# Patient Record
Sex: FEMALE | Race: ASIAN | NOT HISPANIC OR LATINO | Employment: UNEMPLOYED | URBAN - METROPOLITAN AREA
[De-identification: names, ages, dates, MRNs, and addresses within clinical notes are randomized per-mention and may not be internally consistent; named-entity substitution may affect disease eponyms.]

---

## 2017-01-04 ENCOUNTER — APPOINTMENT (OUTPATIENT)
Dept: INTERPRETER SERVICES | Age: 67
End: 2017-01-04

## 2017-01-04 ENCOUNTER — OFFICE VISIT (OUTPATIENT)
Dept: FAMILY MEDICINE | Age: 67
End: 2017-01-04

## 2017-01-04 VITALS
HEIGHT: 59 IN | HEART RATE: 62 BPM | WEIGHT: 167.11 LBS | SYSTOLIC BLOOD PRESSURE: 162 MMHG | BODY MASS INDEX: 33.69 KG/M2 | DIASTOLIC BLOOD PRESSURE: 88 MMHG

## 2017-01-04 DIAGNOSIS — I10 BENIGN ESSENTIAL HYPERTENSION: ICD-10-CM

## 2017-01-04 DIAGNOSIS — Z23 NEED FOR VACCINATION: ICD-10-CM

## 2017-01-04 PROCEDURE — 1036F TOBACCO NON-USER: CPT | Performed by: FAMILY MEDICINE

## 2017-01-04 PROCEDURE — 99214 OFFICE O/P EST MOD 30 MIN: CPT | Performed by: FAMILY MEDICINE

## 2017-01-04 PROCEDURE — G8427 DOCREV CUR MEDS BY ELIG CLIN: HCPCS | Performed by: FAMILY MEDICINE

## 2017-01-04 PROCEDURE — 3017F COLORECTAL CA SCREEN DOC REV: CPT | Performed by: FAMILY MEDICINE

## 2017-01-04 PROCEDURE — G8732 NO DOC OF PAIN: HCPCS | Performed by: FAMILY MEDICINE

## 2017-01-04 PROCEDURE — 3014F SCREEN MAMMO DOC REV: CPT | Performed by: FAMILY MEDICINE

## 2017-01-04 PROCEDURE — 3045F MOST RECENT HEMOGLOBIN A1C 7.0%-9.0%: CPT | Performed by: FAMILY MEDICINE

## 2017-01-04 RX ORDER — SIMVASTATIN 10 MG
10 TABLET ORAL EVERY EVENING
Qty: 90 TABLET | Refills: 1 | Status: SHIPPED | OUTPATIENT
Start: 2017-01-04 | End: 2018-05-08 | Stop reason: SDUPTHER

## 2017-01-04 RX ORDER — PEN NEEDLE, DIABETIC 30 GX5/16"
1 NEEDLE, DISPOSABLE MISCELLANEOUS NIGHTLY
Qty: 100 EACH | Refills: 2 | Status: SHIPPED | OUTPATIENT
Start: 2017-01-04 | End: 2018-05-08 | Stop reason: SDUPTHER

## 2017-01-05 ENCOUNTER — TELEPHONE (OUTPATIENT)
Dept: FAMILY MEDICINE | Age: 67
End: 2017-01-05

## 2017-01-13 ENCOUNTER — OFFICE VISIT (OUTPATIENT)
Dept: EDUCATION SERVICES | Age: 67
End: 2017-01-13
Attending: FAMILY MEDICINE

## 2017-01-13 ENCOUNTER — APPOINTMENT (OUTPATIENT)
Dept: INTERPRETER SERVICES | Age: 67
End: 2017-01-13

## 2017-01-13 VITALS — BODY MASS INDEX: 33.57 KG/M2 | WEIGHT: 166.23 LBS

## 2017-01-13 PROCEDURE — G0108 DIAB MANAGE TRN  PER INDIV: HCPCS | Performed by: REGISTERED NURSE

## 2017-01-13 PROCEDURE — G8428 CUR MEDS NOT DOCUMENT: HCPCS | Performed by: REGISTERED NURSE

## 2017-01-17 ENCOUNTER — OFFICE VISIT (OUTPATIENT)
Dept: EDUCATION SERVICES | Age: 67
End: 2017-01-17

## 2017-01-17 VITALS — BODY MASS INDEX: 33.31 KG/M2 | WEIGHT: 164.9 LBS

## 2017-01-17 DIAGNOSIS — Z71.3 DIETARY COUNSELING AND SURVEILLANCE: ICD-10-CM

## 2017-01-17 PROCEDURE — G8427 DOCREV CUR MEDS BY ELIG CLIN: HCPCS | Performed by: DIETITIAN, REGISTERED

## 2017-01-17 PROCEDURE — 3045F MOST RECENT HEMOGLOBIN A1C 7.0%-9.0%: CPT | Performed by: DIETITIAN, REGISTERED

## 2017-01-17 PROCEDURE — 97802 MEDICAL NUTRITION INDIV IN: CPT | Performed by: DIETITIAN, REGISTERED

## 2017-01-19 ENCOUNTER — TELEPHONE (OUTPATIENT)
Dept: FAMILY MEDICINE | Age: 67
End: 2017-01-19

## 2017-01-19 ENCOUNTER — OFFICE VISIT (OUTPATIENT)
Dept: FAMILY MEDICINE | Age: 67
End: 2017-01-19

## 2017-01-19 ENCOUNTER — TELEPHONE (OUTPATIENT)
Dept: EDUCATION SERVICES | Age: 67
End: 2017-01-19

## 2017-01-19 VITALS
WEIGHT: 165.7 LBS | SYSTOLIC BLOOD PRESSURE: 130 MMHG | TEMPERATURE: 97.9 F | BODY MASS INDEX: 33.4 KG/M2 | HEIGHT: 59 IN | HEART RATE: 64 BPM | DIASTOLIC BLOOD PRESSURE: 78 MMHG

## 2017-01-19 PROCEDURE — 3014F SCREEN MAMMO DOC REV: CPT | Performed by: FAMILY MEDICINE

## 2017-01-19 PROCEDURE — 90662 IIV NO PRSV INCREASED AG IM: CPT | Performed by: FAMILY MEDICINE

## 2017-01-19 PROCEDURE — 1036F TOBACCO NON-USER: CPT | Performed by: FAMILY MEDICINE

## 2017-01-19 PROCEDURE — 3045F MOST RECENT HEMOGLOBIN A1C 7.0%-9.0%: CPT | Performed by: FAMILY MEDICINE

## 2017-01-19 PROCEDURE — 3017F COLORECTAL CA SCREEN DOC REV: CPT | Performed by: FAMILY MEDICINE

## 2017-01-19 PROCEDURE — G0008 ADMIN INFLUENZA VIRUS VAC: HCPCS | Performed by: FAMILY MEDICINE

## 2017-01-19 PROCEDURE — G8427 DOCREV CUR MEDS BY ELIG CLIN: HCPCS | Performed by: FAMILY MEDICINE

## 2017-01-19 PROCEDURE — 99213 OFFICE O/P EST LOW 20 MIN: CPT | Performed by: FAMILY MEDICINE

## 2017-01-19 PROCEDURE — G8732 NO DOC OF PAIN: HCPCS | Performed by: FAMILY MEDICINE

## 2017-01-19 RX ORDER — LANCETS 28 GAUGE
EACH MISCELLANEOUS
Qty: 300 EACH | Refills: 3 | Status: SHIPPED | OUTPATIENT
Start: 2017-01-19 | End: 2017-06-05 | Stop reason: SDUPTHER

## 2017-01-19 RX ORDER — LANCETS 28 GAUGE
EACH MISCELLANEOUS
Qty: 100 EACH | Refills: 3 | Status: SHIPPED | OUTPATIENT
Start: 2017-01-19 | End: 2017-01-19 | Stop reason: SDUPTHER

## 2017-03-06 ENCOUNTER — COMMUNICATION - HEALTHEAST (OUTPATIENT)
Dept: FAMILY MEDICINE | Facility: CLINIC | Age: 67
End: 2017-03-06

## 2017-03-06 DIAGNOSIS — E11.9 DIABETES (H): ICD-10-CM

## 2017-03-13 ENCOUNTER — OFFICE VISIT - HEALTHEAST (OUTPATIENT)
Dept: FAMILY MEDICINE | Facility: CLINIC | Age: 67
End: 2017-03-13

## 2017-03-13 ENCOUNTER — COMMUNICATION - HEALTHEAST (OUTPATIENT)
Dept: FAMILY MEDICINE | Facility: CLINIC | Age: 67
End: 2017-03-13

## 2017-03-13 DIAGNOSIS — M65.312 TRIGGER FINGER OF LEFT THUMB: ICD-10-CM

## 2017-03-13 DIAGNOSIS — E11.43 TYPE 2 DIABETES MELLITUS WITH DIABETIC AUTONOMIC NEUROPATHY, WITHOUT LONG-TERM CURRENT USE OF INSULIN (H): ICD-10-CM

## 2017-03-13 DIAGNOSIS — G62.9 PERIPHERAL POLYNEUROPATHY: ICD-10-CM

## 2017-03-13 DIAGNOSIS — Z85.41 HISTORY OF CERVICAL CANCER: ICD-10-CM

## 2017-03-13 LAB — HBA1C MFR BLD: 7.7 % (ref 3.5–6)

## 2017-03-16 LAB
HPV INTERPRETATION - HISTORICAL: NORMAL
HPV INTERPRETER - HISTORICAL: NORMAL

## 2017-03-20 LAB

## 2017-04-12 ENCOUNTER — COMMUNICATION - HEALTHEAST (OUTPATIENT)
Dept: FAMILY MEDICINE | Facility: CLINIC | Age: 67
End: 2017-04-12

## 2017-05-09 ENCOUNTER — HOSPITAL ENCOUNTER (OUTPATIENT)
Dept: MAMMOGRAPHY | Facility: HOSPITAL | Age: 67
Discharge: HOME | End: 2017-05-09
Attending: FAMILY MEDICINE

## 2017-05-09 DIAGNOSIS — Z12.31 VISIT FOR SCREENING MAMMOGRAM: ICD-10-CM

## 2017-06-05 ENCOUNTER — TELEPHONE (OUTPATIENT)
Dept: FAMILY MEDICINE | Age: 67
End: 2017-06-05

## 2017-06-05 DIAGNOSIS — Z79.4 TYPE 2 DIABETES MELLITUS WITH DIABETIC NEPHROPATHY, WITH LONG-TERM CURRENT USE OF INSULIN (CMD): Primary | ICD-10-CM

## 2017-06-05 DIAGNOSIS — E11.21 TYPE 2 DIABETES MELLITUS WITH DIABETIC NEPHROPATHY, WITH LONG-TERM CURRENT USE OF INSULIN (CMD): Primary | ICD-10-CM

## 2017-06-05 RX ORDER — LANCETS 28 GAUGE
EACH MISCELLANEOUS
Qty: 300 EACH | Refills: 3 | Status: SHIPPED | OUTPATIENT
Start: 2017-06-05 | End: 2019-01-22 | Stop reason: SDUPTHER

## 2017-06-06 RX ORDER — BLOOD SUGAR DIAGNOSTIC
STRIP MISCELLANEOUS
Qty: 350 STRIP | Refills: 3 | Status: SHIPPED | OUTPATIENT
Start: 2017-06-06 | End: 2019-01-22 | Stop reason: SDUPTHER

## 2017-06-13 ENCOUNTER — OFFICE VISIT - HEALTHEAST (OUTPATIENT)
Dept: FAMILY MEDICINE | Facility: CLINIC | Age: 67
End: 2017-06-13

## 2017-06-13 DIAGNOSIS — E11.9 TYPE 2 DIABETES MELLITUS (H): ICD-10-CM

## 2017-06-13 DIAGNOSIS — E11.43 TYPE 2 DIABETES MELLITUS WITH DIABETIC AUTONOMIC NEUROPATHY, WITHOUT LONG-TERM CURRENT USE OF INSULIN (H): ICD-10-CM

## 2017-06-13 DIAGNOSIS — E11.9 DIABETES (H): ICD-10-CM

## 2017-06-13 LAB — HBA1C MFR BLD: 7.7 % (ref 3.5–6)

## 2017-06-14 LAB
CHOLEST SERPL-MCNC: 137 MG/DL
FASTING STATUS PATIENT QL REPORTED: YES
HDLC SERPL-MCNC: 57 MG/DL
LDLC SERPL CALC-MCNC: 62 MG/DL
TRIGL SERPL-MCNC: 88 MG/DL

## 2017-06-19 ENCOUNTER — COMMUNICATION - HEALTHEAST (OUTPATIENT)
Dept: FAMILY MEDICINE | Facility: CLINIC | Age: 67
End: 2017-06-19

## 2017-06-27 ENCOUNTER — LAB SERVICES (OUTPATIENT)
Dept: LAB | Age: 67
End: 2017-06-27

## 2017-06-27 ENCOUNTER — OFFICE VISIT (OUTPATIENT)
Dept: FAMILY MEDICINE | Age: 67
End: 2017-06-27

## 2017-06-27 VITALS
BODY MASS INDEX: 32.98 KG/M2 | SYSTOLIC BLOOD PRESSURE: 138 MMHG | TEMPERATURE: 96 F | DIASTOLIC BLOOD PRESSURE: 87 MMHG | HEIGHT: 59 IN | WEIGHT: 163.58 LBS | HEART RATE: 60 BPM

## 2017-06-27 DIAGNOSIS — N18.2 CKD (CHRONIC KIDNEY DISEASE) STAGE 2, GFR 60-89 ML/MIN: ICD-10-CM

## 2017-06-27 DIAGNOSIS — Z00.00 MEDICARE ANNUAL WELLNESS VISIT, SUBSEQUENT: Primary | ICD-10-CM

## 2017-06-27 DIAGNOSIS — I10 BENIGN ESSENTIAL HYPERTENSION: ICD-10-CM

## 2017-06-27 DIAGNOSIS — R20.0 LEFT ARM NUMBNESS: ICD-10-CM

## 2017-06-27 LAB
ANION GAP SERPL CALC-SCNC: 8 MMOL/L (ref 10–20)
BUN SERPL-MCNC: 14 MG/DL (ref 6–20)
BUN/CREAT SERPL: 16 (ref 7–25)
CALCIUM SERPL-MCNC: 10.2 MG/DL (ref 8.4–10.2)
CHLORIDE SERPL-SCNC: 104 MMOL/L (ref 98–107)
CO2 SERPL-SCNC: 33 MMOL/L (ref 21–32)
CREAT SERPL-MCNC: 0.87 MG/DL (ref 0.51–0.95)
FASTING STATUS PATIENT QL REPORTED: ABNORMAL HRS
GLUCOSE SERPL-MCNC: 114 MG/DL (ref 65–99)
POTASSIUM SERPL-SCNC: 4.9 MMOL/L (ref 3.4–5.1)
SODIUM SERPL-SCNC: 140 MMOL/L (ref 135–145)

## 2017-06-27 PROCEDURE — 3017F COLORECTAL CA SCREEN DOC REV: CPT | Performed by: FAMILY MEDICINE

## 2017-06-27 PROCEDURE — G8732 NO DOC OF PAIN: HCPCS | Performed by: FAMILY MEDICINE

## 2017-06-27 PROCEDURE — 3014F SCREEN MAMMO DOC REV: CPT | Performed by: FAMILY MEDICINE

## 2017-06-27 PROCEDURE — G0439 PPPS, SUBSEQ VISIT: HCPCS | Performed by: FAMILY MEDICINE

## 2017-06-27 PROCEDURE — 3045F MOST RECENT HEMOGLOBIN A1C 7.0%-9.0%: CPT | Performed by: FAMILY MEDICINE

## 2017-06-27 PROCEDURE — 99214 OFFICE O/P EST MOD 30 MIN: CPT | Performed by: FAMILY MEDICINE

## 2017-06-27 PROCEDURE — 80048 BASIC METABOLIC PNL TOTAL CA: CPT | Performed by: INTERNAL MEDICINE

## 2017-06-27 PROCEDURE — G8427 DOCREV CUR MEDS BY ELIG CLIN: HCPCS | Performed by: FAMILY MEDICINE

## 2017-06-27 PROCEDURE — 1036F TOBACCO NON-USER: CPT | Performed by: FAMILY MEDICINE

## 2017-06-27 PROCEDURE — 36415 COLL VENOUS BLD VENIPUNCTURE: CPT | Performed by: INTERNAL MEDICINE

## 2017-06-27 ASSESSMENT — PATIENT HEALTH QUESTIONNAIRE - PHQ9
2. FEELING DOWN, DEPRESSED OR HOPELESS: NO
1. LITTLE INTEREST OR PLEASURE IN DOING THINGS: NO

## 2017-06-28 ENCOUNTER — TELEPHONE (OUTPATIENT)
Dept: FAMILY MEDICINE | Age: 67
End: 2017-06-28

## 2017-06-28 DIAGNOSIS — E11.9 CONTROLLED TYPE 2 DIABETES MELLITUS WITHOUT COMPLICATION, WITHOUT LONG-TERM CURRENT USE OF INSULIN (CMD): Primary | ICD-10-CM

## 2017-06-28 LAB — HBA1C MFR BLD: 6.5 % (ref 4.5–5.6)

## 2017-07-26 ENCOUNTER — OFFICE VISIT (OUTPATIENT)
Dept: NEUROLOGY | Age: 67
End: 2017-07-26
Attending: FAMILY MEDICINE

## 2017-07-26 ENCOUNTER — NURSE ONLY (OUTPATIENT)
Dept: FAMILY MEDICINE | Age: 67
End: 2017-07-26

## 2017-07-26 ENCOUNTER — APPOINTMENT (OUTPATIENT)
Dept: INTERPRETER SERVICES | Age: 67
End: 2017-07-26

## 2017-07-26 DIAGNOSIS — R20.0 LEFT ARM NUMBNESS: Primary | ICD-10-CM

## 2017-07-26 PROCEDURE — 95886 MUSC TEST DONE W/N TEST COMP: CPT | Performed by: PSYCHIATRY & NEUROLOGY

## 2017-07-26 PROCEDURE — 95909 NRV CNDJ TST 5-6 STUDIES: CPT | Performed by: PSYCHIATRY & NEUROLOGY

## 2017-07-26 RX ORDER — LOSARTAN POTASSIUM 50 MG/1
50 TABLET ORAL 2 TIMES DAILY
Qty: 180 TABLET | Refills: 1 | Status: SHIPPED | OUTPATIENT
Start: 2017-07-26 | End: 2020-02-05 | Stop reason: SDUPTHER

## 2017-07-26 RX ORDER — AMLODIPINE BESYLATE 10 MG/1
10 TABLET ORAL DAILY
Qty: 90 TABLET | Refills: 1 | Status: SHIPPED | OUTPATIENT
Start: 2017-07-26 | End: 2018-05-08 | Stop reason: SDUPTHER

## 2017-07-31 ENCOUNTER — TELEPHONE (OUTPATIENT)
Dept: FAMILY MEDICINE | Age: 67
End: 2017-07-31

## 2017-09-19 ENCOUNTER — OFFICE VISIT - HEALTHEAST (OUTPATIENT)
Dept: FAMILY MEDICINE | Facility: CLINIC | Age: 67
End: 2017-09-19

## 2017-09-19 DIAGNOSIS — E11.9 DIABETES (H): ICD-10-CM

## 2017-09-19 LAB — HBA1C MFR BLD: 7.9 % (ref 3.5–6)

## 2017-10-03 ENCOUNTER — OFFICE VISIT - HEALTHEAST (OUTPATIENT)
Dept: FAMILY MEDICINE | Facility: CLINIC | Age: 67
End: 2017-10-03

## 2017-10-03 DIAGNOSIS — H61.21 RIGHT EAR IMPACTED CERUMEN: ICD-10-CM

## 2017-10-30 RX ORDER — INSULIN GLARGINE 100 [IU]/ML
INJECTION, SOLUTION SUBCUTANEOUS
Qty: 15 ML | Refills: 5 | Status: SHIPPED | OUTPATIENT
Start: 2017-10-30 | End: 2018-05-08 | Stop reason: SDUPTHER

## 2017-12-06 ENCOUNTER — COMMUNICATION - HEALTHEAST (OUTPATIENT)
Dept: FAMILY MEDICINE | Facility: CLINIC | Age: 67
End: 2017-12-06

## 2017-12-06 DIAGNOSIS — E11.9 TYPE 2 DIABETES MELLITUS (H): ICD-10-CM

## 2017-12-18 ENCOUNTER — OFFICE VISIT - HEALTHEAST (OUTPATIENT)
Dept: FAMILY MEDICINE | Facility: CLINIC | Age: 67
End: 2017-12-18

## 2017-12-18 DIAGNOSIS — E11.43 TYPE 2 DIABETES MELLITUS WITH DIABETIC AUTONOMIC NEUROPATHY, WITHOUT LONG-TERM CURRENT USE OF INSULIN (H): ICD-10-CM

## 2017-12-18 DIAGNOSIS — Z12.11 SCREENING FOR COLON CANCER: ICD-10-CM

## 2017-12-19 ENCOUNTER — AMBULATORY - HEALTHEAST (OUTPATIENT)
Dept: LAB | Facility: CLINIC | Age: 67
End: 2017-12-19

## 2017-12-19 DIAGNOSIS — E11.43 TYPE 2 DIABETES MELLITUS WITH DIABETIC AUTONOMIC NEUROPATHY, WITHOUT LONG-TERM CURRENT USE OF INSULIN (H): ICD-10-CM

## 2017-12-19 LAB — HBA1C MFR BLD: 7.8 % (ref 3.5–6)

## 2018-01-08 ENCOUNTER — RECORDS - HEALTHEAST (OUTPATIENT)
Dept: ADMINISTRATIVE | Facility: OTHER | Age: 68
End: 2018-01-08

## 2018-01-31 ENCOUNTER — TELEPHONE (OUTPATIENT)
Dept: FAMILY MEDICINE | Age: 68
End: 2018-01-31

## 2018-01-31 RX ORDER — METFORMIN HYDROCHLORIDE 500 MG/1
2000 TABLET, EXTENDED RELEASE ORAL
Qty: 360 TABLET | Refills: 0 | Status: SHIPPED | OUTPATIENT
Start: 2018-01-31 | End: 2019-08-21 | Stop reason: SDUPTHER

## 2018-03-19 ENCOUNTER — OFFICE VISIT - HEALTHEAST (OUTPATIENT)
Dept: FAMILY MEDICINE | Facility: CLINIC | Age: 68
End: 2018-03-19

## 2018-03-19 DIAGNOSIS — E11.43 TYPE 2 DIABETES MELLITUS WITH DIABETIC AUTONOMIC NEUROPATHY, WITHOUT LONG-TERM CURRENT USE OF INSULIN (H): ICD-10-CM

## 2018-03-19 LAB — HBA1C MFR BLD: 7.7 % (ref 3.5–6)

## 2018-03-20 ENCOUNTER — APPOINTMENT (OUTPATIENT)
Dept: INTERPRETER SERVICES | Age: 68
End: 2018-03-20

## 2018-03-20 ENCOUNTER — OFFICE VISIT (OUTPATIENT)
Dept: FAMILY MEDICINE | Age: 68
End: 2018-03-20

## 2018-03-20 VITALS
TEMPERATURE: 98.1 F | WEIGHT: 167.77 LBS | DIASTOLIC BLOOD PRESSURE: 74 MMHG | HEART RATE: 68 BPM | SYSTOLIC BLOOD PRESSURE: 154 MMHG | HEIGHT: 59 IN | BODY MASS INDEX: 33.82 KG/M2

## 2018-03-20 DIAGNOSIS — I10 BENIGN ESSENTIAL HYPERTENSION: Primary | ICD-10-CM

## 2018-03-20 DIAGNOSIS — N18.30 CONTROLLED TYPE 2 DIABETES MELLITUS WITH STAGE 3 CHRONIC KIDNEY DISEASE, WITH LONG-TERM CURRENT USE OF INSULIN (CMD): ICD-10-CM

## 2018-03-20 DIAGNOSIS — Z79.4 CONTROLLED TYPE 2 DIABETES MELLITUS WITH STAGE 3 CHRONIC KIDNEY DISEASE, WITH LONG-TERM CURRENT USE OF INSULIN (CMD): ICD-10-CM

## 2018-03-20 DIAGNOSIS — E11.22 CONTROLLED TYPE 2 DIABETES MELLITUS WITH STAGE 3 CHRONIC KIDNEY DISEASE, WITH LONG-TERM CURRENT USE OF INSULIN (CMD): ICD-10-CM

## 2018-03-20 PROCEDURE — 99214 OFFICE O/P EST MOD 30 MIN: CPT | Performed by: FAMILY MEDICINE

## 2018-03-23 ENCOUNTER — NURSE ONLY (OUTPATIENT)
Dept: FAMILY MEDICINE | Age: 68
End: 2018-03-23

## 2018-03-23 ENCOUNTER — LAB SERVICES (OUTPATIENT)
Dept: LAB | Age: 68
End: 2018-03-23

## 2018-03-23 ENCOUNTER — TELEPHONE (OUTPATIENT)
Dept: FAMILY MEDICINE | Age: 68
End: 2018-03-23

## 2018-03-23 VITALS — SYSTOLIC BLOOD PRESSURE: 130 MMHG | HEART RATE: 80 BPM | DIASTOLIC BLOOD PRESSURE: 78 MMHG

## 2018-03-23 DIAGNOSIS — E11.9 CONTROLLED TYPE 2 DIABETES MELLITUS WITHOUT COMPLICATION, WITHOUT LONG-TERM CURRENT USE OF INSULIN (CMD): ICD-10-CM

## 2018-03-23 LAB
ALBUMIN SERPL-MCNC: 4 G/DL (ref 3.6–5.1)
ALBUMIN/GLOB SERPL: 1 {RATIO} (ref 1–2.4)
ALP SERPL-CCNC: 103 UNITS/L (ref 45–117)
ALT SERPL-CCNC: 32 UNITS/L
ANION GAP SERPL CALC-SCNC: 14 MMOL/L (ref 10–20)
AST SERPL-CCNC: 24 UNITS/L
BILIRUB SERPL-MCNC: 0.6 MG/DL (ref 0.2–1)
BUN SERPL-MCNC: 14 MG/DL (ref 6–20)
BUN/CREAT SERPL: 17 (ref 7–25)
CALCIUM SERPL-MCNC: 10.3 MG/DL (ref 8.4–10.2)
CHLORIDE SERPL-SCNC: 103 MMOL/L (ref 98–107)
CHOLEST SERPL-MCNC: 195 MG/DL
CHOLEST/HDLC SERPL: 4.6 {RATIO}
CO2 SERPL-SCNC: 28 MMOL/L (ref 21–32)
CREAT SERPL-MCNC: 0.81 MG/DL (ref 0.51–0.95)
FASTING STATUS PATIENT QL REPORTED: 12 HRS
GLOBULIN SER-MCNC: 4.2 G/DL (ref 2–4)
GLUCOSE SERPL-MCNC: 137 MG/DL (ref 65–99)
HDLC SERPL-MCNC: 42 MG/DL
LDLC SERPL-MCNC: 111 MG/DL
LENGTH OF FAST TIME PATIENT: 12 HRS
NONHDLC SERPL-MCNC: 153 MG/DL
POTASSIUM SERPL-SCNC: 4.2 MMOL/L (ref 3.4–5.1)
PROT SERPL-MCNC: 8.2 G/DL (ref 6.4–8.2)
SODIUM SERPL-SCNC: 141 MMOL/L (ref 135–145)
TRIGL SERPL-MCNC: 211 MG/DL

## 2018-03-23 PROCEDURE — 36415 COLL VENOUS BLD VENIPUNCTURE: CPT | Performed by: INTERNAL MEDICINE

## 2018-03-23 PROCEDURE — 80053 COMPREHEN METABOLIC PANEL: CPT | Performed by: INTERNAL MEDICINE

## 2018-03-24 LAB
CREAT UR-MCNC: 232 MG/DL
HBA1C MFR BLD: 7.1 % (ref 4.5–5.6)
MICROALBUMIN UR-MCNC: 9.36 MG/DL
MICROALBUMIN/CREAT UR: 40.3 MCG/MG

## 2018-03-26 ENCOUNTER — TELEPHONE (OUTPATIENT)
Dept: FAMILY MEDICINE | Age: 68
End: 2018-03-26

## 2018-03-27 ENCOUNTER — APPOINTMENT (OUTPATIENT)
Dept: INTERPRETER SERVICES | Age: 68
End: 2018-03-27

## 2018-03-27 ENCOUNTER — OFFICE VISIT (OUTPATIENT)
Dept: OPHTHALMOLOGY | Age: 68
End: 2018-03-27
Attending: FAMILY MEDICINE

## 2018-03-27 DIAGNOSIS — E11.22 CONTROLLED TYPE 2 DIABETES MELLITUS WITH STAGE 3 CHRONIC KIDNEY DISEASE, WITH LONG-TERM CURRENT USE OF INSULIN (CMD): Primary | ICD-10-CM

## 2018-03-27 DIAGNOSIS — Z79.4 CONTROLLED TYPE 2 DIABETES MELLITUS WITH STAGE 3 CHRONIC KIDNEY DISEASE, WITH LONG-TERM CURRENT USE OF INSULIN (CMD): Primary | ICD-10-CM

## 2018-03-27 DIAGNOSIS — N18.30 CONTROLLED TYPE 2 DIABETES MELLITUS WITH STAGE 3 CHRONIC KIDNEY DISEASE, WITH LONG-TERM CURRENT USE OF INSULIN (CMD): Primary | ICD-10-CM

## 2018-03-27 DIAGNOSIS — H25.813 COMBINED FORMS OF AGE-RELATED CATARACT OF BOTH EYES: ICD-10-CM

## 2018-03-27 PROCEDURE — 92004 COMPRE OPH EXAM NEW PT 1/>: CPT | Performed by: OPTOMETRIST

## 2018-03-27 ASSESSMENT — REFRACTION_WEARINGRX
OD_SPHERE: +2.25
OD_CYLINDER: SPHERE
OS_SPHERE: +2.00
SPECS_TYPE: SINGLE VISION
OS_CYLINDER: SPHERE

## 2018-03-27 ASSESSMENT — VISUAL ACUITY
METHOD: SNELLEN - LINEAR
OD_PH_SC+: +1
OD_SC: 20/50
OS_SC: 20/40
CORRECTION_TYPE: GLASSES
OD_SC+: +1
OD_PH_SC: 20/25
OS_PH_SC: 20/30
OS_SC+: -2

## 2018-03-27 ASSESSMENT — TONOMETRY
OS_IOP_MMHG: 18
IOP_METHOD: APPLANATION
OD_IOP_MMHG: 18

## 2018-05-02 ENCOUNTER — OFFICE VISIT - HEALTHEAST (OUTPATIENT)
Dept: FAMILY MEDICINE | Facility: CLINIC | Age: 68
End: 2018-05-02

## 2018-05-02 DIAGNOSIS — E87.1 HYPONATREMIA: ICD-10-CM

## 2018-05-02 DIAGNOSIS — N17.9 ACUTE RENAL FAILURE, UNSPECIFIED ACUTE RENAL FAILURE TYPE (H): ICD-10-CM

## 2018-05-02 DIAGNOSIS — E11.43 TYPE 2 DIABETES MELLITUS WITH DIABETIC AUTONOMIC NEUROPATHY, WITHOUT LONG-TERM CURRENT USE OF INSULIN (H): ICD-10-CM

## 2018-05-02 DIAGNOSIS — D64.9 ANEMIA, UNSPECIFIED TYPE: ICD-10-CM

## 2018-05-02 DIAGNOSIS — E83.42 HYPOMAGNESEMIA: ICD-10-CM

## 2018-05-02 LAB
ANION GAP SERPL CALCULATED.3IONS-SCNC: 9 MMOL/L (ref 5–18)
BUN SERPL-MCNC: 8 MG/DL (ref 8–22)
CALCIUM SERPL-MCNC: 9.1 MG/DL (ref 8.5–10.5)
CHLORIDE BLD-SCNC: 102 MMOL/L (ref 98–107)
CO2 SERPL-SCNC: 26 MMOL/L (ref 22–31)
CREAT SERPL-MCNC: 0.72 MG/DL (ref 0.6–1.1)
ERYTHROCYTE [DISTWIDTH] IN BLOOD BY AUTOMATED COUNT: 12.6 % (ref 11–14.5)
GFR SERPL CREATININE-BSD FRML MDRD: >60 ML/MIN/1.73M2
GLUCOSE BLD-MCNC: 96 MG/DL (ref 70–125)
HCT VFR BLD AUTO: 31.9 % (ref 35–47)
HGB BLD-MCNC: 11 G/DL (ref 12–16)
MAGNESIUM SERPL-MCNC: 2 MG/DL (ref 1.8–2.6)
MCH RBC QN AUTO: 31.2 PG (ref 27–34)
MCHC RBC AUTO-ENTMCNC: 34.7 G/DL (ref 32–36)
MCV RBC AUTO: 90 FL (ref 80–100)
PLATELET # BLD AUTO: 354 THOU/UL (ref 140–440)
PMV BLD AUTO: 6.7 FL (ref 7–10)
POTASSIUM BLD-SCNC: 4.2 MMOL/L (ref 3.5–5)
RBC # BLD AUTO: 3.54 MILL/UL (ref 3.8–5.4)
SODIUM SERPL-SCNC: 137 MMOL/L (ref 136–145)
WBC: 4.7 THOU/UL (ref 4–11)

## 2018-05-02 ASSESSMENT — MIFFLIN-ST. JEOR: SCORE: 972.64

## 2018-05-03 ENCOUNTER — COMMUNICATION - HEALTHEAST (OUTPATIENT)
Dept: FAMILY MEDICINE | Facility: CLINIC | Age: 68
End: 2018-05-03

## 2018-05-04 ENCOUNTER — COMMUNICATION - HEALTHEAST (OUTPATIENT)
Dept: FAMILY MEDICINE | Facility: CLINIC | Age: 68
End: 2018-05-04

## 2018-05-04 DIAGNOSIS — E78.5 HYPERLIPIDEMIA: ICD-10-CM

## 2018-05-08 ENCOUNTER — APPOINTMENT (OUTPATIENT)
Dept: INTERPRETER SERVICES | Age: 68
End: 2018-05-08

## 2018-05-08 ENCOUNTER — OFFICE VISIT (OUTPATIENT)
Dept: FAMILY MEDICINE | Age: 68
End: 2018-05-08

## 2018-05-08 VITALS
DIASTOLIC BLOOD PRESSURE: 80 MMHG | HEIGHT: 59 IN | TEMPERATURE: 98.5 F | WEIGHT: 166.4 LBS | SYSTOLIC BLOOD PRESSURE: 136 MMHG | BODY MASS INDEX: 33.55 KG/M2 | HEART RATE: 68 BPM

## 2018-05-08 DIAGNOSIS — I10 BENIGN ESSENTIAL HYPERTENSION: ICD-10-CM

## 2018-05-08 DIAGNOSIS — E11.22 CONTROLLED TYPE 2 DIABETES MELLITUS WITH STAGE 3 CHRONIC KIDNEY DISEASE, WITH LONG-TERM CURRENT USE OF INSULIN (CMD): Primary | ICD-10-CM

## 2018-05-08 DIAGNOSIS — Z79.4 CONTROLLED TYPE 2 DIABETES MELLITUS WITH STAGE 3 CHRONIC KIDNEY DISEASE, WITH LONG-TERM CURRENT USE OF INSULIN (CMD): Primary | ICD-10-CM

## 2018-05-08 DIAGNOSIS — N18.2 CKD (CHRONIC KIDNEY DISEASE) STAGE 2, GFR 60-89 ML/MIN: ICD-10-CM

## 2018-05-08 DIAGNOSIS — N18.30 CONTROLLED TYPE 2 DIABETES MELLITUS WITH STAGE 3 CHRONIC KIDNEY DISEASE, WITH LONG-TERM CURRENT USE OF INSULIN (CMD): Primary | ICD-10-CM

## 2018-05-08 PROCEDURE — 99214 OFFICE O/P EST MOD 30 MIN: CPT | Performed by: FAMILY MEDICINE

## 2018-05-08 RX ORDER — AMLODIPINE BESYLATE 10 MG/1
10 TABLET ORAL DAILY
Qty: 90 TABLET | Refills: 0 | Status: SHIPPED | OUTPATIENT
Start: 2018-05-08 | End: 2019-08-14 | Stop reason: SDUPTHER

## 2018-05-08 RX ORDER — PEN NEEDLE, DIABETIC 30 GX5/16"
1 NEEDLE, DISPOSABLE MISCELLANEOUS NIGHTLY
Qty: 100 EACH | Refills: 0 | Status: SHIPPED | OUTPATIENT
Start: 2018-05-08 | End: 2018-11-28 | Stop reason: SDUPTHER

## 2018-05-08 RX ORDER — SIMVASTATIN 10 MG
10 TABLET ORAL EVERY EVENING
Qty: 90 TABLET | Refills: 0 | Status: SHIPPED | OUTPATIENT
Start: 2018-05-08 | End: 2018-11-28 | Stop reason: SDUPTHER

## 2018-05-10 ENCOUNTER — OFFICE VISIT - HEALTHEAST (OUTPATIENT)
Dept: PHARMACY | Facility: CLINIC | Age: 68
End: 2018-05-10

## 2018-05-10 DIAGNOSIS — G62.9 NEUROPATHY: ICD-10-CM

## 2018-05-10 DIAGNOSIS — E87.1 HYPONATREMIA: ICD-10-CM

## 2018-05-10 DIAGNOSIS — E11.43 TYPE 2 DIABETES MELLITUS WITH DIABETIC AUTONOMIC NEUROPATHY, WITHOUT LONG-TERM CURRENT USE OF INSULIN (H): ICD-10-CM

## 2018-05-10 DIAGNOSIS — E83.42 HYPOMAGNESEMIA: ICD-10-CM

## 2018-06-25 ENCOUNTER — COMMUNICATION - HEALTHEAST (OUTPATIENT)
Dept: FAMILY MEDICINE | Facility: CLINIC | Age: 68
End: 2018-06-25

## 2018-06-25 ENCOUNTER — OFFICE VISIT - HEALTHEAST (OUTPATIENT)
Dept: FAMILY MEDICINE | Facility: CLINIC | Age: 68
End: 2018-06-25

## 2018-06-25 DIAGNOSIS — E11.43 TYPE 2 DIABETES MELLITUS WITH DIABETIC AUTONOMIC NEUROPATHY, WITHOUT LONG-TERM CURRENT USE OF INSULIN (H): ICD-10-CM

## 2018-06-25 DIAGNOSIS — E87.1 HYPONATREMIA: ICD-10-CM

## 2018-06-25 LAB
CREAT UR-MCNC: 63.9 MG/DL
HBA1C MFR BLD: 7.8 % (ref 3.5–6)
MAGNESIUM SERPL-MCNC: 2 MG/DL (ref 1.8–2.6)
MICROALBUMIN UR-MCNC: 0.83 MG/DL (ref 0–1.99)
MICROALBUMIN/CREAT UR: 13 MG/G
SODIUM SERPL-SCNC: 139 MMOL/L (ref 136–145)

## 2018-06-26 ENCOUNTER — LAB SERVICES (OUTPATIENT)
Dept: LAB | Age: 68
End: 2018-06-26

## 2018-06-26 ENCOUNTER — OFFICE VISIT (OUTPATIENT)
Dept: FAMILY MEDICINE | Age: 68
End: 2018-06-26

## 2018-06-26 VITALS
TEMPERATURE: 96.7 F | BODY MASS INDEX: 33.12 KG/M2 | SYSTOLIC BLOOD PRESSURE: 134 MMHG | HEART RATE: 64 BPM | RESPIRATION RATE: 16 BRPM | WEIGHT: 164 LBS | DIASTOLIC BLOOD PRESSURE: 76 MMHG

## 2018-06-26 DIAGNOSIS — N18.30 CONTROLLED TYPE 2 DIABETES MELLITUS WITH STAGE 3 CHRONIC KIDNEY DISEASE, WITH LONG-TERM CURRENT USE OF INSULIN (CMD): ICD-10-CM

## 2018-06-26 DIAGNOSIS — Z79.4 CONTROLLED TYPE 2 DIABETES MELLITUS WITH STAGE 3 CHRONIC KIDNEY DISEASE, WITH LONG-TERM CURRENT USE OF INSULIN (CMD): Primary | ICD-10-CM

## 2018-06-26 DIAGNOSIS — I10 BENIGN ESSENTIAL HYPERTENSION: ICD-10-CM

## 2018-06-26 DIAGNOSIS — K21.9 GASTROESOPHAGEAL REFLUX DISEASE WITHOUT ESOPHAGITIS: ICD-10-CM

## 2018-06-26 DIAGNOSIS — E11.22 CONTROLLED TYPE 2 DIABETES MELLITUS WITH STAGE 3 CHRONIC KIDNEY DISEASE, WITH LONG-TERM CURRENT USE OF INSULIN (CMD): Primary | ICD-10-CM

## 2018-06-26 DIAGNOSIS — N18.30 CONTROLLED TYPE 2 DIABETES MELLITUS WITH STAGE 3 CHRONIC KIDNEY DISEASE, WITH LONG-TERM CURRENT USE OF INSULIN (CMD): Primary | ICD-10-CM

## 2018-06-26 DIAGNOSIS — E11.22 CONTROLLED TYPE 2 DIABETES MELLITUS WITH STAGE 3 CHRONIC KIDNEY DISEASE, WITH LONG-TERM CURRENT USE OF INSULIN (CMD): ICD-10-CM

## 2018-06-26 DIAGNOSIS — Z79.4 CONTROLLED TYPE 2 DIABETES MELLITUS WITH STAGE 3 CHRONIC KIDNEY DISEASE, WITH LONG-TERM CURRENT USE OF INSULIN (CMD): ICD-10-CM

## 2018-06-26 LAB
ALBUMIN SERPL-MCNC: 3.9 G/DL (ref 3.6–5.1)
ALBUMIN/GLOB SERPL: 0.9 {RATIO} (ref 1–2.4)
ALP SERPL-CCNC: 125 UNITS/L (ref 45–117)
ALT SERPL-CCNC: 30 UNITS/L
ANION GAP SERPL CALC-SCNC: 11 MMOL/L (ref 10–20)
AST SERPL-CCNC: 23 UNITS/L
BILIRUB SERPL-MCNC: 0.4 MG/DL (ref 0.2–1)
BUN SERPL-MCNC: 21 MG/DL (ref 6–20)
BUN/CREAT SERPL: 23 (ref 7–25)
CALCIUM SERPL-MCNC: 9.8 MG/DL (ref 8.4–10.2)
CHLORIDE SERPL-SCNC: 102 MMOL/L (ref 98–107)
CO2 SERPL-SCNC: 30 MMOL/L (ref 21–32)
CREAT SERPL-MCNC: 0.93 MG/DL (ref 0.51–0.95)
FASTING STATUS PATIENT QL REPORTED: 0 HRS
GLOBULIN SER-MCNC: 4.4 G/DL (ref 2–4)
GLUCOSE SERPL-MCNC: 122 MG/DL (ref 65–99)
POTASSIUM SERPL-SCNC: 5.3 MMOL/L (ref 3.4–5.1)
PROT SERPL-MCNC: 8.3 G/DL (ref 6.4–8.2)
SODIUM SERPL-SCNC: 138 MMOL/L (ref 135–145)

## 2018-06-26 PROCEDURE — 80053 COMPREHEN METABOLIC PANEL: CPT | Performed by: INTERNAL MEDICINE

## 2018-06-26 PROCEDURE — 36415 COLL VENOUS BLD VENIPUNCTURE: CPT | Performed by: INTERNAL MEDICINE

## 2018-06-26 PROCEDURE — 99214 OFFICE O/P EST MOD 30 MIN: CPT | Performed by: FAMILY MEDICINE

## 2018-06-27 ENCOUNTER — TELEPHONE (OUTPATIENT)
Dept: FAMILY MEDICINE | Age: 68
End: 2018-06-27

## 2018-06-27 LAB — HBA1C MFR BLD: 7.3 % (ref 4.5–5.6)

## 2018-08-10 ENCOUNTER — OFFICE VISIT (OUTPATIENT)
Dept: FAMILY MEDICINE | Age: 68
End: 2018-08-10

## 2018-08-10 VITALS
HEART RATE: 62 BPM | TEMPERATURE: 97.6 F | WEIGHT: 162.1 LBS | SYSTOLIC BLOOD PRESSURE: 140 MMHG | BODY MASS INDEX: 32.68 KG/M2 | HEIGHT: 59 IN | DIASTOLIC BLOOD PRESSURE: 78 MMHG

## 2018-08-10 DIAGNOSIS — Z12.31 ENCOUNTER FOR SCREENING MAMMOGRAM FOR MALIGNANT NEOPLASM OF BREAST: ICD-10-CM

## 2018-08-10 DIAGNOSIS — Z00.00 MEDICARE ANNUAL WELLNESS VISIT, SUBSEQUENT: Primary | ICD-10-CM

## 2018-08-10 DIAGNOSIS — I12.9 BENIGN HYPERTENSION WITH CKD (CHRONIC KIDNEY DISEASE) STAGE III (CMD): ICD-10-CM

## 2018-08-10 DIAGNOSIS — N18.30 BENIGN HYPERTENSION WITH CKD (CHRONIC KIDNEY DISEASE) STAGE III (CMD): ICD-10-CM

## 2018-08-10 DIAGNOSIS — Z23 NEED FOR SHINGLES VACCINE: ICD-10-CM

## 2018-08-10 PROCEDURE — G0439 PPPS, SUBSEQ VISIT: HCPCS | Performed by: PHYSICIAN ASSISTANT

## 2018-08-10 ASSESSMENT — PATIENT HEALTH QUESTIONNAIRE - PHQ9
SUM OF ALL RESPONSES TO PHQ9 QUESTIONS 1 AND 2: 0
CLINICAL INTERPRETATION OF PHQ2 SCORE: 0

## 2018-08-14 ENCOUNTER — OFFICE VISIT (OUTPATIENT)
Dept: EDUCATION SERVICES | Age: 68
End: 2018-08-14
Attending: PHYSICIAN ASSISTANT

## 2018-08-14 VITALS — WEIGHT: 163.14 LBS | BODY MASS INDEX: 33.23 KG/M2

## 2018-08-14 DIAGNOSIS — Z79.4 CONTROLLED TYPE 2 DIABETES MELLITUS WITH STAGE 3 CHRONIC KIDNEY DISEASE, WITH LONG-TERM CURRENT USE OF INSULIN (CMD): ICD-10-CM

## 2018-08-14 DIAGNOSIS — Z71.3 DIETARY COUNSELING AND SURVEILLANCE: ICD-10-CM

## 2018-08-14 DIAGNOSIS — E11.22 CONTROLLED TYPE 2 DIABETES MELLITUS WITH STAGE 3 CHRONIC KIDNEY DISEASE, WITH LONG-TERM CURRENT USE OF INSULIN (CMD): ICD-10-CM

## 2018-08-14 DIAGNOSIS — N18.30 CONTROLLED TYPE 2 DIABETES MELLITUS WITH STAGE 3 CHRONIC KIDNEY DISEASE, WITH LONG-TERM CURRENT USE OF INSULIN (CMD): ICD-10-CM

## 2018-08-14 PROCEDURE — G0108 DIAB MANAGE TRN  PER INDIV: HCPCS | Performed by: DIETITIAN, REGISTERED

## 2018-08-20 ENCOUNTER — TELEPHONE (OUTPATIENT)
Dept: EDUCATION SERVICES | Age: 68
End: 2018-08-20

## 2018-09-01 ENCOUNTER — COMMUNICATION - HEALTHEAST (OUTPATIENT)
Dept: FAMILY MEDICINE | Facility: CLINIC | Age: 68
End: 2018-09-01

## 2018-09-01 DIAGNOSIS — E78.5 HYPERLIPIDEMIA: ICD-10-CM

## 2018-09-01 DIAGNOSIS — E11.9 DIABETES (H): ICD-10-CM

## 2018-09-05 ENCOUNTER — COMMUNICATION - HEALTHEAST (OUTPATIENT)
Dept: FAMILY MEDICINE | Facility: CLINIC | Age: 68
End: 2018-09-05

## 2018-09-05 DIAGNOSIS — E11.9 TYPE 2 DIABETES MELLITUS (H): ICD-10-CM

## 2018-10-01 ENCOUNTER — OFFICE VISIT - HEALTHEAST (OUTPATIENT)
Dept: FAMILY MEDICINE | Facility: CLINIC | Age: 68
End: 2018-10-01

## 2018-10-01 ENCOUNTER — COMMUNICATION - HEALTHEAST (OUTPATIENT)
Dept: FAMILY MEDICINE | Facility: CLINIC | Age: 68
End: 2018-10-01

## 2018-10-01 DIAGNOSIS — E11.43 TYPE 2 DIABETES MELLITUS WITH DIABETIC AUTONOMIC NEUROPATHY, WITHOUT LONG-TERM CURRENT USE OF INSULIN (H): ICD-10-CM

## 2018-10-01 DIAGNOSIS — M79.2 NERVE PAIN: ICD-10-CM

## 2018-10-01 LAB
ALBUMIN SERPL-MCNC: 4.1 G/DL (ref 3.5–5)
ALP SERPL-CCNC: 62 U/L (ref 45–120)
ALT SERPL W P-5'-P-CCNC: 22 U/L (ref 0–45)
ANION GAP SERPL CALCULATED.3IONS-SCNC: 11 MMOL/L (ref 5–18)
AST SERPL W P-5'-P-CCNC: 24 U/L (ref 0–40)
BILIRUB SERPL-MCNC: 0.9 MG/DL (ref 0–1)
BUN SERPL-MCNC: 10 MG/DL (ref 8–28)
CALCIUM SERPL-MCNC: 9.8 MG/DL (ref 8.5–10.5)
CHLORIDE BLD-SCNC: 99 MMOL/L (ref 98–107)
CO2 SERPL-SCNC: 25 MMOL/L (ref 22–31)
CREAT SERPL-MCNC: 0.71 MG/DL (ref 0.6–1.1)
FERRITIN SERPL-MCNC: 63 NG/ML (ref 10–130)
GFR SERPL CREATININE-BSD FRML MDRD: >60 ML/MIN/1.73M2
GLUCOSE BLD-MCNC: 101 MG/DL (ref 70–125)
HBA1C MFR BLD: 8 % (ref 3.5–6)
MAGNESIUM SERPL-MCNC: 2 MG/DL (ref 1.8–2.6)
POTASSIUM BLD-SCNC: 4.3 MMOL/L (ref 3.5–5)
PROT SERPL-MCNC: 6.8 G/DL (ref 6–8)
SODIUM SERPL-SCNC: 135 MMOL/L (ref 136–145)
VIT B12 SERPL-MCNC: 183 PG/ML (ref 213–816)

## 2018-10-02 ENCOUNTER — COMMUNICATION - HEALTHEAST (OUTPATIENT)
Dept: FAMILY MEDICINE | Facility: CLINIC | Age: 68
End: 2018-10-02

## 2018-10-02 ENCOUNTER — AMBULATORY - HEALTHEAST (OUTPATIENT)
Dept: FAMILY MEDICINE | Facility: CLINIC | Age: 68
End: 2018-10-02

## 2018-10-02 DIAGNOSIS — E53.8 VITAMIN B12 DEFICIENCY (NON ANEMIC): ICD-10-CM

## 2018-10-11 ENCOUNTER — AMBULATORY - HEALTHEAST (OUTPATIENT)
Dept: NURSING | Facility: CLINIC | Age: 68
End: 2018-10-11

## 2018-10-18 ENCOUNTER — AMBULATORY - HEALTHEAST (OUTPATIENT)
Dept: NURSING | Facility: CLINIC | Age: 68
End: 2018-10-18

## 2018-10-25 ENCOUNTER — AMBULATORY - HEALTHEAST (OUTPATIENT)
Dept: NURSING | Facility: CLINIC | Age: 68
End: 2018-10-25

## 2018-11-01 ENCOUNTER — AMBULATORY - HEALTHEAST (OUTPATIENT)
Dept: NURSING | Facility: CLINIC | Age: 68
End: 2018-11-01

## 2018-11-01 ENCOUNTER — COMMUNICATION - HEALTHEAST (OUTPATIENT)
Dept: PHARMACY | Facility: CLINIC | Age: 68
End: 2018-11-01

## 2018-11-12 ENCOUNTER — LAB SERVICES (OUTPATIENT)
Dept: LAB | Age: 68
End: 2018-11-12

## 2018-11-12 LAB — HBA1C MFR BLD: 6.7 % (ref 4.5–5.6)

## 2018-11-12 PROCEDURE — 36415 COLL VENOUS BLD VENIPUNCTURE: CPT | Performed by: INTERNAL MEDICINE

## 2018-11-27 ENCOUNTER — COMMUNICATION - HEALTHEAST (OUTPATIENT)
Dept: FAMILY MEDICINE | Facility: CLINIC | Age: 68
End: 2018-11-27

## 2018-11-28 ENCOUNTER — OFFICE VISIT (OUTPATIENT)
Dept: FAMILY MEDICINE | Age: 68
End: 2018-11-28

## 2018-11-28 ENCOUNTER — APPOINTMENT (OUTPATIENT)
Dept: INTERPRETER SERVICES | Age: 68
End: 2018-11-28

## 2018-11-28 VITALS
TEMPERATURE: 98.7 F | BODY MASS INDEX: 32.3 KG/M2 | DIASTOLIC BLOOD PRESSURE: 80 MMHG | WEIGHT: 160.2 LBS | HEART RATE: 64 BPM | HEIGHT: 59 IN | SYSTOLIC BLOOD PRESSURE: 126 MMHG

## 2018-11-28 DIAGNOSIS — I10 BENIGN ESSENTIAL HYPERTENSION: ICD-10-CM

## 2018-11-28 DIAGNOSIS — N18.2 CKD (CHRONIC KIDNEY DISEASE) STAGE 2, GFR 60-89 ML/MIN: ICD-10-CM

## 2018-11-28 DIAGNOSIS — Z12.11 SPECIAL SCREENING FOR MALIGNANT NEOPLASMS, COLON: ICD-10-CM

## 2018-11-28 DIAGNOSIS — E11.22 CONTROLLED TYPE 2 DIABETES MELLITUS WITH STAGE 3 CHRONIC KIDNEY DISEASE, WITH LONG-TERM CURRENT USE OF INSULIN (CMD): ICD-10-CM

## 2018-11-28 DIAGNOSIS — N18.30 CONTROLLED TYPE 2 DIABETES MELLITUS WITH STAGE 3 CHRONIC KIDNEY DISEASE, WITH LONG-TERM CURRENT USE OF INSULIN (CMD): ICD-10-CM

## 2018-11-28 DIAGNOSIS — Z23 NEED FOR VACCINATION: Primary | ICD-10-CM

## 2018-11-28 DIAGNOSIS — I50.32 DIASTOLIC CHF, CHRONIC (CMD): ICD-10-CM

## 2018-11-28 DIAGNOSIS — Z79.4 CONTROLLED TYPE 2 DIABETES MELLITUS WITH STAGE 3 CHRONIC KIDNEY DISEASE, WITH LONG-TERM CURRENT USE OF INSULIN (CMD): ICD-10-CM

## 2018-11-28 PROCEDURE — G0008 ADMIN INFLUENZA VIRUS VAC: HCPCS | Performed by: FAMILY MEDICINE

## 2018-11-28 PROCEDURE — 90662 IIV NO PRSV INCREASED AG IM: CPT | Performed by: FAMILY MEDICINE

## 2018-11-28 PROCEDURE — 99214 OFFICE O/P EST MOD 30 MIN: CPT | Performed by: FAMILY MEDICINE

## 2018-11-28 RX ORDER — SIMVASTATIN 10 MG
10 TABLET ORAL EVERY EVENING
Qty: 90 TABLET | Refills: 0 | Status: SHIPPED | OUTPATIENT
Start: 2018-11-28 | End: 2019-01-22 | Stop reason: ALTCHOICE

## 2018-11-28 RX ORDER — PEN NEEDLE, DIABETIC 30 GX5/16"
1 NEEDLE, DISPOSABLE MISCELLANEOUS NIGHTLY
Qty: 100 EACH | Refills: 0 | Status: SHIPPED | OUTPATIENT
Start: 2018-11-28 | End: 2020-02-05 | Stop reason: SDUPTHER

## 2018-11-29 ENCOUNTER — RECORDS - HEALTHEAST (OUTPATIENT)
Dept: GENERAL RADIOLOGY | Facility: CLINIC | Age: 68
End: 2018-11-29

## 2018-11-29 ENCOUNTER — OFFICE VISIT - HEALTHEAST (OUTPATIENT)
Dept: FAMILY MEDICINE | Facility: CLINIC | Age: 68
End: 2018-11-29

## 2018-11-29 DIAGNOSIS — M79.672 PAIN IN LEFT FOOT: ICD-10-CM

## 2018-11-29 DIAGNOSIS — M79.672 LEFT FOOT PAIN: ICD-10-CM

## 2018-12-04 ENCOUNTER — IMAGING SERVICES (OUTPATIENT)
Dept: MAMMOGRAPHY | Age: 68
End: 2018-12-04

## 2018-12-04 DIAGNOSIS — Z12.31 ENCOUNTER FOR SCREENING MAMMOGRAM FOR MALIGNANT NEOPLASM OF BREAST: ICD-10-CM

## 2018-12-04 DIAGNOSIS — Z00.00 MEDICARE ANNUAL WELLNESS VISIT, SUBSEQUENT: ICD-10-CM

## 2018-12-04 PROCEDURE — 3342F MAMMO ASSESS BENGN DOCD: CPT | Performed by: RADIOLOGY

## 2018-12-04 PROCEDURE — 77063 BREAST TOMOSYNTHESIS BI: CPT | Performed by: RADIOLOGY

## 2018-12-04 PROCEDURE — 77067 SCR MAMMO BI INCL CAD: CPT | Performed by: RADIOLOGY

## 2019-01-03 ENCOUNTER — OFFICE VISIT - HEALTHEAST (OUTPATIENT)
Dept: FAMILY MEDICINE | Facility: CLINIC | Age: 69
End: 2019-01-03

## 2019-01-03 ENCOUNTER — COMMUNICATION - HEALTHEAST (OUTPATIENT)
Dept: FAMILY MEDICINE | Facility: CLINIC | Age: 69
End: 2019-01-03

## 2019-01-03 DIAGNOSIS — E11.43 TYPE 2 DIABETES MELLITUS WITH DIABETIC AUTONOMIC NEUROPATHY, WITHOUT LONG-TERM CURRENT USE OF INSULIN (H): ICD-10-CM

## 2019-01-03 DIAGNOSIS — E53.8 VITAMIN B12 DEFICIENCY (NON ANEMIC): ICD-10-CM

## 2019-01-03 LAB
CHOLEST SERPL-MCNC: 143 MG/DL
FASTING STATUS PATIENT QL REPORTED: NORMAL
HBA1C MFR BLD: 7.7 % (ref 3.5–6)
HDLC SERPL-MCNC: 61 MG/DL
LDLC SERPL CALC-MCNC: 52 MG/DL
TRIGL SERPL-MCNC: 148 MG/DL
VIT B12 SERPL-MCNC: 497 PG/ML (ref 213–816)

## 2019-01-14 DIAGNOSIS — Z79.899 MEDICATION MANAGEMENT: Primary | ICD-10-CM

## 2019-01-16 ENCOUNTER — CASE MANAGEMENT (OUTPATIENT)
Dept: OTHER | Age: 69
End: 2019-01-16

## 2019-01-22 DIAGNOSIS — Z79.4 TYPE 2 DIABETES MELLITUS WITH DIABETIC NEPHROPATHY, WITH LONG-TERM CURRENT USE OF INSULIN (CMD): ICD-10-CM

## 2019-01-22 DIAGNOSIS — E11.21 TYPE 2 DIABETES MELLITUS WITH DIABETIC NEPHROPATHY, WITH LONG-TERM CURRENT USE OF INSULIN (CMD): ICD-10-CM

## 2019-01-22 RX ORDER — SYRINGE AND NEEDLE,INSULIN,1ML 31 GX5/16"
1 SYRINGE, EMPTY DISPOSABLE MISCELLANEOUS SEE ADMIN INSTRUCTIONS
Qty: 1 KIT | Refills: 0 | Status: SHIPPED | OUTPATIENT
Start: 2019-01-22 | End: 2019-01-22 | Stop reason: ALTCHOICE

## 2019-01-22 RX ORDER — ATORVASTATIN CALCIUM 40 MG/1
TABLET, FILM COATED ORAL
Qty: 90 TABLET | Refills: 0 | Status: SHIPPED | OUTPATIENT
Start: 2019-01-22 | End: 2019-04-19 | Stop reason: SDUPTHER

## 2019-01-22 RX ORDER — BLOOD-GLUCOSE METER
KIT MISCELLANEOUS
Qty: 1 EACH | Refills: 0 | Status: SHIPPED | OUTPATIENT
Start: 2019-01-22

## 2019-01-22 RX ORDER — LANCETS 28 GAUGE
EACH MISCELLANEOUS
Qty: 300 EACH | Refills: 3 | Status: SHIPPED | OUTPATIENT
Start: 2019-01-22 | End: 2019-08-28 | Stop reason: SDUPTHER

## 2019-02-05 ENCOUNTER — AMBULATORY - HEALTHEAST (OUTPATIENT)
Dept: NURSING | Facility: CLINIC | Age: 69
End: 2019-02-05

## 2019-02-19 ENCOUNTER — CASE MANAGEMENT (OUTPATIENT)
Dept: OTHER | Age: 69
End: 2019-02-19

## 2019-03-05 ENCOUNTER — COMMUNICATION - HEALTHEAST (OUTPATIENT)
Dept: FAMILY MEDICINE | Facility: CLINIC | Age: 69
End: 2019-03-05

## 2019-03-05 ENCOUNTER — AMBULATORY - HEALTHEAST (OUTPATIENT)
Dept: NURSING | Facility: CLINIC | Age: 69
End: 2019-03-05

## 2019-03-05 DIAGNOSIS — E11.9 DIABETES (H): ICD-10-CM

## 2019-03-05 DIAGNOSIS — E11.9 TYPE 2 DIABETES MELLITUS (H): ICD-10-CM

## 2019-04-04 ENCOUNTER — OFFICE VISIT - HEALTHEAST (OUTPATIENT)
Dept: FAMILY MEDICINE | Facility: CLINIC | Age: 69
End: 2019-04-04

## 2019-04-04 ENCOUNTER — COMMUNICATION - HEALTHEAST (OUTPATIENT)
Dept: FAMILY MEDICINE | Facility: CLINIC | Age: 69
End: 2019-04-04

## 2019-04-04 DIAGNOSIS — Z12.31 VISIT FOR SCREENING MAMMOGRAM: ICD-10-CM

## 2019-04-04 DIAGNOSIS — E11.43 TYPE 2 DIABETES MELLITUS WITH DIABETIC AUTONOMIC NEUROPATHY, WITHOUT LONG-TERM CURRENT USE OF INSULIN (H): ICD-10-CM

## 2019-04-04 DIAGNOSIS — C53.9 MALIGNANT NEOPLASM OF CERVIX, UNSPECIFIED SITE (H): ICD-10-CM

## 2019-04-04 LAB — HBA1C MFR BLD: 7.7 % (ref 3.5–6)

## 2019-04-04 ASSESSMENT — MIFFLIN-ST. JEOR: SCORE: 984.89

## 2019-04-08 ENCOUNTER — COMMUNICATION - HEALTHEAST (OUTPATIENT)
Dept: SCHEDULING | Facility: CLINIC | Age: 69
End: 2019-04-08

## 2019-04-11 ENCOUNTER — COMMUNICATION - HEALTHEAST (OUTPATIENT)
Dept: CARE COORDINATION | Facility: CLINIC | Age: 69
End: 2019-04-11

## 2019-04-18 ENCOUNTER — OFFICE VISIT - HEALTHEAST (OUTPATIENT)
Dept: FAMILY MEDICINE | Facility: CLINIC | Age: 69
End: 2019-04-18

## 2019-04-18 ENCOUNTER — CASE MANAGEMENT (OUTPATIENT)
Dept: OTHER | Age: 69
End: 2019-04-18

## 2019-04-18 DIAGNOSIS — E11.49 OTHER DIABETIC NEUROLOGICAL COMPLICATION ASSOCIATED WITH TYPE 2 DIABETES MELLITUS (H): ICD-10-CM

## 2019-04-18 DIAGNOSIS — E87.1 HYPONATREMIA: ICD-10-CM

## 2019-04-18 DIAGNOSIS — M62.81 GENERALIZED MUSCLE WEAKNESS: ICD-10-CM

## 2019-04-18 LAB
ANION GAP SERPL CALCULATED.3IONS-SCNC: 9 MMOL/L (ref 5–18)
BUN SERPL-MCNC: 11 MG/DL (ref 8–28)
CALCIUM SERPL-MCNC: 9.8 MG/DL (ref 8.5–10.5)
CHLORIDE BLD-SCNC: 101 MMOL/L (ref 98–107)
CO2 SERPL-SCNC: 26 MMOL/L (ref 22–31)
CREAT SERPL-MCNC: 0.79 MG/DL (ref 0.6–1.1)
GFR SERPL CREATININE-BSD FRML MDRD: >60 ML/MIN/1.73M2
GLUCOSE BLD-MCNC: 193 MG/DL (ref 70–125)
POTASSIUM BLD-SCNC: 4.1 MMOL/L (ref 3.5–5)
SODIUM SERPL-SCNC: 136 MMOL/L (ref 136–145)

## 2019-04-19 ENCOUNTER — COMMUNICATION - HEALTHEAST (OUTPATIENT)
Dept: FAMILY MEDICINE | Facility: CLINIC | Age: 69
End: 2019-04-19

## 2019-04-19 RX ORDER — ATORVASTATIN CALCIUM 40 MG/1
TABLET, FILM COATED ORAL
Qty: 90 TABLET | Refills: 0 | Status: SHIPPED | OUTPATIENT
Start: 2019-04-19 | End: 2019-08-14 | Stop reason: SDUPTHER

## 2019-04-19 RX ORDER — INSULIN GLARGINE 100 [IU]/ML
INJECTION, SOLUTION SUBCUTANEOUS
Qty: 15 ML | Refills: 0 | Status: SHIPPED | OUTPATIENT
Start: 2019-04-19 | End: 2019-08-14 | Stop reason: SDUPTHER

## 2019-04-25 ENCOUNTER — COMMUNICATION - HEALTHEAST (OUTPATIENT)
Dept: FAMILY MEDICINE | Facility: CLINIC | Age: 69
End: 2019-04-25

## 2019-05-07 ENCOUNTER — AMBULATORY - HEALTHEAST (OUTPATIENT)
Dept: NURSING | Facility: CLINIC | Age: 69
End: 2019-05-07

## 2019-05-08 ENCOUNTER — COMMUNICATION - HEALTHEAST (OUTPATIENT)
Dept: FAMILY MEDICINE | Facility: CLINIC | Age: 69
End: 2019-05-08

## 2019-05-08 DIAGNOSIS — G62.9 PERIPHERAL POLYNEUROPATHY: ICD-10-CM

## 2019-06-11 ENCOUNTER — AMBULATORY - HEALTHEAST (OUTPATIENT)
Dept: NURSING | Facility: CLINIC | Age: 69
End: 2019-06-11

## 2019-06-11 ENCOUNTER — OFFICE VISIT - HEALTHEAST (OUTPATIENT)
Dept: FAMILY MEDICINE | Facility: CLINIC | Age: 69
End: 2019-06-11

## 2019-06-11 ENCOUNTER — COMMUNICATION - HEALTHEAST (OUTPATIENT)
Dept: TELEHEALTH | Facility: CLINIC | Age: 69
End: 2019-06-11

## 2019-06-11 DIAGNOSIS — T30.0 BURN, FIRST DEGREE: ICD-10-CM

## 2019-06-11 DIAGNOSIS — R03.0 ELEVATED BLOOD PRESSURE READING WITHOUT DIAGNOSIS OF HYPERTENSION: ICD-10-CM

## 2019-06-11 DIAGNOSIS — L25.9 CONTACT DERMATITIS, UNSPECIFIED CONTACT DERMATITIS TYPE, UNSPECIFIED TRIGGER: ICD-10-CM

## 2019-06-13 ENCOUNTER — OFFICE VISIT - HEALTHEAST (OUTPATIENT)
Dept: FAMILY MEDICINE | Facility: CLINIC | Age: 69
End: 2019-06-13

## 2019-06-13 DIAGNOSIS — R21 RASH: ICD-10-CM

## 2019-06-13 DIAGNOSIS — L25.9 CONTACT DERMATITIS, UNSPECIFIED CONTACT DERMATITIS TYPE, UNSPECIFIED TRIGGER: ICD-10-CM

## 2019-06-21 ASSESSMENT — MIFFLIN-ST. JEOR
SCORE: 982.62
SCORE: 975.37

## 2019-06-23 ENCOUNTER — ANESTHESIA - HEALTHEAST (OUTPATIENT)
Dept: SURGERY | Facility: HOSPITAL | Age: 69
End: 2019-06-23

## 2019-06-23 ENCOUNTER — SURGERY - HEALTHEAST (OUTPATIENT)
Dept: SURGERY | Facility: HOSPITAL | Age: 69
End: 2019-06-23

## 2019-06-25 ENCOUNTER — RECORDS - HEALTHEAST (OUTPATIENT)
Dept: ADMINISTRATIVE | Facility: OTHER | Age: 69
End: 2019-06-25

## 2019-06-25 ASSESSMENT — MIFFLIN-ST. JEOR: SCORE: 991.7

## 2019-06-26 ASSESSMENT — MIFFLIN-ST. JEOR: SCORE: 1001.68

## 2019-06-27 ASSESSMENT — MIFFLIN-ST. JEOR: SCORE: 1062.91

## 2019-06-28 ASSESSMENT — MIFFLIN-ST. JEOR: SCORE: 1053.84

## 2019-06-29 ASSESSMENT — MIFFLIN-ST. JEOR: SCORE: 1047.94

## 2019-06-30 ASSESSMENT — MIFFLIN-ST. JEOR: SCORE: 1051.57

## 2019-07-01 ENCOUNTER — COMMUNICATION - HEALTHEAST (OUTPATIENT)
Dept: FAMILY MEDICINE | Facility: CLINIC | Age: 69
End: 2019-07-01

## 2019-07-01 DIAGNOSIS — E11.43 TYPE 2 DIABETES MELLITUS WITH DIABETIC AUTONOMIC NEUROPATHY, WITHOUT LONG-TERM CURRENT USE OF INSULIN (H): ICD-10-CM

## 2019-07-02 ENCOUNTER — HOME CARE/HOSPICE - HEALTHEAST (OUTPATIENT)
Dept: HOME HEALTH SERVICES | Facility: HOME HEALTH | Age: 69
End: 2019-07-02

## 2019-07-02 ENCOUNTER — COMMUNICATION - HEALTHEAST (OUTPATIENT)
Dept: SCHEDULING | Facility: CLINIC | Age: 69
End: 2019-07-02

## 2019-07-03 ENCOUNTER — HOME CARE/HOSPICE - HEALTHEAST (OUTPATIENT)
Dept: HOME HEALTH SERVICES | Facility: HOME HEALTH | Age: 69
End: 2019-07-03

## 2019-07-03 ENCOUNTER — COMMUNICATION - HEALTHEAST (OUTPATIENT)
Dept: HOME HEALTH SERVICES | Facility: HOME HEALTH | Age: 69
End: 2019-07-03

## 2019-07-03 ENCOUNTER — COMMUNICATION - HEALTHEAST (OUTPATIENT)
Dept: CARE COORDINATION | Facility: CLINIC | Age: 69
End: 2019-07-03

## 2019-07-04 ENCOUNTER — HOME CARE/HOSPICE - HEALTHEAST (OUTPATIENT)
Dept: HOME HEALTH SERVICES | Facility: HOME HEALTH | Age: 69
End: 2019-07-04

## 2019-07-06 ENCOUNTER — HOME CARE/HOSPICE - HEALTHEAST (OUTPATIENT)
Dept: HOME HEALTH SERVICES | Facility: HOME HEALTH | Age: 69
End: 2019-07-06

## 2019-07-08 ENCOUNTER — COMMUNICATION - HEALTHEAST (OUTPATIENT)
Dept: HOME HEALTH SERVICES | Facility: HOME HEALTH | Age: 69
End: 2019-07-08

## 2019-07-08 ENCOUNTER — COMMUNICATION - HEALTHEAST (OUTPATIENT)
Dept: FAMILY MEDICINE | Facility: CLINIC | Age: 69
End: 2019-07-08

## 2019-07-09 ENCOUNTER — COMMUNICATION - HEALTHEAST (OUTPATIENT)
Dept: CARE COORDINATION | Facility: CLINIC | Age: 69
End: 2019-07-09

## 2019-07-11 ENCOUNTER — COMMUNICATION - HEALTHEAST (OUTPATIENT)
Dept: LAB | Facility: CLINIC | Age: 69
End: 2019-07-11

## 2019-07-11 ENCOUNTER — OFFICE VISIT - HEALTHEAST (OUTPATIENT)
Dept: FAMILY MEDICINE | Facility: CLINIC | Age: 69
End: 2019-07-11

## 2019-07-11 DIAGNOSIS — E53.8 VITAMIN B12 DEFICIENCY (NON ANEMIC): ICD-10-CM

## 2019-07-11 DIAGNOSIS — Z87.19 HX SBO: ICD-10-CM

## 2019-07-11 DIAGNOSIS — E11.43 TYPE 2 DIABETES MELLITUS WITH DIABETIC AUTONOMIC NEUROPATHY, WITHOUT LONG-TERM CURRENT USE OF INSULIN (H): ICD-10-CM

## 2019-07-11 LAB
CREAT UR-MCNC: 58.2 MG/DL
ERYTHROCYTE [DISTWIDTH] IN BLOOD BY AUTOMATED COUNT: 12.5 % (ref 11–14.5)
HCT VFR BLD AUTO: 29 % (ref 35–47)
HGB BLD-MCNC: 9.6 G/DL (ref 12–16)
MCH RBC QN AUTO: 30.1 PG (ref 27–34)
MCHC RBC AUTO-ENTMCNC: 33.2 G/DL (ref 32–36)
MCV RBC AUTO: 91 FL (ref 80–100)
MICROALBUMIN UR-MCNC: 0.51 MG/DL (ref 0–1.99)
MICROALBUMIN/CREAT UR: 8.8 MG/G
PLATELET # BLD AUTO: 586 THOU/UL (ref 140–440)
PMV BLD AUTO: 6.7 FL (ref 7–10)
RBC # BLD AUTO: 3.19 MILL/UL (ref 3.8–5.4)
WBC: 4.6 THOU/UL (ref 4–11)

## 2019-07-16 ENCOUNTER — OFFICE VISIT - HEALTHEAST (OUTPATIENT)
Dept: SURGERY | Facility: CLINIC | Age: 69
End: 2019-07-16

## 2019-07-18 ENCOUNTER — COMMUNICATION - HEALTHEAST (OUTPATIENT)
Dept: SCHEDULING | Facility: CLINIC | Age: 69
End: 2019-07-18

## 2019-08-06 ENCOUNTER — HOSPITAL ENCOUNTER (OUTPATIENT)
Dept: MAMMOGRAPHY | Facility: CLINIC | Age: 69
Discharge: HOME | End: 2019-08-06
Attending: FAMILY MEDICINE

## 2019-08-06 DIAGNOSIS — Z12.31 VISIT FOR SCREENING MAMMOGRAM: ICD-10-CM

## 2019-08-12 ENCOUNTER — COMMUNICATION - HEALTHEAST (OUTPATIENT)
Dept: FAMILY MEDICINE | Facility: CLINIC | Age: 69
End: 2019-08-12

## 2019-08-12 ENCOUNTER — AMBULATORY - HEALTHEAST (OUTPATIENT)
Dept: FAMILY MEDICINE | Facility: CLINIC | Age: 69
End: 2019-08-12

## 2019-08-12 DIAGNOSIS — E53.8 VITAMIN B12 DEFICIENCY (NON ANEMIC): ICD-10-CM

## 2019-08-13 ENCOUNTER — AMBULATORY - HEALTHEAST (OUTPATIENT)
Dept: NURSING | Facility: CLINIC | Age: 69
End: 2019-08-13

## 2019-08-14 ENCOUNTER — TELEPHONE (OUTPATIENT)
Dept: FAMILY MEDICINE | Age: 69
End: 2019-08-14

## 2019-08-14 RX ORDER — AMLODIPINE BESYLATE 10 MG/1
10 TABLET ORAL DAILY
Qty: 90 TABLET | Refills: 0 | Status: SHIPPED | OUTPATIENT
Start: 2019-08-14 | End: 2019-08-28 | Stop reason: SDUPTHER

## 2019-08-14 RX ORDER — ATORVASTATIN CALCIUM 40 MG/1
TABLET, FILM COATED ORAL
Qty: 90 TABLET | Refills: 0 | Status: SHIPPED | OUTPATIENT
Start: 2019-08-14 | End: 2019-08-21 | Stop reason: SDUPTHER

## 2019-08-19 ENCOUNTER — LAB SERVICES (OUTPATIENT)
Dept: LAB | Age: 69
End: 2019-08-19

## 2019-08-19 LAB
ALBUMIN SERPL-MCNC: 3.8 G/DL (ref 3.6–5.1)
ALBUMIN/GLOB SERPL: 1 {RATIO} (ref 1–2.4)
ALP SERPL-CCNC: 118 UNITS/L (ref 45–117)
ALT SERPL-CCNC: 29 UNITS/L
ANION GAP SERPL CALC-SCNC: 13 MMOL/L (ref 10–20)
AST SERPL-CCNC: 16 UNITS/L
BILIRUB SERPL-MCNC: 0.7 MG/DL (ref 0.2–1)
BUN SERPL-MCNC: 17 MG/DL (ref 6–20)
BUN/CREAT SERPL: 21 (ref 7–25)
CALCIUM SERPL-MCNC: 10.5 MG/DL (ref 8.4–10.2)
CHLORIDE SERPL-SCNC: 103 MMOL/L (ref 98–107)
CHOLEST SERPL-MCNC: 172 MG/DL
CHOLEST/HDLC SERPL: 3.2 {RATIO}
CO2 SERPL-SCNC: 30 MMOL/L (ref 21–32)
CREAT SERPL-MCNC: 0.82 MG/DL (ref 0.51–0.95)
FASTING STATUS PATIENT QL REPORTED: 19 HRS
GLOBULIN SER-MCNC: 3.8 G/DL (ref 2–4)
GLUCOSE SERPL-MCNC: 184 MG/DL (ref 65–99)
HBA1C MFR BLD: 9.7 % (ref 4.5–5.6)
HDLC SERPL-MCNC: 53 MG/DL
LDLC SERPL-MCNC: 77 MG/DL
LENGTH OF FAST TIME PATIENT: 19 HRS
NONHDLC SERPL-MCNC: 119 MG/DL
POTASSIUM SERPL-SCNC: 4.6 MMOL/L (ref 3.4–5.1)
PROT SERPL-MCNC: 7.6 G/DL (ref 6.4–8.2)
SODIUM SERPL-SCNC: 141 MMOL/L (ref 135–145)
TRIGL SERPL-MCNC: 211 MG/DL

## 2019-08-19 PROCEDURE — 36415 COLL VENOUS BLD VENIPUNCTURE: CPT | Performed by: INTERNAL MEDICINE

## 2019-08-19 PROCEDURE — 80053 COMPREHEN METABOLIC PANEL: CPT | Performed by: INTERNAL MEDICINE

## 2019-08-21 ENCOUNTER — OFFICE VISIT (OUTPATIENT)
Dept: FAMILY MEDICINE | Age: 69
End: 2019-08-21

## 2019-08-21 ENCOUNTER — APPOINTMENT (OUTPATIENT)
Dept: INTERPRETER SERVICES | Age: 69
End: 2019-08-21

## 2019-08-21 VITALS
SYSTOLIC BLOOD PRESSURE: 126 MMHG | HEIGHT: 59 IN | HEART RATE: 68 BPM | DIASTOLIC BLOOD PRESSURE: 80 MMHG | WEIGHT: 159.1 LBS | TEMPERATURE: 97.8 F | BODY MASS INDEX: 32.08 KG/M2

## 2019-08-21 DIAGNOSIS — I50.32 DIASTOLIC CHF, CHRONIC (CMD): ICD-10-CM

## 2019-08-21 DIAGNOSIS — I10 BENIGN ESSENTIAL HYPERTENSION: Primary | ICD-10-CM

## 2019-08-21 PROCEDURE — 99214 OFFICE O/P EST MOD 30 MIN: CPT | Performed by: FAMILY MEDICINE

## 2019-08-21 RX ORDER — METFORMIN HYDROCHLORIDE 500 MG/1
2000 TABLET, EXTENDED RELEASE ORAL
Qty: 360 TABLET | Refills: 0 | Status: SHIPPED | OUTPATIENT
Start: 2019-08-21 | End: 2019-08-28 | Stop reason: SDUPTHER

## 2019-08-21 RX ORDER — ATORVASTATIN CALCIUM 40 MG/1
TABLET, FILM COATED ORAL
Qty: 90 TABLET | Refills: 0 | Status: SHIPPED | OUTPATIENT
Start: 2019-08-21 | End: 2019-08-28 | Stop reason: SDUPTHER

## 2019-08-28 ENCOUNTER — OFFICE VISIT (OUTPATIENT)
Dept: FAMILY MEDICINE | Age: 69
End: 2019-08-28

## 2019-08-28 VITALS
HEART RATE: 66 BPM | WEIGHT: 159.6 LBS | HEIGHT: 59 IN | SYSTOLIC BLOOD PRESSURE: 114 MMHG | DIASTOLIC BLOOD PRESSURE: 62 MMHG | TEMPERATURE: 97.4 F | BODY MASS INDEX: 32.17 KG/M2

## 2019-08-28 DIAGNOSIS — N18.30 BENIGN HYPERTENSION WITH CKD (CHRONIC KIDNEY DISEASE) STAGE III (CMD): ICD-10-CM

## 2019-08-28 DIAGNOSIS — I10 BENIGN ESSENTIAL HYPERTENSION: ICD-10-CM

## 2019-08-28 DIAGNOSIS — Z78.0 POSTMENOPAUSAL STATUS (AGE-RELATED) (NATURAL): ICD-10-CM

## 2019-08-28 DIAGNOSIS — K21.9 GASTROESOPHAGEAL REFLUX DISEASE WITHOUT ESOPHAGITIS: ICD-10-CM

## 2019-08-28 DIAGNOSIS — I12.9 BENIGN HYPERTENSION WITH CKD (CHRONIC KIDNEY DISEASE) STAGE III (CMD): ICD-10-CM

## 2019-08-28 DIAGNOSIS — Z00.00 MEDICARE ANNUAL WELLNESS VISIT, SUBSEQUENT: Primary | ICD-10-CM

## 2019-08-28 DIAGNOSIS — E11.65 UNCONTROLLED TYPE 2 DIABETES MELLITUS WITH HYPERGLYCEMIA (CMD): ICD-10-CM

## 2019-08-28 DIAGNOSIS — I50.32 DIASTOLIC CHF, CHRONIC (CMD): ICD-10-CM

## 2019-08-28 DIAGNOSIS — Z00.00 WELLNESS EXAMINATION: ICD-10-CM

## 2019-08-28 PROCEDURE — 99397 PER PM REEVAL EST PAT 65+ YR: CPT | Performed by: FAMILY MEDICINE

## 2019-08-28 PROCEDURE — G0439 PPPS, SUBSEQ VISIT: HCPCS | Performed by: FAMILY MEDICINE

## 2019-08-28 RX ORDER — AMLODIPINE BESYLATE 10 MG/1
10 TABLET ORAL DAILY
Qty: 90 TABLET | Refills: 0 | Status: SHIPPED | OUTPATIENT
Start: 2019-08-28 | End: 2020-03-10 | Stop reason: SDUPTHER

## 2019-08-28 RX ORDER — ATORVASTATIN CALCIUM 40 MG/1
TABLET, FILM COATED ORAL
Qty: 90 TABLET | Refills: 0 | Status: SHIPPED | OUTPATIENT
Start: 2019-08-28 | End: 2020-01-30 | Stop reason: SDUPTHER

## 2019-08-28 RX ORDER — LANCETS 28 GAUGE
EACH MISCELLANEOUS
Qty: 300 EACH | Refills: 3 | Status: SHIPPED | OUTPATIENT
Start: 2019-08-28 | End: 2022-10-20 | Stop reason: SDUPTHER

## 2019-08-28 RX ORDER — METFORMIN HYDROCHLORIDE 500 MG/1
2000 TABLET, EXTENDED RELEASE ORAL
Qty: 360 TABLET | Refills: 0 | Status: SHIPPED | OUTPATIENT
Start: 2019-08-28 | End: 2020-08-04 | Stop reason: SDUPTHER

## 2019-08-28 ASSESSMENT — PATIENT HEALTH QUESTIONNAIRE - PHQ9
1. LITTLE INTEREST OR PLEASURE IN DOING THINGS: NOT AT ALL
2. FEELING DOWN, DEPRESSED OR HOPELESS: NOT AT ALL
SUM OF ALL RESPONSES TO PHQ9 QUESTIONS 1 AND 2: 0
SUM OF ALL RESPONSES TO PHQ9 QUESTIONS 1 AND 2: 0

## 2019-08-29 ENCOUNTER — OFFICE VISIT (OUTPATIENT)
Dept: EDUCATION SERVICES | Age: 69
End: 2019-08-29
Attending: FAMILY MEDICINE

## 2019-08-29 VITALS — BODY MASS INDEX: 31.93 KG/M2 | WEIGHT: 156.75 LBS

## 2019-08-29 PROCEDURE — G0108 DIAB MANAGE TRN  PER INDIV: HCPCS | Performed by: REGISTERED NURSE

## 2019-09-05 ENCOUNTER — COMMUNICATION - HEALTHEAST (OUTPATIENT)
Dept: FAMILY MEDICINE | Facility: CLINIC | Age: 69
End: 2019-09-05

## 2019-09-05 DIAGNOSIS — E78.5 HYPERLIPIDEMIA: ICD-10-CM

## 2019-09-12 ENCOUNTER — OFFICE VISIT - HEALTHEAST (OUTPATIENT)
Dept: FAMILY MEDICINE | Facility: CLINIC | Age: 69
End: 2019-09-12

## 2019-09-12 DIAGNOSIS — G62.9 PERIPHERAL POLYNEUROPATHY: ICD-10-CM

## 2019-09-12 DIAGNOSIS — E11.43 TYPE 2 DIABETES MELLITUS WITH DIABETIC AUTONOMIC NEUROPATHY, WITHOUT LONG-TERM CURRENT USE OF INSULIN (H): ICD-10-CM

## 2019-09-12 DIAGNOSIS — L30.9 DERMATITIS: ICD-10-CM

## 2019-09-12 RX ORDER — TRIAMCINOLONE ACETONIDE 1 MG/G
CREAM TOPICAL
Qty: 30 G | Refills: 1 | Status: SHIPPED | OUTPATIENT
Start: 2019-09-12 | End: 2021-10-11

## 2019-09-13 ENCOUNTER — IMAGING SERVICES (OUTPATIENT)
Dept: GENERAL RADIOLOGY | Age: 69
End: 2019-09-13
Attending: FAMILY MEDICINE

## 2019-09-13 DIAGNOSIS — Z00.00 MEDICARE ANNUAL WELLNESS VISIT, SUBSEQUENT: ICD-10-CM

## 2019-09-13 DIAGNOSIS — Z78.0 POSTMENOPAUSAL STATUS (AGE-RELATED) (NATURAL): ICD-10-CM

## 2019-09-13 PROCEDURE — 77080 DXA BONE DENSITY AXIAL: CPT | Performed by: RADIOLOGY

## 2019-09-15 PROBLEM — E11.65 UNCONTROLLED TYPE 2 DIABETES MELLITUS WITH HYPERGLYCEMIA  (CMD): Status: ACTIVE | Noted: 2019-09-15

## 2019-09-16 ENCOUNTER — TELEPHONE (OUTPATIENT)
Dept: FAMILY MEDICINE | Age: 69
End: 2019-09-16

## 2019-09-16 DIAGNOSIS — M81.0 OSTEOPOROSIS, UNSPECIFIED OSTEOPOROSIS TYPE, UNSPECIFIED PATHOLOGICAL FRACTURE PRESENCE: Primary | ICD-10-CM

## 2019-09-26 ENCOUNTER — COMMUNICATION - HEALTHEAST (OUTPATIENT)
Dept: FAMILY MEDICINE | Facility: CLINIC | Age: 69
End: 2019-09-26

## 2019-09-26 DIAGNOSIS — E11.43 TYPE 2 DIABETES MELLITUS WITH DIABETIC AUTONOMIC NEUROPATHY, WITHOUT LONG-TERM CURRENT USE OF INSULIN (H): ICD-10-CM

## 2019-10-10 ENCOUNTER — OFFICE VISIT (OUTPATIENT)
Dept: EDUCATION SERVICES | Age: 69
End: 2019-10-10
Attending: FAMILY MEDICINE

## 2019-10-10 VITALS — WEIGHT: 164.02 LBS | BODY MASS INDEX: 33.41 KG/M2

## 2019-10-10 DIAGNOSIS — Z71.3 DIETARY COUNSELING AND SURVEILLANCE: ICD-10-CM

## 2019-10-17 ENCOUNTER — OFFICE VISIT - HEALTHEAST (OUTPATIENT)
Dept: FAMILY MEDICINE | Facility: CLINIC | Age: 69
End: 2019-10-17

## 2019-10-17 ENCOUNTER — COMMUNICATION - HEALTHEAST (OUTPATIENT)
Dept: FAMILY MEDICINE | Facility: CLINIC | Age: 69
End: 2019-10-17

## 2019-10-17 DIAGNOSIS — E53.8 VITAMIN B12 DEFICIENCY (NON ANEMIC): ICD-10-CM

## 2019-10-17 DIAGNOSIS — Z00.00 ROUTINE GENERAL MEDICAL EXAMINATION AT A HEALTH CARE FACILITY: ICD-10-CM

## 2019-10-17 DIAGNOSIS — E11.43 TYPE 2 DIABETES MELLITUS WITH DIABETIC AUTONOMIC NEUROPATHY, WITHOUT LONG-TERM CURRENT USE OF INSULIN (H): ICD-10-CM

## 2019-10-17 DIAGNOSIS — G62.9 PERIPHERAL POLYNEUROPATHY: ICD-10-CM

## 2019-10-17 LAB
ANION GAP SERPL CALCULATED.3IONS-SCNC: 7 MMOL/L (ref 5–18)
BUN SERPL-MCNC: 8 MG/DL (ref 8–28)
CALCIUM SERPL-MCNC: 10 MG/DL (ref 8.5–10.5)
CHLORIDE BLD-SCNC: 100 MMOL/L (ref 98–107)
CHOLEST SERPL-MCNC: 129 MG/DL
CO2 SERPL-SCNC: 29 MMOL/L (ref 22–31)
CREAT SERPL-MCNC: 0.76 MG/DL (ref 0.6–1.1)
ERYTHROCYTE [DISTWIDTH] IN BLOOD BY AUTOMATED COUNT: 13.1 % (ref 11–14.5)
FASTING STATUS PATIENT QL REPORTED: YES
GFR SERPL CREATININE-BSD FRML MDRD: >60 ML/MIN/1.73M2
GLUCOSE BLD-MCNC: 122 MG/DL (ref 70–125)
HBA1C MFR BLD: 7.1 % (ref 3.5–6)
HCT VFR BLD AUTO: 36.7 % (ref 35–47)
HDLC SERPL-MCNC: 54 MG/DL
HGB BLD-MCNC: 12.2 G/DL (ref 12–16)
LDLC SERPL CALC-MCNC: 52 MG/DL
MCH RBC QN AUTO: 28.6 PG (ref 27–34)
MCHC RBC AUTO-ENTMCNC: 33.1 G/DL (ref 32–36)
MCV RBC AUTO: 87 FL (ref 80–100)
PLATELET # BLD AUTO: 255 THOU/UL (ref 140–440)
PMV BLD AUTO: 6.8 FL (ref 7–10)
POTASSIUM BLD-SCNC: 4.1 MMOL/L (ref 3.5–5)
RBC # BLD AUTO: 4.24 MILL/UL (ref 3.8–5.4)
SODIUM SERPL-SCNC: 136 MMOL/L (ref 136–145)
TRIGL SERPL-MCNC: 113 MG/DL
WBC: 5 THOU/UL (ref 4–11)

## 2019-11-19 ENCOUNTER — AMBULATORY - HEALTHEAST (OUTPATIENT)
Dept: NURSING | Facility: CLINIC | Age: 69
End: 2019-11-19

## 2019-12-04 PROBLEM — M81.8 OTHER OSTEOPOROSIS WITHOUT CURRENT PATHOLOGICAL FRACTURE: Status: ACTIVE | Noted: 2019-12-04

## 2019-12-20 ENCOUNTER — AMBULATORY - HEALTHEAST (OUTPATIENT)
Dept: NURSING | Facility: CLINIC | Age: 69
End: 2019-12-20

## 2019-12-31 ENCOUNTER — TELEPHONE (OUTPATIENT)
Dept: FAMILY MEDICINE | Age: 69
End: 2019-12-31

## 2020-01-02 ENCOUNTER — COMMUNICATION - HEALTHEAST (OUTPATIENT)
Dept: FAMILY MEDICINE | Facility: CLINIC | Age: 70
End: 2020-01-02

## 2020-01-02 DIAGNOSIS — L25.9 CONTACT DERMATITIS, UNSPECIFIED CONTACT DERMATITIS TYPE, UNSPECIFIED TRIGGER: ICD-10-CM

## 2020-01-04 RX ORDER — HYDROCORTISONE 2.5 %
CREAM (GRAM) TOPICAL
Qty: 30 G | Refills: 1 | Status: SHIPPED | OUTPATIENT
Start: 2020-01-04 | End: 2021-10-11

## 2020-01-17 ENCOUNTER — APPOINTMENT (OUTPATIENT)
Dept: INTERPRETER SERVICES | Age: 70
End: 2020-01-17

## 2020-01-17 ENCOUNTER — OFFICE VISIT (OUTPATIENT)
Dept: FAMILY MEDICINE | Age: 70
End: 2020-01-17

## 2020-01-17 ENCOUNTER — LAB SERVICES (OUTPATIENT)
Dept: LAB | Age: 70
End: 2020-01-17

## 2020-01-17 VITALS
WEIGHT: 163.2 LBS | DIASTOLIC BLOOD PRESSURE: 80 MMHG | HEIGHT: 59 IN | SYSTOLIC BLOOD PRESSURE: 135 MMHG | TEMPERATURE: 96.9 F | BODY MASS INDEX: 32.9 KG/M2 | HEART RATE: 60 BPM

## 2020-01-17 DIAGNOSIS — R01.1 CARDIAC MURMUR, PREVIOUSLY UNDIAGNOSED: ICD-10-CM

## 2020-01-17 DIAGNOSIS — I50.32 DIASTOLIC CHF, CHRONIC (CMD): ICD-10-CM

## 2020-01-17 DIAGNOSIS — Z23 NEED FOR VACCINATION: ICD-10-CM

## 2020-01-17 DIAGNOSIS — N18.30 BENIGN HYPERTENSION WITH CKD (CHRONIC KIDNEY DISEASE) STAGE III (CMD): ICD-10-CM

## 2020-01-17 DIAGNOSIS — I12.9 BENIGN HYPERTENSION WITH CKD (CHRONIC KIDNEY DISEASE) STAGE III (CMD): ICD-10-CM

## 2020-01-17 DIAGNOSIS — I10 BENIGN ESSENTIAL HYPERTENSION: ICD-10-CM

## 2020-01-17 LAB
ANION GAP SERPL CALC-SCNC: 10 MMOL/L (ref 10–20)
BUN SERPL-MCNC: 10 MG/DL (ref 6–20)
BUN/CREAT SERPL: 12 (ref 7–25)
CALCIUM SERPL-MCNC: 10.1 MG/DL (ref 8.4–10.2)
CHLORIDE SERPL-SCNC: 102 MMOL/L (ref 98–107)
CO2 SERPL-SCNC: 30 MMOL/L (ref 21–32)
CREAT SERPL-MCNC: 0.81 MG/DL (ref 0.51–0.95)
FASTING STATUS PATIENT QL REPORTED: 0 HRS
GLUCOSE SERPL-MCNC: 278 MG/DL (ref 65–99)
HBA1C MFR BLD: 10.3 % (ref 4.5–5.6)
POTASSIUM SERPL-SCNC: 4.4 MMOL/L (ref 3.4–5.1)
SODIUM SERPL-SCNC: 138 MMOL/L (ref 135–145)

## 2020-01-17 PROCEDURE — 99214 OFFICE O/P EST MOD 30 MIN: CPT | Performed by: FAMILY MEDICINE

## 2020-01-17 PROCEDURE — 90662 IIV NO PRSV INCREASED AG IM: CPT | Performed by: FAMILY MEDICINE

## 2020-01-17 PROCEDURE — G0008 ADMIN INFLUENZA VIRUS VAC: HCPCS | Performed by: FAMILY MEDICINE

## 2020-01-17 PROCEDURE — 80048 BASIC METABOLIC PNL TOTAL CA: CPT | Performed by: INTERNAL MEDICINE

## 2020-01-17 PROCEDURE — 36415 COLL VENOUS BLD VENIPUNCTURE: CPT | Performed by: INTERNAL MEDICINE

## 2020-01-18 LAB
CREAT UR-MCNC: 88.6 MG/DL
MICROALBUMIN UR-MCNC: 1.07 MG/DL
MICROALBUMIN/CREAT UR: 12.1 MG/G

## 2020-01-20 ENCOUNTER — ADVANCED DIRECTIVES (OUTPATIENT)
Dept: HEALTH INFORMATION MANAGEMENT | Age: 70
End: 2020-01-20

## 2020-01-20 ENCOUNTER — RECORDS - HEALTHEAST (OUTPATIENT)
Dept: ADMINISTRATIVE | Facility: OTHER | Age: 70
End: 2020-01-20

## 2020-01-20 ENCOUNTER — TELEPHONE (OUTPATIENT)
Dept: FAMILY MEDICINE | Age: 70
End: 2020-01-20

## 2020-01-22 ENCOUNTER — IMAGING SERVICES (OUTPATIENT)
Dept: ULTRASOUND IMAGING | Age: 70
End: 2020-01-22
Attending: FAMILY MEDICINE

## 2020-01-22 DIAGNOSIS — I50.32 DIASTOLIC CHF, CHRONIC (CMD): ICD-10-CM

## 2020-01-22 DIAGNOSIS — R01.1 CARDIAC MURMUR, PREVIOUSLY UNDIAGNOSED: ICD-10-CM

## 2020-01-22 PROCEDURE — 93356 MYOCRD STRAIN IMG SPCKL TRCK: CPT | Performed by: INTERNAL MEDICINE

## 2020-01-22 PROCEDURE — 93306 TTE W/DOPPLER COMPLETE: CPT | Performed by: INTERNAL MEDICINE

## 2020-01-23 ENCOUNTER — OFFICE VISIT (OUTPATIENT)
Dept: EDUCATION SERVICES | Age: 70
End: 2020-01-23
Attending: FAMILY MEDICINE

## 2020-01-23 ENCOUNTER — OFFICE VISIT - HEALTHEAST (OUTPATIENT)
Dept: FAMILY MEDICINE | Facility: CLINIC | Age: 70
End: 2020-01-23

## 2020-01-23 VITALS — WEIGHT: 161.82 LBS | BODY MASS INDEX: 32.68 KG/M2

## 2020-01-23 DIAGNOSIS — E11.43 TYPE 2 DIABETES MELLITUS WITH DIABETIC AUTONOMIC NEUROPATHY, WITHOUT LONG-TERM CURRENT USE OF INSULIN (H): ICD-10-CM

## 2020-01-23 DIAGNOSIS — E53.8 VITAMIN B12 DEFICIENCY (NON ANEMIC): ICD-10-CM

## 2020-01-23 DIAGNOSIS — R29.898 LEG WEAKNESS, BILATERAL: ICD-10-CM

## 2020-01-23 DIAGNOSIS — Z13.820 SCREENING FOR OSTEOPOROSIS: ICD-10-CM

## 2020-01-23 DIAGNOSIS — G62.9 PERIPHERAL POLYNEUROPATHY: ICD-10-CM

## 2020-01-23 LAB
AORTIC VALVE AREA: 2.2 CM2
ASCENDING AORTA (AAD): 4.2 CM
AV MEAN GRADIENT (AVMG): 5.6 MMHG
AV PEAK GRADIENT (AVPG): 13 MMHG
AV PEAK VELOCITY (AVPV): 1.8 M/S
DOP CALC LVOT PEAK VEL (LVOTPV): 1.1 M/S
E WAVE DECELARATION TIME (MDT): 299.6 MS
EST RIGHT VENT SYSTOLIC PRESSURE BY TRICUSPID REGURGITATION JET (RVSP): 17.1 MMHG
HBA1C MFR BLD: 7.5 % (ref 3.5–6)
INTERVENTRICULAR SEPTUM IN END DIASTOLE (IVSD): 1.2 CM
LEFT INTERNAL DIMENSION IN SYSTOLE (LVSD): 2.5 CM
LEFT VENTRICULAR INTERNAL DIMENSION IN DIASTOLE (LVDD): 4.3 CM
LEFT VENTRICULAR POSTERIOR WALL IN END DIASTOLE (LVPW): 1.3 CM
LV EF: 70 %
LV END SYSTOLIC LONGITUDINAL STRAIN GLOBAL (LVGS): -13.1 %
LVOT 2D (LVOTD): 2 CM
LVOT VTI (LVOTVTI): 23.8 CM
MV E TISSUE VEL LAT (MELV): 8 CM/S
MV E TISSUE VEL MED (MESV): 5 CM/S
MV E WAVE VEL/E TISSUE VEL MED(MSR): 15.1
MV PEAK A VELOCITY (MVPAV): 1.3 M/S
MV PEAK E VELOCITY (MVPEV): 0.8 M/S
PV PEAK VELOCITY (PVPV): 1.4 M/S
RIGHT VENTRICULAR AREA CHANGE (APICAL 4-CHAMBER VIEW) (RVFAC): 51.7 %
RV TISSUE DOPPLER FREE WALL HEART RATE (RVSTV): 0.1 M/S
SINUSES OF VALSALVA (SVD): 3.3 CM
TRICUSPID VALVE PEAK REGURGITATION VELOCITY (TRPV): 1.7 M/S
TV ESTIMATED RIGHT ARTERIAL PRESSURE (RAP): 5 MMHG
VIT B12 SERPL-MCNC: 416 PG/ML (ref 213–816)

## 2020-01-23 PROCEDURE — G0108 DIAB MANAGE TRN  PER INDIV: HCPCS | Performed by: FAMILY MEDICINE

## 2020-01-24 ENCOUNTER — TELEPHONE (OUTPATIENT)
Dept: FAMILY MEDICINE | Age: 70
End: 2020-01-24

## 2020-01-24 DIAGNOSIS — E11.65 UNCONTROLLED TYPE 2 DIABETES MELLITUS WITH HYPERGLYCEMIA (CMD): Primary | ICD-10-CM

## 2020-01-28 ENCOUNTER — OFFICE VISIT (OUTPATIENT)
Dept: EDUCATION SERVICES | Age: 70
End: 2020-01-28
Attending: FAMILY MEDICINE

## 2020-01-28 VITALS — BODY MASS INDEX: 32.05 KG/M2 | WEIGHT: 164.13 LBS

## 2020-01-28 DIAGNOSIS — Z71.3 DIETARY COUNSELING AND SURVEILLANCE: ICD-10-CM

## 2020-01-28 PROCEDURE — 97802 MEDICAL NUTRITION INDIV IN: CPT | Performed by: DIETITIAN, REGISTERED

## 2020-01-30 ENCOUNTER — TELEPHONE (OUTPATIENT)
Dept: FAMILY MEDICINE | Age: 70
End: 2020-01-30

## 2020-01-30 RX ORDER — ATORVASTATIN CALCIUM 40 MG/1
TABLET, FILM COATED ORAL
Qty: 90 TABLET | Refills: 0 | Status: SHIPPED | OUTPATIENT
Start: 2020-01-30 | End: 2020-11-09

## 2020-02-05 ENCOUNTER — APPOINTMENT (OUTPATIENT)
Dept: INTERPRETER SERVICES | Age: 70
End: 2020-02-05

## 2020-02-05 ENCOUNTER — OFFICE VISIT (OUTPATIENT)
Dept: FAMILY MEDICINE | Age: 70
End: 2020-02-05

## 2020-02-05 VITALS
DIASTOLIC BLOOD PRESSURE: 80 MMHG | HEART RATE: 70 BPM | HEIGHT: 60 IN | WEIGHT: 157 LBS | SYSTOLIC BLOOD PRESSURE: 124 MMHG | BODY MASS INDEX: 30.82 KG/M2 | TEMPERATURE: 98.6 F

## 2020-02-05 DIAGNOSIS — E66.9 OBESITY (BMI 30.0-34.9): ICD-10-CM

## 2020-02-05 DIAGNOSIS — I12.9 BENIGN HYPERTENSION WITH CKD (CHRONIC KIDNEY DISEASE) STAGE III (CMD): ICD-10-CM

## 2020-02-05 DIAGNOSIS — I10 BENIGN ESSENTIAL HYPERTENSION: ICD-10-CM

## 2020-02-05 DIAGNOSIS — N18.30 BENIGN HYPERTENSION WITH CKD (CHRONIC KIDNEY DISEASE) STAGE III (CMD): ICD-10-CM

## 2020-02-05 PROBLEM — E66.811 OBESITY (BMI 30.0-34.9): Status: ACTIVE | Noted: 2020-02-05

## 2020-02-05 PROCEDURE — 99214 OFFICE O/P EST MOD 30 MIN: CPT | Performed by: FAMILY MEDICINE

## 2020-02-05 RX ORDER — PEN NEEDLE, DIABETIC 30 GX5/16"
1 NEEDLE, DISPOSABLE MISCELLANEOUS NIGHTLY
Qty: 100 EACH | Refills: 3 | Status: SHIPPED | OUTPATIENT
Start: 2020-02-05 | End: 2020-08-27 | Stop reason: SDUPTHER

## 2020-02-05 RX ORDER — LOSARTAN POTASSIUM 50 MG/1
50 TABLET ORAL 2 TIMES DAILY
Qty: 180 TABLET | Refills: 1 | Status: SHIPPED | OUTPATIENT
Start: 2020-02-05 | End: 2020-11-09

## 2020-02-10 ENCOUNTER — LAB SERVICES (OUTPATIENT)
Dept: LAB | Age: 70
End: 2020-02-10

## 2020-02-10 ENCOUNTER — APPOINTMENT (OUTPATIENT)
Dept: INTERPRETER SERVICES | Age: 70
End: 2020-02-10

## 2020-02-10 ENCOUNTER — OFFICE VISIT (OUTPATIENT)
Dept: ENDOCRINOLOGY | Age: 70
End: 2020-02-10
Attending: FAMILY MEDICINE

## 2020-02-10 ENCOUNTER — RECORDS - HEALTHEAST (OUTPATIENT)
Dept: ADMINISTRATIVE | Facility: OTHER | Age: 70
End: 2020-02-10

## 2020-02-10 VITALS
BODY MASS INDEX: 31.85 KG/M2 | HEART RATE: 64 BPM | WEIGHT: 163.1 LBS | SYSTOLIC BLOOD PRESSURE: 124 MMHG | DIASTOLIC BLOOD PRESSURE: 68 MMHG

## 2020-02-10 DIAGNOSIS — E83.52 HYPERCALCEMIA: Primary | ICD-10-CM

## 2020-02-10 DIAGNOSIS — E55.9 VITAMIN D DEFICIENCY: ICD-10-CM

## 2020-02-10 DIAGNOSIS — M81.8 OTHER OSTEOPOROSIS WITHOUT CURRENT PATHOLOGICAL FRACTURE: ICD-10-CM

## 2020-02-10 LAB
CALCIUM SERPL-MCNC: 10.1 MG/DL (ref 8.4–10.2)
PHOSPHATE SERPL-MCNC: 2.7 MG/DL (ref 2.4–4.7)
RETINOPATHY: NEGATIVE
TSH SERPL-ACNC: 3.12 MCUNITS/ML (ref 0.35–5)

## 2020-02-10 PROCEDURE — 84155 ASSAY OF PROTEIN SERUM: CPT | Performed by: CLINICAL MEDICAL LABORATORY

## 2020-02-10 PROCEDURE — 82306 VITAMIN D 25 HYDROXY: CPT | Performed by: CLINICAL MEDICAL LABORATORY

## 2020-02-10 PROCEDURE — 99204 OFFICE O/P NEW MOD 45 MIN: CPT | Performed by: INTERNAL MEDICINE

## 2020-02-10 PROCEDURE — 82310 ASSAY OF CALCIUM: CPT | Performed by: INTERNAL MEDICINE

## 2020-02-10 PROCEDURE — 84443 ASSAY THYROID STIM HORMONE: CPT | Performed by: CLINICAL MEDICAL LABORATORY

## 2020-02-10 PROCEDURE — 36415 COLL VENOUS BLD VENIPUNCTURE: CPT | Performed by: INTERNAL MEDICINE

## 2020-02-10 PROCEDURE — 84165 PROTEIN E-PHORESIS SERUM: CPT | Performed by: CLINICAL MEDICAL LABORATORY

## 2020-02-10 PROCEDURE — 83970 ASSAY OF PARATHORMONE: CPT | Performed by: CLINICAL MEDICAL LABORATORY

## 2020-02-10 PROCEDURE — 84100 ASSAY OF PHOSPHORUS: CPT | Performed by: INTERNAL MEDICINE

## 2020-02-11 LAB
ALBUMIN SERPL ELPH-MCNC: 4.4 G/DL (ref 3.5–4.9)
ALPHA 1: 0.3 G/DL (ref 0.2–0.4)
ALPHA2 GLOB SERPL ELPH-MCNC: 0.7 G/DL (ref 0.5–0.9)
B-GLOBULIN SERPL ELPH-MCNC: 0.9 G/DL (ref 0.7–1.2)
GAMMA GLOB SERPL ELPH-MCNC: 1.5 G/DL (ref 0.7–1.7)
GLOBULIN SER-MCNC: 3.3 G/DL (ref 2.1–4.2)
PATH INTERP SPEC-IMP: NORMAL
PROT SERPL-MCNC: 7.7 G/DL (ref 6.4–8.2)

## 2020-02-12 ENCOUNTER — APPOINTMENT (OUTPATIENT)
Dept: LAB | Age: 70
End: 2020-02-12

## 2020-02-12 LAB — PTH-INTACT SERPL-MCNC: 70 PG/ML (ref 19–88)

## 2020-02-12 PROCEDURE — 82340 ASSAY OF CALCIUM IN URINE: CPT | Performed by: CLINICAL MEDICAL LABORATORY

## 2020-02-12 PROCEDURE — 82570 ASSAY OF URINE CREATININE: CPT | Performed by: CLINICAL MEDICAL LABORATORY

## 2020-02-13 ENCOUNTER — TELEPHONE (OUTPATIENT)
Dept: ENDOCRINOLOGY | Age: 70
End: 2020-02-13

## 2020-02-13 DIAGNOSIS — E55.9 VITAMIN D DEFICIENCY: Primary | ICD-10-CM

## 2020-02-13 LAB
CALCIUM 24H UR-MRATE: NORMAL MG/(24.H)
CALCIUM UR-MCNC: <5 MG/DL
COLLECT DURATION TIME UR: 24 HRS
COLLECT DURATION TIME UR: 24 HRS
CREAT 24H UR-MCNC: 34.5 MG/DL
CREAT 24H UR-MRATE: 0.79 G/24 HR (ref 0.67–1.59)
SPECIMEN VOL UR: 2300 ML
SPECIMEN VOL UR: 2300 ML

## 2020-02-14 ENCOUNTER — TELEPHONE (OUTPATIENT)
Dept: ENDOCRINOLOGY | Age: 70
End: 2020-02-14

## 2020-02-14 ENCOUNTER — COMMUNICATION - HEALTHEAST (OUTPATIENT)
Dept: SCHEDULING | Facility: CLINIC | Age: 70
End: 2020-02-14

## 2020-02-14 LAB — 25(OH)D3+25(OH)D2 SERPL-MCNC: 22.3 NG/ML (ref 30–100)

## 2020-02-18 ENCOUNTER — AMBULATORY - HEALTHEAST (OUTPATIENT)
Dept: FAMILY MEDICINE | Facility: CLINIC | Age: 70
End: 2020-02-18

## 2020-02-18 ENCOUNTER — RECORDS - HEALTHEAST (OUTPATIENT)
Dept: HEALTH INFORMATION MANAGEMENT | Facility: CLINIC | Age: 70
End: 2020-02-18

## 2020-02-18 DIAGNOSIS — M21.379 FOOT-DROP, UNSPECIFIED LATERALITY: ICD-10-CM

## 2020-02-18 DIAGNOSIS — M21.372 LEFT FOOT DROP: ICD-10-CM

## 2020-02-25 ENCOUNTER — AMBULATORY - HEALTHEAST (OUTPATIENT)
Dept: NURSING | Facility: CLINIC | Age: 70
End: 2020-02-25

## 2020-02-25 ENCOUNTER — RECORDS - HEALTHEAST (OUTPATIENT)
Dept: ADMINISTRATIVE | Facility: OTHER | Age: 70
End: 2020-02-25

## 2020-02-25 ENCOUNTER — COMMUNICATION - HEALTHEAST (OUTPATIENT)
Dept: OTHER | Facility: CLINIC | Age: 70
End: 2020-02-25

## 2020-02-25 ENCOUNTER — RECORDS - HEALTHEAST (OUTPATIENT)
Dept: BONE DENSITY | Facility: CLINIC | Age: 70
End: 2020-02-25

## 2020-02-25 DIAGNOSIS — Z78.0 ASYMPTOMATIC MENOPAUSAL STATE: ICD-10-CM

## 2020-02-25 DIAGNOSIS — Z13.820 ENCOUNTER FOR SCREENING FOR OSTEOPOROSIS: ICD-10-CM

## 2020-02-28 ENCOUNTER — COMMUNICATION - HEALTHEAST (OUTPATIENT)
Dept: FAMILY MEDICINE | Facility: CLINIC | Age: 70
End: 2020-02-28

## 2020-03-02 ENCOUNTER — COMMUNICATION - HEALTHEAST (OUTPATIENT)
Dept: FAMILY MEDICINE | Facility: CLINIC | Age: 70
End: 2020-03-02

## 2020-03-02 DIAGNOSIS — E11.9 DIABETES (H): ICD-10-CM

## 2020-03-03 ENCOUNTER — COMMUNICATION - HEALTHEAST (OUTPATIENT)
Dept: OTHER | Facility: CLINIC | Age: 70
End: 2020-03-03

## 2020-03-10 ENCOUNTER — HOSPITAL ENCOUNTER (OUTPATIENT)
Dept: MRI IMAGING | Facility: CLINIC | Age: 70
Discharge: HOME | End: 2020-03-10
Attending: FAMILY MEDICINE

## 2020-03-10 ENCOUNTER — AMBULATORY - HEALTHEAST (OUTPATIENT)
Dept: FAMILY MEDICINE | Facility: CLINIC | Age: 70
End: 2020-03-10

## 2020-03-10 DIAGNOSIS — E11.21 TYPE 2 DIABETES MELLITUS WITH DIABETIC NEPHROPATHY, WITH LONG-TERM CURRENT USE OF INSULIN (CMD): ICD-10-CM

## 2020-03-10 DIAGNOSIS — I10 BENIGN ESSENTIAL HYPERTENSION: ICD-10-CM

## 2020-03-10 DIAGNOSIS — M51.26 HERNIATION OF INTERVERTEBRAL DISC BETWEEN L4 AND L5: ICD-10-CM

## 2020-03-10 DIAGNOSIS — Z79.4 TYPE 2 DIABETES MELLITUS WITH DIABETIC NEPHROPATHY, WITH LONG-TERM CURRENT USE OF INSULIN (CMD): ICD-10-CM

## 2020-03-10 DIAGNOSIS — M21.379 FOOT-DROP, UNSPECIFIED LATERALITY: ICD-10-CM

## 2020-03-10 RX ORDER — AMLODIPINE BESYLATE 10 MG/1
10 TABLET ORAL DAILY
Qty: 90 TABLET | Refills: 0 | Status: SHIPPED | OUTPATIENT
Start: 2020-03-10 | End: 2020-08-27 | Stop reason: SDUPTHER

## 2020-03-11 ENCOUNTER — COMMUNICATION - HEALTHEAST (OUTPATIENT)
Dept: FAMILY MEDICINE | Facility: CLINIC | Age: 70
End: 2020-03-11

## 2020-03-17 ENCOUNTER — OFFICE VISIT - HEALTHEAST (OUTPATIENT)
Dept: FAMILY MEDICINE | Facility: CLINIC | Age: 70
End: 2020-03-17

## 2020-03-17 DIAGNOSIS — L03.011 CELLULITIS OF FINGER OF RIGHT HAND: ICD-10-CM

## 2020-03-23 RX ORDER — SITAGLIPTIN 100 MG/1
TABLET, FILM COATED ORAL
Qty: 90 TABLET | Refills: 0 | Status: SHIPPED | OUTPATIENT
Start: 2020-03-23 | End: 2020-08-04 | Stop reason: SDUPTHER

## 2020-04-13 ENCOUNTER — COMMUNICATION - HEALTHEAST (OUTPATIENT)
Dept: FAMILY MEDICINE | Facility: CLINIC | Age: 70
End: 2020-04-13

## 2020-04-13 DIAGNOSIS — G62.9 PERIPHERAL POLYNEUROPATHY: ICD-10-CM

## 2020-04-20 ENCOUNTER — OFFICE VISIT - HEALTHEAST (OUTPATIENT)
Dept: FAMILY MEDICINE | Facility: CLINIC | Age: 70
End: 2020-04-20

## 2020-04-20 DIAGNOSIS — M51.26 HERNIATION OF INTERVERTEBRAL DISC BETWEEN L4 AND L5: ICD-10-CM

## 2020-04-20 DIAGNOSIS — M25.562 ARTHRALGIA OF BOTH LOWER LEGS: ICD-10-CM

## 2020-04-20 DIAGNOSIS — E11.43 TYPE 2 DIABETES MELLITUS WITH DIABETIC AUTONOMIC NEUROPATHY, WITHOUT LONG-TERM CURRENT USE OF INSULIN (H): ICD-10-CM

## 2020-04-20 DIAGNOSIS — M81.0 AGE-RELATED OSTEOPOROSIS WITHOUT CURRENT PATHOLOGICAL FRACTURE: ICD-10-CM

## 2020-04-20 DIAGNOSIS — M25.561 ARTHRALGIA OF BOTH LOWER LEGS: ICD-10-CM

## 2020-04-20 RX ORDER — GLIPIZIDE 2.5 MG/1
2.5 TABLET, EXTENDED RELEASE ORAL DAILY PRN
Qty: 90 TABLET | Refills: 4 | Status: SHIPPED | OUTPATIENT
Start: 2020-04-20 | End: 2022-08-11

## 2020-04-21 ENCOUNTER — RECORDS - HEALTHEAST (OUTPATIENT)
Dept: ADMINISTRATIVE | Facility: OTHER | Age: 70
End: 2020-04-21

## 2020-07-05 ENCOUNTER — VIRTUAL VISIT (OUTPATIENT)
Dept: FAMILY MEDICINE | Facility: OTHER | Age: 70
End: 2020-07-05

## 2020-07-06 ENCOUNTER — AMBULATORY - HEALTHEAST (OUTPATIENT)
Dept: INTERNAL MEDICINE | Facility: CLINIC | Age: 70
End: 2020-07-06

## 2020-07-06 DIAGNOSIS — Z20.822 SUSPECTED 2019 NOVEL CORONAVIRUS INFECTION: ICD-10-CM

## 2020-07-06 NOTE — PROGRESS NOTES
"Date: 2020 20:59:07  Clinician: Carli Jones  Clinician NPI: [id number]  Patient: Hai Meade  Patient : 1950  Patient Address: [address] [place], [place] [address] [address]  Patient Phone: [phone]  Visit Protocol: URI  Patient Summary:  Hai is a 70 year old ( : 1950 ) female who initiated a Visit for COVID-19 (Coronavirus) evaluation and screening. When asked the question \"Please sign me up to receive news, health information and promotions from [employer].\", Hai responded \"No\".    Hai states her symptoms started suddenly 3-4 days ago. After her symptoms started, they improved and then got worse again.   Her symptoms consist of nausea, ageusia, malaise, a headache, and myalgia. Hai also feels feverish but was unable to measure her temperature.   Symptom details   Headache: She states the headache is moderate (4-6 on a 10 point pain scale).    Hai denies having wheezing, teeth pain, diarrhea, vomiting, rhinitis, ear pain, chills, sore throat, enlarged lymph nodes, anosmia, facial pain or pressure, cough, and nasal congestion. She also denies having recent facial or sinus surgery in the past 60 days, taking antibiotic medication in the past month, and having a sinus infection within the past year. She is not experiencing dyspnea.   Precipitating events  She has not recently been exposed to someone with influenza. Hai has been in close contact with the following high risk individuals: pregnant women.   Pertinent COVID-19 (Coronavirus) information  In the past 14 days, Hai has not worked in a congregate living setting.   She does not work or volunteer as healthcare worker or a  and does not work or volunteer in a healthcare facility.   Hai also has not lived in a congregate living setting in the past 14 days. She does not live with a healthcare worker.   Hai has not had a close contact with a laboratory-confirmed COVID-19 patient within 14 days of symptom onset.   Pertinent " medical history  Hai does not get yeast infections when she takes antibiotics.   Hai does not need a return to work/school note.   Weight: 120 lbs   Hai does not smoke or use smokeless tobacco.   Weight: 120 lbs    MEDICATIONS: aspirin-acetaminophen-caffeine oral, vitamin A-vitamin D3-vitamin E-vitamin K oral, simvastatin oral, gabapentin oral, calcium-magnesium oral, metformin oral, ALLERGIES: Cymbalta  Clinician Response:  Dear Hai,   Your symptoms show that you may have coronavirus (COVID-19). This illness can cause fever, cough and trouble breathing. Many people get a mild case and get better on their own. Some people can get very sick.  What should I do?  We would like to test you for this virus.   1. Please call [phone] to schedule your visit. Explain that you were referred by [facility] to have a COVID-19 test. Be ready to share your [facility] visit ID number.  The following will serve as your written order for this COVID Test, ordered by me, for the indication of suspected COVID [Z20.828]: The test will be ordered in Facebook, our electronic health record, after you are scheduled. It will show as ordered and authorized by Jose Miguel Conley MD.  Order: COVID-19 (Coronavirus) PCR for SYMPTOMATIC testing from [facility].    2. When it's time for your COVID test:  Stay at least 6 feet away from others. (If someone will drive you to your test, stay in the backseat, as far away from the  as you can.)   Cover your mouth and nose with a mask, tissue or washcloth.  Go straight to the testing site. Don't make any stops on the way there or back.    3.Starting now: Stay home and away from others (self-isolate) until:   You've had no fever---and no medicine that reduces fever---for 3 full days (72 hours). And...  Your other symptoms have gotten better. For example, your cough or breathing has improved. And...  At least 10 days have passed since your symptoms started.    During this time, don't leave the house except for  "testing or medical care.   Stay in your own room, even for meals. Use your own bathroom if you can.   Stay away from others in your home. No hugging, kissing or shaking hands. No visitors.  Don't go to work, school or anywhere else.    Clean \"high touch\" surfaces often (doorknobs, counters, handles, etc.). Use a household cleaning spray or wipes. You'll find a full list of  on the EPA website: www.epa.gov/pesticide-registration/list-n-disinfectants-use-against-sars-cov-2.   Cover your mouth and nose with a mask, tissue or washcloth to avoid spreading germs.  Wash your hands and face often. Use soap and water.  Caregivers in these groups are at risk for severe illness due to COVID-19:  o People 65 years and older  o People who live in a nursing home or long-term care facility  o People with chronic disease (lung, heart, cancer, diabetes, kidney, liver, immunologic)  o People who have a weakened immune system, including those who:   Are in cancer treatment  Take medicine that weakens the immune system, such as corticosteroids  Had a bone marrow or organ transplant  Have an immune deficiency  Have poorly controlled HIV or AIDS  Are obese (body mass index of 40 or higher)  Smoke regularly   o Caregivers should wear gloves while washing dishes, handling laundry and cleaning bedrooms and bathrooms.  o Use caution when washing and drying laundry: Don't shake dirty laundry, and use the warmest water setting that you can.  o For more tips, go to www.cdc.gov/coronavirus/2019-ncov/downloads/10Things.pdf.   4.Sign up for [employer]. We know it's scary to hear that you might have COVID-19. We want to track your symptoms to make sure you're okay over the next 2 weeks. Please look for an email from [employer]---this is a free, online program that we'll use to keep in touch. To sign up, follow the link in the email. Learn more at [email or web address]  How can I take care of myself?   Get lots of rest. Drink " extra fluids(unless a doctor has told you not to).  Take Tylenol (acetaminophen) for fever or pain. If you have liver or kidney problems, ask your family doctor if it's okay to take Tylenol.   Adults can take either:    650 mg (two 325 mg pills) every 4 to 6 hours, or...  1,000 mg (two 500 mg pills) every 8 hours as needed.   Note: Don't take more than 3,000 mg in one day. Acetaminophen is found in many medicines (both prescribed and over-the-counter medicines). Read all labels to be sure you don't take too much.   For children, check the Tylenol bottle for the right dose. The dose is based on the child's age or weight.    If you have other health problems (like cancer, heart failure, an organ transplant or severe kidney disease):Call your specialty clinic if you don't feel better in the next 2 days.    Know when to call 911. Emergency warning signs include:   Trouble breathing or shortness of breath Pain or pressure in the chest that doesn't go away Feeling confused like you haven't felt before, or not being able to wake up Bluish-colored lips or face.  Where can I get more information?   [facility] -- About COVID-19: [email or web address]  CDC -- What to Do If You're Sick: www.cdc.gov/coronavirus/2019-ncov/about/steps-when-sick.html  CDC -- Ending Home Isolation: www.cdc.gov/coronavirus/2019-ncov/hcp/disposition-in-home-patients.html  CDC -- Caring for Someone: www.cdc.gov/coronavirus/2019-ncov/if-you-are-sick/care-for-someone.html  [facility] -- Interim Guidance for Hospital Discharge to Home: www.health.[facility].[place].us/diseases/coronavirus/hcp/hospdischarge.pdf  [facility] clinical trials (COVID-19 research studies): [email or web address]/umn-clinical-trials  Below are the COVID-19 hotlines at the [place] Department of [facility] ([facility]). Interpreters are available.    For health questions: Call [phone] or 1-962.320.2011 (7 a.m. to 7 p.m.) For questions about schools and childcare: Call  [phone] or 1-711.258.1468 (7 a.m. to 7 p.m.)    Diagnosis: Nausea  Diagnosis ICD: R11.0

## 2020-07-07 ENCOUNTER — AMBULATORY - HEALTHEAST (OUTPATIENT)
Dept: FAMILY MEDICINE | Facility: CLINIC | Age: 70
End: 2020-07-07

## 2020-07-07 DIAGNOSIS — Z20.822 SUSPECTED 2019 NOVEL CORONAVIRUS INFECTION: ICD-10-CM

## 2020-07-16 ENCOUNTER — COMMUNICATION - HEALTHEAST (OUTPATIENT)
Dept: FAMILY MEDICINE | Facility: CLINIC | Age: 70
End: 2020-07-16

## 2020-07-16 ENCOUNTER — OFFICE VISIT - HEALTHEAST (OUTPATIENT)
Dept: FAMILY MEDICINE | Facility: CLINIC | Age: 70
End: 2020-07-16

## 2020-07-16 DIAGNOSIS — M81.0 AGE-RELATED OSTEOPOROSIS WITHOUT CURRENT PATHOLOGICAL FRACTURE: ICD-10-CM

## 2020-07-16 DIAGNOSIS — G62.9 PERIPHERAL POLYNEUROPATHY: ICD-10-CM

## 2020-07-16 DIAGNOSIS — E11.43 TYPE 2 DIABETES MELLITUS WITH DIABETIC AUTONOMIC NEUROPATHY, WITHOUT LONG-TERM CURRENT USE OF INSULIN (H): ICD-10-CM

## 2020-07-16 DIAGNOSIS — Z12.11 SCREEN FOR COLON CANCER: ICD-10-CM

## 2020-07-16 LAB
ANION GAP SERPL CALCULATED.3IONS-SCNC: 8 MMOL/L (ref 5–18)
BUN SERPL-MCNC: 8 MG/DL (ref 8–28)
CALCIUM SERPL-MCNC: 9.8 MG/DL (ref 8.5–10.5)
CHLORIDE BLD-SCNC: 96 MMOL/L (ref 98–107)
CO2 SERPL-SCNC: 27 MMOL/L (ref 22–31)
CREAT SERPL-MCNC: 0.74 MG/DL (ref 0.6–1.1)
GFR SERPL CREATININE-BSD FRML MDRD: >60 ML/MIN/1.73M2
GLUCOSE BLD-MCNC: 134 MG/DL (ref 70–125)
HBA1C MFR BLD: 7.7 % (ref 3.5–6)
POTASSIUM BLD-SCNC: 4.6 MMOL/L (ref 3.5–5)
SODIUM SERPL-SCNC: 131 MMOL/L (ref 136–145)

## 2020-07-16 RX ORDER — BLOOD PRESSURE TEST KIT
KIT MISCELLANEOUS
Qty: 1 EACH | Refills: 0 | Status: SHIPPED | OUTPATIENT
Start: 2020-07-16 | End: 2023-11-20

## 2020-07-21 ENCOUNTER — HOSPITAL ENCOUNTER (OUTPATIENT)
Dept: PHYSICAL MEDICINE AND REHAB | Facility: CLINIC | Age: 70
Discharge: HOME | End: 2020-07-21
Attending: FAMILY MEDICINE

## 2020-07-21 DIAGNOSIS — M51.369 BULGING OF LUMBAR INTERVERTEBRAL DISC: ICD-10-CM

## 2020-07-21 DIAGNOSIS — E11.42 DIABETIC POLYNEUROPATHY ASSOCIATED WITH TYPE 2 DIABETES MELLITUS (H): ICD-10-CM

## 2020-07-21 DIAGNOSIS — M21.372 BILATERAL FOOT-DROP: ICD-10-CM

## 2020-07-21 DIAGNOSIS — R20.2 PARESTHESIA: ICD-10-CM

## 2020-07-21 DIAGNOSIS — M21.371 BILATERAL FOOT-DROP: ICD-10-CM

## 2020-07-21 RX ORDER — CAPSAICIN 0.025 %
CREAM (GRAM) TOPICAL
Qty: 60 G | Refills: 0 | Status: SHIPPED | COMMUNITY
Start: 2020-07-21 | End: 2021-10-11

## 2020-07-21 ASSESSMENT — MIFFLIN-ST. JEOR: SCORE: 975.82

## 2020-07-22 ENCOUNTER — COMMUNICATION - HEALTHEAST (OUTPATIENT)
Dept: OTHER | Facility: CLINIC | Age: 70
End: 2020-07-22

## 2020-07-24 ENCOUNTER — COMMUNICATION - HEALTHEAST (OUTPATIENT)
Dept: LAB | Facility: CLINIC | Age: 70
End: 2020-07-24

## 2020-07-24 ENCOUNTER — HOSPITAL ENCOUNTER (OUTPATIENT)
Dept: PHYSICAL MEDICINE AND REHAB | Facility: CLINIC | Age: 70
Discharge: HOME | End: 2020-07-24
Attending: PHYSICIAN ASSISTANT

## 2020-07-24 DIAGNOSIS — R20.2 PARESTHESIA: ICD-10-CM

## 2020-07-24 DIAGNOSIS — E87.1 HYPONATREMIA: ICD-10-CM

## 2020-07-28 ENCOUNTER — AMBULATORY - HEALTHEAST (OUTPATIENT)
Dept: LAB | Facility: CLINIC | Age: 70
End: 2020-07-28

## 2020-07-28 ENCOUNTER — COMMUNICATION - HEALTHEAST (OUTPATIENT)
Dept: OTHER | Facility: CLINIC | Age: 70
End: 2020-07-28

## 2020-07-28 DIAGNOSIS — E87.1 HYPONATREMIA: ICD-10-CM

## 2020-07-28 LAB
ANION GAP SERPL CALCULATED.3IONS-SCNC: 11 MMOL/L (ref 5–18)
BUN SERPL-MCNC: 11 MG/DL (ref 8–28)
CALCIUM SERPL-MCNC: 10 MG/DL (ref 8.5–10.5)
CHLORIDE BLD-SCNC: 99 MMOL/L (ref 98–107)
CO2 SERPL-SCNC: 25 MMOL/L (ref 22–31)
CREAT SERPL-MCNC: 0.86 MG/DL (ref 0.6–1.1)
GFR SERPL CREATININE-BSD FRML MDRD: >60 ML/MIN/1.73M2
GLUCOSE BLD-MCNC: 226 MG/DL (ref 70–125)
POTASSIUM BLD-SCNC: 4.5 MMOL/L (ref 3.5–5)
SODIUM SERPL-SCNC: 135 MMOL/L (ref 136–145)

## 2020-07-29 ENCOUNTER — HOSPITAL ENCOUNTER (OUTPATIENT)
Dept: PHYSICAL MEDICINE AND REHAB | Facility: CLINIC | Age: 70
Discharge: HOME | End: 2020-07-29
Attending: PHYSICIAN ASSISTANT

## 2020-07-29 DIAGNOSIS — M21.372 BILATERAL FOOT-DROP: ICD-10-CM

## 2020-07-29 DIAGNOSIS — M21.371 BILATERAL FOOT-DROP: ICD-10-CM

## 2020-07-29 DIAGNOSIS — E11.42 DIABETIC POLYNEUROPATHY ASSOCIATED WITH TYPE 2 DIABETES MELLITUS (H): ICD-10-CM

## 2020-07-31 ENCOUNTER — COMMUNICATION - HEALTHEAST (OUTPATIENT)
Dept: FAMILY MEDICINE | Facility: CLINIC | Age: 70
End: 2020-07-31

## 2020-08-03 RX ORDER — SITAGLIPTIN 100 MG/1
TABLET, FILM COATED ORAL
Qty: 90 TABLET | Refills: 0 | OUTPATIENT
Start: 2020-08-03

## 2020-08-03 RX ORDER — METFORMIN HYDROCHLORIDE 500 MG/1
TABLET, EXTENDED RELEASE ORAL
Qty: 360 TABLET | Refills: 0 | OUTPATIENT
Start: 2020-08-03

## 2020-08-04 ENCOUNTER — TELEPHONE (OUTPATIENT)
Dept: FAMILY MEDICINE | Age: 70
End: 2020-08-04

## 2020-08-04 DIAGNOSIS — Z13.220 SCREENING FOR CHOLESTEROL LEVEL: ICD-10-CM

## 2020-08-04 DIAGNOSIS — N18.30 BENIGN HYPERTENSION WITH CKD (CHRONIC KIDNEY DISEASE) STAGE III (CMD): ICD-10-CM

## 2020-08-04 DIAGNOSIS — I12.9 BENIGN HYPERTENSION WITH CKD (CHRONIC KIDNEY DISEASE) STAGE III (CMD): ICD-10-CM

## 2020-08-04 RX ORDER — METFORMIN HYDROCHLORIDE 500 MG/1
2000 TABLET, EXTENDED RELEASE ORAL
Qty: 360 TABLET | Refills: 0 | Status: SHIPPED | OUTPATIENT
Start: 2020-08-04 | End: 2020-11-10

## 2020-08-05 ENCOUNTER — COMMUNICATION - HEALTHEAST (OUTPATIENT)
Dept: OTHER | Facility: CLINIC | Age: 70
End: 2020-08-05

## 2020-08-13 ENCOUNTER — AMBULATORY - HEALTHEAST (OUTPATIENT)
Dept: NURSING | Facility: CLINIC | Age: 70
End: 2020-08-13

## 2020-08-24 ENCOUNTER — LAB SERVICES (OUTPATIENT)
Dept: LAB | Age: 70
End: 2020-08-24

## 2020-08-24 DIAGNOSIS — N18.30 BENIGN HYPERTENSION WITH CKD (CHRONIC KIDNEY DISEASE) STAGE III (CMD): ICD-10-CM

## 2020-08-24 DIAGNOSIS — I12.9 BENIGN HYPERTENSION WITH CKD (CHRONIC KIDNEY DISEASE) STAGE III (CMD): ICD-10-CM

## 2020-08-24 DIAGNOSIS — Z13.220 SCREENING FOR CHOLESTEROL LEVEL: ICD-10-CM

## 2020-08-24 LAB
ALBUMIN SERPL-MCNC: 3.9 G/DL (ref 3.6–5.1)
ALBUMIN/GLOB SERPL: 1 {RATIO} (ref 1–2.4)
ALP SERPL-CCNC: 125 UNITS/L (ref 45–117)
ALT SERPL-CCNC: 29 UNITS/L
ANION GAP SERPL CALC-SCNC: 14 MMOL/L (ref 10–20)
AST SERPL-CCNC: 21 UNITS/L
BILIRUB SERPL-MCNC: 0.4 MG/DL (ref 0.2–1)
BUN SERPL-MCNC: 11 MG/DL (ref 6–20)
BUN/CREAT SERPL: 12 (ref 7–25)
CALCIUM SERPL-MCNC: 10 MG/DL (ref 8.4–10.2)
CHLORIDE SERPL-SCNC: 105 MMOL/L (ref 98–107)
CHOLEST SERPL-MCNC: 109 MG/DL
CHOLEST/HDLC SERPL: 2.2 {RATIO}
CO2 SERPL-SCNC: 29 MMOL/L (ref 21–32)
CREAT SERPL-MCNC: 0.92 MG/DL (ref 0.51–0.95)
FASTING DURATION TIME PATIENT: ABNORMAL H
FASTING DURATION TIME PATIENT: ABNORMAL H
GFR SERPLBLD BASED ON 1.73 SQ M-ARVRAT: 62 ML/MIN/1.73M2
GLOBULIN SER-MCNC: 3.9 G/DL (ref 2–4)
GLUCOSE SERPL-MCNC: 85 MG/DL (ref 65–99)
HDLC SERPL-MCNC: 49 MG/DL
LDLC SERPL CALC-MCNC: 21 MG/DL
NONHDLC SERPL-MCNC: 60 MG/DL
POTASSIUM SERPL-SCNC: 4.4 MMOL/L (ref 3.4–5.1)
PROT SERPL-MCNC: 7.8 G/DL (ref 6.4–8.2)
SODIUM SERPL-SCNC: 144 MMOL/L (ref 135–145)
TRIGL SERPL-MCNC: 197 MG/DL

## 2020-08-24 PROCEDURE — 36415 COLL VENOUS BLD VENIPUNCTURE: CPT | Performed by: INTERNAL MEDICINE

## 2020-08-24 PROCEDURE — 83036 HEMOGLOBIN GLYCOSYLATED A1C: CPT | Performed by: CLINICAL MEDICAL LABORATORY

## 2020-08-24 PROCEDURE — 80053 COMPREHEN METABOLIC PANEL: CPT | Performed by: INTERNAL MEDICINE

## 2020-08-24 PROCEDURE — 80061 LIPID PANEL: CPT | Performed by: CLINICAL MEDICAL LABORATORY

## 2020-08-25 LAB — HBA1C MFR BLD: 7.1 % (ref 4.5–5.6)

## 2020-08-27 ENCOUNTER — OFFICE VISIT (OUTPATIENT)
Dept: FAMILY MEDICINE | Age: 70
End: 2020-08-27

## 2020-08-27 DIAGNOSIS — I10 BENIGN ESSENTIAL HYPERTENSION: ICD-10-CM

## 2020-08-27 DIAGNOSIS — I50.32 DIASTOLIC CHF, CHRONIC (CMD): ICD-10-CM

## 2020-08-27 DIAGNOSIS — N18.30 BENIGN HYPERTENSION WITH CKD (CHRONIC KIDNEY DISEASE) STAGE III (CMD): ICD-10-CM

## 2020-08-27 DIAGNOSIS — I12.9 BENIGN HYPERTENSION WITH CKD (CHRONIC KIDNEY DISEASE) STAGE III (CMD): ICD-10-CM

## 2020-08-27 DIAGNOSIS — E66.9 OBESITY (BMI 30.0-34.9): ICD-10-CM

## 2020-08-27 PROBLEM — E11.65 UNCONTROLLED TYPE 2 DIABETES MELLITUS WITH HYPERGLYCEMIA (CMD): Status: RESOLVED | Noted: 2019-09-15 | Resolved: 2020-08-27

## 2020-08-27 PROCEDURE — 99443 TELEPHONE E&M BY PHYSICIAN EST PT NOT ORIG PREV 7 DAYS 21-30 MIN: CPT | Performed by: FAMILY MEDICINE

## 2020-08-27 RX ORDER — INSULIN GLARGINE 100 [IU]/ML
19 INJECTION, SOLUTION SUBCUTANEOUS NIGHTLY
Qty: 15 ML | Refills: 1 | Status: SHIPPED | OUTPATIENT
Start: 2020-08-27 | End: 2020-10-28

## 2020-08-27 RX ORDER — AMLODIPINE BESYLATE 10 MG/1
10 TABLET ORAL DAILY
Qty: 90 TABLET | Refills: 0 | Status: SHIPPED | OUTPATIENT
Start: 2020-08-27 | End: 2020-11-09

## 2020-08-27 RX ORDER — PEN NEEDLE, DIABETIC 30 GX5/16"
1 NEEDLE, DISPOSABLE MISCELLANEOUS NIGHTLY
Qty: 100 EACH | Refills: 3 | Status: SHIPPED | OUTPATIENT
Start: 2020-08-27

## 2020-09-01 ENCOUNTER — RECORDS - HEALTHEAST (OUTPATIENT)
Dept: ADMINISTRATIVE | Facility: OTHER | Age: 70
End: 2020-09-01

## 2020-09-04 ENCOUNTER — RECORDS - HEALTHEAST (OUTPATIENT)
Dept: ADMINISTRATIVE | Facility: OTHER | Age: 70
End: 2020-09-04

## 2020-09-05 ENCOUNTER — COMMUNICATION - HEALTHEAST (OUTPATIENT)
Dept: FAMILY MEDICINE | Facility: CLINIC | Age: 70
End: 2020-09-05

## 2020-09-05 DIAGNOSIS — E11.9 DIABETES (H): ICD-10-CM

## 2020-09-05 DIAGNOSIS — E78.5 HYPERLIPIDEMIA: ICD-10-CM

## 2020-09-08 RX ORDER — SIMVASTATIN 40 MG
TABLET ORAL
Qty: 90 TABLET | Refills: 3 | Status: SHIPPED | OUTPATIENT
Start: 2020-09-08 | End: 2021-08-31

## 2020-09-17 ENCOUNTER — COMMUNICATION - HEALTHEAST (OUTPATIENT)
Dept: OTHER | Facility: CLINIC | Age: 70
End: 2020-09-17

## 2020-09-24 ENCOUNTER — RECORDS - HEALTHEAST (OUTPATIENT)
Dept: ADMINISTRATIVE | Facility: OTHER | Age: 70
End: 2020-09-24

## 2020-10-28 RX ORDER — INSULIN GLARGINE 100 [IU]/ML
INJECTION, SOLUTION SUBCUTANEOUS
Qty: 15 ML | Refills: 1 | Status: SHIPPED | OUTPATIENT
Start: 2020-10-28 | End: 2021-07-05 | Stop reason: ALTCHOICE

## 2020-11-03 ENCOUNTER — OFFICE VISIT - HEALTHEAST (OUTPATIENT)
Dept: FAMILY MEDICINE | Facility: CLINIC | Age: 70
End: 2020-11-03

## 2020-11-03 DIAGNOSIS — E53.8 VITAMIN B12 DEFICIENCY (NON ANEMIC): ICD-10-CM

## 2020-11-03 DIAGNOSIS — E78.2 MIXED HYPERLIPIDEMIA: ICD-10-CM

## 2020-11-03 DIAGNOSIS — E11.43 TYPE 2 DIABETES MELLITUS WITH DIABETIC AUTONOMIC NEUROPATHY, WITHOUT LONG-TERM CURRENT USE OF INSULIN (H): ICD-10-CM

## 2020-11-03 LAB
ALBUMIN SERPL-MCNC: 4 G/DL (ref 3.5–5)
ALP SERPL-CCNC: 58 U/L (ref 45–120)
ALT SERPL W P-5'-P-CCNC: 23 U/L (ref 0–45)
ANION GAP SERPL CALCULATED.3IONS-SCNC: 9 MMOL/L (ref 5–18)
AST SERPL W P-5'-P-CCNC: 21 U/L (ref 0–40)
BILIRUB SERPL-MCNC: 0.6 MG/DL (ref 0–1)
BUN SERPL-MCNC: 14 MG/DL (ref 8–28)
CALCIUM SERPL-MCNC: 9.4 MG/DL (ref 8.5–10.5)
CHLORIDE BLD-SCNC: 99 MMOL/L (ref 98–107)
CHOLEST SERPL-MCNC: 134 MG/DL
CO2 SERPL-SCNC: 24 MMOL/L (ref 22–31)
CREAT SERPL-MCNC: 0.88 MG/DL (ref 0.6–1.1)
ERYTHROCYTE [DISTWIDTH] IN BLOOD BY AUTOMATED COUNT: 12.7 % (ref 11–14.5)
FASTING STATUS PATIENT QL REPORTED: NO
GFR SERPL CREATININE-BSD FRML MDRD: >60 ML/MIN/1.73M2
GLUCOSE BLD-MCNC: 183 MG/DL (ref 70–125)
HBA1C MFR BLD: 7.7 %
HCT VFR BLD AUTO: 33.9 % (ref 35–47)
HDLC SERPL-MCNC: 54 MG/DL
HGB BLD-MCNC: 11.6 G/DL (ref 12–16)
LDLC SERPL CALC-MCNC: 41 MG/DL
MCH RBC QN AUTO: 30.2 PG (ref 27–34)
MCHC RBC AUTO-ENTMCNC: 34.2 G/DL (ref 32–36)
MCV RBC AUTO: 88 FL (ref 80–100)
PLATELET # BLD AUTO: 258 THOU/UL (ref 140–440)
PMV BLD AUTO: 7.2 FL (ref 7–10)
POTASSIUM BLD-SCNC: 4.3 MMOL/L (ref 3.5–5)
PROT SERPL-MCNC: 6.6 G/DL (ref 6–8)
RBC # BLD AUTO: 3.84 MILL/UL (ref 3.8–5.4)
SODIUM SERPL-SCNC: 132 MMOL/L (ref 136–145)
TRIGL SERPL-MCNC: 194 MG/DL
VIT B12 SERPL-MCNC: 267 PG/ML (ref 213–816)
WBC: 6.5 THOU/UL (ref 4–11)

## 2020-11-09 DIAGNOSIS — E11.21 TYPE 2 DIABETES MELLITUS WITH DIABETIC NEPHROPATHY, WITH LONG-TERM CURRENT USE OF INSULIN (CMD): ICD-10-CM

## 2020-11-09 DIAGNOSIS — Z79.4 TYPE 2 DIABETES MELLITUS WITH DIABETIC NEPHROPATHY, WITH LONG-TERM CURRENT USE OF INSULIN (CMD): ICD-10-CM

## 2020-11-09 DIAGNOSIS — I10 BENIGN ESSENTIAL HYPERTENSION: ICD-10-CM

## 2020-11-09 RX ORDER — SITAGLIPTIN 100 MG/1
TABLET, FILM COATED ORAL
Qty: 90 TABLET | Refills: 0 | Status: SHIPPED | OUTPATIENT
Start: 2020-11-09 | End: 2021-02-05

## 2020-11-09 RX ORDER — LOSARTAN POTASSIUM 50 MG/1
TABLET ORAL
Qty: 180 TABLET | Refills: 0 | Status: SHIPPED | OUTPATIENT
Start: 2020-11-09 | End: 2021-02-05

## 2020-11-09 RX ORDER — ATORVASTATIN CALCIUM 40 MG/1
TABLET, FILM COATED ORAL
Qty: 90 TABLET | Refills: 0 | Status: SHIPPED | OUTPATIENT
Start: 2020-11-09 | End: 2021-02-05

## 2020-11-09 RX ORDER — AMLODIPINE BESYLATE 10 MG/1
10 TABLET ORAL DAILY
Qty: 90 TABLET | Refills: 0 | Status: SHIPPED | OUTPATIENT
Start: 2020-11-09 | End: 2021-02-05

## 2020-11-10 RX ORDER — METFORMIN HYDROCHLORIDE 500 MG/1
TABLET, EXTENDED RELEASE ORAL
Qty: 360 TABLET | Refills: 0 | Status: SHIPPED | OUTPATIENT
Start: 2020-11-10 | End: 2021-02-05

## 2020-11-13 ENCOUNTER — COMMUNICATION - HEALTHEAST (OUTPATIENT)
Dept: FAMILY MEDICINE | Facility: CLINIC | Age: 70
End: 2020-11-13

## 2020-11-13 DIAGNOSIS — E87.1 HYPONATREMIA: ICD-10-CM

## 2020-12-02 ENCOUNTER — LAB SERVICES (OUTPATIENT)
Dept: LAB | Age: 70
End: 2020-12-02

## 2020-12-02 LAB
ALBUMIN SERPL-MCNC: 4.1 G/DL (ref 3.6–5.1)
ALBUMIN/GLOB SERPL: 0.9 {RATIO} (ref 1–2.4)
ALP SERPL-CCNC: 127 UNITS/L (ref 45–117)
ALT SERPL-CCNC: 32 UNITS/L
ANION GAP SERPL CALC-SCNC: 12 MMOL/L (ref 10–20)
AST SERPL-CCNC: 22 UNITS/L
BILIRUB SERPL-MCNC: 0.3 MG/DL (ref 0.2–1)
BUN SERPL-MCNC: 20 MG/DL (ref 6–20)
BUN/CREAT SERPL: 18 (ref 7–25)
CALCIUM SERPL-MCNC: 10.4 MG/DL (ref 8.4–10.2)
CHLORIDE SERPL-SCNC: 99 MMOL/L (ref 98–107)
CO2 SERPL-SCNC: 32 MMOL/L (ref 21–32)
CREAT SERPL-MCNC: 1.11 MG/DL (ref 0.51–0.95)
FASTING DURATION TIME PATIENT: ABNORMAL H
GFR SERPLBLD BASED ON 1.73 SQ M-ARVRAT: 50 ML/MIN/1.73M2
GLOBULIN SER-MCNC: 4.4 G/DL (ref 2–4)
GLUCOSE SERPL-MCNC: 212 MG/DL (ref 65–99)
HBA1C MFR BLD: 6.7 % (ref 4.5–5.6)
POTASSIUM SERPL-SCNC: 4.5 MMOL/L (ref 3.4–5.1)
PROT SERPL-MCNC: 8.5 G/DL (ref 6.4–8.2)
SODIUM SERPL-SCNC: 138 MMOL/L (ref 135–145)

## 2020-12-02 PROCEDURE — 80053 COMPREHEN METABOLIC PANEL: CPT | Performed by: INTERNAL MEDICINE

## 2020-12-02 PROCEDURE — 83036 HEMOGLOBIN GLYCOSYLATED A1C: CPT | Performed by: CLINICAL MEDICAL LABORATORY

## 2020-12-02 PROCEDURE — 36415 COLL VENOUS BLD VENIPUNCTURE: CPT | Performed by: INTERNAL MEDICINE

## 2020-12-03 ENCOUNTER — APPOINTMENT (OUTPATIENT)
Dept: FAMILY MEDICINE | Age: 70
End: 2020-12-03

## 2020-12-04 ENCOUNTER — AMBULATORY - HEALTHEAST (OUTPATIENT)
Dept: LAB | Facility: CLINIC | Age: 70
End: 2020-12-04

## 2020-12-04 ENCOUNTER — AMBULATORY - HEALTHEAST (OUTPATIENT)
Dept: NURSING | Facility: CLINIC | Age: 70
End: 2020-12-04

## 2020-12-04 DIAGNOSIS — E87.1 HYPONATREMIA: ICD-10-CM

## 2020-12-04 LAB
OSMOLALITY UR: 314 MOSM/KG (ref 300–900)
SODIUM UR-SCNC: 79 MMOL/L

## 2020-12-07 ENCOUNTER — RECORDS - HEALTHEAST (OUTPATIENT)
Dept: ADMINISTRATIVE | Facility: OTHER | Age: 70
End: 2020-12-07

## 2021-01-04 ENCOUNTER — AMBULATORY - HEALTHEAST (OUTPATIENT)
Dept: NURSING | Facility: CLINIC | Age: 71
End: 2021-01-04

## 2021-02-05 DIAGNOSIS — I10 BENIGN ESSENTIAL HYPERTENSION: ICD-10-CM

## 2021-02-05 RX ORDER — AMLODIPINE BESYLATE 10 MG/1
10 TABLET ORAL DAILY
Qty: 90 TABLET | Refills: 0 | Status: SHIPPED | OUTPATIENT
Start: 2021-02-05 | End: 2021-03-03 | Stop reason: SDUPTHER

## 2021-02-05 RX ORDER — LOSARTAN POTASSIUM 50 MG/1
TABLET ORAL
Qty: 180 TABLET | Refills: 0 | Status: SHIPPED | OUTPATIENT
Start: 2021-02-05 | End: 2022-04-19 | Stop reason: SDUPTHER

## 2021-02-05 RX ORDER — METFORMIN HYDROCHLORIDE 500 MG/1
TABLET, EXTENDED RELEASE ORAL
Qty: 360 TABLET | Refills: 0 | Status: SHIPPED | OUTPATIENT
Start: 2021-02-05 | End: 2022-09-20

## 2021-02-05 RX ORDER — SITAGLIPTIN 100 MG/1
TABLET, FILM COATED ORAL
Qty: 90 TABLET | Refills: 0 | Status: SHIPPED | OUTPATIENT
Start: 2021-02-05 | End: 2021-03-03 | Stop reason: SDUPTHER

## 2021-02-05 RX ORDER — ATORVASTATIN CALCIUM 40 MG/1
TABLET, FILM COATED ORAL
Qty: 90 TABLET | Refills: 0 | Status: SHIPPED | OUTPATIENT
Start: 2021-02-05 | End: 2021-03-03 | Stop reason: SDUPTHER

## 2021-02-08 ENCOUNTER — AMBULATORY - HEALTHEAST (OUTPATIENT)
Dept: NURSING | Facility: CLINIC | Age: 71
End: 2021-02-08

## 2021-03-01 ENCOUNTER — APPOINTMENT (OUTPATIENT)
Dept: GENERAL RADIOLOGY | Age: 71
End: 2021-03-01
Attending: PHYSICIAN ASSISTANT

## 2021-03-01 ENCOUNTER — HOSPITAL ENCOUNTER (EMERGENCY)
Age: 71
Discharge: HOME | End: 2021-03-01

## 2021-03-01 ENCOUNTER — APPOINTMENT (OUTPATIENT)
Dept: INTERPRETER SERVICES | Age: 71
End: 2021-03-01

## 2021-03-01 ENCOUNTER — TELEPHONE (OUTPATIENT)
Dept: FAMILY MEDICINE | Age: 71
End: 2021-03-01

## 2021-03-01 VITALS
DIASTOLIC BLOOD PRESSURE: 94 MMHG | BODY MASS INDEX: 32.55 KG/M2 | WEIGHT: 165.79 LBS | HEIGHT: 60 IN | HEART RATE: 78 BPM | RESPIRATION RATE: 18 BRPM | OXYGEN SATURATION: 98 % | TEMPERATURE: 99.1 F | SYSTOLIC BLOOD PRESSURE: 182 MMHG
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
## 2021-03-01 PROCEDURE — 99285 EMERGENCY DEPT VISIT HI MDM: CPT | Performed by: PHYSICIAN ASSISTANT

## 2021-03-01 PROCEDURE — 90472 IMMUNIZATION ADMIN EACH ADD: CPT | Performed by: PHYSICIAN ASSISTANT

## 2021-03-01 PROCEDURE — 99283 EMERGENCY DEPT VISIT LOW MDM: CPT

## 2021-03-01 PROCEDURE — 10004651 HB RX, NO CHARGE ITEM: Performed by: PHYSICIAN ASSISTANT

## 2021-03-01 PROCEDURE — 73130 X-RAY EXAM OF HAND: CPT

## 2021-03-01 PROCEDURE — 90375 RABIES IG IM/SC: CPT | Performed by: PHYSICIAN ASSISTANT

## 2021-03-01 PROCEDURE — 73130 X-RAY EXAM OF HAND: CPT | Performed by: RADIOLOGY

## 2021-03-01 PROCEDURE — 90715 TDAP VACCINE 7 YRS/> IM: CPT | Performed by: PHYSICIAN ASSISTANT

## 2021-03-01 PROCEDURE — 10002800 HB RX 250 W HCPCS: Performed by: PHYSICIAN ASSISTANT

## 2021-03-01 PROCEDURE — 10002803 HB RX 637: Performed by: PHYSICIAN ASSISTANT

## 2021-03-01 PROCEDURE — 90675 RABIES VACCINE IM: CPT | Performed by: PHYSICIAN ASSISTANT

## 2021-03-01 PROCEDURE — 90471 IMMUNIZATION ADMIN: CPT | Performed by: PHYSICIAN ASSISTANT

## 2021-03-01 RX ORDER — ACETAMINOPHEN 325 MG/1
650 TABLET ORAL ONCE
Status: COMPLETED | OUTPATIENT
Start: 2021-03-01 | End: 2021-03-01

## 2021-03-01 RX ORDER — AMOXICILLIN AND CLAVULANATE POTASSIUM 875; 125 MG/1; MG/1
1 TABLET, FILM COATED ORAL EVERY 12 HOURS
Qty: 20 TABLET | Refills: 0 | Status: SHIPPED | OUTPATIENT
Start: 2021-03-01 | End: 2021-03-11

## 2021-03-01 RX ORDER — NAPROXEN 250 MG/1
500 TABLET ORAL ONCE
Status: COMPLETED | OUTPATIENT
Start: 2021-03-01 | End: 2021-03-01

## 2021-03-01 RX ADMIN — TETANUS TOXOID, REDUCED DIPHTHERIA TOXOID AND ACELLULAR PERTUSSIS VACCINE, ADSORBED 0.5 ML: 5; 2.5; 8; 8; 2.5 SUSPENSION INTRAMUSCULAR at 15:30

## 2021-03-01 RX ADMIN — RABIES IMMUNE GLOBULIN (HUMAN) 1500 UNITS: 300 INJECTION, SOLUTION INFILTRATION; INTRAMUSCULAR at 15:25

## 2021-03-01 RX ADMIN — ACETAMINOPHEN 650 MG: 325 TABLET ORAL at 13:50

## 2021-03-01 RX ADMIN — RABIES VACCINE 1 ML: KIT at 15:31

## 2021-03-01 RX ADMIN — NAPROXEN 500 MG: 250 TABLET ORAL at 14:44

## 2021-03-01 ASSESSMENT — PAIN SCALES - GENERAL
PAINLEVEL_OUTOF10: 7
PAINLEVEL_OUTOF10: 7
PAINLEVEL_OUTOF10: 8
PAINLEVEL_OUTOF10: 8
PAINLEVEL_OUTOF10: 3
PAINLEVEL_OUTOF10: 2

## 2021-03-01 ASSESSMENT — PAIN DESCRIPTION - PAIN TYPE
TYPE: ACUTE PAIN

## 2021-03-01 ASSESSMENT — ENCOUNTER SYMPTOMS
NUMBNESS: 0
WOUND: 1

## 2021-03-03 ENCOUNTER — APPOINTMENT (OUTPATIENT)
Dept: INTERPRETER SERVICES | Age: 71
End: 2021-03-03

## 2021-03-03 ENCOUNTER — OFFICE VISIT (OUTPATIENT)
Dept: FAMILY MEDICINE | Age: 71
End: 2021-03-03

## 2021-03-03 VITALS
BODY MASS INDEX: 32.63 KG/M2 | DIASTOLIC BLOOD PRESSURE: 76 MMHG | WEIGHT: 166.2 LBS | SYSTOLIC BLOOD PRESSURE: 122 MMHG | HEART RATE: 84 BPM | TEMPERATURE: 98.4 F | HEIGHT: 60 IN

## 2021-03-03 DIAGNOSIS — I12.9 BENIGN HYPERTENSION WITH CKD (CHRONIC KIDNEY DISEASE) STAGE III (CMD): ICD-10-CM

## 2021-03-03 DIAGNOSIS — I13.0 HYPERTENSIVE HEART AND KIDNEY DISEASE WITH HEART FAILURE AND WITH CHRONIC KIDNEY DISEASE STAGE III (CMD): ICD-10-CM

## 2021-03-03 DIAGNOSIS — S61.402D: Primary | ICD-10-CM

## 2021-03-03 DIAGNOSIS — I50.32 DIASTOLIC CHF, CHRONIC (CMD): ICD-10-CM

## 2021-03-03 DIAGNOSIS — N18.30 HYPERTENSIVE HEART AND KIDNEY DISEASE WITH HEART FAILURE AND WITH CHRONIC KIDNEY DISEASE STAGE III (CMD): ICD-10-CM

## 2021-03-03 DIAGNOSIS — I10 BENIGN ESSENTIAL HYPERTENSION: ICD-10-CM

## 2021-03-03 DIAGNOSIS — N18.30 BENIGN HYPERTENSION WITH CKD (CHRONIC KIDNEY DISEASE) STAGE III (CMD): ICD-10-CM

## 2021-03-03 PROCEDURE — 99214 OFFICE O/P EST MOD 30 MIN: CPT | Performed by: FAMILY MEDICINE

## 2021-03-03 RX ORDER — ATORVASTATIN CALCIUM 40 MG/1
40 TABLET, FILM COATED ORAL DAILY
Qty: 90 TABLET | Refills: 0 | Status: SHIPPED | OUTPATIENT
Start: 2021-03-03 | End: 2021-06-03

## 2021-03-03 RX ORDER — AMLODIPINE BESYLATE 10 MG/1
10 TABLET ORAL DAILY
Qty: 90 TABLET | Refills: 0 | Status: SHIPPED | OUTPATIENT
Start: 2021-03-03 | End: 2021-06-03

## 2021-03-03 ASSESSMENT — PATIENT HEALTH QUESTIONNAIRE - PHQ9
1. LITTLE INTEREST OR PLEASURE IN DOING THINGS: NOT AT ALL
CLINICAL INTERPRETATION OF PHQ2 SCORE: NO FURTHER SCREENING NEEDED
CLINICAL INTERPRETATION OF PHQ9 SCORE: NO FURTHER SCREENING NEEDED
2. FEELING DOWN, DEPRESSED OR HOPELESS: NOT AT ALL
SUM OF ALL RESPONSES TO PHQ9 QUESTIONS 1 AND 2: 0
SUM OF ALL RESPONSES TO PHQ9 QUESTIONS 1 AND 2: 0

## 2021-03-04 ENCOUNTER — HOSPITAL ENCOUNTER (OUTPATIENT)
Dept: OTHER | Age: 71
Discharge: STILL ADMITTED OR ADMINISTRATIVE | End: 2021-03-04
Attending: FAMILY MEDICINE

## 2021-03-04 ENCOUNTER — TELEPHONE (OUTPATIENT)
Dept: WOUND CARE | Age: 71
End: 2021-03-04

## 2021-03-04 ENCOUNTER — COMMUNICATION - HEALTHEAST (OUTPATIENT)
Dept: FAMILY MEDICINE | Facility: CLINIC | Age: 71
End: 2021-03-04

## 2021-03-04 VITALS
SYSTOLIC BLOOD PRESSURE: 144 MMHG | HEART RATE: 58 BPM | RESPIRATION RATE: 16 BRPM | OXYGEN SATURATION: 98 % | TEMPERATURE: 97.2 F | DIASTOLIC BLOOD PRESSURE: 71 MMHG
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
## 2021-03-04 DIAGNOSIS — E11.9 DIABETES (H): ICD-10-CM

## 2021-03-04 PROCEDURE — 10002800 HB RX 250 W HCPCS: Performed by: PHYSICIAN ASSISTANT

## 2021-03-04 PROCEDURE — 90675 RABIES VACCINE IM: CPT | Performed by: PHYSICIAN ASSISTANT

## 2021-03-04 PROCEDURE — 90471 IMMUNIZATION ADMIN: CPT | Performed by: PHYSICIAN ASSISTANT

## 2021-03-04 RX ADMIN — RABIES VACCINE 1 ML: KIT at 09:11

## 2021-03-08 ENCOUNTER — AMBULATORY - HEALTHEAST (OUTPATIENT)
Dept: NURSING | Facility: CLINIC | Age: 71
End: 2021-03-08

## 2021-03-11 ENCOUNTER — HOSPITAL ENCOUNTER (OUTPATIENT)
Dept: OTHER | Age: 71
Discharge: STILL ADMITTED OR ADMINISTRATIVE | End: 2021-03-11
Attending: FAMILY MEDICINE

## 2021-03-11 VITALS
RESPIRATION RATE: 16 BRPM | OXYGEN SATURATION: 97 % | TEMPERATURE: 96.4 F | DIASTOLIC BLOOD PRESSURE: 81 MMHG | HEART RATE: 68 BPM | SYSTOLIC BLOOD PRESSURE: 170 MMHG
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
## 2021-03-11 PROCEDURE — 90471 IMMUNIZATION ADMIN: CPT | Performed by: PHYSICIAN ASSISTANT

## 2021-03-11 PROCEDURE — 10002800 HB RX 250 W HCPCS: Performed by: PHYSICIAN ASSISTANT

## 2021-03-11 PROCEDURE — 90675 RABIES VACCINE IM: CPT | Performed by: PHYSICIAN ASSISTANT

## 2021-03-11 RX ADMIN — Medication 1 ML: at 09:14

## 2021-03-23 ENCOUNTER — HOSPITAL ENCOUNTER (OUTPATIENT)
Dept: WOUND CARE | Age: 71
Discharge: STILL ADMITTED OR ADMINISTRATIVE | End: 2021-03-23
Attending: FAMILY MEDICINE

## 2021-03-23 VITALS
DIASTOLIC BLOOD PRESSURE: 77 MMHG | HEART RATE: 61 BPM | RESPIRATION RATE: 16 BRPM | SYSTOLIC BLOOD PRESSURE: 155 MMHG | TEMPERATURE: 96.9 F

## 2021-03-23 DIAGNOSIS — S61.402D: ICD-10-CM

## 2021-03-23 PROCEDURE — 99202 OFFICE O/P NEW SF 15 MIN: CPT | Performed by: NURSE PRACTITIONER

## 2021-03-23 PROCEDURE — 99212 OFFICE O/P EST SF 10 MIN: CPT

## 2021-03-23 PROCEDURE — 10004196 HB COUNTER-WOUNDCARE OP

## 2021-03-25 ENCOUNTER — HOSPITAL ENCOUNTER (OUTPATIENT)
Dept: OTHER | Age: 71
Discharge: STILL ADMITTED OR ADMINISTRATIVE | End: 2021-03-25
Attending: FAMILY MEDICINE

## 2021-03-25 VITALS
RESPIRATION RATE: 16 BRPM | DIASTOLIC BLOOD PRESSURE: 77 MMHG | SYSTOLIC BLOOD PRESSURE: 163 MMHG | HEART RATE: 60 BPM | OXYGEN SATURATION: 99 % | TEMPERATURE: 97.1 F
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
## 2021-03-25 PROCEDURE — 10002800 HB RX 250 W HCPCS: Performed by: PHYSICIAN ASSISTANT

## 2021-03-25 PROCEDURE — 90675 RABIES VACCINE IM: CPT | Performed by: PHYSICIAN ASSISTANT

## 2021-03-25 PROCEDURE — 90471 IMMUNIZATION ADMIN: CPT | Performed by: PHYSICIAN ASSISTANT

## 2021-03-25 RX ADMIN — RABIES VACCINE 1 ML: KIT at 08:49

## 2021-04-02 ENCOUNTER — LAB SERVICES (OUTPATIENT)
Dept: LAB | Age: 71
End: 2021-04-02

## 2021-04-02 DIAGNOSIS — I10 BENIGN ESSENTIAL HYPERTENSION: ICD-10-CM

## 2021-04-02 LAB
ANION GAP SERPL CALC-SCNC: 17 MMOL/L (ref 10–20)
BUN SERPL-MCNC: 13 MG/DL (ref 6–20)
BUN/CREAT SERPL: 15 (ref 7–25)
CALCIUM SERPL-MCNC: 10.7 MG/DL (ref 8.4–10.2)
CHLORIDE SERPL-SCNC: 101 MMOL/L (ref 98–107)
CO2 SERPL-SCNC: 28 MMOL/L (ref 21–32)
CREAT SERPL-MCNC: 0.86 MG/DL (ref 0.51–0.95)
FASTING DURATION TIME PATIENT: 0 HOURS
GFR SERPLBLD BASED ON 1.73 SQ M-ARVRAT: 68 ML/MIN/1.73M2
GLUCOSE SERPL-MCNC: 159 MG/DL (ref 65–99)
HBA1C MFR BLD: 8.1 % (ref 4.5–5.6)
POTASSIUM SERPL-SCNC: 4.7 MMOL/L (ref 3.4–5.1)
SODIUM SERPL-SCNC: 141 MMOL/L (ref 135–145)

## 2021-04-02 PROCEDURE — 83036 HEMOGLOBIN GLYCOSYLATED A1C: CPT | Performed by: CLINICAL MEDICAL LABORATORY

## 2021-04-02 PROCEDURE — 82043 UR ALBUMIN QUANTITATIVE: CPT | Performed by: CLINICAL MEDICAL LABORATORY

## 2021-04-02 PROCEDURE — 80048 BASIC METABOLIC PNL TOTAL CA: CPT | Performed by: INTERNAL MEDICINE

## 2021-04-02 PROCEDURE — 36415 COLL VENOUS BLD VENIPUNCTURE: CPT | Performed by: INTERNAL MEDICINE

## 2021-04-02 PROCEDURE — 82570 ASSAY OF URINE CREATININE: CPT | Performed by: CLINICAL MEDICAL LABORATORY

## 2021-04-03 LAB
CREAT UR-MCNC: 57.1 MG/DL
MICROALBUMIN UR-MCNC: 2.85 MG/DL
MICROALBUMIN/CREAT UR: 49.9 MG/G

## 2021-04-05 ENCOUNTER — OFFICE VISIT - HEALTHEAST (OUTPATIENT)
Dept: FAMILY MEDICINE | Facility: CLINIC | Age: 71
End: 2021-04-05

## 2021-04-05 DIAGNOSIS — G62.9 PERIPHERAL POLYNEUROPATHY: ICD-10-CM

## 2021-04-05 DIAGNOSIS — E87.1 HYPONATREMIA: ICD-10-CM

## 2021-04-05 DIAGNOSIS — E11.9 TYPE 2 DIABETES MELLITUS (H): ICD-10-CM

## 2021-04-05 DIAGNOSIS — E11.43 TYPE 2 DIABETES MELLITUS WITH DIABETIC AUTONOMIC NEUROPATHY, WITHOUT LONG-TERM CURRENT USE OF INSULIN (H): ICD-10-CM

## 2021-04-05 LAB
ANION GAP SERPL CALCULATED.3IONS-SCNC: 12 MMOL/L (ref 5–18)
BUN SERPL-MCNC: 10 MG/DL (ref 8–28)
CALCIUM SERPL-MCNC: 9.3 MG/DL (ref 8.5–10.5)
CHLORIDE BLD-SCNC: 101 MMOL/L (ref 98–107)
CO2 SERPL-SCNC: 22 MMOL/L (ref 22–31)
CREAT SERPL-MCNC: 0.72 MG/DL (ref 0.6–1.1)
ERYTHROCYTE [DISTWIDTH] IN BLOOD BY AUTOMATED COUNT: 12.3 % (ref 11–14.5)
GFR SERPL CREATININE-BSD FRML MDRD: >60 ML/MIN/1.73M2
GLUCOSE BLD-MCNC: 138 MG/DL (ref 70–125)
HBA1C MFR BLD: 7.6 %
HCT VFR BLD AUTO: 34.3 % (ref 35–47)
HGB BLD-MCNC: 11.8 G/DL (ref 12–16)
MCH RBC QN AUTO: 30.1 PG (ref 27–34)
MCHC RBC AUTO-ENTMCNC: 34.4 G/DL (ref 32–36)
MCV RBC AUTO: 88 FL (ref 80–100)
PLATELET # BLD AUTO: 233 THOU/UL (ref 140–440)
PMV BLD AUTO: 9.3 FL (ref 7–10)
POTASSIUM BLD-SCNC: 3.8 MMOL/L (ref 3.5–5)
RBC # BLD AUTO: 3.92 MILL/UL (ref 3.8–5.4)
SODIUM SERPL-SCNC: 135 MMOL/L (ref 136–145)
WBC: 6 THOU/UL (ref 4–11)

## 2021-04-05 RX ORDER — BLOOD SUGAR DIAGNOSTIC
STRIP MISCELLANEOUS
Qty: 200 STRIP | Refills: 3 | Status: SHIPPED | OUTPATIENT
Start: 2021-04-05 | End: 2022-01-17

## 2021-04-09 ENCOUNTER — APPOINTMENT (OUTPATIENT)
Dept: FAMILY MEDICINE | Age: 71
End: 2021-04-09

## 2021-04-09 ENCOUNTER — APPOINTMENT (OUTPATIENT)
Dept: INTERPRETER SERVICES | Age: 71
End: 2021-04-09

## 2021-04-09 ENCOUNTER — OFFICE VISIT (OUTPATIENT)
Dept: FAMILY MEDICINE | Age: 71
End: 2021-04-09

## 2021-04-09 VITALS
HEART RATE: 64 BPM | WEIGHT: 168 LBS | DIASTOLIC BLOOD PRESSURE: 74 MMHG | HEIGHT: 59 IN | TEMPERATURE: 96.4 F | BODY MASS INDEX: 33.87 KG/M2 | SYSTOLIC BLOOD PRESSURE: 138 MMHG

## 2021-04-09 DIAGNOSIS — N18.30 HYPERTENSIVE HEART AND KIDNEY DISEASE WITH HEART FAILURE AND WITH CHRONIC KIDNEY DISEASE STAGE III (CMD): ICD-10-CM

## 2021-04-09 DIAGNOSIS — K21.9 GASTROESOPHAGEAL REFLUX DISEASE WITHOUT ESOPHAGITIS: ICD-10-CM

## 2021-04-09 DIAGNOSIS — E11.36 TYPE 2 DIABETES MELLITUS WITH DIABETIC CATARACT, WITH LONG-TERM CURRENT USE OF INSULIN (CMD): ICD-10-CM

## 2021-04-09 DIAGNOSIS — Z79.4 TYPE 2 DIABETES MELLITUS WITH HYPERGLYCEMIA, WITH LONG-TERM CURRENT USE OF INSULIN (CMD): ICD-10-CM

## 2021-04-09 DIAGNOSIS — Z12.31 ENCOUNTER FOR SCREENING MAMMOGRAM FOR MALIGNANT NEOPLASM OF BREAST: ICD-10-CM

## 2021-04-09 DIAGNOSIS — I50.32 DIASTOLIC CHF, CHRONIC (CMD): ICD-10-CM

## 2021-04-09 DIAGNOSIS — Z00.00 WELLNESS EXAMINATION: ICD-10-CM

## 2021-04-09 DIAGNOSIS — Z00.00 MEDICARE ANNUAL WELLNESS VISIT, SUBSEQUENT: Primary | ICD-10-CM

## 2021-04-09 DIAGNOSIS — E11.29 TYPE 2 DIABETES MELLITUS WITH MICROALBUMINURIA, WITH LONG-TERM CURRENT USE OF INSULIN (CMD): ICD-10-CM

## 2021-04-09 DIAGNOSIS — I13.0 HYPERTENSIVE HEART AND KIDNEY DISEASE WITH HEART FAILURE AND WITH CHRONIC KIDNEY DISEASE STAGE III (CMD): ICD-10-CM

## 2021-04-09 DIAGNOSIS — Z78.0 POSTMENOPAUSAL STATUS (AGE-RELATED) (NATURAL): ICD-10-CM

## 2021-04-09 DIAGNOSIS — E11.65 TYPE 2 DIABETES MELLITUS WITH HYPERGLYCEMIA, WITH LONG-TERM CURRENT USE OF INSULIN (CMD): ICD-10-CM

## 2021-04-09 DIAGNOSIS — Z79.4 TYPE 2 DIABETES MELLITUS WITH DIABETIC CATARACT, WITH LONG-TERM CURRENT USE OF INSULIN (CMD): ICD-10-CM

## 2021-04-09 DIAGNOSIS — Z79.4 TYPE 2 DIABETES MELLITUS WITH MICROALBUMINURIA, WITH LONG-TERM CURRENT USE OF INSULIN (CMD): ICD-10-CM

## 2021-04-09 DIAGNOSIS — R80.9 TYPE 2 DIABETES MELLITUS WITH MICROALBUMINURIA, WITH LONG-TERM CURRENT USE OF INSULIN (CMD): ICD-10-CM

## 2021-04-09 DIAGNOSIS — E66.9 OBESITY (BMI 30.0-34.9): ICD-10-CM

## 2021-04-09 PROCEDURE — G0439 PPPS, SUBSEQ VISIT: HCPCS | Performed by: FAMILY MEDICINE

## 2021-04-09 PROCEDURE — 99397 PER PM REEVAL EST PAT 65+ YR: CPT | Performed by: FAMILY MEDICINE

## 2021-04-09 ASSESSMENT — PATIENT HEALTH QUESTIONNAIRE - PHQ9
SUM OF ALL RESPONSES TO PHQ9 QUESTIONS 1 AND 2: 0
CLINICAL INTERPRETATION OF PHQ2 SCORE: NO FURTHER SCREENING NEEDED
2. FEELING DOWN, DEPRESSED OR HOPELESS: NOT AT ALL
SUM OF ALL RESPONSES TO PHQ9 QUESTIONS 1 AND 2: 0
CLINICAL INTERPRETATION OF PHQ9 SCORE: NO FURTHER SCREENING NEEDED
1. LITTLE INTEREST OR PLEASURE IN DOING THINGS: NOT AT ALL

## 2021-05-03 ENCOUNTER — COMMUNICATION - HEALTHEAST (OUTPATIENT)
Dept: FAMILY MEDICINE | Facility: CLINIC | Age: 71
End: 2021-05-03

## 2021-05-03 ENCOUNTER — AMBULATORY - HEALTHEAST (OUTPATIENT)
Dept: NURSING | Facility: CLINIC | Age: 71
End: 2021-05-03

## 2021-05-03 DIAGNOSIS — E53.8 VITAMIN B12 DEFICIENCY (NON ANEMIC): ICD-10-CM

## 2021-05-06 ENCOUNTER — APPOINTMENT (OUTPATIENT)
Dept: INTERPRETER SERVICES | Age: 71
End: 2021-05-06

## 2021-05-10 ENCOUNTER — TELEPHONIC VISIT (OUTPATIENT)
Dept: FAMILY MEDICINE | Age: 71
End: 2021-05-10

## 2021-05-10 ENCOUNTER — APPOINTMENT (OUTPATIENT)
Dept: INTERPRETER SERVICES | Age: 71
End: 2021-05-10

## 2021-05-10 DIAGNOSIS — E11.65 TYPE 2 DIABETES MELLITUS WITH HYPERGLYCEMIA, WITH LONG-TERM CURRENT USE OF INSULIN (CMD): ICD-10-CM

## 2021-05-10 DIAGNOSIS — E66.9 OBESITY (BMI 30.0-34.9): ICD-10-CM

## 2021-05-10 DIAGNOSIS — Z79.4 TYPE 2 DIABETES MELLITUS WITH HYPERGLYCEMIA, WITH LONG-TERM CURRENT USE OF INSULIN (CMD): ICD-10-CM

## 2021-05-10 DIAGNOSIS — Z79.4 TYPE 2 DIABETES MELLITUS WITH DIABETIC CATARACT, WITH LONG-TERM CURRENT USE OF INSULIN (CMD): Primary | ICD-10-CM

## 2021-05-10 DIAGNOSIS — I10 BENIGN ESSENTIAL HYPERTENSION: ICD-10-CM

## 2021-05-10 DIAGNOSIS — I50.32 DIASTOLIC CHF, CHRONIC (CMD): ICD-10-CM

## 2021-05-10 DIAGNOSIS — E11.36 TYPE 2 DIABETES MELLITUS WITH DIABETIC CATARACT, WITH LONG-TERM CURRENT USE OF INSULIN (CMD): Primary | ICD-10-CM
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
## 2021-05-10 PROCEDURE — 99443 TELEPHONE E&M BY PHYSICIAN EST PT NOT ORIG PREV 7 DAYS 21-30 MIN: CPT | Performed by: FAMILY MEDICINE

## 2021-05-14 ENCOUNTER — OFFICE VISIT - HEALTHEAST (OUTPATIENT)
Dept: FAMILY MEDICINE | Facility: CLINIC | Age: 71
End: 2021-05-14

## 2021-05-14 DIAGNOSIS — M79.671 RIGHT FOOT PAIN: ICD-10-CM

## 2021-05-27 VITALS
DIASTOLIC BLOOD PRESSURE: 72 MMHG | WEIGHT: 123.1 LBS | SYSTOLIC BLOOD PRESSURE: 132 MMHG | HEART RATE: 81 BPM | OXYGEN SATURATION: 97 %

## 2021-05-27 NOTE — PROGRESS NOTES
ASSESSMENT/PLAN:  1. Type 2 diabetes mellitus with diabetic autonomic neuropathy, without long-term current use of insulin (H)  5-year-old female with controlled type 2 diabetes and diabetic neuropathy.  We will check hemoglobin A1c today.  We did not do any changes in her medications.  For her diabetic neuropathy, the gabapentin helps her at night but she cannot take it during the day as it makes her too sleepy.  We will add Cymbalta at low dose at 20 mg in the morning to help with her daytime symptoms.  We discussed side effects and follow-up in 3 months.  - Glycosylated Hemoglobin A1c  - DULoxetine (CYMBALTA) 20 MG capsule; Take 1 capsule (20 mg total) by mouth daily.  Dispense: 30 capsule; Refill: 2    2. Visit for screening mammogram  - Mammo Screening Bilateral; Future    3. Malignant neoplasm of cervix, unspecified site (H)  History of cervical cancer status post radiation.    Diabetes Quality  Lab Results   Component Value Date    HGBA1C 7.7 (H) 04/04/2019     Lab Results   Component Value Date    MICROALBUR 0.83 06/25/2018     Lab Results   Component Value Date    LDLCALC 52 01/03/2019     BP Readings from Last 1 Encounters:   04/04/19 136/80     Social History     Tobacco Use   Smoking Status Never Smoker   Smokeless Tobacco Never Used     Statin: yes  Aspirin: yes    Patient Instructions   The new medication may make you feel sick to your stomach or cause some diarrhea.   - If these symptoms occur but are mild, try to push through it as they may go away.  - If the symptoms are severe, stop the medication and let me know.      Orders Placed This Encounter   Procedures     Mammo Screening Bilateral     Standing Status:   Future     Standing Expiration Date:   7/4/2020     Order Specific Question:   Patient's previous breast density:     Answer:   Scattered fibroglandular density [2]     Order Specific Question:   Can the procedure be changed per Radiologist protocol?     Answer:   Yes     Td, Preservative  Free (green label)     Glycosylated Hemoglobin A1c     There are no discontinued medications.  Administrations This Visit     cyanocobalamin injection 1,000 mcg     Admin Date  04/04/2019 Action  Given Dose  1000 mcg Route  Intramuscular Administered By  Minerva Aguilar CMA              Return in about 3 months (around 7/4/2019) for diabetes follow up.    CHIEF COMPLAINT;  Chief Complaint   Patient presents with     Diabetes       HISTORY OF PRESENT ILLNESS:  Hai is a 68 y.o. female presenting to the clinic with her daughter today for follow up regarding diabetes. An  is used for this visit. She has been okay.     Diabetes: Her hemoglobin A1c is 7.7% today in clinic. She continues on metformin 1,000 mg twice daily and glipizide 2.5 mg daily. She reports no adverse side effects of the medication. She is taking a daily baby aspirin and is on statin medication therapy. Her diabetic foot exam is done today.    Diabetic Neuropathy: She is taking 100 mg gabapentin daily for her diabetic neuropathy, but she endorses continued pain and tingling in her feet. This discomfort comes and goes. The pain is worse in her left foot. The discomfort is worse when she is sitting down. She can experience the tingling during the day or night, though she states that she is most bothered by the tingling during the day. She endorses some pain and tingling in her right hand as well.     Vitamin B12 Deficiency: She received her vitamin B12 injection today.     Health Maintenance: She is due for a mammogram. She received her tetanus immunization today.     REVIEW OF SYSTEMS:  MSK positive for intermittent right hand tingling. All other systems are negative.    PFSH:  She has a history of cervical cancer in [id number] treated with radiation.    TOBACCO USE:  Social History     Tobacco Use   Smoking Status Never Smoker   Smokeless Tobacco Never Used       VITALS:  Vitals:    04/04/19 0847   BP: 136/80   Pulse: 62   SpO2: 99%    Weight: 122 lb (55.3 kg)   Height: 5' (1.524 m)     Wt Readings from Last 3 Encounters:   04/04/19 122 lb (55.3 kg)   01/03/19 125 lb 11.2 oz (57 kg)   11/29/18 128 lb (58.1 kg)     Body mass index is 23.83 kg/m .    PHYSICAL EXAM:  GENERAL APPEARANCE: Alert, cooperative, no distress, appears stated age  HEAD: Normocephalic, without obvious abnormality, atraumatic  LUNGS: Clear to auscultation bilaterally, respirations unlabored  CHEST WALL: No tenderness or deformity  HEART: Regular rate and rhythm, S1 and S2 normal, no murmur, rub or gallop  EXTREMITIES: Extremities normal, atraumatic, no cyanosis or edema  FEET:  monofilament testing normal on right, abnormal on left heel laterally, warm, without edema, no deformities, no skin lesions or skin breakdown and pedal pulses palpable.    PULSES: 2+ and symmetric all extremities  NEUROLOGIC: CNII-XII intact.     RECENT RESULTS  Recent Results (from the past 48 hour(s))   Glycosylated Hemoglobin A1c    Collection Time: 04/04/19  9:44 AM   Result Value Ref Range    Hemoglobin A1c 7.7 (H) 3.5 - 6.0 %       ADDITIONAL HISTORY SUMMARIZED (2): None.  DECISION TO OBTAIN EXTRA INFORMATION (1): None.  RADIOLOGY TESTS (1): Mammogram ordered.  LABS (1): Labs ordered today.  MEDICINE TESTS (1): None.  INDEPENDENT REVIEW (2 each): None.    The visit lasted a total of 23 minutes face to face with the patient. Over 50% of the time was spent counseling and educating the patient about diabetes and diabetic neuropathy.    IValery, am scribing for and in the presence of, Dr. Barron.    I, Dr. Braron, personally performed the services described in this documentation, as scribed by Valery Shirley in my presence, and it is both accurate and complete.    MEDICATIONS:  Current Outpatient Medications   Medication Sig Dispense Refill     ACCU-CHEK LOIDA PLUS TEST STRP strips TEST TWICE A  strip 3     ACCU-CHEK SOFTCLIX LANCETS lancets TEST TWICE DAILY 200 each 1     aspirin  81 MG EC tablet Take 81 mg by mouth daily.       DULoxetine (CYMBALTA) 20 MG capsule Take 1 capsule (20 mg total) by mouth daily. 30 capsule 2     gabapentin (NEURONTIN) 100 MG capsule TAKE 1 CAPSULE BY MOUTH EVERY NIGHT AT BEDTIME 90 capsule 6     glipiZIDE (GLUCOTROL XL) 2.5 MG 24 hr tablet Take 1 tablet (2.5 mg total) by mouth daily. 90 tablet 4     magnesium oxide (MAG-OX) 400 mg tablet Take 1 tablet (400 mg total) by mouth 2 (two) times a day. 60 tablet 0     metFORMIN (GLUCOPHAGE) 1000 MG tablet Take 1 tablet (1,000 mg total) by mouth 2 (two) times a day with meals. 180 tablet 3     simvastatin (ZOCOR) 40 MG tablet TAKE 1 TABLET(40 MG) BY MOUTH AT BEDTIME 90 tablet 0     Current Facility-Administered Medications   Medication Dose Route Frequency Provider Last Rate Last Dose     cyanocobalamin injection 1,000 mcg  1,000 mcg Intramuscular Q30 Days Denia Barron MD   1,000 mcg at 04/04/19 0937       Total data points: 2

## 2021-05-27 NOTE — TELEPHONE ENCOUNTER
Just stop the medication.  She can schedule a follow up appointment to discuss other options for her pain in her feet or we can discuss at her next office visit

## 2021-05-27 NOTE — PATIENT INSTRUCTIONS - HE
The new medication may make you feel sick to your stomach or cause some diarrhea.   - If these symptoms occur but are mild, try to push through it as they may go away.  - If the symptoms are severe, stop the medication and let me know.

## 2021-05-27 NOTE — PROGRESS NOTES
TCM DISCHARGE FOLLOW UP CALL    Discharge Date:  4/10/2019  Reason for hospital stay (discharge diagnosis)::  Hyponatremia  Are you feeling better, the same or worse since your discharge?:  Patient is feeling better (No N/V or dizziness.   this morning)  Do you feel like you have a plan in the event of a health emergency?: Yes (Lives w/adult children)    As part of your discharge plan, were  home care services ordered for you?: No    Did you receive any new medications, or was there a change to your medications?: No    Do you have any follow up visits scheduled with your PCP or Specialist?:  Yes, with PCP (Dr. Barron 4/18/19)  (RN) Is PCP appt scheduled soon enough (within 14 days of discharge date)?: Yes        Shanti Mcdonough RN Care Manager, Population Health

## 2021-05-27 NOTE — PROGRESS NOTES
ASSESSMENT/PLAN:  1. Hyponatremia  68-year-old female recently hospitalized with nausea vomiting and diarrhea.  It caused hyponatremia.  It was resolved while in the hospital however she is due for recheck of her sodium.  Was felt that the nausea and vomiting was elicited by the initiation of Cymbalta.  She since stopped this medication.  She states that those symptoms have resolved.  We will recheck a BMP today.  - Basic Metabolic Panel    2. Generalized muscle weakness  Generally she is feeling a little more weak and unstable with walking and is requesting a cane to assist.  Prescription is provided.  - Cane Single Tip    3. Other diabetic neurological complication associated with type 2 diabetes mellitus (H)  - Cane Single Tip    All medications have been reviewed and reconciled.    Return in about 3 months (around 7/18/2019) for diabetes follow up.    Patient Instructions   We will stop your Cymbalta and add this to your allergy list.     Labs done today, we will call with the results.    Prescription given for cane; you can take this to any medical supply store.      Orders Placed This Encounter   Procedures     Cane Single Tip     Order Specific Question:   Which DME provider?     Answer:   [facility]     Order Specific Question:   Cane type:     Answer:   Single Tip     Basic Metabolic Panel     Medications Discontinued During This Encounter   Medication Reason     DULoxetine (CYMBALTA) 20 MG capsule Therapy completed       CHIEF COMPLAINT;  Chief Complaint   Patient presents with     Hospital Visit Follow Up       HISTORY OF PRESENT ILLNESS:  Hai is a 68 y.o. female presenting to the clinic today for inpatient follow up. An  is used for this visit. She was admitted to [facility] 4/8/19-4/10/19 for nausea, vomiting, weakness, and hyponatremia. She feels the Cymbalta caused her the hyponatremia, vomiting, and nausea. She is feeling better since discharge. The vomiting and diarrhea have resolved. She  does not want to take the Cymbalta anymore. She is taking gabapentin 100 mg once daily at night for her diabetic neuropathy and she feels this is working for her. Her blood sugars have been the same since she has been in the hospital.     She would like a cane. She does not feel steady when she walks.     REVIEW OF SYSTEMS:  HEENT positive for mild cough. All other systems are negative.    PFSH:  Reviewed, as below.    TOBACCO USE:  Social History     Tobacco Use   Smoking Status Never Smoker   Smokeless Tobacco Never Used       VITALS:  Vitals:    04/18/19 0907 04/18/19 0912   BP: 140/80 142/74   Patient Site: Left Arm Left Arm   Patient Position: Sitting Sitting   Cuff Size: Adult Regular Adult Regular   Pulse: 82    Weight: 121 lb 14.4 oz (55.3 kg)      Wt Readings from Last 3 Encounters:   04/18/19 121 lb 14.4 oz (55.3 kg)   04/08/19 118 lb 8 oz (53.8 kg)   04/04/19 122 lb (55.3 kg)     Body mass index is 23.81 kg/m .    PHYSICAL EXAM:  GENERAL APPEARANCE: Alert, cooperative, no distress, appears stated age  HEAD: Normocephalic, without obvious abnormality, atraumatic  NEUROLOGIC: CNII-XII intact.     RECENT RESULTS  No results found for this or any previous visit (from the past 48 hour(s)).    ADDITIONAL HISTORY SUMMARIZED (2): Discharge Summary 4/10/19 reviewed, hyponatremia, symptoms started shortly after taking Cymbalta, does take medicinal tea, symptoms improved with 1 L normal saline.  DECISION TO OBTAIN EXTRA INFORMATION (1): None.  RADIOLOGY TESTS (1): None.  LABS (1): Labs done today. Labs 4/10/19 reviewed, sodium 135, glucose 127. Sodium 130 4/9/19, 129 4/8/19.  MEDICINE TESTS (1): None.  INDEPENDENT REVIEW (2 each): None.    The visit lasted a total of 11 minutes face to face with the patient. Over 50% of the time was spent counseling and educating the patient about recent hospitalization due to hyponatremia.    Valery BRITO, am scribing for and in the presence of, Dr. Barron.    Dr. DARYL  Beatrice, personally performed the services described in this documentation, as scribed by Valery Shirley in my presence, and it is both accurate and complete.    Dragon dictation was used for this note.  Speech recognition errors are a possibility.    MEDICATIONS:  Current Outpatient Medications   Medication Sig Dispense Refill     ACCU-CHEK LOIDA PLUS TEST STRP strips TEST TWICE A  strip 3     ACCU-CHEK SOFTCLIX LANCETS lancets TEST TWICE DAILY 200 each 1     aspirin 81 MG EC tablet Take 81 mg by mouth daily.       gabapentin (NEURONTIN) 100 MG capsule TAKE 1 CAPSULE BY MOUTH EVERY NIGHT AT BEDTIME 90 capsule 6     glipiZIDE (GLUCOTROL XL) 2.5 MG 24 hr tablet Take 1 tablet (2.5 mg total) by mouth daily. 90 tablet 4     metFORMIN (GLUCOPHAGE) 1000 MG tablet Take 1 tablet (1,000 mg total) by mouth 2 (two) times a day with meals. 180 tablet 3     simvastatin (ZOCOR) 40 MG tablet TAKE 1 TABLET(40 MG) BY MOUTH AT BEDTIME 90 tablet 0     Current Facility-Administered Medications   Medication Dose Route Frequency Provider Last Rate Last Dose     cyanocobalamin injection 1,000 mcg  1,000 mcg Intramuscular Q30 Days Denia Barron MD   1,000 mcg at 04/04/19 0937       Total data points: 3

## 2021-05-27 NOTE — TELEPHONE ENCOUNTER
Pt started duloxetine and started taking it yesterday and symptoms/nausea/shakiness feeling in her whole body (hard to see any shaking noted per daughter) and pt couldn't sleep from nausea  A 90 day supply was approved after 30 day supply sent  She is eating breakfast right now  She was vomiting last night like 4 times throughout the night  No diarrhea  typically BG are around 120  BG is 185 this am   Last night before bed 177  She feels a little dizzy too. Feeling pretty lousy      Please advise (stop med?)  Antinausea pill, [facility] on [address] in [place]    Call back daughter listed or mom cell phone  [employer] cell phone (need )     Diana Kelley, [facility] RN Triage      Reason for Disposition    Caller has URGENT medication question about med that PCP prescribed and triager unable to answer question    Protocols used: MEDICATION QUESTION CALL-A-

## 2021-05-27 NOTE — PATIENT INSTRUCTIONS - HE
We will stop your Cymbalta and add this to your allergy list.     Labs done today, we will call with the results.    Prescription given for cane; you can take this to any medical supply store.

## 2021-05-28 NOTE — TELEPHONE ENCOUNTER
Referral Request  Type of referral: Physical Therapy  Who s requesting: [facility] calling on behalf of patient.   Why the request: Leg pain   Have you been seen for this request: Yes patient states she has spoken to PCP about this   Does patient have a preference on a group/provider? [facility]. FAX: [phone]  Okay to leave a detailed message?  Yes

## 2021-05-29 ENCOUNTER — RECORDS - HEALTHEAST (OUTPATIENT)
Dept: ADMINISTRATIVE | Facility: CLINIC | Age: 71
End: 2021-05-29

## 2021-05-29 NOTE — ANESTHESIA PREPROCEDURE EVALUATION
Anesthesia Evaluation      Patient summary reviewed   No history of anesthetic complications     Airway   Mallampati: II  Neck ROM: full   Pulmonary - normal exam    breath sounds clear to auscultation  (-) shortness of breath, not a smoker                         Cardiovascular - normal exam  (+) , hypercholesterolemia,     Rhythm: regular  Rate: normal,         Neuro/Psych    (+) neuromuscular disease,      Endo/Other    (+) diabetes mellitus type 2,      Comments: Cervical ca.    GI/Hepatic/Renal      Comments: SBO     Other findings: ngt in place      Dental - normal exam                        Anesthesia Plan  Planned anesthetic: general endotracheal  ngt suction  ASA 2 - emergent   Induction: intravenous   Anesthetic plan and risks discussed with: patient and  services used  Anesthesia plan special considerations: rapid sequence induction, antiemetics,   Post-op plan: routine recovery

## 2021-05-29 NOTE — PATIENT INSTRUCTIONS - HE
Keep using hydrocortisone cream two times a day to left foot and ankle  Can also use on right ankle if needed  Continue to avoid other lotions and creams for now    Come back to clinic in 2 weeks if rash isn't gone

## 2021-05-29 NOTE — PATIENT INSTRUCTIONS - HE
1. Keep appointment for follow up with primary care provider  2. No need to apply any other creams to her left foot and leg  3. If symptoms are getting worse over time or you have any questions, call the clinic number - it's answered 24/7

## 2021-05-29 NOTE — ANESTHESIA CARE TRANSFER NOTE
Last vitals:   Vitals:    06/23/19 1750   BP: 167/84   Pulse:    Resp: 18   Temp:    SpO2:    HR: 80  Temp: 36.5 c  SPO2: 100  Patient's level of consciousness is drowsy  Spontaneous respirations: yes  Maintains airway independently: yes  Dentition unchanged: yes  Oropharynx: oropharynx clear of all foreign objects    QCDR Measures:  ASA# 20 - Surgical Safety Checklist: WHO surgical safety checklist completed prior to induction    PQRS# 430 - Adult PONV Prevention: 4558F - Pt received => 2 anti-emetic agents (different classes) preop & intraop  ASA# 8 - Peds PONV Prevention: NA - Not pediatric patient, not GA or 2 or more risk factors NOT present  PQRS# 424 - Antonia-op Temp Management: 4559F - At least one body temp DOCUMENTED => 35.5C or 95.9F within required timeframe  PQRS# 426 - PACU Transfer Protocol: - Transfer of care checklist used  ASA# 14 - Acute Post-op Pain: ASA14B - Patient did NOT experience pain >= 7 out of 10

## 2021-05-29 NOTE — PROGRESS NOTES
"Assessment/Plan:    1. Rash  2. Contact dermatitis, unspecified contact dermatitis type, unspecified trigger  Patient reporting improvement since starting hydrocortisone and this is noted on today's exam when compared to photo from walk-in clinic 2 days ago.  Agree with diagnosis of contact dermatitis, discussed continuing hydrocortisone twice a day for approximately 1 week and avoiding other lotions/creams for now.  Discussed that patient may have mild scar from hot water burn but is unlikely to have scar from this rash.  Patient will return to clinic if rash is not improved within 2 weeks.      Follow up: as needed for persistent or worsening sx    Roslyn Olivas MD  [facility]    Subjective:    Patient ID: Hai Meade is a 68 y.o. female is here today for f/u rash    F/u rash  -Patient was seen at walk-in clinic on 6/11 for rash (on 6/6 accidentally poured hot water on top of foot and then used Neosporin/benadryl cream, next day she had a rash, itchy), diagnosed with contact dermatitis and prescribed hydrocortisone  -states hot water hit her on the distal foot near the toes and she doesn't necessarily think it splashed on to her ankle but it may have - she applied \"benadryl\" cream afterwards  -the day after she washd rugs (with just water) and then the next day had the rash and itching  -no prior reactions to benadryl cream  -whole foot itches  -no fever, other rash, shortness of breath   -sometimes itches on right ankle also  -using hydrocortisone two times a day - not using any other lotions or creams      Patient Active Problem List   Diagnosis     Anemia     Joint Pain, Localized In The Knee     Type 2 diabetes mellitus with diabetic autonomic neuropathy, without long-term current use of insulin (H)     Trigger Finger Of The Right Thumb     Cholelithiasis     Cervical Cancer     Vision Problems     Urinary incontinence     Hyperlipidemia     Hyponatremia     Hypomagnesemia     Vertigo     " Generalized muscle weakness     Peripheral neuropathy     Past Medical History:   Diagnosis Date     Anemia      Cervical cancer (H)      Cholelithiasis      Diabetes mellitus, type II (H)      Hyperlipidemia      Small bowel obstruction (H)      Vision problems      Past Surgical History:   Procedure Laterality Date     FLEXIBLE SIGMOIDOSCOPY  2009     HYSTERECTOMY  2007    cervix ca     OOPHORECTOMY  2006    cervix ca     MO BSO, OMENTECTOMY W/SILVIA      Description: Salp-oophorect Bilat W/Omentect, Total Abd Hysterect, Rad Dissect For Malig;  Recorded: 03/28/2007;     Current Outpatient Medications on File Prior to Visit   Medication Sig Dispense Refill     ACCU-CHEK LOIDA PLUS TEST STRP strips TEST TWICE A  strip 3     ACCU-CHEK SOFTCLIX LANCETS lancets TEST TWICE DAILY 200 each 1     aspirin 81 MG EC tablet Take 81 mg by mouth daily.       gabapentin (NEURONTIN) 100 MG capsule TAKE 1 CAPSULE BY MOUTH EVERY NIGHT AT BEDTIME 90 capsule 6     glipiZIDE (GLUCOTROL XL) 2.5 MG 24 hr tablet Take 1 tablet (2.5 mg total) by mouth daily. 90 tablet 4     hydrocortisone 2.5 % cream Apply 0.5 grams to area of itching on lower legs twice a day as needed for itchy rash 30 g 0     metFORMIN (GLUCOPHAGE) 1000 MG tablet Take 1 tablet (1,000 mg total) by mouth 2 (two) times a day with meals. 180 tablet 3     simvastatin (ZOCOR) 40 MG tablet TAKE 1 TABLET(40 MG) BY MOUTH AT BEDTIME 90 tablet 0     Current Facility-Administered Medications on File Prior to Visit   Medication Dose Route Frequency Provider Last Rate Last Dose     cyanocobalamin injection 1,000 mcg  1,000 mcg Intramuscular Q30 Days Denia Barron MD   1,000 mcg at 06/11/19 0826     Allergies   Allergen Reactions     Cymbalta [Duloxetine]      Nausea and vomiting       Social History     Socioeconomic History     Marital status:      Spouse name: Not on file     Number of children: Not on file     Years of education: Not on file     Highest education  level: Not on file   Occupational History     Not on file   Social Needs     Financial resource strain: Not on file     Food insecurity:     Worry: Not on file     Inability: Not on file     Transportation needs:     Medical: Not on file     Non-medical: Not on file   Tobacco Use     Smoking status: Never Smoker     Smokeless tobacco: Never Used   Substance and Sexual Activity     Alcohol use: No     Drug use: No     Sexual activity: Not on file   Lifestyle     Physical activity:     Days per week: Not on file     Minutes per session: Not on file     Stress: Not on file   Relationships     Social connections:     Talks on phone: Not on file     Gets together: Not on file     Attends Sabianism service: Not on file     Active member of club or organization: Not on file     Attends meetings of clubs or organizations: Not on file     Relationship status: Not on file     Intimate partner violence:     Fear of current or ex partner: Not on file     Emotionally abused: Not on file     Physically abused: Not on file     Forced sexual activity: Not on file   Other Topics Concern     Not on file   Social History Narrative     Not on file     Review of systems is as stated in HPI, and the remainder of system review is otherwise negative.    Objective:      /68 (Patient Site: Left Arm, Patient Position: Sitting, Cuff Size: Adult Regular)   Pulse 62   Temp 97.8  F (36.6  C)   Wt 120 lb 14.4 oz (54.8 kg)   BMI 23.61 kg/m      General appearance: awake, NAD, here with daughter  HEENT: atraumatic, normocephalic, no scleral icterus or injection, ears and nose grossly normal, moist mucous membranes  Lungs: breathing comfortably on room air  Extremities: no LE edema bilaterally, moving all extremities, strong peripheral pulses bilaterally  Skin: purple-colored papules on distal left foot and ankle, no blistering, no bleeding or drainage - when compared to picture from [facility] note there has been improvement  Neuro: alert, CNs  grossly intact, no focal deficits appreciated  Psych: normal mood/affect/behavior, answering questions appropriately, linear thought process

## 2021-05-29 NOTE — ANESTHESIA POSTPROCEDURE EVALUATION
Patient: Hai Maede  LAPAROSCOPY lysis of adhesions, WITH OPEN SMALL BOWEL RESECTION  Anesthesia type: general    Patient location: PACU  Last vitals:   Vitals Value Taken Time   /98 6/23/2019  6:50 PM   Temp 37  C (98.6  F) 6/23/2019  6:40 PM   Pulse 106 6/23/2019  6:52 PM   Resp 22 6/23/2019  6:51 PM   SpO2 99 % 6/23/2019  6:52 PM   Vitals shown include unvalidated device data.  Post vital signs: stable  Level of consciousness: awake and responds to simple questions  Post-anesthesia pain: pain controlled  Post-anesthesia nausea and vomiting: no  Pulmonary: unassisted  Cardiovascular: stable  Hydration: adequate  Anesthetic events: no    QCDR Measures:  ASA# 11 - Antonia-op Cardiac Arrest: ASA11B - Patient did NOT experience unanticipated cardiac arrest  ASA# 12 - Antonia-op Mortality Rate: ASA12B - Patient did NOT die  ASA# 13 - PACU Re-Intubation Rate: ASA13B - Patient did NOT require a new airway mgmt  ASA# 10 - Composite Anes Safety: ASA10A - No serious adverse event    Additional Notes:

## 2021-05-30 ENCOUNTER — RECORDS - HEALTHEAST (OUTPATIENT)
Dept: ADMINISTRATIVE | Facility: CLINIC | Age: 71
End: 2021-05-30

## 2021-05-30 VITALS — BODY MASS INDEX: 24.41 KG/M2 | WEIGHT: 125 LBS

## 2021-05-30 NOTE — TELEPHONE ENCOUNTER
Orders being requested: Ken   I can per day  #30  1 case per month   Reason service is needed/diagnosis:  Post surgical weight loss,   When are orders needed by: TODAY   Where to send Orders: [facility]  Fax [phone]  Okay to leave detailed message?  Yes

## 2021-05-30 NOTE — PROGRESS NOTES
HPI: Pt is here for follow up of a laparotomy with lysis of adhesions on 6/23/19 with Dr Wray. She had Patient was in hospital from 6/21/19-7/1/19 She had prolonged stay due to ileus, bacteremia from Enterobacter cloaca as well as Streptococcus anginosis  She had subsequent hospitalization  7/4/19 to 7/7/19 for small bowel obstruction that resolved well with conservative measures.She states she is tired. She does have soreness at her wound site. She is not eating much in fear of abdominal pain. She has lost about 20lb since all this began.              /58 (Patient Site: Right Arm, Patient Position: Sitting, Cuff Size: Adult Small)   Pulse 89   Temp 97.6  F (36.4  C) (Tympanic)   Wt 100 lb 3.2 oz (45.5 kg)   SpO2 98%   BMI 18.93 kg/m      EXAM:  GENERAL:Appears tired obviously has lost weight from when I first met her  ABDOMEN:  Soft, +BS  SURGICAL WOUNDS:  Incisions healing well, no enduration or drainage.        Assessment/Plan: . Post op lysis of adhesions with prolonged ileus resolved and sbo resolved.                                     Malnutrition and weight loss. - discussed importance to get her nutrients and calories in but will need to go slow as she has not been eating much. Eat small frequent meals. Protein supplementation. I recommended she follow up with her doctor in next 2- 3 weeks. Sooner if getting more fatigued or ongoing weight loss.     Lesly Mccormick PA-C  [facility] Department of Surgery

## 2021-05-30 NOTE — TELEPHONE ENCOUNTER
I spoke with patient today and she stated that home care is no longer necessary. I instructed her to contact her PCP if she changes her decision.

## 2021-05-30 NOTE — TELEPHONE ENCOUNTER
Upcoming Appointment Question  When is the appointment: 07/11/19  What is your appointment for?: currently scheduled as OFV for DM check & vitamin B12 injection.   Who is your appointment scheduled with?: PCP only  What is your question/concern?: Daughter is wondering if pcp would be able to combine this regular check-up with a INF appointment.  Patient was recently at [facility] from 07/-07/07 for bowel obstruction.    Please advise daughter if combing appointment is possible or if patient will need to be rescheduled.   Okay to leave a detailed message?: Yes

## 2021-05-30 NOTE — PROGRESS NOTES
Hospital discharge follow up call to pt, no answer.  Left VM message reminding patient of their upcoming appt 7/11/19 at 8:50am.  RN will attempt call back at another time.    Shanti Mdconough RN Care Manager, Population Health

## 2021-05-30 NOTE — TELEPHONE ENCOUNTER
RN cannot approve Refill Request    RN can NOT refill this medication pt is in the hospital.       Cinyd Roman, [facility] Triage/Med Refill 7/1/2019    Requested Prescriptions   Pending Prescriptions Disp Refills     DULoxetine (CYMBALTA) 20 MG capsule [Pharmacy Med Name: DULOXETINE DR 20MG CAPSULES] 90 capsule 0     Sig: TAKE 1 CAPSULE(20 MG) BY MOUTH DAILY       Tricyclics/Misc Antidepressant/Antianxiety Meds Refill Protocol Passed - 7/1/2019  3:19 AM        Passed - PCP or prescribing provider visit in last year     Last office visit with prescriber/PCP: 4/18/2019 Denia Barron MD OR same dept: 6/13/2019 Roslyn Olivas MD OR same specialty: 6/13/2019 Roslyn Olivas MD  Last physical: Visit date not found Last MTM visit: Visit date not found   Next visit within 3 mo: Visit date not found  Next physical within 3 mo: Visit date not found  Prescriber OR PCP: Denia Barron MD  Last diagnosis associated with med order: 1. Type 2 diabetes mellitus with diabetic autonomic neuropathy, without long-term current use of insulin (H)  - DULoxetine (CYMBALTA) 20 MG capsule [Pharmacy Med Name: DULOXETINE DR 20MG CAPSULES]; TAKE 1 CAPSULE(20 MG) BY MOUTH DAILY  Dispense: 90 capsule; Refill: 0    If protocol passes may refill for 12 months if within 3 months of last provider visit (or a total of 15 months).

## 2021-05-30 NOTE — TELEPHONE ENCOUNTER
Hi Dr. Barron,    [facility] and I am requesting for skilled nurse visits 2x/wk x2 weeks and 1x/wk x1 week to perform/teach surgical incision care, monitor for infection, complex med management, wound care, monitor s/s of infection and teach wound care. I am also requesting for PT/OT to eval and treat s/p SBO abdominal surgery.     Please respond with orders.    Thanks,    Keerthi Franco RN  [facility]

## 2021-05-30 NOTE — PROGRESS NOTES
TCM DISCHARGE FOLLOW UP CALL    Discharge Date:  7/1/2019  Reason for hospital stay (discharge diagnosis)::  SBO, Type 2 diabetes mellitus with diabetic autonomic neuropathy, without long-term current use of insulin, Hyperglycemia, Postoperative ileus, Hypernatremia, Bacteremia, Urinary tract infection without hematuria, Anemia due to blood loss, acute  Are you feeling better, the same or worse since your discharge?:  Patient is feeling better (Abdominal pain improved - taking pain meds, having BMs, and eating well.)  Do you feel like you have a plan in the event of a health emergency?: Yes (ER)    As part of your discharge plan, were  home care services ordered for you?: Yes    Have you seen them yet, or are they scheduled to visit?: Yes    Do you have any follow up visits scheduled with your PCP or Specialist?:  Yes, with PCP and Yes, with Specialist (Dr. Dale 7/8/19)  (RN) Is PCP appt scheduled soon enough (within 14 days of discharge date)?: Yes    Who are you seeing and when is it scheduled?:  Surgery 7/8/19      Shanti Mcdonough RN Care Manager, Population Health

## 2021-05-30 NOTE — PROGRESS NOTES
ASSESSMENT/PLAN:  1. Hx SBO  69-year-old female with recent small bowel obstruction secondary to adhesions.  She is very slowly recovering.  She is certainly lost weight but is gradually increasing her food intake.  Continue to advance her diet as tolerated.  Follow-up with any increasing symptoms.    2. Type 2 diabetes mellitus with diabetic autonomic neuropathy, without long-term current use of insulin (H)  With her decreased diet and weight loss, she is no longer taking glipizide daily.  She continues on metformin.  Will watch closely and add back in daily glipizide as needed.  - Microalbumin, Random Urine  - HM2(CBC w/o Differential)    3. Vitamin B12 deficiency (non anemic)    Medication reconciliation was performed.    Dragon dictation was used for this note.  Speech recognition errors are a possibility.    No follow-ups on file.  There are no Patient Instructions on file for this visit.    Orders Placed This Encounter   Procedures     Microalbumin, Random Urine     HM2(CBC w/o Differential)     There are no discontinued medications.  Administrations This Visit     cyanocobalamin injection 1,000 mcg     Admin Date  07/11/2019 Action  Given Dose  1000 mcg Route  Intramuscular Administered By  Keerthi Meade CMA                CHIEF COMPLAINT;  Chief Complaint   Patient presents with     Diabetes     Hospital Visit Follow Up       HISTORY OF PRESENT ILLNESS:  Hai is a 69 y.o. female presenting to the clinic today for hospital follow-up regarding small bowel obstruction.  She has a history of cervical cancer and adhesions with partial small bowel obstructions in the past.  She had from small bowel obstruction at this point time did undergo surgery.  She is been very slowly healing.  She did experience quite a bit of pain difficulty with recovery.  At this point she is advancing her diet and pain is controlled.  Her daughter is with her today and  is used for this visit.  Blood sugars have been lower  since she is eliminated the glipizide.  She continues to take the metformin daily.    Remainder of 12-point ROS is negative.    TOBACCO USE:  Social History     Tobacco Use   Smoking Status Never Smoker   Smokeless Tobacco Never Used       VITALS:  Vitals:    07/11/19 0910   BP: 92/60   Patient Site: Left Arm   Patient Position: Sitting   Cuff Size: Adult Regular   Pulse: 76   SpO2: 98%   Weight: 109 lb 14.4 oz (49.9 kg)     Wt Readings from Last 3 Encounters:   07/16/19 100 lb 3.2 oz (45.5 kg)   07/11/19 109 lb 14.4 oz (49.9 kg)   07/04/19 116 lb (52.6 kg)     Body mass index is 20.77 kg/m .    PHYSICAL EXAM:  GENERAL APPEARANCE: Alert, cooperative, no distress, appears stated age  BACK: Symmetric, no curvature, ROM normal, no CVA tenderness  LUNGS: Clear to auscultation bilaterally, respirations unlabored  CHEST WALL: No tenderness or deformity  HEART: Regular rate and rhythm, S1 and S2 normal, no murmur, rub or gallop  ABDOMEN: Soft, non-tender, bowel sounds active all four quadrants,     no masses, no organomegaly  EXTREMITIES: Extremities normal, atraumatic, no cyanosis or edema  PULSES: 2+ and symmetric all extremities  SKIN: Skin color, texture, turgor normal, no rashes or lesions         RECENT RESULTS  No results found for this or any previous visit (from the past 48 hour(s)).    MEDICATIONS:  Current Outpatient Medications   Medication Sig Dispense Refill     ACCU-CHEK LOIDA PLUS TEST STRP strips TEST TWICE A  strip 3     ACCU-CHEK SOFTCLIX LANCETS lancets TEST TWICE DAILY 200 each 1     acetaminophen (TYLENOL) 500 MG tablet Take 1-2 tablets (500-1,000 mg total) by mouth every 4 (four) hours as needed.  0     aspirin 81 MG EC tablet Take 81 mg by mouth daily.       famotidine (PEPCID) 20 MG tablet Take 1 tablet (20 mg total) by mouth 2 (two) times a day.  0     gabapentin (NEURONTIN) 100 MG capsule TAKE 1 CAPSULE BY MOUTH EVERY NIGHT AT BEDTIME 90 capsule 6     glipiZIDE (GLUCOTROL XL) 2.5 MG 24 hr  tablet Take 1 tablet (2.5 mg total) by mouth daily. (Patient taking differently: Take 2.5 mg by mouth daily as needed (High blood sugars).       ) 90 tablet 4     hydrocortisone 2.5 % cream Apply 0.5 grams to area of itching on lower legs twice a day as needed for itchy rash 30 g 0     ibuprofen (ADVIL,MOTRIN) 400 MG tablet Take 1 tablet (400 mg total) by mouth every 6 (six) hours as needed. 15 tablet 0     metFORMIN (GLUCOPHAGE) 1000 MG tablet Take 1 tablet (1,000 mg total) by mouth 2 (two) times a day with meals. 180 tablet 3     senna-docusate (PERICOLACE) 8.6-50 mg tablet Take 1 tablet by mouth daily with lunch.       simethicone (MYLICON) 80 MG chewable tablet Chew 1 tablet (80 mg total) every 6 (six) hours as needed for flatulence.  0     simvastatin (ZOCOR) 40 MG tablet TAKE 1 TABLET(40 MG) BY MOUTH AT BEDTIME 90 tablet 0     Current Facility-Administered Medications   Medication Dose Route Frequency Provider Last Rate Last Dose     cyanocobalamin injection 1,000 mcg  1,000 mcg Intramuscular Q30 Days Denia Barron MD   1,000 mcg at 07/11/19 1005

## 2021-05-30 NOTE — PROGRESS NOTES
Second attempt not completed due to patient seeing provider.      Shanti Mcdonough RN Care Manager, [facility]

## 2021-05-31 ENCOUNTER — RECORDS - HEALTHEAST (OUTPATIENT)
Dept: ADMINISTRATIVE | Facility: CLINIC | Age: 71
End: 2021-05-31

## 2021-05-31 VITALS — WEIGHT: 122 LBS | BODY MASS INDEX: 23.83 KG/M2

## 2021-05-31 VITALS — WEIGHT: 125 LBS | BODY MASS INDEX: 24.41 KG/M2

## 2021-05-31 VITALS — BODY MASS INDEX: 24.22 KG/M2 | WEIGHT: 124 LBS

## 2021-05-31 VITALS — BODY MASS INDEX: 23.44 KG/M2 | WEIGHT: 120 LBS

## 2021-06-01 ENCOUNTER — RECORDS - HEALTHEAST (OUTPATIENT)
Dept: ADMINISTRATIVE | Facility: CLINIC | Age: 71
End: 2021-06-01

## 2021-06-01 VITALS — WEIGHT: 123 LBS | BODY MASS INDEX: 24.02 KG/M2

## 2021-06-01 VITALS — BODY MASS INDEX: 24.8 KG/M2 | WEIGHT: 127 LBS

## 2021-06-01 VITALS — BODY MASS INDEX: 23.65 KG/M2 | WEIGHT: 121.1 LBS

## 2021-06-01 VITALS — BODY MASS INDEX: 23.63 KG/M2 | WEIGHT: 121 LBS

## 2021-06-01 VITALS — WEIGHT: 119.3 LBS | HEIGHT: 60 IN | BODY MASS INDEX: 23.42 KG/M2

## 2021-06-01 NOTE — PROGRESS NOTES
ASSESSMENT/PLAN:  1. Type 2 diabetes mellitus with diabetic autonomic neuropathy, without long-term current use of insulin (H)  69-year-old female with fairly well controlled type 2 diabetes.  We will continue with just the metformin and as needed glipizide.  She is having some low blood sugars so she is not taking her glipizide daily.  Her hemoglobin A1c is 7.  7 from June.  We are too early to check her blood sugars at this visit.  She will return in about 6 weeks and we will recheck hemoglobin A1c then.  Continue with aspirin.  She does have a mild diabetic neuropathy which were treated with gabapentin.    2. Peripheral polyneuropathy  Still symptomatic but were unable to increase the gabapentin.  She did not most improve with nortriptyline.  We will continue on her current dosing as she is able to tolerate her symptoms at this time.    3. Dermatitis  - triamcinolone (KENALOG) 0.1 % cream; Apply twice daily to affected area for up to 2 week  Dispense: 30 g; Refill: 1      Dragon dictation was used for this note.  Speech recognition errors are a possibility.    No follow-ups on file.  There are no Patient Instructions on file for this visit.    No orders of the defined types were placed in this encounter.    Medications Discontinued During This Encounter   Medication Reason     simvastatin (ZOCOR) 40 MG tablet Duplicate order     Administrations This Visit     cyanocobalamin injection 1,000 mcg     Admin Date  09/12/2019 Action  Given Dose  1000 mcg Route  Intramuscular Administered By  Keerthi Meade CMA                CHIEF COMPLAINT;  Chief Complaint   Patient presents with     Diabetes     Immunizations     Vitamin B12 injection     Medication Problem     Would like to change DM meds. When at home glucose levels seem normal. But when come here to [facility], it is high.     Foot Problem     Both feet tingling, numbness, and hot sensation. With pressure.       HISTORY OF PRESENT ILLNESS:  Hai is a 69 y.o. female  presenting to the clinic today for follow-up regarding her blood sugars.  She is concerned because when she checks her blood sugars at home, she states that they are in the normal range.  Her hemoglobin A1c is been between 7 and 8.  She has not felt high or low blood sugars.  She has been eating better since she is recovering from the pneumonia.  When I last saw her she was having lots of fatigue and really felt that she did not get better.  She is happy to say that her appetite is improving she is eating better.  Her blood sugar she states is been in range.  She is not having any chest pain or breathing problems.  Her peripheral neuropathy is still bothersome but she continues to take the gabapentin which is helpful..    Remainder of 12-point ROS is negative.    TOBACCO USE:  Social History     Tobacco Use   Smoking Status Never Smoker   Smokeless Tobacco Never Used       VITALS:  Vitals:    09/12/19 0904   BP: 120/60   Patient Site: Right Arm   Patient Position: Sitting   Cuff Size: Adult Regular   Pulse: 65   SpO2: 99%   Weight: 114 lb 9.6 oz (52 kg)     Wt Readings from Last 3 Encounters:   09/12/19 114 lb 9.6 oz (52 kg)   07/16/19 100 lb 3.2 oz (45.5 kg)   07/11/19 109 lb 14.4 oz (49.9 kg)     Body mass index is 21.65 kg/m .    PHYSICAL EXAM:  GENERAL APPEARANCE: Alert, cooperative, no distress, appears stated age  BACK: Symmetric, no curvature, ROM normal, no CVA tenderness  LUNGS: Clear to auscultation bilaterally, respirations unlabored  CHEST WALL: No tenderness or deformity  HEART: Regular rate and rhythm, S1 and S2 normal, no murmur, rub or gallop  ABDOMEN: Soft, non-tender, bowel sounds active all four quadrants,     no masses, no organomegalyl  NEUROLOGIC: CNII-XII intact. Normal strength, sensation and reflexes       throughout    RECENT RESULTS  No results found for this or any previous visit (from the past 48 hour(s)).    MEDICATIONS:  Current Outpatient Medications   Medication Sig Dispense Refill      ACCU-CHEK LOIDA PLUS TEST STRP strips TEST TWICE A  strip 3     ACCU-CHEK SOFTCLIX LANCETS lancets TEST TWICE DAILY 200 each 1     acetaminophen (TYLENOL) 500 MG tablet Take 1-2 tablets (500-1,000 mg total) by mouth every 4 (four) hours as needed.  0     aspirin 81 MG EC tablet Take 81 mg by mouth daily.       famotidine (PEPCID) 20 MG tablet Take 1 tablet (20 mg total) by mouth 2 (two) times a day.  0     gabapentin (NEURONTIN) 100 MG capsule TAKE 1 CAPSULE BY MOUTH EVERY NIGHT AT BEDTIME 90 capsule 6     glipiZIDE (GLUCOTROL XL) 2.5 MG 24 hr tablet Take 1 tablet (2.5 mg total) by mouth daily. (Patient taking differently: Take 2.5 mg by mouth daily as needed (High blood sugars).       ) 90 tablet 4     hydrocortisone 2.5 % cream Apply 0.5 grams to area of itching on lower legs twice a day as needed for itchy rash 30 g 0     ibuprofen (ADVIL,MOTRIN) 400 MG tablet Take 1 tablet (400 mg total) by mouth every 6 (six) hours as needed. 15 tablet 0     metFORMIN (GLUCOPHAGE) 1000 MG tablet Take 1 tablet (1,000 mg total) by mouth 2 (two) times a day with meals. 180 tablet 3     senna-docusate (PERICOLACE) 8.6-50 mg tablet Take 1 tablet by mouth daily with lunch.       simethicone (MYLICON) 80 MG chewable tablet Chew 1 tablet (80 mg total) every 6 (six) hours as needed for flatulence.  0     simvastatin (ZOCOR) 40 MG tablet TAKE 1 TABLET(40 MG) BY MOUTH AT BEDTIME 90 tablet 3     triamcinolone (KENALOG) 0.1 % cream Apply twice daily to affected area for up to 2 week 30 g 1     Current Facility-Administered Medications   Medication Dose Route Frequency Provider Last Rate Last Dose     cyanocobalamin injection 1,000 mcg  1,000 mcg Intramuscular Q30 Days Denia Barron MD   1,000 mcg at 07/11/19 1006     cyanocobalamin injection 1,000 mcg  1,000 mcg Intramuscular Q30 Days Denia Barron MD   1,000 mcg at 09/12/19 4538

## 2021-06-01 NOTE — TELEPHONE ENCOUNTER
DULoxetine (CYMBALTA) 20 MG capsule [[id number]]     Electronically signed by: Scarlet Min MD on 07/01/19 1227 Status: Discontinued   Ordering user: Scarlet Min MD 07/01/19 1227 Authorized by: Scarlet Min MD   Frequency:  07/01/19 - 07/01/19  Discontinued by: Octaviano Martinez MD 07/01/19 1928 [Stop Taking at Discharge]

## 2021-06-01 NOTE — TELEPHONE ENCOUNTER
Refill Approved    Rx renewed per Medication Renewal Policy. Medication was last renewed on 9/6/2018.  Last OV 7/11/2019.    Miladis Silva, [facility] Connection Triage/Med Refill 9/5/2019     Requested Prescriptions   Pending Prescriptions Disp Refills     simvastatin (ZOCOR) 40 MG tablet [Pharmacy Med Name: SIMVASTATIN 40MG TABLETS] 90 tablet 0     Sig: TAKE 1 TABLET(40 MG) BY MOUTH AT BEDTIME       Statins Refill Protocol (Hmg CoA Reductase Inhibitors) Passed - 9/5/2019  3:14 AM        Passed - PCP or prescribing provider visit in past 12 months      Last office visit with prescriber/PCP: Visit date not found OR same dept: 7/11/2019 Denia Barron MD OR same specialty: 7/11/2019 Denia Barron MD  Last physical: Visit date not found Last MTM visit: Visit date not found   Next visit within 3 mo: Visit date not found  Next physical within 3 mo: Visit date not found  Prescriber OR PCP: Brie Acevedo MD  Last diagnosis associated with med order: 1. Hyperlipidemia  - simvastatin (ZOCOR) 40 MG tablet [Pharmacy Med Name: SIMVASTATIN 40MG TABLETS]; TAKE 1 TABLET(40 MG) BY MOUTH AT BEDTIME  Dispense: 90 tablet; Refill: 0    If protocol passes may refill for 12 months if within 3 months of last provider visit (or a total of 15 months).

## 2021-06-02 VITALS — BODY MASS INDEX: 23.81 KG/M2 | WEIGHT: 121.9 LBS

## 2021-06-02 VITALS — WEIGHT: 125.7 LBS | BODY MASS INDEX: 24.55 KG/M2

## 2021-06-02 VITALS — BODY MASS INDEX: 23.95 KG/M2 | WEIGHT: 122 LBS | HEIGHT: 60 IN

## 2021-06-02 VITALS — BODY MASS INDEX: 23.63 KG/M2 | WEIGHT: 121 LBS

## 2021-06-02 VITALS — WEIGHT: 128 LBS | BODY MASS INDEX: 25 KG/M2

## 2021-06-02 NOTE — PROGRESS NOTES
ASSESSMENT/PLAN:  1. Type 2 diabetes mellitus with diabetic autonomic neuropathy, without long-term current use of insulin (H)  69-year-old with improved type 2 diabetes controlled with a hemoglobin A1c at 7.1.  Continue with current medications.  She is taking an aspirin daily.  Will check lipid cascade.  She does have a complication of a diabetic peripheral neuropathy.  Continue with yearly eye exams.  Follow-up in 3 months.    2. Peripheral polyneuropathy  Multifactorial blood primarily due to diabetes.  She cannot tolerate any more than 100 of gabapentin at night.  She had a severe reaction to Cymbalta and did not have any improvement with nortriptyline.  We will continue with gabapentin 100 mg at night.  She can take Tylenol during the daytime for pain relief.  She is also using acupuncture.  - gabapentin (NEURONTIN) 100 MG capsule; TAKE 1 CAPSULE BY MOUTH EVERY NIGHT AT BEDTIME  Dispense: 90 capsule; Refill: 6    3. Vitamin B12 deficiency (non anemic)  Receiving monthly B12 injections    4.  Prior anemia, likely due to her significant illness.  We will recheck him to the day and make further recommendations as needed.  - Glycosylated Hemoglobin A1c  - Basic Metabolic Panel  - HM2(CBC w/o Differential)  - Lipid Cascade FASTING      Dragon dictation was used for this note.  Speech recognition errors are a possibility.    No follow-ups on file.  There are no Patient Instructions on file for this visit.    Orders Placed This Encounter   Procedures     Pneumococcal conjugate vaccine 13-valent 6wks-17yrs; >50yrs     Glycosylated Hemoglobin A1c     Basic Metabolic Panel     HM2(CBC w/o Differential)     Lipid [place] FASTING     Order Specific Question:   Fasting is required?     Answer:   Yes     Medications Discontinued During This Encounter   Medication Reason     gabapentin (NEURONTIN) 100 MG capsule Reorder     Administrations This Visit     cyanocobalamin injection 1,000 mcg     Admin Date  10/17/2019  Action  Given Dose  1000 mcg Route  Intramuscular Administered By  Keerthi Meade CMA                CHIEF COMPLAINT;  Chief Complaint   Patient presents with     Diabetes     Immunizations     Vitamin B12 injection today     Medication Request     Would like to  her muscle relaxer medications every 3 months instead of 1 month.       HISTORY OF PRESENT ILLNESS:  Hai is a 69 y.o. female presenting to the clinic today for follow-up of her chronic medical conditions.  She is accompanied by her daughter and a [facility] .  She states overall she is been doing well.  She continues to improve after her hospitalization in July.  She is eating better and continues to gain weight.  She does not have any chest pain or breathing difficulties.  She continues to take her medications regularly.  She continues to have pain in bilateral legs worse in the left on the right.  When she increases her gabapentin she has side effects.  She did not tolerate nortriptyline and had a significant reaction to the Cymbalta.  She did go to 2 treatments with acupuncture and states it helped temporarily but the symptoms returned..    Remainder of 12-point ROS is negative.    TOBACCO USE:  Social History     Tobacco Use   Smoking Status Never Smoker   Smokeless Tobacco Never Used       VITALS:  Vitals:    10/17/19 0803   BP: 130/80   Patient Site: Right Arm   Patient Position: Sitting   Cuff Size: Adult Regular   Pulse: 66   SpO2: 98%   Weight: 115 lb 4.8 oz (52.3 kg)     Wt Readings from Last 3 Encounters:   10/17/19 115 lb 4.8 oz (52.3 kg)   09/12/19 114 lb 9.6 oz (52 kg)   07/16/19 100 lb 3.2 oz (45.5 kg)     Body mass index is 21.79 kg/m .    PHYSICAL EXAM:  GENERAL APPEARANCE: Alert, cooperative, no distress, appears stated age  LUNGS: Clear to auscultation bilaterally, respirations unlabored  CHEST WALL: No tenderness or deformity  HEART: Regular rate and rhythm, S1 and S2 normal, no murmur, rub or gallop  ABDOMEN: Soft,  non-tender, bowel sounds active all four quadrants,     no masses, no organomegaly  EXTREMITIES: Extremities normal, atraumatic, no cyanosis or edema  PULSES: 2+ and symmetric all extremities    RECENT RESULTS  Recent Results (from the past 48 hour(s))   Glycosylated Hemoglobin A1c    Collection Time: 10/17/19  7:56 AM   Result Value Ref Range    Hemoglobin A1c 7.1 (H) 3.5 - 6.0 %   HM2(CBC w/o Differential)    Collection Time: 10/17/19  7:56 AM   Result Value Ref Range    WBC 5.0 4.0 - 11.0 thou/uL    RBC 4.24 3.80 - 5.40 mill/uL    Hemoglobin 12.2 12.0 - 16.0 g/dL    Hematocrit 36.7 35.0 - 47.0 %    MCV 87 80 - 100 fL    MCH 28.6 27.0 - 34.0 pg    MCHC 33.1 32.0 - 36.0 g/dL    RDW 13.1 11.0 - 14.5 %    Platelets 255 140 - 440 thou/uL    MPV 6.8 (L) 7.0 - 10.0 fL       MEDICATIONS:  Current Outpatient Medications   Medication Sig Dispense Refill     ACCU-CHEK LOIDA PLUS TEST STRP strips TEST TWICE A  strip 3     ACCU-CHEK SOFTCLIX LANCETS lancets TEST TWICE DAILY 200 each 1     acetaminophen (TYLENOL) 500 MG tablet Take 1-2 tablets (500-1,000 mg total) by mouth every 4 (four) hours as needed.  0     aspirin 81 MG EC tablet Take 81 mg by mouth daily.       famotidine (PEPCID) 20 MG tablet Take 1 tablet (20 mg total) by mouth 2 (two) times a day.  0     gabapentin (NEURONTIN) 100 MG capsule TAKE 1 CAPSULE BY MOUTH EVERY NIGHT AT BEDTIME 90 capsule 6     glipiZIDE (GLUCOTROL XL) 2.5 MG 24 hr tablet Take 1 tablet (2.5 mg total) by mouth daily. (Patient taking differently: Take 2.5 mg by mouth daily as needed (High blood sugars).       ) 90 tablet 4     hydrocortisone 2.5 % cream Apply 0.5 grams to area of itching on lower legs twice a day as needed for itchy rash 30 g 0     ibuprofen (ADVIL,MOTRIN) 400 MG tablet Take 1 tablet (400 mg total) by mouth every 6 (six) hours as needed. 15 tablet 0     metFORMIN (GLUCOPHAGE) 1000 MG tablet Take 1 tablet (1,000 mg total) by mouth 2 (two) times a day with meals. 180  tablet 3     senna-docusate (PERICOLACE) 8.6-50 mg tablet Take 1 tablet by mouth daily with lunch.       simethicone (MYLICON) 80 MG chewable tablet Chew 1 tablet (80 mg total) every 6 (six) hours as needed for flatulence.  0     simvastatin (ZOCOR) 40 MG tablet TAKE 1 TABLET(40 MG) BY MOUTH AT BEDTIME 90 tablet 3     triamcinolone (KENALOG) 0.1 % cream Apply twice daily to affected area for up to 2 week 30 g 1     Current Facility-Administered Medications   Medication Dose Route Frequency Provider Last Rate Last Dose     cyanocobalamin injection 1,000 mcg  1,000 mcg Intramuscular Q30 Days Denia Barron MD   1,000 mcg at 07/11/19 1006     cyanocobalamin injection 1,000 mcg  1,000 mcg Intramuscular Q30 Days Denia Barron MD   1,000 mcg at 10/17/19 3126

## 2021-06-03 ENCOUNTER — COMMUNICATION - HEALTHEAST (OUTPATIENT)
Dept: FAMILY MEDICINE | Facility: CLINIC | Age: 71
End: 2021-06-03

## 2021-06-03 VITALS — BODY MASS INDEX: 18.93 KG/M2 | WEIGHT: 100.2 LBS

## 2021-06-03 VITALS
DIASTOLIC BLOOD PRESSURE: 60 MMHG | WEIGHT: 114.6 LBS | BODY MASS INDEX: 21.65 KG/M2 | HEART RATE: 65 BPM | SYSTOLIC BLOOD PRESSURE: 120 MMHG | OXYGEN SATURATION: 99 %

## 2021-06-03 VITALS — BODY MASS INDEX: 20.77 KG/M2 | WEIGHT: 109.9 LBS

## 2021-06-03 VITALS
HEART RATE: 66 BPM | DIASTOLIC BLOOD PRESSURE: 80 MMHG | WEIGHT: 115.3 LBS | OXYGEN SATURATION: 98 % | SYSTOLIC BLOOD PRESSURE: 130 MMHG | BODY MASS INDEX: 21.79 KG/M2

## 2021-06-03 VITALS — HEIGHT: 61 IN | BODY MASS INDEX: 25.15 KG/M2 | WEIGHT: 133.2 LBS

## 2021-06-03 VITALS — BODY MASS INDEX: 23.61 KG/M2 | WEIGHT: 120.9 LBS

## 2021-06-03 DIAGNOSIS — I10 BENIGN ESSENTIAL HYPERTENSION: ICD-10-CM

## 2021-06-03 DIAGNOSIS — G62.9 PERIPHERAL POLYNEUROPATHY: ICD-10-CM

## 2021-06-03 RX ORDER — AMLODIPINE BESYLATE 10 MG/1
10 TABLET ORAL DAILY
Qty: 90 TABLET | Refills: 0 | Status: SHIPPED | OUTPATIENT
Start: 2021-06-03 | End: 2021-11-26

## 2021-06-03 RX ORDER — SITAGLIPTIN 100 MG/1
TABLET, FILM COATED ORAL
Qty: 90 TABLET | Refills: 0 | Status: SHIPPED | OUTPATIENT
Start: 2021-06-03 | End: 2021-10-18 | Stop reason: SDUPTHER

## 2021-06-03 RX ORDER — ATORVASTATIN CALCIUM 40 MG/1
40 TABLET, FILM COATED ORAL DAILY
Qty: 90 TABLET | Refills: 0 | Status: SHIPPED | OUTPATIENT
Start: 2021-06-03 | End: 2021-09-02

## 2021-06-04 VITALS — WEIGHT: 120 LBS | BODY MASS INDEX: 23.56 KG/M2 | HEIGHT: 60 IN

## 2021-06-04 VITALS
TEMPERATURE: 98.4 F | BODY MASS INDEX: 22.76 KG/M2 | OXYGEN SATURATION: 98 % | DIASTOLIC BLOOD PRESSURE: 78 MMHG | SYSTOLIC BLOOD PRESSURE: 147 MMHG | WEIGHT: 120.44 LBS | HEART RATE: 73 BPM

## 2021-06-04 VITALS
OXYGEN SATURATION: 98 % | SYSTOLIC BLOOD PRESSURE: 110 MMHG | WEIGHT: 119.7 LBS | BODY MASS INDEX: 22.62 KG/M2 | DIASTOLIC BLOOD PRESSURE: 60 MMHG | HEART RATE: 67 BPM

## 2021-06-04 VITALS
BODY MASS INDEX: 23.83 KG/M2 | DIASTOLIC BLOOD PRESSURE: 60 MMHG | HEART RATE: 77 BPM | OXYGEN SATURATION: 97 % | SYSTOLIC BLOOD PRESSURE: 126 MMHG | WEIGHT: 122 LBS

## 2021-06-04 VITALS
WEIGHT: 120.5 LBS | DIASTOLIC BLOOD PRESSURE: 80 MMHG | HEART RATE: 64 BPM | BODY MASS INDEX: 22.77 KG/M2 | OXYGEN SATURATION: 98 % | SYSTOLIC BLOOD PRESSURE: 146 MMHG

## 2021-06-04 RX ORDER — GABAPENTIN 100 MG/1
CAPSULE ORAL
Qty: 90 CAPSULE | Refills: 3 | Status: SHIPPED | OUTPATIENT
Start: 2021-06-04 | End: 2022-01-17

## 2021-06-04 NOTE — TELEPHONE ENCOUNTER
Refill Approved    Rx renewed per Medication Renewal Policy. Medication was last renewed on 6/11/2019 for 30 g/ 0 refills  Last OV 10/17/2019   Next OV 1/23/2020  Delia Sadler, [facility] Connection Triage/Med Refill 1/4/2020     Requested Prescriptions   Pending Prescriptions Disp Refills     hydrocortisone 2.5 % cream [Pharmacy Med Name: HYDROCORTISONE 2.5% CREAM 30GM] 30 g 0     Sig: APPLY 0.5 GRAMS TOPICALLY TO AREA OF ITCHING ON LOWER LEFS TWICE DAILY AS NEEDED       GI Medications Refill Protocol Passed - 1/2/2020  5:27 AM        Passed - PCP or prescribing provider visit in last 12 or next 3 months.     Last office visit with prescriber/PCP: 6/11/2019 Digna Franco MD OR same dept: 6/11/2019 Digna Franco MD OR same specialty: 6/11/2019 Digna Franco MD  Last physical: Visit date not found Last MTM visit: Visit date not found   Next visit within 3 mo: Visit date not found  Next physical within 3 mo: Visit date not found  Prescriber OR PCP: Digna Franco MD  Last diagnosis associated with med order: 1. Contact dermatitis, unspecified contact dermatitis type, unspecified trigger  - hydrocortisone 2.5 % cream [Pharmacy Med Name: HYDROCORTISONE 2.5% CREAM 30GM]; APPLY 0.5 GRAMS TOPICALLY TO AREA OF ITCHING ON LOWER LEFS TWICE DAILY AS NEEDED  Dispense: 30 g; Refill: 0    If protocol passes may refill for 12 months if within 3 months of last provider visit (or a total of 15 months).

## 2021-06-05 ENCOUNTER — HEALTH MAINTENANCE LETTER (OUTPATIENT)
Age: 71
End: 2021-06-05

## 2021-06-05 VITALS
OXYGEN SATURATION: 98 % | SYSTOLIC BLOOD PRESSURE: 144 MMHG | BODY MASS INDEX: 23.73 KG/M2 | DIASTOLIC BLOOD PRESSURE: 72 MMHG | WEIGHT: 121.5 LBS | HEART RATE: 78 BPM

## 2021-06-05 NOTE — PROGRESS NOTES
Assessment/Plan:  1. Type 2 diabetes mellitus with diabetic autonomic neuropathy, without long-term current use of insulin (H)  69-year-old with controlled type 2 diabetes on oral medications.  Continue with her current medications.  She is on a daily aspirin as well as a statin.  Recommend yearly eye exam.  Follow-up in 6 months.  - Glycosylated Hemoglobin A1c  - Ambulatory referral to Ophthalmology    2. Vitamin B12 deficiency (non anemic)  She is receiving monthly injections of B12.  - Vitamin B12    3. Peripheral polyneuropathy  Neuropathy is likely multifactorial.  Most symptoms are due to her diabetes with coinciding B12 deficiency.  Her prior chemotherapy may also be contributing.  As the neuropathy is resulting in some weakness with concern for eventual balance issues she is referred to physical therapy.  She is taking gabapentin at 100 mg.  She does not tolerate a higher dose and has not tolerated nortriptyline or Cymbalta in the past.  - Ambulatory referral to Adult PT- External    4. Leg weakness, bilateral  - Ambulatory referral to Adult PT- External          Subjective:  69-year-old H[place] speaking female who presents with an  and her daughter.  She feels her diabetes has been well controlled.  She takes her metformin regularly and glipizide as needed.  She continues to have pain in bilateral lower legs.  We have currently got her on gabapentin 100 mg.  When she went up to 200 mg, she experienced quite a bit of sleepiness and did not appreciate that so she is down to 100 mg she had a significant reaction to Cymbalta and did not feel that the nortriptyline helped her.  She feels like her legs are getting more weak.  She is not had any chest pain or breathing difficulties.  She denies any swelling in her legs.  She does get regular vitamin B12 injections.    Objective:  /60 (Patient Site: Right Arm, Patient Position: Sitting, Cuff Size: Adult Regular)   Pulse 67   Wt 119 lb 11.2 oz (54.3  kg)   SpO2 98%   BMI 22.62 kg/m    Heart regular rate and rhythm  Lungs clear to auscultation bilaterally  Abdomen is soft and nontender  Extremities warm showing no edema

## 2021-06-06 NOTE — TELEPHONE ENCOUNTER
Left message to call back for: results   Information to relay to patient:  Below message      Left a VM to return phone call via  services.

## 2021-06-06 NOTE — TELEPHONE ENCOUNTER
Spoke with Gee, he is noticing a foot drop and weakness that is not improving, will do MRI imaging for further evaluation

## 2021-06-06 NOTE — TELEPHONE ENCOUNTER
Who is calling:  MANDO Flynn  Reason for Call:  Wants to discuss PT history and orthotics  Okay to leave a detailed message: Yes

## 2021-06-06 NOTE — TELEPHONE ENCOUNTER
Refill Approved    Rx renewed per Medication Renewal Policy. Medication was last renewed on 3/8/19.    Cindy Roman, [facility] Triage/Med Refill 3/2/2020     Requested Prescriptions   Pending Prescriptions Disp Refills     metFORMIN (GLUCOPHAGE) 1000 MG tablet [Pharmacy Med Name: METFORMIN 1000MG TABLETS] 180 tablet 3     Sig: TAKE 1 TABLET(1000 MG) BY MOUTH TWICE DAILY WITH MEALS       Metformin Refill Protocol Passed - 3/2/2020  5:28 AM        Passed - Blood pressure in last 12 months     BP Readings from Last 1 Encounters:   01/23/20 110/60             Passed - LFT or AST or ALT in last 12 months     Albumin   Date Value Ref Range Status   07/04/2019 3.5 3.5 - 5.0 g/dL Final     Bilirubin, Total   Date Value Ref Range Status   07/04/2019 0.6 0.0 - 1.0 mg/dL Final     Bilirubin, Direct   Date Value Ref Range Status   07/04/2019 0.2 <=0.5 mg/dL Final     Alkaline Phosphatase   Date Value Ref Range Status   07/04/2019 82 45 - 120 U/L Final     AST   Date Value Ref Range Status   07/04/2019 17 0 - 40 U/L Final     ALT   Date Value Ref Range Status   07/04/2019 17 0 - 45 U/L Final     Protein, Total   Date Value Ref Range Status   07/04/2019 7.8 6.0 - 8.0 g/dL Final                Passed - GFR or Serum Creatinine in last 6 months     GFR MDRD Non Af Amer   Date Value Ref Range Status   10/17/2019 >60 >60 mL/min/1.73m2 Final     GFR MDRD Af Amer   Date Value Ref Range Status   10/17/2019 >60 >60 mL/min/1.73m2 Final             Passed - Visit with PCP or prescribing provider visit in last 6 months or next 3 months     Last office visit with prescriber/PCP: 1/23/2020 OR same dept: 1/23/2020 Denia Barron MD OR same specialty: 1/23/2020 Denia Barron MD Last physical: Visit date not found Last MTM visit: Visit date not found         Next appt within 3 mo: Visit date not found  Next physical within 3 mo: Visit date not found  Prescriber OR PCP: Denia Barron MD  Last diagnosis associated with med  order: 1. Diabetes (H)  - metFORMIN (GLUCOPHAGE) 1000 MG tablet [Pharmacy Med Name: METFORMIN 1000MG TABLETS]; TAKE 1 TABLET(1000 MG) BY MOUTH TWICE DAILY WITH MEALS  Dispense: 180 tablet; Refill: 3     If protocol passes may refill for 12 months if within 3 months of last provider visit (or a total of 15 months).           Passed - A1C in last 6 months     Hemoglobin A1c   Date Value Ref Range Status   01/23/2020 7.5 (H) 3.5 - 6.0 % Final               Passed - Microalbumin in last year      Microalbumin, Random Urine   Date Value Ref Range Status   07/11/2019 0.51 0.00 - 1.99 mg/dL Final

## 2021-06-06 NOTE — TELEPHONE ENCOUNTER
Called patient via  services.       Reason contacted:  Results   Information relayed:  Below message. Patient has an appointment already scheduled on 4/27/2020   Additional questions:  No  Further follow-up needed:  No  Okay to leave a detailed message:  No

## 2021-06-06 NOTE — TELEPHONE ENCOUNTER
----- Message from Denia Barron MD sent at 2/27/2020  7:37 PM CST -----  Please notify pt that her bone density scan shows osteoporosis. Please ask pt to schedule an appointment to discuss treatment options

## 2021-06-06 NOTE — PROGRESS NOTES
"Assessment and Plan  1. Cellulitis of finger of right hand  Aspiration was done as below.  A small amount of purulent material was expressed.  She is diabetic and I think at risk for a worsening infection.  Therefore I did start antibiotics after the drainage.  She does not have a known history of MRSA and no risk factors, so I chose to treat with Keflex.   I think there may be underlying osteoarthritis and a [place] node; discussed this diagnosis as well.  Advised to return if increased redness, swelling or fever occur.     - cephalexin (KEFLEX) 500 MG capsule; Take 1 capsule (500 mg total) by mouth 3 (three) times a day for 7 days.  Dispense: 21 capsule; Refill: 0      Chief complaint:  finger pain (bump on right ring finger)    HPI:  Hai Meade is a 69 y.o. female who complains of a bump on her right right finger that is increasing in redness, pain and swelling.  She noted a \"bump\" there for several months. Usually it is not painful or red.  Over the last week it has increased in pain and redness and swelling.  No systemic symptoms, no fever.  Normal ROM and normal sensation.    PMH:   Patient Active Problem List   Diagnosis     Anemia     Joint Pain, Localized In The Knee     Type 2 diabetes mellitus with diabetic autonomic neuropathy, without long-term current use of insulin (H)     Cholelithiasis     Cervical Cancer     Urinary incontinence     Hyperlipidemia     Peripheral neuropathy     Small bowel obstruction (H)     Postoperative ileus (H)     Gastroesophageal reflux disease without esophagitis     Vitamin B12 deficiency (non anemic)       Past Medical History:   Diagnosis Date     Anemia      Cervical cancer (H)      Cholelithiasis      Diabetes mellitus, type II (H)      Hyperlipidemia      Small bowel obstruction (H)      Vision problems        Current Medications:   Current Outpatient Medications on File Prior to Visit   Medication Sig Dispense Refill     ACCU-CHEK LOIDA PLUS TEST STRP strips TEST " TWICE A  strip 3     ACCU-CHEK SOFTCLIX LANCETS lancets TEST TWICE DAILY 200 each 1     acetaminophen (TYLENOL) 500 MG tablet Take 1-2 tablets (500-1,000 mg total) by mouth every 4 (four) hours as needed.  0     aspirin 81 MG EC tablet Take 81 mg by mouth daily.       famotidine (PEPCID) 20 MG tablet Take 1 tablet (20 mg total) by mouth 2 (two) times a day.  0     gabapentin (NEURONTIN) 100 MG capsule TAKE 1 CAPSULE BY MOUTH EVERY NIGHT AT BEDTIME 90 capsule 6     glipiZIDE (GLUCOTROL XL) 2.5 MG 24 hr tablet Take 1 tablet (2.5 mg total) by mouth daily. (Patient taking differently: Take 2.5 mg by mouth daily as needed (High blood sugars).       ) 90 tablet 4     hydrocortisone 2.5 % cream APPLY 0.5 GRAMS TOPICALLY TO AREA OF ITCHING ON LOWER LEFS TWICE DAILY AS NEEDED 30 g 1     ibuprofen (ADVIL,MOTRIN) 400 MG tablet Take 1 tablet (400 mg total) by mouth every 6 (six) hours as needed. 15 tablet 0     metFORMIN (GLUCOPHAGE) 1000 MG tablet TAKE 1 TABLET(1000 MG) BY MOUTH TWICE DAILY WITH MEALS 180 tablet 1     senna-docusate (PERICOLACE) 8.6-50 mg tablet Take 1 tablet by mouth daily with lunch.       simethicone (MYLICON) 80 MG chewable tablet Chew 1 tablet (80 mg total) every 6 (six) hours as needed for flatulence.  0     simvastatin (ZOCOR) 40 MG tablet TAKE 1 TABLET(40 MG) BY MOUTH AT BEDTIME 90 tablet 3     triamcinolone (KENALOG) 0.1 % cream Apply twice daily to affected area for up to 2 week 30 g 1     Current Facility-Administered Medications on File Prior to Visit   Medication Dose Route Frequency Provider Last Rate Last Dose     cyanocobalamin injection 1,000 mcg  1,000 mcg Intramuscular Q30 Days Denia Barron MD   1,000 mcg at 11/19/19 0849     cyanocobalamin injection 1,000 mcg  1,000 mcg Intramuscular Q30 Days Denia Barron MD   1,000 mcg at 02/25/20 0814       Allergies:  is allergic to cymbalta [duloxetine].    SH/FH:  Social History and Family History reviewed and updated.   Tobacco  Status:  She  reports that she has never smoked. She has never used smokeless tobacco.    Review of Systems:  A 10 point review of systems was done  No fever  No sore throat, no difficulty swallowing, no voice change  No cough, no shortness of breath  Finger pain and redness    Vitals:    03/17/20 0823   BP: 147/78   Pulse: 73   Temp: 98.4  F (36.9  C)   SpO2: 98%   Weight: 120 lb 7 oz (54.6 kg)     Wt Readings from Last 3 Encounters:   03/17/20 120 lb 7 oz (54.6 kg)   01/23/20 119 lb 11.2 oz (54.3 kg)   10/17/19 115 lb 4.8 oz (52.3 kg)       Physical Exam:  GENERAL: Alert, cooperative, well-appearing  Right ring finger: over the DIP joint on the dorsal side there is a swelling with erythema and pain.  On palpation there is a small amount of fluctuance.     Discussed with the patient and her daughter the risks and benefits of aspiration and drainage. Verbal consent was obtained.  Cleaned the area with alcohol.  Injected about 0.5 cc of lidocaine without epinephrine.  Used an 18 g needle over the area of maximum fluctuance.  A small amount of thick white exudate was expressed (about 0.5 ml).  Pressure was applied and a sterile band-aid applied.

## 2021-06-06 NOTE — TELEPHONE ENCOUNTER
----- Message from Denia Barron MD sent at 3/10/2020 12:38 PM CDT -----  Can you please notify pt she has a disc herniation in her back which may be causing some of her pain and foot symptoms.  I would like her to see the doctors at the spine care center for evaluation.  I have placed a referral

## 2021-06-06 NOTE — PATIENT INSTRUCTIONS - HE
1. Today, I drained fluid that looked infected from the finger.  You should start the antibiotic today.  If your redness or swelling gets worse or if you have fever you should return to the clinic  2. I think the bump may also be due to arthritis.  That is probably why you have noticed it for months.  It is very common to have this bump from arthritis.  I think it became infected over the last couple days.  After the antibiotics you may still have a bump there, but it should be less red and painful.   3. In the future, after the antibiotics, you can try Acetaminophen or Ibuprofen for pain, try icing the joint, try giving the hand rest (less gripping).  You can also try compression with wrapping the finger.

## 2021-06-07 ENCOUNTER — AMBULATORY - HEALTHEAST (OUTPATIENT)
Dept: NURSING | Facility: CLINIC | Age: 71
End: 2021-06-07

## 2021-06-07 NOTE — PROGRESS NOTES
"Hai Meade is a 69 y.o. female who is being evaluated via a billable telephone visit.      The patient has been notified of following:     \"This telephone visit will be conducted via a call between you and your physician/provider. We have found that certain health care needs can be provided without the need for a physical exam.  This service lets us provide the care you need with a short phone conversation.  If a prescription is necessary we can send it directly to your pharmacy.  If lab work is needed we can place an order for that and you can then stop by our lab to have the test done at a later time.    Telephone visits are billed at different rates depending on your insurance coverage. During this emergency period, for some insurers they may be billed the same as an in-person visit.  Please reach out to your insurance provider with any questions.    If during the course of the call the physician/provider feels a telephone visit is not appropriate, you will not be charged for this service.\"    Patient has given verbal consent to a Telephone visit? Yes    Patient would like to receive their AVS by AVS Preference: Thea.    Additional provider notes:  69-year-old female with known history of type 2 diabetes.  She states her blood sugars have been really well controlled and she has been taking her metformin regularly.  She states that with the decreased exercise with the shelter in place order, that may be been slightly up but she still is only having to take her glipizide every 2 to 3 days.  She elected to take the glipizide just as needed and has been doing this for the last 6 months.  She has not had any chest pain or breathing problems.  She is taking her aspirin daily.  She feels her blood pressures have been well controlled.    She continues to have pain in both her joints and down into her legs.  We reviewed today her MRI results.  She states she did not hear those results previously.  I do recommend that " she be seen by the spine care center and referral is placed.  She continues on gabapentin 100 mg at night.    Finally we review results of her bone density scan which show osteoporosis.  Assessment/Plan:  1. Type 2 diabetes mellitus with diabetic autonomic neuropathy, without long-term current use of insulin (H)  Continue with daily metformin and glipizide as needed.  Discussed that she can try to get some exercise while in the house to help decrease blood sugars.  She will continue on her diabetic diet and will recheck in 3 months.  - glipiZIDE (GLUCOTROL XL) 2.5 MG 24 hr tablet; Take 1 tablet (2.5 mg total) by mouth daily as needed (High blood sugars).  Dispense: 90 tablet; Refill: 4    2. Herniation of intervertebral disc between L4 and L5  There is a noted herniated disc causing impingement on L5 bilaterally.  This is definitely contributing to her leg pain and also minor foot drop.  I am referring her to the spine care center to discuss options.  - Ambulatory referral to [facility]    3. 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  We discussed taking Tylenol as needed for pain but otherwise would avoid glucosamine as it may elevate her blood sugars.    4. Age-related osteoporosis without current pathological fracture  Discussed that her bone density scan does show osteoporosis.  She is not yet taking calcium and vitamin D so I send these into the pharmacy.  Ultimately she does need to get started on Fosamax but I would like to see her in a face-to-face visit to discuss the risks for this due to her history of medication complications.  I schedule her to be seen in 2 months for a recheck of her diabetes and then discussions of treatment for osteoporosis.  - calcium carbonate-vitamin D3 500 mg(1,250mg) -400 unit per tablet; Chew 1 tablet 2 (two) times a day.  Dispense: 180 tablet; Refill: 4  - cholecalciferol, vitamin D3, 25 mcg (1,000 unit) capsule; Take 1 capsule (1,000 Units  total) by mouth daily.  Dispense: 90 capsule; Refill: 4        Phone call duration:  36 minutes    Keerthi Meade, CMA

## 2021-06-07 NOTE — TELEPHONE ENCOUNTER
Refill Approved    Rx renewed per Medication Renewal Policy. Medication was last renewed on 10/17/19.    Cindy Roman, [facility] Triage/Med Refill 4/14/2020     Requested Prescriptions   Pending Prescriptions Disp Refills     gabapentin (NEURONTIN) 100 MG capsule [Pharmacy Med Name: GABAPENTIN 100MG CAPSULES] 90 capsule 6     Sig: TAKE ONE CAPSULE BY MOUTH EVERY NIGHT AT BEDTIME       Gabapentin/Levetiracetam/Tiagabine Refill Protocol  Passed - 4/13/2020  2:45 PM        Passed - PCP or prescribing provider visit in past 12 months or next 3 months     Last office visit with prescriber/PCP: 6/13/2019 Roslyn Olivas MD OR same dept: 1/23/2020 Denia Barron MD OR same specialty: 1/23/2020 Denia Barron MD  Last physical: Visit date not found Last MTM visit: Visit date not found   Next visit within 3 mo: Visit date not found  Next physical within 3 mo: Visit date not found  Prescriber OR PCP: Roslyn Olivas MD  Last diagnosis associated with med order: 1. Peripheral polyneuropathy  - gabapentin (NEURONTIN) 100 MG capsule [Pharmacy Med Name: GABAPENTIN 100MG CAPSULES]; TAKE ONE CAPSULE BY MOUTH EVERY NIGHT AT BEDTIME  Dispense: 90 capsule; Refill: 6    If protocol passes may refill for 12 months if within 3 months of last provider visit (or a total of 15 months).

## 2021-06-09 NOTE — TELEPHONE ENCOUNTER
Patient has a lab only appointment next week to have her sodium level rechecked.  Please place orders.  Thank you!

## 2021-06-09 NOTE — PATIENT INSTRUCTIONS - HE
Thank you for choosing the [facility] for your EMG testing.    The ordering provider will receive your final EMG results within the next few days.  Please follow up with your provider for the results and further treatment recommendations.

## 2021-06-09 NOTE — PROGRESS NOTES
Assessment/Plan:        Diagnoses and all orders for this visit:    Type 2 diabetes mellitus with diabetic autonomic neuropathy, without long-term current use of insulin  -     Glycosylated Hemoglobin A1c  -     Basic Metabolic Panel  Hemoglobin A1c is 7.7.  Very similar to her last level.  Encouraged her to continue to increase activities and monitor blood sugars.  She does well on the metformin that any additional medications we have tried she does not tolerate.  For that reason and not quite at medications: On controlling blood sugars with dietary and activity alterations as long as we can.  She is taking an aspirin daily.  She has a peripheral neuropathy either related to her diabetes or possibly related to her chemotherapy from cancer.  She otherwise has no coexisting coronary artery disease.  She is recommended to get an eye exam.    Peripheral polyneuropathy  Peripheral neuropathy due to either her diabetes or history of chemotherapy exposure.  She does not tolerate increasing gabapentin dose to 300 mg at night.  Instead we will increase to twice daily to help control her symptoms.    Trigger finger of left thumb  -     Splint, Wrist/Hand/Finger w/Thumb, Premier  If symptoms not resolve, consider referral to orthopedics for further evaluation.    History of cervical cancer  -     Gynecologic Cytology (PAP Smear)  Continue with every 1-2 year Pap smear screenings.    Other orders  -     gabapentin (NEURONTIN) 100 MG capsule; Take 1 tablet twice daily  Dispense: 60 capsule; Refill: 6          Subjective:    Patient ID: Hai Meade is a 66 y.o. female.    HPI Comments: 58-year-old female presents today for routine follow-up of her type 2 diabetes.  She states her blood sugars seemed to be normal and she brings in with her today.  They're all less than 120.  She's not had any symptoms of low blood sugars.  She is eating regularly.  She tried to increase her activity levels.  She does have some lower extremity  pain.  She is feeling fullness.  It seems to bother her at night mostly can be painful during the day.  She is on gabapentin.  She was 100 mg and after last visit we tried to increase to 300 mg to better control her pain however she felt very sleepy went back to her 100 mg.    She is also complaining of some pain in her left thumb.  She states in the morning to feels very stiff and she has to extend and move around in order to get it feeling normal.  She had the same finger problem on her right hand and used a thumb spica brace which resolved the issue.  Her rectum is normal.  Finally she has a history of cervical cancer status post total hysterectomy but is due for Pap smear screening.    Diabetes   Pertinent negatives for diabetes include no chest pain and no fatigue.       The following portions of the patient's history were reviewed and updated as appropriate:   Current Outpatient Prescriptions   Medication Sig Dispense Refill     ASPIRIN ORAL Take by mouth.       blood glucose test strips Use 1 each As Directed 2 (two) times a day. Dispense brand per patient's insurance at pharmacy discretion. (dx E11.8) 200 strip 1     generic lancets Use 1 each As Directed 2 (two) times a day. Dispense brand per patient's insurance at pharmacy discretion. (dx E11.8) 200 each 1     metFORMIN (GLUCOPHAGE) 1000 MG tablet TAKE 1 TABLET BY MOUTH TWICE DAILY WITH MEALS 180 tablet 0     simvastatin (ZOCOR) 40 MG tablet TAKE 1 TABLET BY MOUTH EVERY NIGHT AT BEDTIME 90 tablet 0     gabapentin (NEURONTIN) 100 MG capsule Take 1 tablet twice daily 60 capsule 6     simvastatin (ZOCOR) 40 MG tablet Take 1 tablet (40 mg total) by mouth bedtime. 90 tablet 3     No current facility-administered medications for this visit.      She has No Known Allergies..    Review of Systems   Constitutional: Negative for fatigue and fever.   Respiratory: Negative for chest tightness and shortness of breath.    Cardiovascular: Negative for chest pain.    Musculoskeletal: Positive for arthralgias. Negative for joint swelling.             Objective:    Physical Exam   Constitutional: She appears well-developed and well-nourished.   Cardiovascular: Normal rate and regular rhythm.    Pulmonary/Chest: Effort normal and breath sounds normal.   Genitourinary: Vagina normal.   Musculoskeletal:   Stiffness and crepitus with left thumb , no deformity   Vitals reviewed.

## 2021-06-09 NOTE — PROGRESS NOTES
Patient presents at the request with Earline Doe for a bilateral lower extremity EMG.  She has bilateral lower extremity weakness of ankle dorsiflexion and EHL.  She also has bilateral lower extremity paresthesias from the feet up to the knees for the past 2 to 3 years left is worse than right.  Daughter is with her to interpret today.    EMG/NCS  results: Please see scanned full report.    Comment NCS: Abnormal study  1.  Absent or low amplitude bilateral lower extremity SNAPs.  2.  Significantly low amplitude bilateral lower extremity peroneal CMAPs to the EDB.  3.  Normal bilateral peroneal CMAPs of the tibialis anterior  4.  Borderline amplitude bilateral tibial CMAPs.  5. Normal right radial SNAP and ulnar CMAP    Comment EMG: Abnormal study  1.  Increased insertional activity bilateral medial gastrocnemius muscles.    2.  Additional findings in the right lower extremity include reduced recruitment prolonged motor unit potential duration of the right tibialis anterior remainder of right lower extremity needle EMG normal.  3.  Left lower extremity findings include poor activation of the tibialis anterior mild increased insertional activity of the left peroneus longus and poor activation.   proximal muscles of the left lower extremity were normal.    Interpretation: Abnormal study: There is electrodiagnostic evidence of.    1.  Electrodiagnostic findings are consistent with a length dependent sensorimotor polyneuropathy, predominantly axonal.  This is significantly affecting the distal muscles of the bilateral lower extremities without upper extremity involvement.      2. There is no density electrodiagnostic evidence of concomitant lumbosacral radiculopathy, lumbosacral plexopathy, or focal neuropathy in the bilateral lower extremities.    The testing was completed in its entirety by the physician.      It was our pleasure caring for your patient today, if there any questions or concerns please do not hesitate to  contact us.

## 2021-06-09 NOTE — PROGRESS NOTES
ASSESSMENT: Hai Meade is a 70 y.o. female with past medical history significant for type 2 diabetes mellitus with neuropathy, cholelithiasis, cervical cancer, urinary incontinence, hyperlipidemia, GERD, vitamin B12 deficiency, osteoporosis who presents today for new patient evaluation of a 3-year history of bilateral lower extremity paresthesias and weakness.  MRI lumbar spine shows a disc bulge at L4-5 with abutment of the bilateral L5 nerve roots in the lateral recess.  However, patient does not have any low back pain or lumbar radicular pain.  I suspect that symptoms are related to peripheral polyneuropathy which could be multifactorial and related to type 2 diabetes mellitus, vitamin B12 deficiency (gets monthly injections), and history of chemotherapy (for cervical cancer 2007), but it is possible there is concurrent radiculopathy.  On exam, patient demonstrated a sensory deficit in a stocking distribution left greater than right lower extremity.  She had absent lower extremity reflexes.  She also demonstrated significant weakness in the left greater than right ankle dorsiflexors, EHL, and left ankle plantar flexors.    CARISSA: 40  Who 5:13    PLAN:  A shared decision making model was used.  The patient's values and choices were respected.  The following represents what was discussed and decided upon by the physician assistant and the patient.  Patient's daughter interpreted for the patient and signed a waiver.    1.  DIAGNOSTIC TESTS: I reviewed the MRI lumbar spine.  I ordered an EMG bilateral lower extremities for further evaluation.    2.  PHYSICAL THERAPY: Patient went to physical therapy for about 1 month before it shut down to the COVID-19 pandemic.  She went in [place].  No further physical therapy was ordered.  She is doing her home exercises on a regular basis.  We will await the results of the EMG.  May order additional physical therapy at that time.    3.  MEDICATIONS:   -I provided a prescription  for capsaicin cream.  -Patient continues on gabapentin 100 mg at bedtime.  She did not tolerate a higher dose of this medication.  -Patient also did not tolerate nortriptyline or Cymbalta.    4.  INTERVENTIONS: No interventions were ordered.    5.  PATIENT EDUCATION: patient is in agreement the above plan.  All questions were answered.    6.  REFERRALS: I entered a referral for [facility] so that the patient can be fitted for bilateral custom AFOs.  Patient walks with a severe steppage gait And uses a cane.  I explained that the braces can help her walk more safely and comfortably.    7.  FOLLOW-UP:    A nurse about the patient with the results of the EMG.  If she has questions or concerns in the meantime, she should not hesitate to call.      SUBJECTIVE:  Hai Meade  Is a 70 y.o. female who presents today in consultation at request of Dr. Barron for new patient evaluation of a 3-year history of bilateral lower extremity numbness, tingling, and weakness.  Patient reports that she first began to have symptoms in the left great toe and it has been getting progressively worse.  She denies any injury or event to cause the symptoms.    Patient complains of numbness and tingling in the left greater than right lower extremity.  This affects primarily the feet but spreads up to the lower legs up to the knees.  She states the whole foot and lower leg is affected.  She denies any back pain.  She denies any pain radiating down the legs.  She describes the numbness and tingling discomfort as feeling like bugs crawling and a throbbing sensation.  She states that she feels very weak.  She states that she has lost complete control of the left great toe.  She also states that she cannot bend her ankle on the left as well as on the right.  She has needed to walk with a cane for the past 1 year because she feels off balance and she sometimes catches her toe, causing her to trip.  She has not had any falls.  Patient denies  any loss of bowel or bladder control.  Denies any recent fevers.  Patient positive symptoms tend to be better during the day and get worse in the evening and overnight.  She does do some stretches before she goes to bed which helps.    Patient attended physical therapy at an outside facility in [place] for about 1 month earlier this year, before the COVID-19 pandemic.  She states that she still does her home exercises on a regular basis.  She tried acupuncture for several months which provided temporary relief of her symptoms (about 2 hours).  She does not go to a chiropractor.  She has never had any spine injections or spine surgery.  She is his gabapentin 100 mg at bedtime.  She could not tolerate increasing this dose due to drowsiness.  She also did not tolerate nortriptyline or Cymbalta.    Current Outpatient Medications on File Prior to Visit   Medication Sig Dispense Refill     ACCU-CHEK LOIDA PLUS TEST STRP strips TEST TWICE A  strip 3     ACCU-CHEK SOFTCLIX LANCETS lancets TEST TWICE DAILY 200 each 1     acetaminophen (TYLENOL) 500 MG tablet Take 1-2 tablets (500-1,000 mg total) by mouth every 4 (four) hours as needed.  0     aspirin 81 MG EC tablet Take 81 mg by mouth daily.       blood pressure monitor Kit Please dispense 1 Blood pressure kit of pts choice 1 each 0     calcium carbonate-vitamin D3 500 mg(1,250mg) -400 unit per tablet Chew 1 tablet 2 (two) times a day. 180 tablet 4     cholecalciferol, vitamin D3, 25 mcg (1,000 unit) capsule Take 1 capsule (1,000 Units total) by mouth daily. 90 capsule 4     famotidine (PEPCID) 20 MG tablet Take 1 tablet (20 mg total) by mouth 2 (two) times a day.  0     gabapentin (NEURONTIN) 100 MG capsule TAKE ONE CAPSULE BY MOUTH EVERY NIGHT AT BEDTIME 90 capsule 2     glipiZIDE (GLUCOTROL XL) 2.5 MG 24 hr tablet Take 1 tablet (2.5 mg total) by mouth daily as needed (High blood sugars). 90 tablet 4     hydrocortisone 2.5 % cream APPLY 0.5 GRAMS TOPICALLY TO AREA  OF ITCHING ON LOWER LEFS TWICE DAILY AS NEEDED 30 g 1     ibuprofen (ADVIL,MOTRIN) 400 MG tablet Take 1 tablet (400 mg total) by mouth every 6 (six) hours as needed. 15 tablet 0     metFORMIN (GLUCOPHAGE) 1000 MG tablet TAKE 1 TABLET(1000 MG) BY MOUTH TWICE DAILY WITH MEALS 180 tablet 1     senna-docusate (PERICOLACE) 8.6-50 mg tablet Take 1 tablet by mouth daily with lunch.       simethicone (MYLICON) 80 MG chewable tablet Chew 1 tablet (80 mg total) every 6 (six) hours as needed for flatulence.  0     simvastatin (ZOCOR) 40 MG tablet TAKE 1 TABLET(40 MG) BY MOUTH AT BEDTIME 90 tablet 3     triamcinolone (KENALOG) 0.1 % cream Apply twice daily to affected area for up to 2 week 30 g 1     Current Facility-Administered Medications on File Prior to Visit   Medication Dose Route Frequency Provider Last Rate Last Dose     cyanocobalamin injection 1,000 mcg  1,000 mcg Intramuscular Q30 Days Denia Barron MD   1,000 mcg at 07/16/20 1048     cyanocobalamin injection 1,000 mcg  1,000 mcg Intramuscular Q30 Days Denia Barron MD   1,000 mcg at 02/25/20 0814       Allergies   Allergen Reactions     Cymbalta [Duloxetine]      Nausea and vomiting         Past Medical History:   Diagnosis Date     Anemia      Cervical cancer (H)      Cholelithiasis      Diabetes mellitus, type II (H)      Hyperlipidemia      Small bowel obstruction (H)      Vision problems       Patient Active Problem List   Diagnosis     Anemia     Joint Pain, Localized In The Knee     Type 2 diabetes mellitus with diabetic autonomic neuropathy, without long-term current use of insulin (H)     Cholelithiasis     Cervical Cancer     Urinary incontinence     Hyperlipidemia     Peripheral neuropathy     Small bowel obstruction (H)     Postoperative ileus (H)     Gastroesophageal reflux disease without esophagitis     Vitamin B12 deficiency (non anemic)     Age-related osteoporosis without current pathological fracture         Past Surgical History:    Procedure Laterality Date     COLECTOMY N/A 6/23/2019    Procedure: WITH OPEN SMALL BOWEL RESECTION;  Surgeon: Ramon Wray MD;  Location: [facility];  Service: General     FLEXIBLE SIGMOIDOSCOPY  2009     HYSTERECTOMY  2007    cervix ca     OOPHORECTOMY  2006    cervix ca     NC BSO, OMENTECTOMY W/SILVIA      Description: Salp-oophorect Bilat W/Omentect, Total Abd Hysterect, Rad Dissect For Malig;  Recorded: 03/28/2007;     NC LAP,DIAGNOSTIC ABDOMEN N/A 6/23/2019    Procedure: LAPAROSCOPY lysis of adhesions,;  Surgeon: Ramon Wray MD;  Location: [facility];  Service: General       Family history: None.  Patient reports family is healthy.    Social history: Patient is .  She does not work.  She denies tobacco, alcohol, or illicit drug use.    ROS:    Specifically negative for bowel/bladder dysfunction, fevers,chills, appetite changes, unexplained weight loss.   Otherwise 13 systems reviewed are negative.  Please see the patient's intake questionnaire from today for details.      OBJECTIVE:  PHYSICAL EXAMINATION:    CONSTITUTIONAL:  Vital signs as above.  No acute distress.  The patient is well nourished and well groomed.  PSYCHIATRIC:  The patient is awake, alert, oriented to person, place, time and answering questions appropriately with clear speech.    HEENT: Normocephalic, atraumatic.  Sclera clear.  Neck is supple.  SKIN:  Skin over the face, bilateral lower extremities, and posterior torso is clean, dry, intact without rashes.    GAIT: Patient ambulates with a steppage gait.  She is able to rise onto the toes on the right foot and onto the heels on the right foot with support, she cannot raise onto the toes and heels in the left foot at all.  STANDING EXAMINATION: No tenderness palpation bilateral lower lumbar spine.  MUSCLE STRENGTH: Strength is 4-/5 right and 3/5 left ankle dorsiflexors, 4-/5 right and 0/5 left EHL, 5/5 right and 4-/5 left ankle plantar flexors.  The patient has  5/5 strength for the bilateral hip flexors, knee flexors/extensors.  NEUROLOGICAL: Absent patellar, and achilles reflexes bilaterally.  Negative Babinski's bilaterally.  No ankle clonus bilaterally.  Diminished sensation left or the right lower extremity stocking distribution.  VASCULAR:  2/4 dorsalis pedis pulses bilaterally.  Bilateral lower extremities are warm.  There is no pitting edema of the bilateral lower extremities.  ABDOMINAL:  Soft, non-distended, non-tender throughout all quadrants.  No pulsatile mass palpated in the left lower quadrant.  LYMPH NODES:  No palpable or tender inguinal lymph nodes.  MUSCULOSKELETAL: Negative straight leg raise.    RESULTS: I reviewed the MRI lumbar spine from one visit Ritika 10, 2020.  At L4-5 there is a central annular tear and disc protrusion which abuts the bilateral L5 nerve roots in the lateral recesses.  There is overall mild spinal canal stenosis and slight foraminal stenosis at this level.

## 2021-06-10 ENCOUNTER — APPOINTMENT (OUTPATIENT)
Dept: MAMMOGRAPHY | Age: 71
End: 2021-06-10
Attending: FAMILY MEDICINE

## 2021-06-10 NOTE — TELEPHONE ENCOUNTER
----- Message from Kathi Webber CMA sent at 7/29/2020 12:07 PM CDT -----    ----- Message -----  From: Denia Barron MD  Sent: 7/29/2020  12:01 PM CDT  To: Denia Barron Care Team Pool    Notify pt's daughter that the sodium is back up to near normal.  We will recheck at her next OV     158.7

## 2021-06-10 NOTE — PROGRESS NOTES
Assessment:   Hai Meade is a 70 y.o. y.o. female with past medical history significant for type 2 diabetes mellitus, cholelithiasis, cervical cancer, urinary incontinence, hyperlipidemia, GERD, vitamin B12 deficiency, osteoporosis who presents today for follow-up regarding a 3-year history of bilateral lower extremity paresthesias and weakness secondary to sensorimotor polyneuropathy.  She has bilateral foot drop.  Neuropathy is likely due to diabetes mellitus.  Patient also has vitamin B12 deficiency and a history of chemotherapy which could be contributing.  - MRI lumbar spine shows a disc bulge at L4-5 with abutment of the bilateral L5 nerve roots in the lateral recess.   However, the EMG did not show any evidence of concomitant lumbosacral radiculopathy. She is not symptomatic from this. She does not have any low back pain or lumbar radicular pain.       Plan:     A shared decision making plan was used.  The patient's values and choices were respected.  The following represents what was discussed and decided upon by the physician assistant and the patient.  A telephone  was used for the visit.    1.  DIAGNOSTIC TESTS: I reviewed the EMG results with the patient and her daughter.  I also reviewed the MRI lumbar spine.  No further diagnostic tests were ordered.    2.  PHYSICAL THERAPY: I entered a referral to physical therapy.  She had been going to physical therapy for about a month earlier this year.  I think she would benefit from ongoing physical therapy.  Patient prefers to go to an outside facility in [place].  She does not recall the name.  I printed out the referral so that she can hand carry it to the facility of her choice.    3.  MEDICATIONS:   -Capsaicin was not helpful.  She should stop applying it.  -Patient can continues on gabapentin 100 mg at bedtime.  She did not tolerate a higher dose of this medication.  -Patient also did not tolerate nortriptyline or Cymbalta.    4.   INTERVENTIONS: No interventions were ordered.    5.  PATIENT EDUCATION:    -I had referred the patient to orthotics for bilateral AFOs for her bilateral foot drop.  Patient has not yet gotten the braces.  She states that she did not get a phone call to schedule her appointment.  I provided the patient and her daughter with the contact information for [facility].  -I stressed the importance of blood sugar control to mitigate the neuropathy symptoms.    6.  REFERRALS: Entered a referral to neurology to see if they have any other recommendations for the management of the neuropathy.    7.  FOLLOW-UP: Patient will follow-up with me as needed.  If she has questions or concerns, she should not hesitate to call.    Subjective:     Hai Meade is a 70 y.o. female who presents today for follow-up regarding chronic bilateral lower extremity numbness and weakness.  I saw the patient in consultation July 21, 2020.  At that time I ordered an EMG.  EMG showed sensorimotor polyneuropathy significantly affecting the distal bilateral lower extremities.  There is no concomitant lumbosacral radiculopathy, plexopathy, or focal neuropathy.  Patient reports that since her EMG she has felt more tingling in her legs.    Patient complains of numbness and tingling in the left greater than right lower extremity.  This affects primarily the feet but spreads up the lower legs to the knees.  The whole foot and lower leg is affected.  She denies any back pain or pain radiating down the legs.  She has weakness in the left greater than right foot/ankle.  She rates her pain today as a 7 out of 10.  At its worst it is an 8-10.  At its best it is a 5-10.  Symptoms have been worse since the EMG.  She denies any alleviating factors.  She is doing home exercises.  She is using gabapentin 100 mg at bedtime.  She tried using the capsaicin cream but it made    Patient went to physical therapy for about a month earlier this year before the COVID-19  shutdown.  Her legs feel too hot so she stopped using it.    Past medical history is reviewed and is unchanged.    Review of Systems:  Positive for numbness/tingling, weakness, wetting pants, pain much worse at night.  Negative for loss of bowel/bladder control, inability to urinate, headache, trip/stumble/falls, difficulty swallowing, difficulty with hand skills, fevers, unintentional weight loss.     Objective:   CONSTITUTIONAL:  Vital signs as above.  No acute distress.  The patient is well nourished and well groomed.    PSYCHIATRIC:  The patient is awake, alert, oriented to person, place and time.  The patient is answering questions appropriately with clear speech.  Normal affect.  HEENT: Normocephalic, atraumatic.  Sclera clear.    SKIN:  Skin over the face, posterior torso, bilateral upper and lower extremities is clean, dry, intact without rashes.  VASCULAR: No significant lower extremity edema.  MUSCULOSKELETAL: Patient ambulates with a steppage gait.  Strength is 4-/5 right and 3/5 left ankle dorsiflexors, 4-/5 right and 0/5 left EHL, 5/5 right and 4-/5 left ankle plantar flexors.  The patient has 5/5 strength for the bilateral hip flexors, knee flexors/extensors.  NEUROLOGICAL:  Absent patellar, and achilles reflexes bilaterally.   Diminished sensation left or the right lower extremity stocking distribution.     RESULTS:     I reviewed the MRI lumbar spine from one visit Ritika 10, 2020.  At L4-5 there is a central annular tear and disc protrusion which abuts the bilateral L5 nerve roots in the lateral recesses.  There is overall mild spinal canal stenosis and slight foraminal stenosis at this level.    EMG bilateral lower extremity July 25, 2020:  Comment NCS: Abnormal study  1.  Absent or low amplitude bilateral lower extremity SNAPs.  2.  Significantly low amplitude bilateral lower extremity peroneal CMAPs to the EDB.  3.  Normal bilateral peroneal CMAPs of the tibialis anterior  4.  Borderline amplitude  bilateral tibial CMAPs.  5. Normal right radial SNAP and ulnar CMAP     Comment EMG: Abnormal study  1.  Increased insertional activity bilateral medial gastrocnemius muscles.    2.  Additional findings in the right lower extremity include reduced recruitment prolonged motor unit potential duration of the right tibialis anterior remainder of right lower extremity needle EMG normal.  3.  Left lower extremity findings include poor activation of the tibialis anterior mild increased insertional activity of the left peroneus longus and poor activation.   proximal muscles of the left lower extremity were normal.     Interpretation: Abnormal study: There is electrodiagnostic evidence of.     1.  Electrodiagnostic findings are consistent with a length dependent sensorimotor polyneuropathy, predominantly axonal.  This is significantly affecting the distal muscles of the bilateral lower extremities without upper extremity involvement.       2. There is no density electrodiagnostic evidence of concomitant lumbosacral radiculopathy, lumbosacral plexopathy, or focal neuropathy in the bilateral lower extremities.

## 2021-06-11 NOTE — TELEPHONE ENCOUNTER
RN cannot approve Refill Request    RN can NOT refill this medication PCP messaged that patient is overdue for Labs. Last office visit: 7/16/2020 Denia Barron MD Last Physical: Visit date not found Last MTM visit: Visit date not found Last visit same specialty: 7/16/2020 Denia Barron MD.  Next visit within 3 mo: Visit date not found  Next physical within 3 mo: Visit date not found      Mary Godoy, [facility] Triage/Med Refill 9/6/2020    Requested Prescriptions   Pending Prescriptions Disp Refills     simvastatin (ZOCOR) 40 MG tablet [Pharmacy Med Name: SIMVASTATIN 40MG TABLETS] 90 tablet 3     Sig: TAKE 1 TABLET(40 MG) BY MOUTH AT BEDTIME       Statins Refill Protocol (Hmg CoA Reductase Inhibitors) Passed - 9/5/2020  5:41 AM        Passed - PCP or prescribing provider visit in past 12 months      Last office visit with prescriber/PCP: 7/16/2020 Denia Barron MD OR same dept: 7/16/2020 Denia Barron MD OR same specialty: 7/16/2020 Denia Barron MD  Last physical: Visit date not found Last MTM visit: Visit date not found   Next visit within 3 mo: Visit date not found  Next physical within 3 mo: Visit date not found  Prescriber OR PCP: Denia Barron MD  Last diagnosis associated with med order: 1. Hyperlipidemia  - simvastatin (ZOCOR) 40 MG tablet [Pharmacy Med Name: SIMVASTATIN 40MG TABLETS]; TAKE 1 TABLET(40 MG) BY MOUTH AT BEDTIME  Dispense: 90 tablet; Refill: 3    2. Diabetes (H)  - metFORMIN (GLUCOPHAGE) 1000 MG tablet [Pharmacy Med Name: METFORMIN 1000MG TABLETS]; TAKE 1 TABLET(1000 MG) BY MOUTH TWICE DAILY WITH MEALS  Dispense: 180 tablet; Refill: 1    If protocol passes may refill for 12 months if within 3 months of last provider visit (or a total of 15 months).                metFORMIN (GLUCOPHAGE) 1000 MG tablet [Pharmacy Med Name: METFORMIN 1000MG TABLETS] 180 tablet 1     Sig: TAKE 1 TABLET(1000 MG) BY MOUTH TWICE DAILY WITH MEALS       Metformin Refill  Protocol Failed - 9/5/2020  5:41 AM        Failed - LFT or AST or ALT in last 12 months     Albumin   Date Value Ref Range Status   07/04/2019 3.5 3.5 - 5.0 g/dL Final     Bilirubin, Total   Date Value Ref Range Status   07/04/2019 0.6 0.0 - 1.0 mg/dL Final     Bilirubin, Direct   Date Value Ref Range Status   07/04/2019 0.2 <=0.5 mg/dL Final     Alkaline Phosphatase   Date Value Ref Range Status   07/04/2019 82 45 - 120 U/L Final     AST   Date Value Ref Range Status   07/04/2019 17 0 - 40 U/L Final     ALT   Date Value Ref Range Status   07/04/2019 17 0 - 45 U/L Final     Protein, Total   Date Value Ref Range Status   07/04/2019 7.8 6.0 - 8.0 g/dL Final                Failed - Microalbumin in last year      Microalbumin, Random Urine   Date Value Ref Range Status   07/11/2019 0.51 0.00 - 1.99 mg/dL Final                  Passed - Blood pressure in last 12 months     BP Readings from Last 1 Encounters:   07/29/20 124/62             Passed - GFR or Serum Creatinine in last 6 months     GFR MDRD Non Af Amer   Date Value Ref Range Status   07/28/2020 >60 >60 mL/min/1.73m2 Final     GFR MDRD Af Amer   Date Value Ref Range Status   07/28/2020 >60 >60 mL/min/1.73m2 Final             Passed - Visit with PCP or prescribing provider visit in last 6 months or next 3 months     Last office visit with prescriber/PCP: 7/16/2020 OR same dept: 7/16/2020 Denia Barron MD OR same specialty: 7/16/2020 Denia Barron MD Last physical: Visit date not found Last MTM visit: Visit date not found         Next appt within 3 mo: Visit date not found  Next physical within 3 mo: Visit date not found  Prescriber OR PCP: Denia Barron MD  Last diagnosis associated with med order: 1. Hyperlipidemia  - simvastatin (ZOCOR) 40 MG tablet [Pharmacy Med Name: SIMVASTATIN 40MG TABLETS]; TAKE 1 TABLET(40 MG) BY MOUTH AT BEDTIME  Dispense: 90 tablet; Refill: 3    2. Diabetes (H)  - metFORMIN (GLUCOPHAGE) 1000 MG tablet [Pharmacy Med  Name: METFORMIN 1000MG TABLETS]; TAKE 1 TABLET(1000 MG) BY MOUTH TWICE DAILY WITH MEALS  Dispense: 180 tablet; Refill: 1     If protocol passes may refill for 12 months if within 3 months of last provider visit (or a total of 15 months).           Passed - A1C in last 6 months     Hemoglobin A1c   Date Value Ref Range Status   07/16/2020 7.7 (H) 3.5 - 6.0 % Final

## 2021-06-12 NOTE — PROGRESS NOTES
ASSESSMENT/PLAN:  1. Type 2 diabetes mellitus with diabetic autonomic neuropathy, without long-term current use of insulin (H)  70-year-old female with stable type 2 diabetes with hemoglobin A1c of 7.7.  She prefers to stand his metformin and her blood sugars are reasonable.  We will continue with this dosing.  Recommend recheck in 6 months.  Recommended continued yearly eye exam, baby aspirin daily.  She does have diabetic peripheral neuropathy and is taking gabapentin 100 mg at night which is helpful.  Continue with regular foot checks.  - Comprehensive Metabolic Panel  - Glycosylated Hemoglobin A1c  - HM2(CBC w/o Differential)    2. Mixed hyperlipidemia  Due for recheck of her LDL today.  She is taking simvastatin 40 mg daily.  - Lipid Spink FASTING  - Comprehensive Metabolic Panel    3. Vitamin B12 deficiency (non anemic)  Receives monthly vitamin B12 injections.  She has not received in the last 2 months due to scheduling difficulties.  She is given her B12 injection today.  B12 levels pending.  - Vitamin B12      Dragon dictation was used for this note.  Speech recognition errors are a possibility.    No follow-ups on file.  Patient Instructions   [facility] [phone] for massage therapy        Orders Placed This Encounter   Procedures     Lipid Spink FASTING     Order Specific Question:   Fasting is required?     Answer:   Yes     Comprehensive Metabolic Panel     Glycosylated Hemoglobin A1c     HM2(CBC w/o Differential)     Vitamin B12     There are no discontinued medications.  Administrations This Visit     cyanocobalamin injection 1,000 mcg     Admin Date  11/03/2020 Action  Given Dose  1,000 mcg Route  Intramuscular Administered By  Keerthi Meade CMA                CHIEF COMPLAINT;  Chief Complaint   Patient presents with     Follow-up       HISTORY OF PRESENT ILLNESS:  Hai is a 70 y.o. female presenting to the clinic today for recheck of her diabetes.  She is here with her daughter who  acts as an  which is their preference.  She has been doing well.  She remains active walking.  Her blood sugars have been good.  She continues on her medications and is tolerating the Metformin twice daily.  We discussed her evaluation with the spine care center.  She did have a herniated disc with a suggestion of affecting the L5 nerve root.  However on EMG, no abnormalities were found and they feel her neuropathy is due to her diabetes.  She does have a heel lift in her shoe related to limb length discrepancy but she does not like walking with it and does not feel that it is helpful.    No chest pain or breathing difficulties.  Her leg pain is helped by the gabapentin.  She did not tolerate duloxetine or nortriptyline.    Remainder of 12-point ROS is negative.    TOBACCO USE:  Social History     Tobacco Use   Smoking Status Never Smoker   Smokeless Tobacco Never Used       VITALS:  Vitals:    11/03/20 1050   BP: 126/60   Patient Site: Left Arm   Patient Position: Sitting   Cuff Size: Adult Regular   Pulse: 77   SpO2: 97%   Weight: 122 lb (55.3 kg)     Wt Readings from Last 3 Encounters:   11/03/20 122 lb (55.3 kg)   07/21/20 120 lb (54.4 kg)   07/16/20 120 lb 8 oz (54.7 kg)     Body mass index is 23.83 kg/m .    PHYSICAL EXAM:  GENERAL APPEARANCE: Alert, cooperative, no distress, appears stated age  NECK: Supple, symmetrical, trachea midline, no adenopathy;    thyroid:  No enlargement/tenderness/nodules; no carotid    bruit or JVD  BACK: Symmetric, no curvature, ROM normal, no CVA tenderness  LUNGS: Clear to auscultation bilaterally, respirations unlabored  CHEST WALL: No tenderness or deformity  HEART: Regular rate and rhythm, S1 and S2 normal, no murmur, rub or gallop      RECENT RESULTS  Recent Results (from the past 48 hour(s))   Glycosylated Hemoglobin A1c    Collection Time: 11/03/20 10:59 AM   Result Value Ref Range    Hemoglobin A1c 7.7 (H) <=5.6 %   HM2(CBC w/o Differential)    Collection Time:  11/03/20 10:59 AM   Result Value Ref Range    WBC 6.5 4.0 - 11.0 thou/uL    RBC 3.84 3.80 - 5.40 mill/uL    Hemoglobin 11.6 (L) 12.0 - 16.0 g/dL    Hematocrit 33.9 (L) 35.0 - 47.0 %    MCV 88 80 - 100 fL    MCH 30.2 27.0 - 34.0 pg    MCHC 34.2 32.0 - 36.0 g/dL    RDW 12.7 11.0 - 14.5 %    Platelets 258 140 - 440 thou/uL    MPV 7.2 7.0 - 10.0 fL       MEDICATIONS:  Current Outpatient Medications   Medication Sig Dispense Refill     ACCU-CHEK LOIDA PLUS TEST STRP strips TEST TWICE A  strip 3     ACCU-CHEK SOFTCLIX LANCETS lancets TEST TWICE DAILY 200 each 1     acetaminophen (TYLENOL) 500 MG tablet Take 1-2 tablets (500-1,000 mg total) by mouth every 4 (four) hours as needed.  0     aspirin 81 MG EC tablet Take 81 mg by mouth daily.       blood pressure monitor Kit Please dispense 1 Blood pressure kit of pts choice 1 each 0     calcium carbonate-vitamin D3 500 mg(1,250mg) -400 unit per tablet Chew 1 tablet 2 (two) times a day. 180 tablet 4     capsaicin (ZOSTRIX) 0.025 % cream Apply to affected area three times daily as needed 60 g 0     cholecalciferol, vitamin D3, 25 mcg (1,000 unit) capsule Take 1 capsule (1,000 Units total) by mouth daily. 90 capsule 4     famotidine (PEPCID) 20 MG tablet Take 1 tablet (20 mg total) by mouth 2 (two) times a day.  0     gabapentin (NEURONTIN) 100 MG capsule TAKE ONE CAPSULE BY MOUTH EVERY NIGHT AT BEDTIME 90 capsule 2     glipiZIDE (GLUCOTROL XL) 2.5 MG 24 hr tablet Take 1 tablet (2.5 mg total) by mouth daily as needed (High blood sugars). 90 tablet 4     hydrocortisone 2.5 % cream APPLY 0.5 GRAMS TOPICALLY TO AREA OF ITCHING ON LOWER LEFS TWICE DAILY AS NEEDED 30 g 1     ibuprofen (ADVIL,MOTRIN) 400 MG tablet Take 1 tablet (400 mg total) by mouth every 6 (six) hours as needed. 15 tablet 0     metFORMIN (GLUCOPHAGE) 1000 MG tablet TAKE 1 TABLET(1000 MG) BY MOUTH TWICE DAILY WITH MEALS 180 tablet 1     senna-docusate (PERICOLACE) 8.6-50 mg tablet Take 1 tablet by mouth daily  with lunch.       simethicone (MYLICON) 80 MG chewable tablet Chew 1 tablet (80 mg total) every 6 (six) hours as needed for flatulence.  0     simvastatin (ZOCOR) 40 MG tablet TAKE 1 TABLET(40 MG) BY MOUTH AT BEDTIME 90 tablet 3     triamcinolone (KENALOG) 0.1 % cream Apply twice daily to affected area for up to 2 week 30 g 1     Current Facility-Administered Medications   Medication Dose Route Frequency Provider Last Rate Last Dose     cyanocobalamin injection 1,000 mcg  1,000 mcg Intramuscular Q30 Days Denia Barron MD   1,000 mcg at 11/03/20 1127     cyanocobalamin injection 1,000 mcg  1,000 mcg Intramuscular Q30 Days Denia Barron MD   1,000 mcg at 02/25/20 0814

## 2021-06-13 NOTE — PROGRESS NOTES
Chief Complaint   Patient presents with     Ear Fullness     Right side. 1x week.        NPI:    Patient is here for one week of right ear fullness, with reduced hearing, started after she used a Q-tip to clean her ear after a shower. No ear pain, cough,fever, nasal symptoms.     ROS: Pertinent ROS noted in HPI.     No Known Allergies    Patient Active Problem List   Diagnosis     Anemia     Joint Pain, Localized In The Knee     Type 2 diabetes mellitus with diabetic autonomic neuropathy, without long-term current use of insulin     Trigger Finger Of The Right Thumb     Cholelithiasis     Cervical Cancer     Vision Problems     Urinary incontinence       No family history on file.    Social History     Social History     Marital status:      Spouse name: N/A     Number of children: N/A     Years of education: N/A     Occupational History     Not on file.     Social History Main Topics     Smoking status: Never Smoker     Smokeless tobacco: Never Used     Alcohol use Not on file     Drug use: Not on file     Sexual activity: Not on file     Other Topics Concern     Not on file     Social History Narrative         Objective:    Vitals:    10/03/17 1805   BP: 120/60   Pulse: 67   Resp: 18   Temp: 97.8  F (36.6  C)   SpO2: 97%       Gen:NAD  Oropharynx: normal  Ears: L TM and canal normal. Almost complete cerumen blockage of R TM noted on initial exam. R ear canal normal. After ear lavage, exam showed normal R TM and canal.   Eyes: normal conjunctiva bilaterally      Right ear impacted cerumen  -     Ear wax removal    Ear lavage performed by staff without event. F/u prn.

## 2021-06-13 NOTE — TELEPHONE ENCOUNTER
Reason contacted:  Results   Information relayed:  Below message relayed to patients daughter Shyam. Lab only appt scheduled on 12/4/2020.    Please place future lab orders.   Additional questions:  No  Further follow-up needed:  No  Okay to leave a detailed message:  No

## 2021-06-13 NOTE — TELEPHONE ENCOUNTER
----- Message from Denia Barron MD sent at 11/13/2020 10:50 AM CST -----  Please notify Hai's daughter her sodium level is low again.  I would like to do some additional tests.  Please make a lab only appointment some time in the next month to do some additional labs to help identify a cause. Remainder of labs are good.

## 2021-06-13 NOTE — PROGRESS NOTES
Assessment/Plan:        Diagnoses and all orders for this visit:    Diabetes  -     Glycosylated Hemoglobin A1c  Type 2 diabetes, not requiring insulin, with complications of diabetic neuropathy.  Continue on metformin.  Would ideally be also taking a sulfonylurea however she has been resistant to adding medications.  She will work on increasing her intake of meals to lower her blood sugars.  Continuing on metformin for now.  She is also taking an aspirin daily.  She is reminded to get her eyes checked.  Will continue on gabapentin 100 mg at night for her diabetic neuropathy.  Other orders  -     gabapentin (NEURONTIN) 100 MG capsule; Take 1 tablet qhs  Dispense: 270 capsule; Refill: 4          Subjective:    Patient ID: Hai Meade is a 67 y.o. female.    HPI Comments: 67-year-old female with diabetes and hyperlipidemia presents with an  today.  She states her blood sugars have been well controlled and she brings in her meter B.  We got we have not had any recent medication changes and she has been resistant to adding any new medications.  We have tried glipizide in the past and she stopped taking it.  She has noticed that she has had increased difficulties with her pain in her legs.  The gabapentin that she takes does help but she cannot take any more than 100 mg at night as it makes her so sleepy.  Her abdominal pain has not been present recently.  She is taking aspirin 81 mg daily.  No recent chest pain, shortness of breath or leg swelling.      The following portions of the patient's history were reviewed and updated as appropriate:   Current Outpatient Prescriptions on File Prior to Visit   Medication Sig     ASPIRIN ORAL Take by mouth.     blood glucose test strips Use 1 each As Directed 2 (two) times a day. Dispense brand per patient's insurance at pharmacy discretion. (dx E11.8)     generic lancets Use 1 each As Directed 2 (two) times a day. Dispense brand per patient's insurance at pharmacy  discretion. (dx E11.8)     metFORMIN (GLUCOPHAGE) 1000 MG tablet TAKE 1 TABLET BY MOUTH TWICE DAILY WITH MEALS     simvastatin (ZOCOR) 40 MG tablet Take 1 tablet (40 mg total) by mouth at bedtime.     No current facility-administered medications on file prior to visit.      She has No Known Allergies..    Review of Systems   Constitutional: Negative for fatigue and fever.   Respiratory: Negative for cough, chest tightness and shortness of breath.    Cardiovascular: Negative for chest pain.   Gastrointestinal: Negative for abdominal pain.             Objective:    Physical Exam   Constitutional: She appears well-developed and well-nourished.   Cardiovascular: Normal rate and regular rhythm.    Pulmonary/Chest: Effort normal and breath sounds normal.   Abdominal: Soft. Bowel sounds are normal.   Musculoskeletal: She exhibits no edema.

## 2021-06-14 ENCOUNTER — APPOINTMENT (OUTPATIENT)
Dept: INTERPRETER SERVICES | Age: 71
End: 2021-06-14

## 2021-06-14 NOTE — PROGRESS NOTES
ASSESSMENT:  1. Type 2 diabetes mellitus with diabetic autonomic neuropathy, without long-term current use of insulin  Blood sugars have been increasing recently.  They are suboptimally controlled on just Metformin.  She does agree to start glipizide at 2.5 mg daily.  A prescription is sent to the pharmacy.  Advised her to monitor for any signs of low blood sugars.  She will return later this week for labs as she is just under the 91 day shawna.  We will see her again in 3 months for recheck, sooner if needed.  - Glycosylated Hemoglobin A1c; Future  - Comprehensive Metabolic Panel; Future    2. Screening for colon cancer  Because options for colon cancer screening and feels she be a good candidate for [facility]acey card screening.  I do place an order and she understands the process and agrees to follow through with this testing.  - Ambulatory referral for Colonoscopy        PLAN:  There are no Patient Instructions on file for this visit.    Orders Placed This Encounter   Procedures     Glycosylated Hemoglobin A1c     Standing Status:   Future     Standing Expiration Date:   12/18/2018     Comprehensive Metabolic Panel     Standing Status:   Future     Standing Expiration Date:   12/18/2018     Ambulatory referral for Colonoscopy     Referral Priority:   Routine     Referral Type:   Colonoscopy     Referral Reason:   Evaluation and Treatment     Requested Specialty:   Gastroenterology     Number of Visits Requested:   1     There are no discontinued medications.    No Follow-up on file.    CHIEF COMPLAINT;  Chief Complaint   Patient presents with     Diabetes     discuss colonoscopy       HISTORY OF PRESENT ILLNESS:  Hai is a 67 y.o. female presenting to the clinic today for follow up regarding her diabetes. She presents to the clinic with her daughter and a [facility] interpretor. Her daughter provides some interpretation. She has been doing okay.    Diabetes: She has been checking her blood sugars at home. She reports that her  readings are sometimes high, sometimes low. She brings in her home meter. She checks her blood sugars twice a day, once in the morning and once in the afternoon. She reports that she waits 3 hours before checking her blood sugar in the afternoon. She continues on metformin 1000 mg twice daily. She reports no side effects. She does not think it works as well as the metformin she previously took. She states that when she first started metformin, she felt that it worked very well for her, but as she continued to take it, it was less effective. She would like to control her diabetes with little medication. She is amenable to adding another medication to help control her diabetes. Her last A1c was 7.9 on 9/19/17. She is able to come back to recheck her A1c sometime this week.     Health Maintenance: She is due for a colonoscopy. She had a partial screen in 2009. She is amenable to trying Cologuard at this time.    REVIEW OF SYSTEMS:  All other systems are negative.    PFSH:  Reviewed, as below.    TOBACCO USE:  History   Smoking Status     Never Smoker   Smokeless Tobacco     Never Used       VITALS:  Vitals:    12/18/17 0907   BP: 116/70   Pulse: 60   Resp: 16   Weight: 120 lb (54.4 kg)     Wt Readings from Last 3 Encounters:   12/18/17 120 lb (54.4 kg)   10/03/17 124 lb (56.2 kg)   09/19/17 122 lb (55.3 kg)     Body mass index is 23.44 kg/(m^2).    PHYSICAL EXAM:  GENERAL APPEARANCE: Alert, cooperative, no distress, appears stated age  HEAD: Normocephalic, without obvious abnormality, atraumatic  LUNGS: Clear to auscultation bilaterally, respirations unlabored  HEART: Regular rate and rhythm, S1 and S2 normal, no murmur, rub or gallop  EXTREMITIES: Extremities normal, atraumatic, no cyanosis or edema  NEUROLOGIC: CNII-XII intact.    Diabetic Foot Exam: Normal    RECENT RESULTS  No results found for this or any previous visit (from the past 48 hour(s)).      ADDITIONAL HISTORY SUMMARIZED (2): None.  DECISION TO OBTAIN  EXTRA INFORMATION (1): None.  RADIOLOGY TESTS (1): None.  LABS (1): Labs ordered.  MEDICINE TESTS (1): None.  INDEPENDENT REVIEW (2 each): None.      The visit lasted a total of 23 minutes face to face with the patient. Over 50% of the time was spent counseling and educating the patient about diabetes.    IValery, am scribing for and in the presence of, Dr. Barron.    I, Dr. Barron, personally performed the services described in this documentation, as scribed by Valery Shirley in my presence, and it is both accurate and complete.    MEDICATIONS:  Current Outpatient Prescriptions   Medication Sig Dispense Refill     ACCU-CHEK LOIDA PLUS TEST STRP strips TEST TWICE A  strip 3     ASPIRIN ORAL Take by mouth.       gabapentin (NEURONTIN) 100 MG capsule Take 1 tablet qhs 270 capsule 4     generic lancets Use 1 each As Directed 2 (two) times a day. Dispense brand per patient's insurance at pharmacy discretion. (dx E11.8) 200 each 1     metFORMIN (GLUCOPHAGE) 1000 MG tablet TAKE 1 TABLET BY MOUTH TWICE DAILY WITH MEALS 180 tablet 4     simvastatin (ZOCOR) 40 MG tablet Take 1 tablet (40 mg total) by mouth at bedtime. 90 tablet 3     glipiZIDE (GLUCOTROL XL) 2.5 MG 24 hr tablet Take 1 tablet (2.5 mg total) by mouth daily. 30 tablet 11     No current facility-administered medications for this visit.        Total data points: 1

## 2021-06-15 ENCOUNTER — COMMUNICATION - HEALTHEAST (OUTPATIENT)
Dept: FAMILY MEDICINE | Facility: CLINIC | Age: 71
End: 2021-06-15

## 2021-06-15 DIAGNOSIS — M81.0 AGE-RELATED OSTEOPOROSIS WITHOUT CURRENT PATHOLOGICAL FRACTURE: ICD-10-CM

## 2021-06-15 NOTE — TELEPHONE ENCOUNTER
RN cannot approve Refill Request    RN can NOT refill this medication Protocol failed and NO refill given. Last office visit: 11/3/2020 Denia Barron MD Last Physical: Visit date not found Last MTM visit: Visit date not found Last visit same specialty: 11/3/2020 Denia Barron MD.  Next visit within 3 mo: Visit date not found  Next physical within 3 mo: Visit date not found      [facility], [facility] Triage/Med Refill 3/4/2021    Requested Prescriptions   Pending Prescriptions Disp Refills     metFORMIN (GLUCOPHAGE) 1000 MG tablet [Pharmacy Med Name: METFORMIN 1000MG TABLETS] 180 tablet 1     Sig: TAKE 1 TABLET(1000 MG) BY MOUTH TWICE DAILY WITH MEALS       Metformin Refill Protocol Failed - 3/4/2021  5:40 AM        Failed - Microalbumin in last year      Microalbumin, Random Urine   Date Value Ref Range Status   07/11/2019 0.51 0.00 - 1.99 mg/dL Final                  Passed - Blood pressure in last 12 months     BP Readings from Last 1 Encounters:   11/03/20 126/60             Passed - LFT or AST or ALT in last 12 months     Albumin   Date Value Ref Range Status   11/03/2020 4.0 3.5 - 5.0 g/dL Final     Bilirubin, Total   Date Value Ref Range Status   11/03/2020 0.6 0.0 - 1.0 mg/dL Final     Bilirubin, Direct   Date Value Ref Range Status   07/04/2019 0.2 <=0.5 mg/dL Final     Alkaline Phosphatase   Date Value Ref Range Status   11/03/2020 58 45 - 120 U/L Final     AST   Date Value Ref Range Status   11/03/2020 21 0 - 40 U/L Final     ALT   Date Value Ref Range Status   11/03/2020 23 0 - 45 U/L Final     Protein, Total   Date Value Ref Range Status   11/03/2020 6.6 6.0 - 8.0 g/dL Final                Passed - GFR or Serum Creatinine in last 6 months     GFR MDRD Non Af Amer   Date Value Ref Range Status   11/03/2020 >60 >60 mL/min/1.73m2 Final     GFR MDRD Af Amer   Date Value Ref Range Status   11/03/2020 >60 >60 mL/min/1.73m2 Final             Passed - Visit with PCP or prescribing provider  visit in last 6 months or next 3 months     Last office visit with prescriber/PCP: 11/3/2020 OR same dept: 11/3/2020 Denia Barron MD OR same specialty: 11/3/2020 Denia Barron MD Last physical: Visit date not found Last MTM visit: Visit date not found         Next appt within 3 mo: Visit date not found  Next physical within 3 mo: Visit date not found  Prescriber OR PCP: Denia Barron MD  Last diagnosis associated with med order: 1. Diabetes (H)  - metFORMIN (GLUCOPHAGE) 1000 MG tablet [Pharmacy Med Name: METFORMIN 1000MG TABLETS]; TAKE 1 TABLET(1000 MG) BY MOUTH TWICE DAILY WITH MEALS  Dispense: 180 tablet; Refill: 1     If protocol passes may refill for 12 months if within 3 months of last provider visit (or a total of 15 months).           Passed - A1C in last 6 months     Hemoglobin A1c   Date Value Ref Range Status   11/03/2020 7.7 (H) <=5.6 % Final     Comment:     Normal <5.7% Prediabete 5.7-6.4% Diabletes 6.5% or higher - adopted from ADA consensus guidelines

## 2021-06-16 ENCOUNTER — APPOINTMENT (OUTPATIENT)
Dept: MAMMOGRAPHY | Age: 71
End: 2021-06-16
Attending: FAMILY MEDICINE

## 2021-06-16 ENCOUNTER — APPOINTMENT (OUTPATIENT)
Dept: INTERPRETER SERVICES | Age: 71
End: 2021-06-16

## 2021-06-16 PROBLEM — M81.0 AGE-RELATED OSTEOPOROSIS WITHOUT CURRENT PATHOLOGICAL FRACTURE: Status: ACTIVE | Noted: 2020-04-20

## 2021-06-16 PROBLEM — G62.9 PERIPHERAL NEUROPATHY: Status: ACTIVE | Noted: 2019-05-13

## 2021-06-16 PROBLEM — K21.9 GASTROESOPHAGEAL REFLUX DISEASE WITHOUT ESOPHAGITIS: Status: ACTIVE | Noted: 2019-07-04

## 2021-06-16 PROBLEM — K56.609 SMALL BOWEL OBSTRUCTION (H): Status: ACTIVE | Noted: 2019-06-21

## 2021-06-16 PROBLEM — E53.8 VITAMIN B12 DEFICIENCY (NON ANEMIC): Status: ACTIVE | Noted: 2019-10-17

## 2021-06-16 NOTE — PROGRESS NOTES
ASSESSMENT/PLAN:  1. Type 2 diabetes mellitus with diabetic autonomic neuropathy, without long-term current use of insulin  Doing well with improvement in her hemoglobin A1c.  Continue on current medication regimen.  Continue on an aspirin daily.  Blood pressures well controlled.  Follow-up in 3-6 months.  - Glycosylated Hemoglobin A1c      General Health Maintenance  She does not have a DXA on file.   Her most recent mammogram was completed in 2017 and revealed no radiographic evidence of malignancy. She is to continue routine screening mammogram as recommended. She is due for a mammogram in May.  Her most recent Pap smear was completed in 2017 and was normal.   Her most recent colorectal screening was completed with [facility] in 2018 and was negative. She is on a 3-year screening plan.    There are no Patient Instructions on file for this visit.    Orders Placed This Encounter   Procedures     Glycosylated Hemoglobin A1c     Medications Discontinued During This Encounter   Medication Reason     glipiZIDE (GLUCOTROL XL) 2.5 MG 24 hr tablet Reorder       No Follow-up on file.    CHIEF COMPLAINT;  Chief Complaint   Patient presents with     Diabetes       HISTORY OF PRESENT ILLNESS:  Hai is a 67 y.o. female presenting to the clinic today for a follow up regarding diabetes. She presents to the clinic with her daughter. Her daughter provides interpretation. She is doing well. She does not have any concerns at this time.     Diabetes: Her hemoglobin A1c was 7.8 on 12/19/17 and 7.7 today.  She continues on 2.5 mg glipizide daily and 1000 mg metformin twice daily. She does not mention adverse side effects. She does monitor her blood sugars at home; she brings in her monitor with recent readings. She does feel that her blood sugars have been a little lower. This morning, her blood sugar was low, so she ate a piece of bread. She does check her blood sugar when she feels it is low. Sometimes in the morning, she feels  hungry, so she will check her blood sugars right away. The lowest reading she has gotten was 86. She does not eat a snack before bed; she eats dinner around 6 PM.     Neuropathy: She endorses continued neuropathy symptoms. She continues on gabapentin 100 mg daily. She does not mention having problems with the medication. She is wondering if her symptoms will improve or if they will stay the same.     Health Maintenance: Her Cologuard result was negative as of 1/8/18. She is on a 3-year screening plan at this time. She is otherwise up to date.     REVIEW OF SYSTEMS:  She endorses discomfort in her left first toe, especially while wearing certain shoes. All other systems are negative.    PFSH:  Reviewed, as below.    TOBACCO USE:  History   Smoking Status     Never Smoker   Smokeless Tobacco     Never Used       VITALS:  Vitals:    03/19/18 0906   BP: 124/74   Pulse: 71   Resp: 16   Weight: 127 lb (57.6 kg)     Wt Readings from Last 3 Encounters:   03/19/18 127 lb (57.6 kg)   12/18/17 120 lb (54.4 kg)   10/03/17 124 lb (56.2 kg)     Body mass index is 24.8 kg/(m^2).    PHYSICAL EXAM:  GENERAL APPEARANCE: Alert, cooperative, no distress, appears stated age  HEAD: Normocephalic, without obvious abnormality, atraumatic  LUNGS: Clear to auscultation bilaterally, respirations unlabored  CHEST WALL: No tenderness or deformity  HEART: Regular rate and rhythm, S1 and S2 normal, no murmur, rub or gallop  NEUROLOGIC: CNII-XII intact.     Diabetic Foot Exam: Done, normal.       RECENT RESULTS  Recent Results (from the past 48 hour(s))   Glycosylated Hemoglobin A1c    Collection Time: 03/19/18  9:09 AM   Result Value Ref Range    Hemoglobin A1c 7.7 (H) 3.5 - 6.0 %       ADDITIONAL HISTORY SUMMARIZED (2): None.  DECISION TO OBTAIN EXTRA INFORMATION (1): None.  RADIOLOGY TESTS (1): None.  LABS (1): A1c ordered, 7.7.  MEDICINE TESTS (1): None.  INDEPENDENT REVIEW (2 each): None.    The visit lasted a total of 8 minutes face to face  with the patient. Over 50% of the time was spent counseling and educating the patient about diabetes.    I, Valery Shirley, am scribing for and in the presence of, Dr. Barron.    I, Dr. Barron, personally performed the services described in this documentation, as scribed by Valery Shirley in my presence, and it is both accurate and complete.    MEDICATIONS:  Current Outpatient Prescriptions   Medication Sig Dispense Refill     ACCU-CHEK LOIDA PLUS TEST STRP strips TEST TWICE A  strip 3     ASPIRIN ORAL Take by mouth.       gabapentin (NEURONTIN) 100 MG capsule Take 1 tablet qhs 270 capsule 4     generic lancets Use 1 each As Directed 2 (two) times a day. Dispense brand per patient's insurance at pharmacy discretion. (dx E11.8) 200 each 1     glipiZIDE (GLUCOTROL XL) 2.5 MG 24 hr tablet Take 1 tablet (2.5 mg total) by mouth daily. 90 tablet 4     metFORMIN (GLUCOPHAGE) 1000 MG tablet TAKE 1 TABLET BY MOUTH TWICE DAILY WITH MEALS 180 tablet 4     simvastatin (ZOCOR) 40 MG tablet Take 1 tablet (40 mg total) by mouth at bedtime. 90 tablet 3     No current facility-administered medications for this visit.        Total data points: 1

## 2021-06-16 NOTE — PROGRESS NOTES
ASSESSMENT/PLAN:  1. Type 2 diabetes mellitus with diabetic autonomic neuropathy, without long-term current use of insulin (H)  Hemoglobin A1c is stable.  Continue on current regimen.  Refills are given for her test strips and lancets.  She is taking aspirin 81 mg daily.  Her blood pressure is well controlled.  She is on Metformin and glipizide.  She prefers these medications and does not wish to start any injections.  Follow-up in 6 months.  - Glycosylated Hemoglobin A1c  - HM2(CBC w/o Differential)    2. Type 2 diabetes mellitus (H)  - blood glucose test (ACCU-CHEK LOIDA PLUS TEST STRP) strips; TEST TWICE A DAY  Dispense: 200 strip; Refill: 3  - generic lancets (ACCU-CHEK SOFTCLIX LANCETS); TEST TWICE DAILY  Dispense: 200 each; Refill: 1    3. Peripheral polyneuropathy  Taking gabapentin but cannot tolerate anything higher than 100 mg.  We have tried duloxetine and nortriptyline in the past.  She would like to add a topical agent and we will add diclofenac.  - diclofenac sodium (VOLTAREN) 1 % Gel; Apply 4gm to affected area up to four times a day for pain  Dispense: 150 g; Refill: 6    4. Hyponatremia  She is chronic hyponatremia.  So it is been mild and is of unclear etiology.  Today it stable at 135.  - Basic Metabolic Panel      Dragon dictation was used for this note.  Speech recognition errors are a possibility.    No follow-ups on file.  There are no Patient Instructions on file for this visit.    Orders Placed This Encounter   Procedures     Glycosylated Hemoglobin A1c     Basic Metabolic Panel     HM2(CBC w/o Differential)     Medications Discontinued During This Encounter   Medication Reason     ACCU-CHEK SOFTCLIX LANCETS lancets Reorder     ACCU-CHEK LOIDA PLUS TEST STRP strips Reorder     Administrations This Visit     cyanocobalamin injection 1,000 mcg     Admin Date  04/05/2021 Action  Given Dose  1,000 mcg Route  Intramuscular Administered By  Keerthi Meade CMA                CHIEF COMPLAINT;  Chief  Complaint   Patient presents with     Diabetes     Leg Problem     Both legs are very stiff and would like to know if there is any medications to take for it per pt     Immunizations     vitamin B12 today       HISTORY OF PRESENT ILLNESS:  Hai is a 70 y.o. female presenting to the clinic today for recheck of her diabetes.  Her daughter is with her and serves as her  at their preference.  She states she has been doing well.  Blood sugars have been stable.  She is taking her medications regularly.    She continues to have pain in both of her legs.  Seems to be more stiff.  She has been using gabapentin which really helps her at night.  Unfortunately can increase the doses it makes her too sedated and she is unable to take it during the daytime.  She is wondering if there is anything topical that she can use    She has a history of vitamin B12 deficiency which may be influencing her neuropathy.  She is receiving monthly B12 injections.  Remainder of 12-point ROS is negative.    TOBACCO USE:  Social History     Tobacco Use   Smoking Status Never Smoker   Smokeless Tobacco Never Used       VITALS:  Vitals:    04/05/21 0853 04/05/21 0856   BP: 142/70 144/72   Patient Site: Right Arm Right Arm   Patient Position: Sitting Sitting   Cuff Size: Adult Regular Adult Regular   Pulse: 78    SpO2: 98%    Weight: 121 lb 8 oz (55.1 kg)      Wt Readings from Last 3 Encounters:   04/05/21 121 lb 8 oz (55.1 kg)   11/03/20 122 lb (55.3 kg)   07/21/20 120 lb (54.4 kg)     Body mass index is 23.73 kg/m .    PHYSICAL EXAM:  GENERAL APPEARANCE: Alert, cooperative, no distress, appears stated age  NECK: Supple, symmetrical, trachea midline, no adenopathy;    thyroid:  No enlargement/tenderness/nodules; no carotid    bruit or JVD  LUNGS: Clear to auscultation bilaterally, respirations unlabored  HEART: Regular rate and rhythm, S1 and S2 normal, no murmur, rub or gallop  ABDOMEN: Soft, non-tender, bowel sounds active all four  quadrants,     no masses, no organomegaly      RECENT RESULTS  No results found for this or any previous visit (from the past 48 hour(s)).    MEDICATIONS:  Current Outpatient Medications   Medication Sig Dispense Refill     acetaminophen (TYLENOL) 500 MG tablet Take 1-2 tablets (500-1,000 mg total) by mouth every 4 (four) hours as needed.  0     aspirin 81 MG EC tablet Take 81 mg by mouth daily.       blood pressure monitor Kit Please dispense 1 Blood pressure kit of pts choice 1 each 0     calcium carbonate-vitamin D3 500 mg(1,250mg) -400 unit per tablet Chew 1 tablet 2 (two) times a day. 180 tablet 4     capsaicin (ZOSTRIX) 0.025 % cream Apply to affected area three times daily as needed 60 g 0     cholecalciferol, vitamin D3, 25 mcg (1,000 unit) capsule Take 1 capsule (1,000 Units total) by mouth daily. 90 capsule 4     famotidine (PEPCID) 20 MG tablet Take 1 tablet (20 mg total) by mouth 2 (two) times a day.  0     gabapentin (NEURONTIN) 100 MG capsule TAKE ONE CAPSULE BY MOUTH EVERY NIGHT AT BEDTIME 90 capsule 2     glipiZIDE (GLUCOTROL XL) 2.5 MG 24 hr tablet Take 1 tablet (2.5 mg total) by mouth daily as needed (High blood sugars). 90 tablet 4     hydrocortisone 2.5 % cream APPLY 0.5 GRAMS TOPICALLY TO AREA OF ITCHING ON LOWER LEFS TWICE DAILY AS NEEDED 30 g 1     ibuprofen (ADVIL,MOTRIN) 400 MG tablet Take 1 tablet (400 mg total) by mouth every 6 (six) hours as needed. 15 tablet 0     metFORMIN (GLUCOPHAGE) 1000 MG tablet TAKE 1 TABLET(1000 MG) BY MOUTH TWICE DAILY WITH MEALS 180 tablet 1     senna-docusate (PERICOLACE) 8.6-50 mg tablet Take 1 tablet by mouth daily with lunch.       simethicone (MYLICON) 80 MG chewable tablet Chew 1 tablet (80 mg total) every 6 (six) hours as needed for flatulence.  0     simvastatin (ZOCOR) 40 MG tablet TAKE 1 TABLET(40 MG) BY MOUTH AT BEDTIME 90 tablet 3     triamcinolone (KENALOG) 0.1 % cream Apply twice daily to affected area for up to 2 week 30 g 1     blood glucose  test (ACCU-CHEK LOIDA PLUS TEST STRP) strips TEST TWICE A  strip 3     diclofenac sodium (VOLTAREN) 1 % Gel Apply 4gm to affected area up to four times a day for pain 150 g 6     generic lancets (ACCU-CHEK SOFTCLIX LANCETS) TEST TWICE DAILY 200 each 1     Current Facility-Administered Medications   Medication Dose Route Frequency Provider Last Rate Last Admin     cyanocobalamin injection 1,000 mcg  1,000 mcg Intramuscular Q30 Days Denia Barron MD   1,000 mcg at 02/08/21 0956     cyanocobalamin injection 1,000 mcg  1,000 mcg Intramuscular Q30 Days Denia Barron MD   1,000 mcg at 04/05/21 0943

## 2021-06-17 NOTE — PROGRESS NOTES
Assessment and Plan:   1. Acute renal failure, unspecified acute renal failure type     2. Hyponatremia  Basic Metabolic Panel   3. Hypomagnesemia  Magnesium   4. Anemia, unspecified type  HM2(CBC w/o Differential)    CANCELED: Hemoglobin   5. Type 2 diabetes mellitus with diabetic autonomic neuropathy, without long-term current use of insulin       Patient's appetite has increased.  She is trying to stay well-hydrated.  She continues to feel weak, but declines physical therapy.  She is willing to meet with our [facility] pharmacist for further discussion of electrolyte imbalance and chronic hyponatremia.  Will notify patient of results.  I encouraged follow-up with Dr. Barron for diabetes check within 2-3 months.  She is content with the plan.  Her medications were reconciled this visit.    Subjective:     Hai is a 67 y.o. female presenting to the clinic for follow-up on hospitalization.  Patient has a past medical history significant for type 2 diabetes, cervical cancer, hyperlipidemia.  She presented to the emergency room with 4 day history of persistent diarrhea which progressed to nausea and vomiting.  She denied abdominal pain.  Blood work showed significant hyponatremia along with acute renal failure.  IV fluids were provided and she was admitted.  Patient's lab work was consistent with viral gastroenteritis.  CT scan of the abdomen showed changes consistent with gastroenteritis.  Sodium level did slowly improve with IV hydration.  She developed hypomagnesemia and received replacement.  She had some anemia over the course of her stay.  Patient had noted that hyponatremia had been an issue for her for quite some time.  Sodium was 133 on 4/27/18.  Calcium was 7.8 and magnesium 1.7.  Hemogram showed a white blood count of 2.8, red blood count 3.22, hemoglobin 9.7.  She presents today stating the nausea has improved.  She is eating small amounts throughout the day and has been trying to increase her fluid intake.   She continues to feel weak but is not interested in physical therapy.  She denies abdominal pain.  Her last bowel movement was this morning.  She denies any blood or mucus in the stool.  She does have type 2 diabetes with an A1c of 7.7% on 3/19/18.    Review of Systems: A complete 14 point review of systems was obtained and is negative or as stated in the history of present illness.    Social History     Social History     Marital status:      Spouse name: N/A     Number of children: N/A     Years of education: N/A     Occupational History     Not on file.     Social History Main Topics     Smoking status: Never Smoker     Smokeless tobacco: Never Used     Alcohol use No     Drug use: No     Sexual activity: Not on file     Other Topics Concern     Not on file     Social History Narrative       Active Ambulatory Problems     Diagnosis Date Noted     Anemia      Joint Pain, Localized In The Knee      Type 2 diabetes mellitus with diabetic autonomic neuropathy, without long-term current use of insulin      Trigger Finger Of The Right Thumb      Cholelithiasis      Cervical Cancer      Vision Problems      Urinary incontinence 08/22/2016     Hyperlipidemia      Hyponatremia 04/26/2018     Acute renal failure, unspecified acute renal failure type      Intractable vomiting with nausea, unspecified vomiting type      Hypomagnesemia 04/27/2018     Resolved Ambulatory Problems     Diagnosis Date Noted     Limb Pain      Limb Swelling      Past Medical History:   Diagnosis Date     Anemia      Cervical cancer      Cholelithiasis      Diabetes mellitus, type II      Hyperlipidemia      Small bowel obstruction      Vision problems        No family history on file.    Objective:     /66  Pulse 64  Ht 5' (1.524 m)  Wt 119 lb 4.8 oz (54.1 kg)  BMI 23.3 kg/m2    Patient is alert, in no obvious distress.   Skin: Warm, dry.    Lungs:  Clear to auscultation. Respirations even and unlabored.  No wheezing or rales  noted.   Heart:  Regular rate and rhythm.  No murmurs.   Abdomen: Soft, nontender.  No organomegaly. Bowel sounds normoactive. No guarding or masses noted.

## 2021-06-17 NOTE — PROGRESS NOTES
MTM Initial Encounter  ASSESSMENT AND PLAN  Hypomagnesemia: Last magnesium level was within normal limits.  Okay to split magnesium in half.  Would recommend rechecking magnesium at follow-up, and can consider discontinuing if remains stable.  Plan:  1.  Future magnesium level    Hyponatremia: Last sodium level was within normal limits, but may have been because she was recently given fluids upon admission.  I do not think that any of her current medications are the reason for her hyponatremia.  Common medications that cause hyponatremia include carbamazepine, oxcarbazepine, opioids, antipsychotics, mirtazapine, SSRI, SNRI, TCAs.   Plan:  1.  Future sodium level    Neuropathy: Stable.  Reviewed that I would not recommend taking more than 100 mg of gabapentin since it makes her fatigued.  Gabapentin is not likely to cause hyponatremia.    Type 2 Diabetes:  reasonably well controlled. A1C was not at goal of <7%. FBG at goal  mg/dL per reported values. Reviewed that she should NOT split glipizide ER in half as it is disrupting the release mechanism. Ok to cut metformin in half -- I provided her with a pill splitter. Will leave dose the same and recheck A1c at follow up, along with microalbuminuria and lipid panel. If remains >7%, could consider increasing glipizide dose. Metformin and glipizide are not likely to cause hyponatremia.   Ok to cut simvastatin in half. Reviewed that we could consider switching her to rosuvastatin 10 mg which is likely a smaller tablet, but she was not interested.   PLAN:   1. Do NOT split glipizide ER 2.5 mg in half. Swallow whole   2.  A1c, microalbuminuria, lipid panel at follow-up.     MTM FOLLOW UP  Dr. Barron in 1 month  MTM in 2 months    SUBJECTIVE AND OBJECTIVE  Hai Meade is a 67 y.o. female here for an initial medication therapy management (MTM) appointment. Referred from Arelis Oh CNP and discharging physician for possible medication-related hyponatremia. CHIQUITA Tovar   and her daughter, Shyam, joins us today.     Medication Concern(s)/Question(s): Wonders about long-term side effects of magnesium.     Medication Adherence: Brings her medications with her today in the bottles, but left aspirin and gabapentin at home    Hypomagnesemia: Brings in magnesium oxide 400 mg BID -- taking since discharge.  Cuts magnesium in half.  Reports taking BID. Denies diarrhea. Had a stomach virus and could not eat for the week. She is eating normally now and drinking water.   Last magnesium level = 2 on 5/2/18  Previous magnesium level = 1.7 on 4/27/2018    Hyponatremia: Last sodium level = 137 on 5-18, but previously 133 on 4/27/2018.  Patient has a history of hyponatremia even prior to most recent hospitalization.  Discharge note mentions that if patient is continuing to have chronic hyponatremia, outpatient pharmacy consult may be indicated to review medications and see if this could be an etiology to her hyponatremia    Neuropathy: Taking gabapentin 100 mg at bedtime. In the morning she feels drowsy and forgetful. She thinks it helps her neuropathy because she falls asleep - the neuropathy does bother her at night, but when she takes the medication it takes a while to work then she falls asleep. She thinks she has been on gabapentin <1 year. When she takes two capsules she is too crazy.     Type 2 Diabetes: Currently taking metformin 1000 mg BID and glipizide ER 2.5 mg daily. The metformin and glipizide ER is hard to swallow so she cuts them in half.   Did not bring glucometer today. Tests BG 0-2 times daily. Reports blood sugars: 113 this morning, sometimes .   Last A1c checked 3/19/18 = 7.7%   Hypoglycemia none -- she tries to prevent by eating rice. She reports she is not hungry anymore and every 3-4 hours she will eat.   Microalbumin checked 6/13/17  Is taking simvastatin 40 mg HS -- cut in half because it is hard to swallow, she takes two halfs. She is ok with taking two  halves. Has muscle pain but she massages it, occurs sometimes. Last lipids checked in 2014  Is taking aspirin 81 mg for primary prevention.       Hai's medication list was reviewed with them, discussing reason for use, directions for use, and potential side effects of each medication as needed. Indication, safety, efficacy, and convenience was assessed for all medications addressed above.  No environmental factors were noted currently affecting patient.  This care plan was communicated via EMR with her primary care provider, Denia Barron MD, who is the authorizing prescriber for this visit.  Direct supervision was available by either the patient's PCP or other available provider.    Time and complexity billing metrics are included in the docflowsheet linked to this visit    Time spent: 45 min  Sandra Whipple, Pharm.D., BCACP  MTM Pharmacist at [facility]

## 2021-06-17 NOTE — TELEPHONE ENCOUNTER
Patient has an appointment at 9:30 am this morning for a vitamin B12 injection.    Please place new order as the order in her chart is from 8/2019

## 2021-06-17 NOTE — PROGRESS NOTES
Assessment and Plan     70-year-old female with recent trauma to her right foot from dropping a coffee mug on it.  Now presenting with pain, bruising, discrete area of swelling.  Point-of-care ultrasound done and shows hypoechoic approximately 2 cm in diameter area most consistent with hematoma.  Believe this is her most of her pain is coming from.  Do not believe she has a fracture but cannot rule it out.  So obtain x-ray today.  Offered drainage of the hematoma by aspiration but patient prefers to let this resolve naturally.  Recommended Tylenol and icing.  Discussed symptoms of infection to look out for and to return to clinic if these occur.    1. Right foot pain  - XR Foot Right 3 or More VWS    Follow up: PRN  Options for treatment and follow-up care were reviewed with the patient and/or guardian. Hai Meade and/or guardian engaged in the decision making process and verbalized understanding of the options discussed and agreed with the final plan.    Dr. Sam Thompson MD         HPI:   Hai Meade is a 70 y.o.  female with problems as below, who presents for:    Chief Complaint   Patient presents with     Foot Pain     dropped coffee mug on right foot by toes. swollen.      Patient presents with pain in her right foot since she dropped a coffee mug an approximately week ago.  Initially she did not have pain in foot after this event occurred.  However the next day she started noticing bruising and swelling in the foot.  Today she notes it is very painful especially when she walks.  She has noticed a lump as well on the foot.  No significant redness or drainage.  Can walk without limping.  No sensation changes in the foot.  Has never done anything like this to her foot before.         PMHX:     Patient Active Problem List   Diagnosis     Anemia     Joint Pain, Localized In The Knee     Type 2 diabetes mellitus with diabetic autonomic neuropathy, without long-term current use of insulin (H)     Cholelithiasis      Cervical Cancer     Urinary incontinence     Hyperlipidemia     Peripheral neuropathy     Small bowel obstruction (H)     Postoperative ileus (H)     Gastroesophageal reflux disease without esophagitis     Vitamin B12 deficiency (non anemic)     Age-related osteoporosis without current pathological fracture       Current Outpatient Medications   Medication Sig Dispense Refill     acetaminophen (TYLENOL) 500 MG tablet Take 1-2 tablets (500-1,000 mg total) by mouth every 4 (four) hours as needed.  0     aspirin 81 MG EC tablet Take 81 mg by mouth daily.       blood glucose test (ACCU-CHEK LOIDA PLUS TEST STRP) strips TEST TWICE A  strip 3     blood pressure monitor Kit Please dispense 1 Blood pressure kit of pts choice 1 each 0     calcium carbonate-vitamin D3 500 mg(1,250mg) -400 unit per tablet Chew 1 tablet 2 (two) times a day. 180 tablet 4     capsaicin (ZOSTRIX) 0.025 % cream Apply to affected area three times daily as needed 60 g 0     cholecalciferol, vitamin D3, 25 mcg (1,000 unit) capsule Take 1 capsule (1,000 Units total) by mouth daily. 90 capsule 4     diclofenac sodium (VOLTAREN) 1 % Gel Apply 4gm to affected area up to four times a day for pain 150 g 6     gabapentin (NEURONTIN) 100 MG capsule TAKE ONE CAPSULE BY MOUTH EVERY NIGHT AT BEDTIME 90 capsule 2     generic lancets (ACCU-CHEK SOFTCLIX LANCETS) TEST TWICE DAILY 200 each 1     glipiZIDE (GLUCOTROL XL) 2.5 MG 24 hr tablet Take 1 tablet (2.5 mg total) by mouth daily as needed (High blood sugars). 90 tablet 4     hydrocortisone 2.5 % cream APPLY 0.5 GRAMS TOPICALLY TO AREA OF ITCHING ON LOWER LEFS TWICE DAILY AS NEEDED 30 g 1     ibuprofen (ADVIL,MOTRIN) 400 MG tablet Take 1 tablet (400 mg total) by mouth every 6 (six) hours as needed. 15 tablet 0     metFORMIN (GLUCOPHAGE) 1000 MG tablet TAKE 1 TABLET(1000 MG) BY MOUTH TWICE DAILY WITH MEALS 180 tablet 1     simvastatin (ZOCOR) 40 MG tablet TAKE 1 TABLET(40 MG) BY MOUTH AT BEDTIME 90  tablet 3     triamcinolone (KENALOG) 0.1 % cream Apply twice daily to affected area for up to 2 week 30 g 1     famotidine (PEPCID) 20 MG tablet Take 1 tablet (20 mg total) by mouth 2 (two) times a day.  0     senna-docusate (PERICOLACE) 8.6-50 mg tablet Take 1 tablet by mouth daily with lunch.       simethicone (MYLICON) 80 MG chewable tablet Chew 1 tablet (80 mg total) every 6 (six) hours as needed for flatulence.  0     Current Facility-Administered Medications   Medication Dose Route Frequency Provider Last Rate Last Admin     cyanocobalamin injection 1,000 mcg  1,000 mcg Intramuscular Q30 Days Denia Barron MD   1,000 mcg at 02/08/21 0956     cyanocobalamin injection 1,000 mcg  1,000 mcg Intramuscular Q30 Days Denia Barron MD   1,000 mcg at 04/05/21 0943     cyanocobalamin injection 1,000 mcg  1,000 mcg Intramuscular Q30 Days Denia Barron MD   1,000 mcg at 05/03/21 0931       Social History     Tobacco Use     Smoking status: Never Smoker     Smokeless tobacco: Never Used   Substance Use Topics     Alcohol use: No     Drug use: No       Social History     Social History Narrative    Lives with her family.       Allergies   Allergen Reactions     Cymbalta [Duloxetine]      Nausea and vomiting                Review of Systems:    Complete ROS is negative except as noted in the HPI         Physical Exam:   /72 (Patient Site: Left Arm, Patient Position: Sitting, Cuff Size: Adult Regular)   Pulse 81   Wt 123 lb 1.6 oz (55.8 kg)   SpO2 97%   BMI 24.04 kg/m      General appearance: Alert, cooperative, no distress, appears stated age  Head: Normocephalic, atraumatic, without obvious abnormality  Eyes: Pupils equal round, reactive.  Conjunctiva clear.  Neck: Supple, symmetric, trachea midline  Lungs: Clear to auscultation bilaterally, no wheezing or crackles present.  Respirations unlabored  Heart: Regular rate and rhythm, normal S1 and S2, no murmur, rub or gallop.      Right  Foot/Ankle:  Inspection: Swelling - yes; Bruising - yes, with nodular area of swelling on dorsal aspect of foot just proximal to the 2nd metatarsal  Sensation: intact in peroneal, tibial, sural, and saphenous distribution  ROM: Dorsiflexion - normal; Plantarflexion - normal; Inversion -normal; Eversion - normal;  Strength: 5/5 in all motions  Bony tenderness: Medial malleolus? - no; Lateral malleolus? - no; Navicular? - no; 5th Metatarsal? - no;  Ligaments Tenderness: ATFL/CFL -no; Deltoid - no;  Tendons: Posterior Tibialis - no; Peroneals - no; Achilles - no

## 2021-06-18 ENCOUNTER — IMAGING SERVICES (OUTPATIENT)
Dept: MAMMOGRAPHY | Age: 71
End: 2021-06-18
Attending: FAMILY MEDICINE

## 2021-06-18 DIAGNOSIS — Z00.00 MEDICARE ANNUAL WELLNESS VISIT, SUBSEQUENT: ICD-10-CM

## 2021-06-18 DIAGNOSIS — Z12.31 ENCOUNTER FOR SCREENING MAMMOGRAM FOR MALIGNANT NEOPLASM OF BREAST: ICD-10-CM

## 2021-06-18 PROCEDURE — 77063 BREAST TOMOSYNTHESIS BI: CPT | Performed by: RADIOLOGY

## 2021-06-18 PROCEDURE — 77067 SCR MAMMO BI INCL CAD: CPT | Performed by: RADIOLOGY

## 2021-06-18 NOTE — LETTER
Letter by Denia Barron MD at      Author: Denia Barron MD Service: -- Author Type: --    Filed:  Encounter Date: 1/3/2019 Status: (Other)       Hai Meade  [address]  [place] [address]             January 3, 2019         Dear Ms. Meade,    Below are the results from your recent visit:    Resulted Orders   Glycosylated Hemoglobin A1c   Result Value Ref Range    Hemoglobin A1c 7.7 (H) 3.5 - 6.0 %   Vitamin B12   Result Value Ref Range    Vitamin B-12 497 213 - 816 pg/mL       Your Vitamin B12 levels are improving with injections. Continue to come in monthly for injections    Please call with questions or contact us using [employer].    Sincerely,        Electronically signed by Denia Barron MD

## 2021-06-18 NOTE — PATIENT INSTRUCTIONS - HE
Patient Instructions by Denia Barron MD at 7/16/2020  9:30 AM     Author: Denia Barron MD Service: -- Author Type: Physician    Filed: 7/16/2020 10:43 AM Encounter Date: 7/16/2020 Status: Addendum    : Denia Barron MD (Physician)    Related Notes: Original Note by Denia Barron MD (Physician) filed at 7/16/2020 10:35 AM         Blood pressure goal of less than 140/90      Osteoporosis Medicines: Bisphosphonates  Depending on your needs, your healthcare provider may prescribe medicines to prevent or treat osteoporosis.    Bisphosphonates  Several medicines make up the class of drugs known as bisphosphonates. Bisphosphonates are the most common type of medicine used to help prevent and treat bone loss. Bisphosphonates are given in pill or injectable form as an IV infusion. They must be taken exactly as directed. Benefits may include:    Reducing bone loss    Increasing bone density in the hip and spine    Reducing risk of fractures in the spine, hip, and wrist  Side effects may include:    Heartburn    Nausea    Belly (abdominal) pain    Bone or muscle pain  Taking bisphosphonates pills  Always read medicine information closely. You should not take bisphosphonates if you currently have upper gastrointestinal disease. Certain bisphosphonates must be taken:    On an empty stomach.    With a full glass of water (8 oz.) first thing in the morning.    At least 30 minutes to 1 hour before any food, drink, or other medicines.    While sitting or standing. You should not lie down for at least 30 minutes after taking the medicine.  Newer medicines can be taken weekly or monthly. Talk with your healthcare provider to find out which one is right for you.   Date Last Reviewed: 5/1/2018 2000-2019 [employer]. [address] [place] [address], [place] [address]. All rights reserved. This information is not intended as a substitute for professional medical care. Always follow your  healthcare professional's instructions.           Living with Osteoporosis: Regular Exercise  If you have osteoporosis, exercise is vital for your health. It can prevent bone fractures and spine changes. It will slow bone loss. Exercise will strengthen your body. It can also be fun. A variety of exercises is best. See below for exercises that can help you. But before you start, talk with your healthcare provider to be sure these exercises are right for you.    Resistance exercises. These build muscle strength and maintain bone mass. They also make you less prone to injury. Exercises include lifting small weights, doing push-ups and sit-ups, using elastic exercise bands, and using weight machines.  Weight-bearing activities. These help your whole body. They also help you maintain bone mass. Activities include walking, dancing, and housework.  Non-weight-bearing exercises. These help prevent back strain and pain. They do this by building the trunk and leg muscles. Exercises that help with flexibility can prevent falls. Examples include swimming, water exercise, and stretching.  Staying safe  Here are tips to stay safe:     Always check with your healthcare provider before starting any new exercise program.    Use weights only as instructed.    Stop any exercise that causes pain.   Date Last Reviewed: 5/1/2018 2000-2019 [employer]. [address] [place] [id number]. All rights reserved. This information is not intended as a substitute for professional medical care. Always follow your healthcare professional's instructions.           Living with Osteoporosis: Preventing Fractures  If you have osteoporosis, you can do a lot to reduce its effect on your life. Knowing how to prevent fractures and spinal curvature can help you live more comfortably and safely with this disease.    Reducing your risk for fractures  The most common fracture sites in people with osteoporosis are the wrist, spine, and  hip. These fractures are often caused by accidents and falls. All fractures are painful and may limit what you can do. But hip fractures are very serious. They often need surgery, and it can take months to recover. To reduce your risk for fractures:    Get regular exercise. Try walking, swimming, or weight training.    Eat foods that are rich in calcium, or take calcium supplements.    Make your home safe to help avoid accidents.    Take extra precautions not to fall in risky areas, such as icy sidewalks.  Understanding spinal fractures  Your spine is made up of many bones called vertebrae. Osteoporosis can cause the vertebrae in your spine to collapse. As a result, your upper back may arch forward, creating a curvature. Spine fractures may also result from back strain and bad posture. You will also lose height. Your lower spine must then adjust to keep your body balanced. This can cause back pain. To prevent or lessen these spinal changes:    Practice good posture.    Use proper techniques if you need to lift heavy objects.    Do back exercises to help your posture.    Lie on your back when you have pain.    Ask your healthcare provider about these and other ways to help your spine.  Date Last Reviewed: 5/1/2018 2000-2019 [employer]. [address] [facility], [place] [address]. All rights reserved. This information is not intended as a substitute for professional medical care. Always follow your healthcare professional's instructions.

## 2021-06-18 NOTE — PROGRESS NOTES
ASSESSMENT/PLAN:  1. Type 2 diabetes mellitus with diabetic autonomic neuropathy, without long-term current use of insulin (H)  68-year-old with type 2 diabetes.  Hemoglobin A1c 7.8.  She is taking her metformin and her glipizide.  She is concerned that her blood sugars are occasionally in the 80s in the morning.  She states she feels fine.  She is reassured that these are normal blood sugars particularly if she is feeling well.  Will continue with her medications as they are.  A microalbumin is pending.  - Microalbumin, Random Urine  - Glycosylated Hemoglobin A1c    2. Hyponatremia  Reviewed records from her recent hospitalization and follow-up labs.  Her sodium at last check was normal but we will recheck that along with a magnesium.  I suspect these were lower just because of her significant dehydration and not medication related.  This is supported after visit with pharmacy.  - Sodium  - Magnesium    Diabetes Quality  Lab Results   Component Value Date    HGBA1C 7.8 (H) 06/25/2018     Lab Results   Component Value Date    MICROALBUR 0.87 06/13/2017     Lab Results   Component Value Date    LDLCALC 62 06/13/2017     BP Readings from Last 1 Encounters:   06/25/18 120/68     History   Smoking Status     Never Smoker   Smokeless Tobacco     Never Used       Statin: yes  Aspirin: yes    General Health Maintenance  Her does not have a DXA on file.  Her most recent mammogram was completed in 2017 and revealed no radiographic evidence of malignancy. She is to continue routine screening mammogram as recommended.   Her most recent Pap smear was completed in 2017 and was normal.   Her most recent colorectal cancer screening was completed with [facility] in 2018 and was negative. She is on a 3-year screening plan.      Patient Instructions   You can try eating more raw vegetables and fruits to help improve your constipation.   - You can try taking docusate sodium or Colace for constipation as well.    As long as your blood  sugars are >70, these readings are normal.    Continue taking your medications for diabetes at the same doses.       Orders Placed This Encounter   Procedures     Microalbumin, Random Urine     Glycosylated Hemoglobin A1c     Sodium     Magnesium     There are no discontinued medications.    Return in about 3 months (around 9/25/2018).    CHIEF COMPLAINT;  Chief Complaint   Patient presents with     Diabetes       HISTORY OF PRESENT ILLNESS:  Hai is a 68 y.o. female presenting to the clinic today for follow up regarding diabetes. She is accompanied to the clinic today by her daughter. An  is used during this visit.     Diabetes: Her hemoglobin A1c is 7.8 in clinic today. She continues on glipizide 2.5 mg daily and metformin 2,000 mg daily. She reports no adverse side effects of the medications. She does monitor her blood sugars at home and brings in her blood glucose monitor for review. She endorses occasional hypoglycemia in the mornings. She denies hunger, shaking, or weakness. She has been cutting her metformin in half as she felt her blood sugar readings were too low.     Hyponatremia: She was admitted to [facility] 4/25/18-4/27/18 for acute gastroenteritis, acute on chronic hyponatremia, acute renal failure, acute hypomagnesemia, and generalized weakness. She feels stronger. She continues on magnesium and calcium supplements daily. She denies diarrhea or emesis since her admission, but does endorse constipation and hard stools since her hospital admission. She has not tried taking Colace or another stool softener. She did see Sandra Whipple, PharmD 5/10/18 as she felt her medications were the cause of her hypomagnesemia and hyponatremia. This was discussed to not be the case. She does have a follow up appointment scheduled but would like to cancel this as she does not feel it is necessary.     REVIEW OF SYSTEMS:  PHQ-2 is done today in clinic: 0. She denies gastrointestinal symptoms. All other  systems are negative.    PFSH:  Reviewed, as below.    TOBACCO USE:  History   Smoking Status     Never Smoker   Smokeless Tobacco     Never Used       VITALS:  Vitals:    06/25/18 0902   BP: 120/68   Pulse: 62   Resp: 16   Weight: 121 lb (54.9 kg)     Wt Readings from Last 3 Encounters:   06/25/18 121 lb (54.9 kg)   05/10/18 121 lb 1.6 oz (54.9 kg)   05/02/18 119 lb 4.8 oz (54.1 kg)     Body mass index is 23.63 kg/(m^2).    PHYSICAL EXAM:  GENERAL APPEARANCE: Alert, cooperative, no distress, appears stated age  HEAD: Normocephalic, without obvious abnormality, atraumatic  LUNGS: Clear to auscultation bilaterally, respirations unlabored  CHEST WALL: No tenderness or deformity  HEART: Regular rate and rhythm, S1 and S2 normal, no murmur, rub or gallop  EXTREMITIES: Extremities normal, atraumatic, no cyanosis or edema  PULSES: 2+ and symmetric all extremities  NEUROLOGIC: CNII-XII intact.     RECENT RESULTS  Recent Results (from the past 48 hour(s))   Glycosylated Hemoglobin A1c    Collection Time: 06/25/18  9:06 AM   Result Value Ref Range    Hemoglobin A1c 7.8 (H) 3.5 - 6.0 %       ADDITIONAL HISTORY SUMMARIZED (2): Sarabjit note 5/10/18 reviewed, medications likely not the cause of hypomagnesemia, hyponatremia, neuropathy. [facility] discharge summary 4/27/18 reviewed, hypomagnesemia, hyponatremia, daily magnesium supplement 400 mg BID prescribed.  DECISION TO OBTAIN EXTRA INFORMATION (1): None.  RADIOLOGY TESTS (1): None.  LABS (1): Labs ordered today.  MEDICINE TESTS (1): None.  INDEPENDENT REVIEW (2 each): None.    The visit lasted a total of 14 minutes face to face with the patient. Over 50% of the time was spent counseling and educating the patient about diabetes and hyponatremia.    Valery BRITO, am scribing for and in the presence of, Dr. Barron.    I, Dr. Barron, personally performed the services described in this documentation, as scribed by Valery Shirley in my presence, and it is both accurate and  complete.    MEDICATIONS:  Current Outpatient Prescriptions   Medication Sig Dispense Refill     ACCU-CHEK LOIDA PLUS TEST STRP strips TEST TWICE A  strip 3     aspirin 81 MG EC tablet Take 81 mg by mouth daily.       gabapentin (NEURONTIN) 100 MG capsule Take 100 mg by mouth at bedtime.       generic lancets Use 1 each As Directed 2 (two) times a day. Dispense brand per patient's insurance at pharmacy discretion. (dx E11.8) 200 each 1     glipiZIDE (GLUCOTROL XL) 2.5 MG 24 hr tablet Take 1 tablet (2.5 mg total) by mouth daily. 90 tablet 4     magnesium oxide (MAG-OX) 400 mg tablet Take 1 tablet (400 mg total) by mouth 2 (two) times a day. 60 tablet 0     metFORMIN (GLUCOPHAGE) 1000 MG tablet Take 1,000 mg by mouth 2 (two) times a day with meals.       simvastatin (ZOCOR) 40 MG tablet Take 1 tablet (40 mg total) by mouth at bedtime. 90 tablet 0     No current facility-administered medications for this visit.        Total data points: 3

## 2021-06-18 NOTE — LETTER
Letter by Denia Barron MD at      Author: Denia Barron MD Service: -- Author Type: --    Filed:  Encounter Date: 1/3/2019 Status: (Other)       Hai Meade  [address] [place]  [facility] [address]             January 9, 2019         Dear Ms. Meade,    Below are the results from your recent visit:    Resulted Orders   Glycosylated Hemoglobin A1c   Result Value Ref Range    Hemoglobin A1c 7.7 (H) 3.5 - 6.0 %   Vitamin B12   Result Value Ref Range    Vitamin B-12 497 213 - 816 pg/mL   Lipid Cascade FASTING   Result Value Ref Range    Cholesterol 143 <=199 mg/dL    Triglycerides 148 <=149 mg/dL    HDL Cholesterol 61 >=50 mg/dL    LDL Calculated 52 <=129 mg/dL    Patient Fasting > 8hrs? Unknown        Your labs are within normal range.  Please let me know if you have questions.    Please call with questions or contact us using [employer].    Sincerely,        Electronically signed by Denia Barron MD

## 2021-06-19 DIAGNOSIS — E11.21 TYPE 2 DIABETES MELLITUS WITH DIABETIC NEPHROPATHY, WITH LONG-TERM CURRENT USE OF INSULIN (CMD): ICD-10-CM

## 2021-06-19 DIAGNOSIS — Z79.4 TYPE 2 DIABETES MELLITUS WITH DIABETIC NEPHROPATHY, WITH LONG-TERM CURRENT USE OF INSULIN (CMD): ICD-10-CM

## 2021-06-19 NOTE — LETTER
Letter by Denia Barron MD at      Author: Denia Barron MD Service: -- Author Type: --    Filed:  Encounter Date: 2019 Status: (Other)       Hai CASTRO [birth year]  7/18/19      Physician orders:     Glycerna 8 oz shakes     Directions: 1 can per day Qty: 30 Refills: 11        Diagnosis:    Weight loss R63.4        [facility]   Fax: [phone]                  Dr. Denia Mcclelland MD

## 2021-06-19 NOTE — LETTER
Letter by Denia Barron MD at      Author: Denia Barron MD Service: -- Author Type: --    Filed:  Encounter Date: 4/25/2019 Status: (Other)         Hai Meade  [address] [place] [address]               April 25, 2019    Dear Hai:    Our records indicate that you are due for a mammogram.    In the [place], one in nine women will develop breast cancer during their lifetime. While there is no way to prevent breast cancer, early detection provides the best opportunity for curing it.    For women over the age of 40, the American Cancer Society recommends a yearly clinical breast exam and a yearly mammogram. These practices have saved thousands of lives. We need your help to ensure that you are receiving optimal medical care.    Please make an appointment for a mammogram ([phone]) at your earliest convenience.    If your mammogram was completed at a facility outside of [facility], please have the facility forward the results over to our office.    Sincerely,      Denia Barron MD

## 2021-06-19 NOTE — LETTER
Letter by Denia Barron MD at      Author: Denia Barron MD Service: -- Author Type: --    Filed:  Encounter Date: 4/19/2019 Status: (Other)         Hai Meade  [address] [place] [address]             April 19, 2019         Dear Ms. Meade,    Below are the results from your recent visit:    Resulted Orders   Basic Metabolic Panel   Result Value Ref Range    Sodium 136 136 - 145 mmol/L    Potassium 4.1 3.5 - 5.0 mmol/L    Chloride 101 98 - 107 mmol/L    CO2 26 22 - 31 mmol/L    Anion Gap, Calculation 9 5 - 18 mmol/L    Glucose 193 (H) 70 - 125 mg/dL    Calcium 9.8 8.5 - 10.5 mg/dL    BUN 11 8 - 28 mg/dL    Creatinine 0.79 0.60 - 1.10 mg/dL    GFR MDRD Af Amer >60 >60 mL/min/1.73m2    GFR MDRD Non Af Amer >60 >60 mL/min/1.73m2    Narrative    Fasting Glucose reference range is 70-99 mg/dL per  American Diabetes Association (ADA) guidelines.       Your sodium levels are now back to normal.    Please call with questions or contact us using [employer].    Sincerely,        Electronically signed by Denia Barron MD

## 2021-06-19 NOTE — LETTER
Letter by Denia Barron MD at      Author: Denia Barron MD Service: -- Author Type: --    Filed:  Encounter Date: 10/17/2019 Status: Signed         Hai Meade  [address] [place] [address]             October 17, 2019         Dear Ms. Meade,    Below are the results from your recent visit:    Resulted Orders   Glycosylated Hemoglobin A1c   Result Value Ref Range    Hemoglobin A1c 7.1 (H) 3.5 - 6.0 %   Basic Metabolic Panel   Result Value Ref Range    Sodium 136 136 - 145 mmol/L    Potassium 4.1 3.5 - 5.0 mmol/L    Chloride 100 98 - 107 mmol/L    CO2 29 22 - 31 mmol/L    Anion Gap, Calculation 7 5 - 18 mmol/L    Glucose 122 70 - 125 mg/dL    Calcium 10.0 8.5 - 10.5 mg/dL    BUN 8 8 - 28 mg/dL    Creatinine 0.76 0.60 - 1.10 mg/dL    GFR MDRD Af Amer >60 >60 mL/min/1.73m2    GFR MDRD Non Af Amer >60 >60 mL/min/1.73m2    Narrative    Fasting Glucose reference range is 70-99 mg/dL per  American Diabetes Association (ADA) guidelines.   HM2(CBC w/o Differential)   Result Value Ref Range    WBC 5.0 4.0 - 11.0 thou/uL    RBC 4.24 3.80 - 5.40 mill/uL    Hemoglobin 12.2 12.0 - 16.0 g/dL    Hematocrit 36.7 35.0 - 47.0 %    MCV 87 80 - 100 fL    MCH 28.6 27.0 - 34.0 pg    MCHC 33.1 32.0 - 36.0 g/dL    RDW 13.1 11.0 - 14.5 %    Platelets 255 140 - 440 thou/uL    MPV 6.8 (L) 7.0 - 10.0 fL   Lipid Cascade FASTING   Result Value Ref Range    Cholesterol 129 <=199 mg/dL    Triglycerides 113 <=149 mg/dL    HDL Cholesterol 54 >=50 mg/dL    LDL Calculated 52 <=129 mg/dL    Patient Fasting > 8hrs? Yes        Your labs look great.     Please call with questions or contact us using [employer].    Sincerely,        Electronically signed by Denia Barron MD

## 2021-06-19 NOTE — LETTER
Letter by Denia Barron MD at      Author: Denia Barron MD Service: -- Author Type: --    Filed:  Encounter Date: 9/12/2019 Status: (Other)         September 12, 2019     Patient: Hai Meade   YOB: 1950   Date of Visit: 9/12/2019       To Whom it May Concern:    Hai Meade was seen in my clinic on 9/12/2019.  It is my opinion that Hai Meade is in adequate health to exercise control of a motor vehicle. Her diabetes is well controlled and she is not on insulin.     If you have any questions or concerns, please don't hesitate to call.    Sincerely,         Electronically signed by Denia Barron MD

## 2021-06-19 NOTE — LETTER
Letter by Denai Barron MD at      Author: Denia Barron MD Service: -- Author Type: --    Filed:  Encounter Date: 4/4/2019 Status: (Other)         Hai Meade  [address]  [place] [address]             April 4, 2019         Dear Ms. Meade,    Below are the results from your recent visit:    Resulted Orders   Glycosylated Hemoglobin A1c   Result Value Ref Range    Hemoglobin A1c 7.7 (H) 3.5 - 6.0 %       Your blood sugar levels are stable    Please call with questions or contact us using [employer].    Sincerely,        Electronically signed by Denia Barron MD

## 2021-06-20 NOTE — PROGRESS NOTES
ASSESSMENT/PLAN:  1. Type 2 diabetes mellitus with diabetic autonomic neuropathy, without long-term current use of insulin (H)  5-year-old with uncontrolled type 2 diabetes.  Her hemoglobin A1c came back at 8.0.  The blood sugar she showed me does show that she does have some blood sugars under 70 so I am hesitant to increase her glipizide.  We discussed options of either doing a 4-day continuous glucose monitoring study or increasing frequency of monitoring.  At this time she like to just increase the frequency of monitoring to get an idea of when her high blood sugars are.  We discussed strategies as far as changing portion sizes, changing food choices, or increasing activity if she finds that certain foods raise her blood sugars.  She does have a diabetic neuropathy.  It is most prominent in her left foot.  It is possible that her history history of treatment for cervical cancer could also be contributing to the neuropathy.  She gets very sleepy and tired with the gabapentin and is only able to take 100 mg at night.  I feel this is not enough to control her neuropathy.  We will stop this and try nortriptyline 10 mg.  She will likely need more than that at night but will start at a low dose.  If she is doing well on this and experiencing some benefits, will consider increasing.  Follow-up in 3 months, sooner if needed.  Additional workup included B12, ferritin, magnesium and CMP.  - Glycosylated Hemoglobin A1c  - Comprehensive Metabolic Panel  - Vitamin B12  - Ferritin  - Magnesium      Diabetes Quality  Lab Results   Component Value Date    HGBA1C 8.0 (H) 10/01/2018     Lab Results   Component Value Date    MICROALBUR 0.83 06/25/2018     Lab Results   Component Value Date    LDLCALC 62 06/13/2017     BP Readings from Last 1 Encounters:   10/01/18 104/68     History   Smoking Status     Never Smoker   Smokeless Tobacco     Never Used       Statin: yes  Aspirin: yes    Patient Instructions   Stop taking gabapentin.      We will try nortriptyline 10 mg at night for your nerve pain.   - Let me know if your pain worsens off of gabapentin  - We can increase the dose if it is not working to improve your pain    Acupuncture may be helpful for your nerve pain.   - This is typically not covered by insurance    Consider checking your blood sugars more throughout the day to see if there is correlation with certain foods and high blood sugar readings.      Orders Placed This Encounter   Procedures     Glycosylated Hemoglobin A1c     Comprehensive Metabolic Panel     Vitamin B12     Ferritin     Magnesium     Medications Discontinued During This Encounter   Medication Reason     simvastatin (ZOCOR) 40 MG tablet Duplicate order     gabapentin (NEURONTIN) 100 MG capsule      gabapentin (NEURONTIN) 100 MG capsule        Return in about 3 months (around 1/1/2019).    CHIEF COMPLAINT;  Chief Complaint   Patient presents with     Diabetes     tingling in feet       HISTORY OF PRESENT ILLNESS:  Hai is a 68 y.o. female presenting to the clinic with her son today for follow up regarding diabetes. An  is used for this visit.    Diabetes: Her recent hemoglobin A1c is 8.0% today. She continues on glipizide 2.5 mg daily and metformin 1,000 mg twice daily. She reports no adverse side effects of the medication. She does monitor her blood sugars at home and reports they have remained the same. She typically checks her blood sugars in the morning. She has had some low blood sugar readings of around 70. She denies symptoms when she has low blood sugar readings. She does check her blood sugar sometimes during the day after she eats and reports that the readings are over 200. She is not interested in a 4-day continuous glucose monitor. She feels that she monitors her diet and controls her portions. She does endorse increased pain and tingling in her feet, mostly under her left toes. This makes it difficult for her to walk. She states that it feels  like she needs to hop while walking to avoid discomfort. She denies back pain. She is worried about taking more gabapentin because of fogginess. She uses Icy Hot and feels it worsens the burning sensation. She did also try getting a massage and feels it did not help improve her pain. Her diabetic foot exam is done today.     REVIEW OF SYSTEMS:  She endorses an uneven gait. All other systems are negative.    PFSH:  Reviewed, as below.    TOBACCO USE:  History   Smoking Status     Never Smoker   Smokeless Tobacco     Never Used       VITALS:  Vitals:    10/01/18 0900   BP: 104/68   Pulse: (!) 58   Resp: 16   Weight: 123 lb (55.8 kg)     Wt Readings from Last 3 Encounters:   10/01/18 123 lb (55.8 kg)   06/25/18 121 lb (54.9 kg)   05/10/18 121 lb 1.6 oz (54.9 kg)     Body mass index is 24.02 kg/(m^2).    PHYSICAL EXAM:  GENERAL APPEARANCE: Alert, cooperative, no distress, appears stated age  HEAD: Normocephalic, without obvious abnormality, atraumatic  LUNGS: Clear to auscultation bilaterally, respirations unlabored  HEART: Regular rate and rhythm, S1 and S2 normal, no murmur, rub or gallop  FEET:  warm, without edema, no deformities, no skin lesions or skin breakdown, monofilament testing abnormal and pedal pulses palpable. Monofilament testing abnormal on bottom of left foot. Slightly diminished pedal pulse on left, good capillary refill.  PULSES: 2+ and symmetric all extremities  NEUROLOGIC: CNII-XII intact.    RECENT RESULTS  Recent Results (from the past 48 hour(s))   Glycosylated Hemoglobin A1c    Collection Time: 10/01/18  9:03 AM   Result Value Ref Range    Hemoglobin A1c 8.0 (H) 3.5 - 6.0 %       ADDITIONAL HISTORY SUMMARIZED (2): None.  DECISION TO OBTAIN EXTRA INFORMATION (1): None.  RADIOLOGY TESTS (1): None.  LABS (1): Labs ordered today.  MEDICINE TESTS (1): None.  INDEPENDENT REVIEW (2 each): None.    The visit lasted a total of 26 minutes face to face with the patient. Over 50% of the time was spent  counseling and educating the patient about diabetes and neuropathy.    I, Valery Shirley, am scribing for and in the presence of, Dr. Barron.    I, Dr. Barron, personally performed the services described in this documentation, as scribed by Valery Shirley in my presence, and it is both accurate and complete.    MEDICATIONS:  Current Outpatient Prescriptions   Medication Sig Dispense Refill     ACCU-CHEK LOIDA PLUS TEST STRP strips TEST TWICE A  strip 3     ACCU-CHEK SOFTCLIX LANCETS lancets TEST TWICE DAILY 200 each 1     aspirin 81 MG EC tablet Take 81 mg by mouth daily.       glipiZIDE (GLUCOTROL XL) 2.5 MG 24 hr tablet Take 1 tablet (2.5 mg total) by mouth daily. 90 tablet 4     magnesium oxide (MAG-OX) 400 mg tablet Take 1 tablet (400 mg total) by mouth 2 (two) times a day. 60 tablet 0     metFORMIN (GLUCOPHAGE) 1000 MG tablet Take 1,000 mg by mouth 2 (two) times a day with meals.       metFORMIN (GLUCOPHAGE) 1000 MG tablet TAKE 1 TABLET BY MOUTH TWICE DAILY WITH MEALS 180 tablet 0     simvastatin (ZOCOR) 40 MG tablet TAKE 1 TABLET(40 MG) BY MOUTH AT BEDTIME 90 tablet 0     nortriptyline (PAMELOR) 10 MG capsule Take 1 capsule (10 mg total) by mouth at bedtime. 30 capsule 2     No current facility-administered medications for this visit.        Total data points: 1

## 2021-06-21 NOTE — LETTER
Letter by Denia Barron MD at      Author: Denia Barron MD Service: -- Author Type: --    Filed:  Encounter Date: 11/3/2020 Status: (Other)         November 3, 2020     Patient: Hai Meade   YOB: 1950   Date of Visit: 11/3/2020       To Whom it May Concern:    Hai Meade was seen in my clinic on 11/3/2020.  Referral to Massage therapy  DX: G62.9    If you have any questions or concerns, please don't hesitate to call.    Sincerely,         Electronically signed by Denia Barron MD

## 2021-06-22 NOTE — PROGRESS NOTES
ASSESSMENT/PLAN:  1. Left foot pain  Soft tissue swelling around the lateral ankle.  I suspect she had some sprain or irritation in the tissue when she was trying to ride the stationary bike with a clip over her foot.  There is no signs of infection.  She is hesitant to walk on the ankle due to the swelling.  I recommend an ankle brace for the next 1-2 weeks.  She will elevate and use an ice pack as needed.  Would expect symptoms to improve over the next 1-2 weeks.  If they are worsening or any point her skin becomes red more swollen or she develops fevers, she needs to be seen immediately for further evaluation.  - XR Ankle Left 3 or More VWS; Future  - XR Foot Left 3 or More VWS; Future      Patient Instructions   Wear the ankle support brace for about 2 weeks.     Elevate your foot as much as you can.       Orders Placed This Encounter   Procedures     XR Ankle Left 3 or More VWS     Standing Status:   Future     Number of Occurrences:   1     Standing Expiration Date:   11/29/2019     Order Specific Question:   Can the procedure be changed per Radiologist protocol?     Answer:   Yes     XR Foot Left 3 or More VWS     Standing Status:   Future     Number of Occurrences:   1     Standing Expiration Date:   11/29/2019     Order Specific Question:   Can the procedure be changed per Radiologist protocol?     Answer:   Yes     Medications Discontinued During This Encounter   Medication Reason     metFORMIN (GLUCOPHAGE) 1000 MG tablet Duplicate order     Administrations This Visit     cyanocobalamin injection 1,000 mcg     Admin Date  11/29/2018 Action  Given Dose  1000 mcg Route  Intramuscular Administered By  Cindy Narayanan LPN              Return in about 6 weeks (around 1/10/2019) for diabetes follow up.    CHIEF COMPLAINT;  Chief Complaint   Patient presents with     Foot Swelling     LEFT times 2 weeks       HISTORY OF PRESENT ILLNESS:  Hai is a 68 y.o. female presenting to the clinic with her daughter today  for evaluation of foot pain. An  is used for this visit.     Left Foot Pain: She reports that her left ankle has been quite swollen for the last 2 weeks. She was on a stationary bike machine and then noticed a small lesion on the lateral side of her left foot. She does recall having to strain her foot a little bit while on the stationary bike because the foot strap was too large. She describes a tingling sensation that feels like pins and needles in the foot. She denies specific injury to the area, recently or in the past. It is difficult for her to walk on the foot. She cannot put too much pressure on the foot because she will experience some tightness, especially on the bottom of the foot. She does not feel the foot is as strong or stable as it used to. She has been taking OTC ibuprofen before bedtime. She is wondering if the swelling is due to water retention or infection. She denies recent fevers or calf pain.     REVIEW OF SYSTEMS:  She request a Vitamin B12 injection today. All other systems are negative.    PFSH:  Reviewed, as below.    TOBACCO USE:  Social History     Tobacco Use   Smoking Status Never Smoker   Smokeless Tobacco Never Used       VITALS:  Vitals:    11/29/18 1011   BP: 120/78   Pulse: 90   Resp: 18   Temp: 98.2  F (36.8  C)   Weight: 128 lb (58.1 kg)     Wt Readings from Last 3 Encounters:   11/29/18 128 lb (58.1 kg)   10/01/18 123 lb (55.8 kg)   06/25/18 121 lb (54.9 kg)     Body mass index is 25 kg/m .    PHYSICAL EXAM:  GENERAL APPEARANCE: Alert, cooperative, no distress, appears stated age  HEAD: Normocephalic, without obvious abnormality, atraumatic  LEFT FOOT: Lateral ankle edema, no significant pain with palpation or ankle movement, hesitant with weight bearing, small healed ulceration on lateral foot  NEUROLOGIC: CNII-XII intact.     XR Ankle: No evidence of fracture or infection.  XR Foot: No evidence of fracture or infection.    RECENT RESULTS  No results found for this  or any previous visit (from the past 48 hour(s)).      ADDITIONAL HISTORY SUMMARIZED (2): None.  DECISION TO OBTAIN EXTRA INFORMATION (1): None.  RADIOLOGY TESTS (1): XR Left Ankle and XR Left Foot ordered.  LABS (1): None.  MEDICINE TESTS (1): None.  INDEPENDENT REVIEW (2 each): XR Left Ankle reviewed. XR Left Foot reviewed.    The visit lasted a total of 18 minutes face to face with the patient. Over 50% of the time was spent counseling and educating the patient about left foot pain.    IValery, am scribing for and in the presence of, Dr. Barron.    I, Dr. Barron, personally performed the services described in this documentation, as scribed by Valery Shirley in my presence, and it is both accurate and complete.    Dragon dictation was used for this note.  Speech recognition errors are a possibility.    MEDICATIONS:  Current Outpatient Medications   Medication Sig Dispense Refill     ACCU-CHEK LOIDA PLUS TEST STRP strips TEST TWICE A  strip 3     ACCU-CHEK SOFTCLIX LANCETS lancets TEST TWICE DAILY 200 each 1     aspirin 81 MG EC tablet Take 81 mg by mouth daily.       glipiZIDE (GLUCOTROL XL) 2.5 MG 24 hr tablet Take 1 tablet (2.5 mg total) by mouth daily. 90 tablet 4     magnesium oxide (MAG-OX) 400 mg tablet Take 1 tablet (400 mg total) by mouth 2 (two) times a day. 60 tablet 0     metFORMIN (GLUCOPHAGE) 1000 MG tablet TAKE 1 TABLET BY MOUTH TWICE DAILY WITH MEALS 180 tablet 0     nortriptyline (PAMELOR) 10 MG capsule TAKE 1 CAPSULE(10 MG) BY MOUTH AT BEDTIME 90 capsule 2     simvastatin (ZOCOR) 40 MG tablet TAKE 1 TABLET(40 MG) BY MOUTH AT BEDTIME 90 tablet 0     Current Facility-Administered Medications   Medication Dose Route Frequency Provider Last Rate Last Dose     cyanocobalamin injection 1,000 mcg  1,000 mcg Intramuscular Q30 Days Denia Barron MD   1,000 mcg at 11/29/18 1104       Total data points: 5

## 2021-06-22 NOTE — PROGRESS NOTES
ASSESSMENT/PLAN:  1. Type 2 diabetes mellitus with diabetic autonomic neuropathy, without long-term current use of insulin (H)  Hemoglobin A1c is improved to 7.7.  She does have a diabetic neuropathy.  This is compounded by her vitamin B12 deficiency.  We switch her from the nortriptyline which she did not tolerate back to gabapentin 100 mg at night.  She states this seemed to work better for her.  She is due for a lipid cascade and a new glucometer.  Prescription is sent for new glucometer.  Follow-up in 3 months.  - Glycosylated Hemoglobin A1c  - Lipid Cascade FASTING  - Glucose monitor    2. Vitamin B12 deficiency (non anemic)  - Vitamin B12    Diabetes Quality  Lab Results   Component Value Date    HGBA1C 7.7 (H) 01/03/2019     Lab Results   Component Value Date    MICROALBUR 0.83 06/25/2018     Lab Results   Component Value Date    LDLCALC 62 06/13/2017     BP Readings from Last 1 Encounters:   01/03/19 120/62     Social History     Tobacco Use   Smoking Status Never Smoker   Smokeless Tobacco Never Used       Statin: yes  Aspirin: yes    Patient Instructions   Continue with Vitamin B12 injections every month.    Monitor your feet for skin breakdown or lesions.     Wear supportive shoes.     Prescriptions are sent for gabapentin and a new glucometer.      Orders Placed This Encounter   Procedures     Glucose monitor     Glycosylated Hemoglobin A1c     Vitamin B12     Lipid Cascade FASTING     Order Specific Question:   Fasting is required?     Answer:   Yes     There are no discontinued medications.  Administrations This Visit     cyanocobalamin injection 1,000 mcg     Admin Date  01/03/2019 Action  Given Dose  1000 mcg Route  Intramuscular Administered By  Keely Steawrt CMA              Return in about 3 months (around 4/3/2019) for diabetes follow up.    CHIEF COMPLAINT;  Chief Complaint   Patient presents with     Follow-up     3 month diabetic check        HISTORY OF PRESENT ILLNESS:  Hai is a 68  y.o. female presenting to the clinic with her daughter today for follow up regarding diabetes. An  is used for this visit.     Diabetes: Her hemoglobin A1c is 7.7% today in clinic, compared to 8.0% 10/1/18. She continues on glipizide 2.5 mg daily and metformin 1,000 mg twice daily. She reports no adverse side effects of the medications. She has been monitoring her blood sugars. She sometimes gets high blood sugar readings and sometimes gets lower blood sugar readings.     Foot Pain: She endorses persistent left foot pain. She describes a tingling sensation. She endorses numbness in her left great toe. She experiences pain with walking that does get better the more she walks. She reports that she is getting fevers after taking nortriptyline 10 mg. She is hoping to switch back to gabapentin 100 mg. She states that she plans to take 1 tablet gabapentin daily before bed. She does feel that the vitamin B12 injections help improve her foot pain.     Vitamin B12 Deficiency: She did receive her vitamin B12 injection today.     REVIEW OF SYSTEMS:  MSK positive for tingling in left foot. All other systems are negative.    PFSH:  Reviewed, as below.    TOBACCO USE:  Social History     Tobacco Use   Smoking Status Never Smoker   Smokeless Tobacco Never Used       VITALS:  Vitals:    01/03/19 0918   BP: 120/62   Patient Site: Left Arm   Patient Position: Sitting   Cuff Size: Adult Regular   Pulse: (!) 56   SpO2: 98%   Weight: 125 lb 11.2 oz (57 kg)     Wt Readings from Last 3 Encounters:   01/03/19 125 lb 11.2 oz (57 kg)   11/29/18 128 lb (58.1 kg)   10/01/18 123 lb (55.8 kg)     Body mass index is 24.55 kg/m .    PHYSICAL EXAM:  GENERAL APPEARANCE: Alert, cooperative, no distress, appears stated age  HEAD: Normocephalic, without obvious abnormality, atraumatic  LUNGS: Clear to auscultation bilaterally, respirations unlabored  HEART: Regular rate and rhythm, S1 and S2 normal, no murmur, rub or gallop  FEET:  warm,  without edema, no deformities, no skin lesions or skin breakdown and pedal pulses palpable.    NEUROLOGIC: CNII-XII intact.    RECENT RESULTS  Recent Results (from the past 48 hour(s))   Glycosylated Hemoglobin A1c    Collection Time: 01/03/19  9:26 AM   Result Value Ref Range    Hemoglobin A1c 7.7 (H) 3.5 - 6.0 %       ADDITIONAL HISTORY SUMMARIZED (2): None.  DECISION TO OBTAIN EXTRA INFORMATION (1): None.  RADIOLOGY TESTS (1): None.  LABS (1): Labs ordered today.  MEDICINE TESTS (1): None.  INDEPENDENT REVIEW (2 each): None.    The visit lasted a total of 10 minutes face to face with the patient. Over 50% of the time was spent counseling and educating the patient about diabetes, foot pain, and vitamin B12 deficiency.    IValery, am scribing for and in the presence of, Dr. Barron.    I, Dr. Barron, personally performed the services described in this documentation, as scribed by Valery Shirley in my presence, and it is both accurate and complete.    MEDICATIONS:  Current Outpatient Medications   Medication Sig Dispense Refill     ACCU-CHEK LOIDA PLUS TEST STRP strips TEST TWICE A  strip 3     ACCU-CHEK SOFTCLIX LANCETS lancets TEST TWICE DAILY 200 each 1     aspirin 81 MG EC tablet Take 81 mg by mouth daily.       glipiZIDE (GLUCOTROL XL) 2.5 MG 24 hr tablet Take 1 tablet (2.5 mg total) by mouth daily. 90 tablet 4     magnesium oxide (MAG-OX) 400 mg tablet Take 1 tablet (400 mg total) by mouth 2 (two) times a day. 60 tablet 0     metFORMIN (GLUCOPHAGE) 1000 MG tablet TAKE 1 TABLET BY MOUTH TWICE DAILY WITH MEALS 180 tablet 0     simvastatin (ZOCOR) 40 MG tablet TAKE 1 TABLET(40 MG) BY MOUTH AT BEDTIME 90 tablet 0     gabapentin (NEURONTIN) 100 MG capsule Take 1 tablet po qhs 30 capsule 6     nortriptyline (PAMELOR) 10 MG capsule TAKE 1 CAPSULE(10 MG) BY MOUTH AT BEDTIME 90 capsule 2     Current Facility-Administered Medications   Medication Dose Route Frequency Provider Last Rate Last Dose      cyanocobalamin injection 1,000 mcg  1,000 mcg Intramuscular Q30 Days Denia Barron MD   1,000 mcg at 01/03/19 0959       Total data points: 1

## 2021-06-22 NOTE — PATIENT INSTRUCTIONS - HE
Continue with Vitamin B12 injections every month.    Monitor your feet for skin breakdown or lesions.     Wear supportive shoes.     Prescriptions are sent for gabapentin and a new glucometer.

## 2021-06-24 NOTE — TELEPHONE ENCOUNTER
RN cannot approve Refill Request -Gabapentin    RN can NOT refill this medication med is not covered by policy/route to provider   RNs may only renew gabapentin for epilepsy or seizures.  Last refill 1/3/2019 for 30/6  Last OV 1/3/2019  Patient requests 90 day supply    . Last office visit: 1/3/2019 Denia Barron MD Last Physical: Visit date not found Last MTM visit: Visit date not found Last visit same specialty: 1/3/2019 Denia Barron MD.  Next visit within 3 mo: Visit date not found  Next physical within 3 mo: Visit date not found      Delia WALSH Doroteo, [facility] Triage/Med Refill 3/8/2019    Requested Prescriptions   Pending Prescriptions Disp Refills     gabapentin (NEURONTIN) 100 MG capsule [Pharmacy Med Name: GABAPENTIN 100MG CAPSULES] 90 capsule 6     Sig: TAKE 1 CAPSULE BY MOUTH EVERY NIGHT AT BEDTIME    Gabapentin/Levetiracetam/Tiagabine Refill Protocol  Passed - 3/8/2019  3:03 AM       Passed - PCP or prescribing provider visit in past 12 months or next 3 months    Last office visit with prescriber/PCP: 1/3/2019 Denia Barron MD OR same dept: 1/3/2019 Denia Barron MD OR same specialty: 1/3/2019 Denia Barron MD  Last physical: Visit date not found Last MTM visit: Visit date not found   Next visit within 3 mo: Visit date not found  Next physical within 3 mo: Visit date not found  Prescriber OR PCP: Denia Barron MD  Last diagnosis associated with med order: 1. Diabetes (H)  - metFORMIN (GLUCOPHAGE) 1000 MG tablet; Take 1 tablet (1,000 mg total) by mouth 2 (two) times a day with meals.  Dispense: 180 tablet; Refill: 3    2. Type 2 diabetes mellitus (H)  - ACCU-CHEK LOIDA PLUS TEST STRP strips; TEST TWICE A DAY  Dispense: 200 strip; Refill: 3    If protocol passes may refill for 12 months if within 3 months of last provider visit (or a total of 15 months).           Signed Prescriptions Disp Refills     metFORMIN (GLUCOPHAGE) 1000 MG tablet 180 tablet 3     Sig:  Take 1 tablet (1,000 mg total) by mouth 2 (two) times a day with meals.    Metformin Refill Protocol Passed - 3/8/2019  3:03 AM       Passed - Blood pressure in last 12 months    BP Readings from Last 1 Encounters:   01/03/19 120/62            Passed - LFT or AST or ALT in last 12 months    Albumin   Date Value Ref Range Status   10/01/2018 4.1 3.5 - 5.0 g/dL Final     Bilirubin, Total   Date Value Ref Range Status   10/01/2018 0.9 0.0 - 1.0 mg/dL Final     Bilirubin, Direct   Date Value Ref Range Status   04/22/2018 0.3 <=0.5 mg/dL Final     Alkaline Phosphatase   Date Value Ref Range Status   10/01/2018 62 45 - 120 U/L Final     AST   Date Value Ref Range Status   10/01/2018 24 0 - 40 U/L Final     ALT   Date Value Ref Range Status   10/01/2018 22 0 - 45 U/L Final     Protein, Total   Date Value Ref Range Status   10/01/2018 6.8 6.0 - 8.0 g/dL Final               Passed - GFR or Serum Creatinine in last 6 months    GFR MDRD Non Af Amer   Date Value Ref Range Status   10/01/2018 >60 >60 mL/min/1.73m2 Final     GFR MDRD Af Amer   Date Value Ref Range Status   10/01/2018 >60 >60 mL/min/1.73m2 Final            Passed - Visit with PCP or prescribing provider visit in last 6 months or next 3 months    Last office visit with prescriber/PCP: 1/3/2019 OR same dept: 1/3/2019 Denia aBrron MD OR same specialty: 1/3/2019 Denia Barron MD Last physical: Visit date not found Last MTM visit: Visit date not found         Next appt within 3 mo: Visit date not found  Next physical within 3 mo: Visit date not found  Prescriber OR PCP: Denia Barron MD  Last diagnosis associated with med order: 1. Diabetes (H)  - metFORMIN (GLUCOPHAGE) 1000 MG tablet; Take 1 tablet (1,000 mg total) by mouth 2 (two) times a day with meals.  Dispense: 180 tablet; Refill: 3    2. Type 2 diabetes mellitus (H)  - ACCU-CHEK LOIDA PLUS TEST STRP strips; TEST TWICE A DAY  Dispense: 200 strip; Refill: 3     If protocol passes may refill  for 12 months if within 3 months of last provider visit (or a total of 15 months).          Passed - A1C in last 6 months    Hemoglobin A1c   Date Value Ref Range Status   01/03/2019 7.7 (H) 3.5 - 6.0 % Final              Passed - Microalbumin in last year     Microalbumin, Random Urine   Date Value Ref Range Status   06/25/2018 0.83 0.00 - 1.99 mg/dL Final                  ACCU-CHEK LOIDA PLUS TEST STRP strips 200 strip 3     Sig: TEST TWICE A DAY    Diabetic Supplies Refill Protocol Passed - 3/8/2019  3:03 AM       Passed - Visit with PCP or prescribing provider visit in last 6 months    Last office visit with prescriber/PCP: 1/3/2019 Denia Barron MD OR same dept: 1/3/2019 Denia Barron MD OR same specialty: 1/3/2019 Denia Barron MD  Last physical: Visit date not found Last MTM visit: Visit date not found   Next visit within 3 mo: Visit date not found  Next physical within 3 mo: Visit date not found  Prescriber OR PCP: Denia Barron MD  Last diagnosis associated with med order: 1. Diabetes (H)  - metFORMIN (GLUCOPHAGE) 1000 MG tablet; Take 1 tablet (1,000 mg total) by mouth 2 (two) times a day with meals.  Dispense: 180 tablet; Refill: 3    2. Type 2 diabetes mellitus (H)  - ACCU-CHEK LOIDA PLUS TEST STRP strips; TEST TWICE A DAY  Dispense: 200 strip; Refill: 3    If protocol passes may refill for 12 months if within 3 months of last provider visit (or a total of 15 months).            Passed - A1C in last 6 months    Hemoglobin A1c   Date Value Ref Range Status   01/03/2019 7.7 (H) 3.5 - 6.0 % Final

## 2021-06-24 NOTE — TELEPHONE ENCOUNTER
Refill Approved - metformin and strips only    Rx renewed per Medication Renewal Policy. Medication was last renewed on:  Metformin on 9/4/2018 for 180/0; accu-check strips for 200/1  Last OV 1/3/2019    Delia Sadler, [facility] Triage/Med Refill 3/8/2019     Requested Prescriptions   Pending Prescriptions Disp Refills     metFORMIN (GLUCOPHAGE) 1000 MG tablet [Pharmacy Med Name: METFORMIN 1000MG TABLETS] 180 tablet 0     Sig: TAKE 1 TABLET BY MOUTH TWICE DAILY WITH MEALS    Metformin Refill Protocol Passed - 3/8/2019  2:59 AM       Passed - Blood pressure in last 12 months    BP Readings from Last 1 Encounters:   01/03/19 120/62            Passed - LFT or AST or ALT in last 12 months    Albumin   Date Value Ref Range Status   10/01/2018 4.1 3.5 - 5.0 g/dL Final     Bilirubin, Total   Date Value Ref Range Status   10/01/2018 0.9 0.0 - 1.0 mg/dL Final     Bilirubin, Direct   Date Value Ref Range Status   04/22/2018 0.3 <=0.5 mg/dL Final     Alkaline Phosphatase   Date Value Ref Range Status   10/01/2018 62 45 - 120 U/L Final     AST   Date Value Ref Range Status   10/01/2018 24 0 - 40 U/L Final     ALT   Date Value Ref Range Status   10/01/2018 22 0 - 45 U/L Final     Protein, Total   Date Value Ref Range Status   10/01/2018 6.8 6.0 - 8.0 g/dL Final               Passed - GFR or Serum Creatinine in last 6 months    GFR MDRD Non Af Amer   Date Value Ref Range Status   10/01/2018 >60 >60 mL/min/1.73m2 Final     GFR MDRD Af Amer   Date Value Ref Range Status   10/01/2018 >60 >60 mL/min/1.73m2 Final            Passed - Visit with PCP or prescribing provider visit in last 6 months or next 3 months    Last office visit with prescriber/PCP: 1/3/2019 OR same dept: 1/3/2019 Denia Barron MD OR same specialty: 1/3/2019 Denia Barron MD Last physical: Visit date not found Last MTM visit: Visit date not found         Next appt within 3 mo: Visit date not found  Next physical within 3 mo: Visit date not  found  Prescriber OR PCP: Denia Barron MD  Last diagnosis associated with med order: 1. Diabetes (H)  - metFORMIN (GLUCOPHAGE) 1000 MG tablet [Pharmacy Med Name: METFORMIN 1000MG TABLETS]; TAKE 1 TABLET BY MOUTH TWICE DAILY WITH MEALS  Dispense: 180 tablet; Refill: 0    2. Type 2 diabetes mellitus (H)  - ACCU-CHEK LOIDA PLUS TEST STRP strips [Pharmacy Med Name: ACCU-CHEK LOIDA PLUS STRIPS 100'S]; TEST TWICE A DAY  Dispense: 200 strip; Refill: 0     If protocol passes may refill for 12 months if within 3 months of last provider visit (or a total of 15 months).          Passed - A1C in last 6 months    Hemoglobin A1c   Date Value Ref Range Status   01/03/2019 7.7 (H) 3.5 - 6.0 % Final              Passed - Microalbumin in last year     Microalbumin, Random Urine   Date Value Ref Range Status   06/25/2018 0.83 0.00 - 1.99 mg/dL Final                  ACCU-CHEK LOIDA PLUS TEST STRP strips [Pharmacy Med Name: ACCU-CHEK LOIDA PLUS STRIPS 100'S] 200 strip 0     Sig: TEST TWICE A DAY    Diabetic Supplies Refill Protocol Passed - 3/8/2019  2:59 AM       Passed - Visit with PCP or prescribing provider visit in last 6 months    Last office visit with prescriber/PCP: 1/3/2019 Denia Barron MD OR same dept: 1/3/2019 Denia Barron MD OR same specialty: 1/3/2019 Denia Barron MD  Last physical: Visit date not found Last [facility] visit: Visit date not found   Next visit within 3 mo: Visit date not found  Next physical within 3 mo: Visit date not found  Prescriber OR PCP: Denia Barron MD  Last diagnosis associated with med order: 1. Diabetes (H)  - metFORMIN (GLUCOPHAGE) 1000 MG tablet [Pharmacy Med Name: METFORMIN 1000MG TABLETS]; TAKE 1 TABLET BY MOUTH TWICE DAILY WITH MEALS  Dispense: 180 tablet; Refill: 0    2. Type 2 diabetes mellitus (H)  - ACCU-CHEK LOIDA PLUS TEST STRP strips [Pharmacy Med Name: ACCU-CHEK LOIDA PLUS STRIPS 100'S]; TEST TWICE A DAY  Dispense: 200 strip; Refill: 0    If protocol  passes may refill for 12 months if within 3 months of last provider visit (or a total of 15 months).            Passed - A1C in last 6 months    Hemoglobin A1c   Date Value Ref Range Status   01/03/2019 7.7 (H) 3.5 - 6.0 % Final               gabapentin (NEURONTIN) 100 MG capsule [Pharmacy Med Name: GABAPENTIN 100MG CAPSULES] 90 capsule 6     Sig: TAKE 1 CAPSULE BY MOUTH EVERY NIGHT AT BEDTIME    Gabapentin/Levetiracetam/Tiagabine Refill Protocol  Passed - 3/8/2019  2:59 AM       Passed - PCP or prescribing provider visit in past 12 months or next 3 months    Last office visit with prescriber/PCP: 1/3/2019 Denia Barron MD OR same dept: 1/3/2019 Denia Barron MD OR same specialty: 1/3/2019 Denia Barron MD  Last physical: Visit date not found Last MTM visit: Visit date not found   Next visit within 3 mo: Visit date not found  Next physical within 3 mo: Visit date not found  Prescriber OR PCP: Denia Barron MD  Last diagnosis associated with med order: 1. Diabetes (H)  - metFORMIN (GLUCOPHAGE) 1000 MG tablet [Pharmacy Med Name: METFORMIN 1000MG TABLETS]; TAKE 1 TABLET BY MOUTH TWICE DAILY WITH MEALS  Dispense: 180 tablet; Refill: 0    2. Type 2 diabetes mellitus (H)  - ACCU-CHEK LOIDA PLUS TEST STRP strips [Pharmacy Med Name: ACCU-CHEK LOIDA PLUS STRIPS 100'S]; TEST TWICE A DAY  Dispense: 200 strip; Refill: 0    If protocol passes may refill for 12 months if within 3 months of last provider visit (or a total of 15 months).

## 2021-06-25 NOTE — TELEPHONE ENCOUNTER
Refill Approved    Rx renewed per Medication Renewal Policy. Medication was last renewed on 7/16/20.    Yan Heath, [facility] Triage/Med Refill 6/4/2021     Requested Prescriptions   Pending Prescriptions Disp Refills     gabapentin (NEURONTIN) 100 MG capsule 90 capsule 2     Sig: TAKE ONE CAPSULE BY MOUTH EVERY NIGHT AT BEDTIME       Gabapentin/Levetiracetam/Tiagabine Refill Protocol  Passed - 6/3/2021  3:56 PM        Passed - PCP or prescribing provider visit in past 12 months or next 3 months     Last office visit with prescriber/PCP: 6/13/2019 Roslyn Olivas MD OR same dept: 5/14/2021 Sam Kim MD OR same specialty: 5/14/2021 Sam Kim MD  Last physical: Visit date not found Last MTM visit: Visit date not found   Next visit within 3 mo: Visit date not found  Next physical within 3 mo: Visit date not found  Prescriber OR PCP: Roslyn Olivas MD  Last diagnosis associated with med order: 1. Peripheral polyneuropathy  - gabapentin (NEURONTIN) 100 MG capsule; TAKE ONE CAPSULE BY MOUTH EVERY NIGHT AT BEDTIME  Dispense: 90 capsule; Refill: 2    If protocol passes may refill for 12 months if within 3 months of last provider visit (or a total of 15 months).

## 2021-06-25 NOTE — TELEPHONE ENCOUNTER
RN cannot approve Refill Request    RN can NOT refill this medication med is not covered by policy/route to provider. Last office visit: 4/5/2021 Denia Barron MD Last Physical: Visit date not found Last MTM visit: Visit date not found Last visit same specialty: 5/14/2021 Sam Kim MD.  Next visit within 3 mo: Visit date not found  Next physical within 3 mo: Visit date not found      Philomena Norris, [facility] Triage/Med Refill 6/15/2021    Requested Prescriptions   Pending Prescriptions Disp Refills     calcium carbonate-vitamin D3 500 mg(1,250mg) -400 unit per tablet [Pharmacy Med Name: CALCIUM 500MG W/ VIT D CHW TABLETS] 180 tablet 4     Sig: CHEW 1 TABLET TWICE DAILY       There is no refill protocol information for this order

## 2021-06-27 NOTE — PROGRESS NOTES
Progress Notes by Digna Franco MD at 6/11/2019 11:00 AM     Author: Digna Franco MD Service: -- Author Type: Physician    Filed: 6/11/2019  8:44 PM Encounter Date: 6/11/2019 Status: Signed    : Digna Franco MD (Physician)         Subjective:   Hai Meade is a 68 y.o. female  Roomed by: STEFANO Edwards    Accompanied by Son Ramo   Refills needed? No    Do you have any forms that need to be filled out? No     services provided by: Family/Friend Ramo     Chief Complaint   Patient presents with   ? Rash     redness, itchy , burning x  4 days   ? Foot Swelling   Mother says that on 6/6 she dropped some hot water on the top of her left foot.  States that the next day she put some Neosporin on that area.  States that she noticed a red rash around the left foot and on the around her ankle and lower leg that was itchy.  Says that in the last day she noticed a little bit of itchiness on the front part of her lower right leg.  She denies being outside in bare feet and her son says that there backyard does not have poison ivy that he knows of.  Patient says she has not gotten this before.  She denies any chest pain or shortness of breath.    review of Systems  See HPI for ROS, otherwise balance of other systems negative    Allergies   Allergen Reactions   ? Cymbalta [Duloxetine]      Nausea and vomiting         Current Outpatient Medications:   ?  gabapentin (NEURONTIN) 100 MG capsule, TAKE 1 CAPSULE BY MOUTH EVERY NIGHT AT BEDTIME, Disp: 90 capsule, Rfl: 6  ?  glipiZIDE (GLUCOTROL XL) 2.5 MG 24 hr tablet, Take 1 tablet (2.5 mg total) by mouth daily., Disp: 90 tablet, Rfl: 4  ?  metFORMIN (GLUCOPHAGE) 1000 MG tablet, Take 1 tablet (1,000 mg total) by mouth 2 (two) times a day with meals., Disp: 180 tablet, Rfl: 3  ?  simvastatin (ZOCOR) 40 MG tablet, TAKE 1 TABLET(40 MG) BY MOUTH AT BEDTIME, Disp: 90 tablet, Rfl: 0  ?  ACCU-CHEK LOIDA PLUS TEST STRP strips, TEST TWICE A DAY, Disp: 200 strip, Rfl:  3  ?  ACCU-CHEK SOFTCLIX LANCETS lancets, TEST TWICE DAILY, Disp: 200 each, Rfl: 1  ?  aspirin 81 MG EC tablet, Take 81 mg by mouth daily., Disp: , Rfl:     Current Facility-Administered Medications:   ?  cyanocobalamin injection 1,000 mcg, 1,000 mcg, Intramuscular, Q30 Days, Denia Barron MD, 1,000 mcg at 06/11/19 0826  Patient Active Problem List   Diagnosis   ? Anemia   ? Joint Pain, Localized In The Knee   ? Type 2 diabetes mellitus with diabetic autonomic neuropathy, without long-term current use of insulin (H)   ? Trigger Finger Of The Right Thumb   ? Cholelithiasis   ? Cervical Cancer   ? Vision Problems   ? Urinary incontinence   ? Hyperlipidemia   ? Hyponatremia   ? Nausea and vomiting   ? Hypomagnesemia   ? Vertigo   ? Generalized muscle weakness   ? Peripheral neuropathy     Past Medical History:   Diagnosis Date   ? Anemia    ? Cervical cancer (H)    ? Cholelithiasis    ? Diabetes mellitus, type II (H)    ? Hyperlipidemia    ? Small bowel obstruction (H)    ? Vision problems     - if none on file, see Problem List    Objective:     Vitals:    06/11/19 1114 06/11/19 1119   BP: 144/80 153/78   Patient Site: Right Arm    Patient Position: Sitting    Cuff Size: Infant    Pulse: 68    Resp: 18    Temp: 98.5  F (36.9  C)    TempSrc: Oral    SpO2: 98%    Weight: 121 lb (54.9 kg)    General-patient is no apparent distress  Skin- left foot/lower left leg notes a diffuse mildly papular erythematous rash over the anterior and posterior of the ankle/left lower leg and part of the dorsum distal dorsal area of her left foot.  In the area where patient said that she spilled some hot water there was no blistering, only some papular erythema  Right lower leg-anterior portion notes about 2 less than 1 mm erythematous macular areas          Assessment - Plan   Medical Decision Making -68-year-old woman presents with a rash on her left lower leg and foot that started after she had put some hot water in her left foot.   She did put some Neosporin the area of the burn and then the next day noticed a red rash that was more proximal to the area of the burn that was itching and red.  On exam she does have some diffuse papular erythema over her left lower leg and over the dorsum of her left foot that is not consistent with the distribution of shingles.  Discussed the patient that her rash looks more like a contact dermatitis.  The family said that they are not in contact with any poison ivy.  The area of patient's burn looks like a first-degree burn.  Patient did have elevated blood pressure x2.  Gave patient some 2-1/2% hydrocortisone cream and will have her follow-up with primary care on 6/13.    1. Contact dermatitis, unspecified contact dermatitis type, unspecified trigger  - hydrocortisone 2.5 % cream; Apply 0.5 grams to area of itching on lower legs twice a day as needed for itchy rash  Dispense: 30 g; Refill: 0  - Nursing communication    2. Burn, first degree    3. Elevated blood pressure reading without diagnosis of hypertension    At the conclusion of the encounter, assessment and plan were discussed.   All questions were answered.   The patient or guardian acknowledged understanding and was involved in the decision making regarding the overall care plan.    Patient Instructions   1. Keep appointment for follow up with primary care provider  2. No need to apply any other creams to her left foot and leg  3. If symptoms are getting worse over time or you have any questions, call the clinic number - it's answered 24/7

## 2021-06-29 NOTE — PROGRESS NOTES
Progress Notes by Denia Barron MD at 7/16/2020  9:30 AM     Author: Denia Barron MD Service: -- Author Type: Physician    Filed: 7/26/2020 11:02 AM Encounter Date: 7/16/2020 Status: Signed    : Denia Barron MD (Physician)       ASSESSMENT/PLAN:  1. Type 2 diabetes mellitus with diabetic autonomic neuropathy, without long-term current use of insulin (H)  Elevation of hgb A1c- will work on diet, declines medication changes.  Diabetic neuropathy present taking gabapentin.  Checking BS once daily.  REcommend BP cuff for closer monitoring of BP. Normal microalbumin  - Basic Metabolic Panel  - Glycosylated Hemoglobin A1c  - blood pressure monitor Kit; Please dispense 1 Blood pressure kit of pts choice  Dispense: 1 each; Refill: 0    2. Screen for colon cancer  - Ambulatory referral for Colonoscopy    3. Age-related osteoporosis without current pathological fracture  Calcium, vit D and weight bearing exercise. Discussed starting fosamax however she declines at this time. Will continue conversation.  - cholecalciferol, vitamin D3, 25 mcg (1,000 unit) capsule; Take 1 capsule (1,000 Units total) by mouth daily.  Dispense: 90 capsule; Refill: 4    4. Peripheral polyneuropathy  Seeing PT and having EMG- getting fitted for ankle braces for support  - gabapentin (NEURONTIN) 100 MG capsule; TAKE ONE CAPSULE BY MOUTH EVERY NIGHT AT BEDTIME  Dispense: 90 capsule; Refill: 2      Dragon dictation was used for this note.  Speech recognition errors are a possibility.    Return in about 3 months (around 10/16/2020).  Patient Instructions       Blood pressure goal of less than 140/90      Osteoporosis Medicines: Bisphosphonates  Depending on your needs, your healthcare provider may prescribe medicines to prevent or treat osteoporosis.    Bisphosphonates  Several medicines make up the class of drugs known as bisphosphonates. Bisphosphonates are the most common type of medicine used to help prevent and treat  bone loss. Bisphosphonates are given in pill or injectable form as an IV infusion. They must be taken exactly as directed. Benefits may include:    Reducing bone loss    Increasing bone density in the hip and spine    Reducing risk of fractures in the spine, hip, and wrist  Side effects may include:    Heartburn    Nausea    Belly (abdominal) pain    Bone or muscle pain  Taking bisphosphonates pills  Always read medicine information closely. You should not take bisphosphonates if you currently have upper gastrointestinal disease. Certain bisphosphonates must be taken:    On an empty stomach.    With a full glass of water (8 oz.) first thing in the morning.    At least 30 minutes to 1 hour before any food, drink, or other medicines.    While sitting or standing. You should not lie down for at least 30 minutes after taking the medicine.  Newer medicines can be taken weekly or monthly. Talk with your healthcare provider to find out which one is right for you.   Date Last Reviewed: 5/1/2018 2000-2019 [employer]. [address] [place] [address]. All rights reserved. This information is not intended as a substitute for professional medical care. Always follow your healthcare professional's instructions.           Living with Osteoporosis: Regular Exercise  If you have osteoporosis, exercise is vital for your health. It can prevent bone fractures and spine changes. It will slow bone loss. Exercise will strengthen your body. It can also be fun. A variety of exercises is best. See below for exercises that can help you. But before you start, talk with your healthcare provider to be sure these exercises are right for you.    Resistance exercises. These build muscle strength and maintain bone mass. They also make you less prone to injury. Exercises include lifting small weights, doing push-ups and sit-ups, using elastic exercise bands, and using weight machines.  Weight-bearing activities. These help  your whole body. They also help you maintain bone mass. Activities include walking, dancing, and housework.  Non-weight-bearing exercises. These help prevent back strain and pain. They do this by building the trunk and leg muscles. Exercises that help with flexibility can prevent falls. Examples include swimming, water exercise, and stretching.  Staying safe  Here are tips to stay safe:     Always check with your healthcare provider before starting any new exercise program.    Use weights only as instructed.    Stop any exercise that causes pain.   Date Last Reviewed: 5/1/2018 2000-2019 [employer]. [address] [place] [id number]. All rights reserved. This information is not intended as a substitute for professional medical care. Always follow your healthcare professional's instructions.           Living with Osteoporosis: Preventing Fractures  If you have osteoporosis, you can do a lot to reduce its effect on your life. Knowing how to prevent fractures and spinal curvature can help you live more comfortably and safely with this disease.    Reducing your risk for fractures  The most common fracture sites in people with osteoporosis are the wrist, spine, and hip. These fractures are often caused by accidents and falls. All fractures are painful and may limit what you can do. But hip fractures are very serious. They often need surgery, and it can take months to recover. To reduce your risk for fractures:    Get regular exercise. Try walking, swimming, or weight training.    Eat foods that are rich in calcium, or take calcium supplements.    Make your home safe to help avoid accidents.    Take extra precautions not to fall in risky areas, such as icy sidewalks.  Understanding spinal fractures  Your spine is made up of many bones called vertebrae. Osteoporosis can cause the vertebrae in your spine to collapse. As a result, your upper back may arch forward, creating a curvature. Spine  fractures may also result from back strain and bad posture. You will also lose height. Your lower spine must then adjust to keep your body balanced. This can cause back pain. To prevent or lessen these spinal changes:    Practice good posture.    Use proper techniques if you need to lift heavy objects.    Do back exercises to help your posture.    Lie on your back when you have pain.    Ask your healthcare provider about these and other ways to help your spine.  Date Last Reviewed: 5/1/2018 2000-2019 [employer]. [address] [place] [address]. All rights reserved. This information is not intended as a substitute for professional medical care. Always follow your healthcare professional's instructions.               Orders Placed This Encounter   Procedures   ? Basic Metabolic Panel   ? Glycosylated Hemoglobin A1c   ? Ambulatory referral for Colonoscopy     Referral Priority:   Routine     Referral Type:   Colonoscopy     Referral Reason:   Evaluation and Treatment     Requested Specialty:   Gastroenterology     Number of Visits Requested:   1     Medications Discontinued During This Encounter   Medication Reason   ? cholecalciferol, vitamin D3, 25 mcg (1,000 unit) capsule Reorder   ? gabapentin (NEURONTIN) 100 MG capsule Reorder     Administrations This Visit     cyanocobalamin injection 1,000 mcg     Admin Date  07/16/2020 Action  Given Dose  1000 mcg Route  Intramuscular Administered By  Keerthi Meade CMA                CHIEF COMPLAINT;  Chief Complaint   Patient presents with   ? Diabetes       HISTORY OF PRESENT ILLNESS:  Hai is a 70 y.o. female presenting to the clinic today for recheck of her chronic medication conditions She is feeling ok.  BS have been stable, mildly elevated.  She is feeling fatigued and continues with leg pain. Some relief with gabapentin however does not tolerate higher doses.  Daughter is with her today.  She has been doing PT to stabilize gait. Concern for  possible lumbosacral neuropathy in addition to diabetic neuropathy.EMG pending.   Dexa showed osteoporosis.    Remainder of 12-point ROS is negative.    TOBACCO USE:  Social History     Tobacco Use   Smoking Status Never Smoker   Smokeless Tobacco Never Used       VITALS:  Vitals:    07/16/20 0939 07/16/20 0942   BP: 140/80 146/80   Patient Site: Left Arm Left Arm   Patient Position: Sitting Sitting   Cuff Size: Adult Regular Adult Regular   Pulse: 64    SpO2: 98%    Weight: 120 lb 8 oz (54.7 kg)      Wt Readings from Last 3 Encounters:   07/21/20 120 lb (54.4 kg)   07/16/20 120 lb 8 oz (54.7 kg)   03/17/20 120 lb 7 oz (54.6 kg)     Body mass index is 22.77 kg/m .    PHYSICAL EXAM:  GENERAL APPEARANCE: Alert, cooperative, no distress, appears stated age  LUNGS: Clear to auscultation bilaterally, respirations unlabored  CHEST WALL: No tenderness or deformity  HEART: Regular rate and rhythm, S1 and S2 normal, no murmur, rub or gallop  ABDOMEN: Soft, non-tender, bowel sounds active all four quadrants,     no masses, no organomegaly  EXTREMITIES: Extremities normal, atraumatic, no cyanosis or edema  PULSES: 2+ and symmetric all extremities        RECENT RESULTS  No results found for this or any previous visit (from the past 48 hour(s)).    MEDICATIONS:  Current Outpatient Medications   Medication Sig Dispense Refill   ? ACCU-CHEK LOIDA PLUS TEST STRP strips TEST TWICE A  strip 3   ? ACCU-CHEK SOFTCLIX LANCETS lancets TEST TWICE DAILY 200 each 1   ? acetaminophen (TYLENOL) 500 MG tablet Take 1-2 tablets (500-1,000 mg total) by mouth every 4 (four) hours as needed.  0   ? aspirin 81 MG EC tablet Take 81 mg by mouth daily.     ? calcium carbonate-vitamin D3 500 mg(1,250mg) -400 unit per tablet Chew 1 tablet 2 (two) times a day. 180 tablet 4   ? cholecalciferol, vitamin D3, 25 mcg (1,000 unit) capsule Take 1 capsule (1,000 Units total) by mouth daily. 90 capsule 4   ? famotidine (PEPCID) 20 MG tablet Take 1 tablet  (20 mg total) by mouth 2 (two) times a day.  0   ? gabapentin (NEURONTIN) 100 MG capsule TAKE ONE CAPSULE BY MOUTH EVERY NIGHT AT BEDTIME 90 capsule 2   ? glipiZIDE (GLUCOTROL XL) 2.5 MG 24 hr tablet Take 1 tablet (2.5 mg total) by mouth daily as needed (High blood sugars). 90 tablet 4   ? hydrocortisone 2.5 % cream APPLY 0.5 GRAMS TOPICALLY TO AREA OF ITCHING ON LOWER LEFS TWICE DAILY AS NEEDED 30 g 1   ? ibuprofen (ADVIL,MOTRIN) 400 MG tablet Take 1 tablet (400 mg total) by mouth every 6 (six) hours as needed. 15 tablet 0   ? metFORMIN (GLUCOPHAGE) 1000 MG tablet TAKE 1 TABLET(1000 MG) BY MOUTH TWICE DAILY WITH MEALS 180 tablet 1   ? senna-docusate (PERICOLACE) 8.6-50 mg tablet Take 1 tablet by mouth daily with lunch.     ? simethicone (MYLICON) 80 MG chewable tablet Chew 1 tablet (80 mg total) every 6 (six) hours as needed for flatulence.  0   ? simvastatin (ZOCOR) 40 MG tablet TAKE 1 TABLET(40 MG) BY MOUTH AT BEDTIME 90 tablet 3   ? triamcinolone (KENALOG) 0.1 % cream Apply twice daily to affected area for up to 2 week 30 g 1   ? blood pressure monitor Kit Please dispense 1 Blood pressure kit of pts choice 1 each 0   ? capsaicin (ZOSTRIX) 0.025 % cream Apply to affected area three times daily as needed 60 g 0     Current Facility-Administered Medications   Medication Dose Route Frequency Provider Last Rate Last Dose   ? cyanocobalamin injection 1,000 mcg  1,000 mcg Intramuscular Q30 Days Denia Barron MD   1,000 mcg at 07/16/20 1048   ? cyanocobalamin injection 1,000 mcg  1,000 mcg Intramuscular Q30 Days Denia Barron MD   1,000 mcg at 02/25/20 0814

## 2021-07-01 RX ORDER — DULAGLUTIDE 0.75 MG/.5ML
INJECTION, SOLUTION SUBCUTANEOUS
Qty: 6 ML | Refills: 0 | Status: SHIPPED | OUTPATIENT
Start: 2021-07-01 | End: 2021-07-05 | Stop reason: DRUGHIGH

## 2021-07-05 ENCOUNTER — APPOINTMENT (OUTPATIENT)
Dept: INTERPRETER SERVICES | Age: 71
End: 2021-07-05

## 2021-07-05 ENCOUNTER — TRANSCRIBE ORDERS (OUTPATIENT)
Dept: FAMILY MEDICINE | Facility: CLINIC | Age: 71
End: 2021-07-05

## 2021-07-05 ENCOUNTER — OFFICE VISIT (OUTPATIENT)
Dept: FAMILY MEDICINE | Age: 71
End: 2021-07-05

## 2021-07-05 VITALS
HEIGHT: 59 IN | WEIGHT: 163.6 LBS | OXYGEN SATURATION: 99 % | BODY MASS INDEX: 32.98 KG/M2 | SYSTOLIC BLOOD PRESSURE: 150 MMHG | HEART RATE: 74 BPM | TEMPERATURE: 97.2 F | DIASTOLIC BLOOD PRESSURE: 70 MMHG

## 2021-07-05 DIAGNOSIS — E53.8 VITAMIN B12 DEFICIENCY (NON ANEMIC): Primary | ICD-10-CM

## 2021-07-05 DIAGNOSIS — E66.9 OBESITY (BMI 30.0-34.9): ICD-10-CM

## 2021-07-05 DIAGNOSIS — E11.21 TYPE 2 DIABETES MELLITUS WITH DIABETIC NEPHROPATHY, WITH LONG-TERM CURRENT USE OF INSULIN (CMD): ICD-10-CM

## 2021-07-05 DIAGNOSIS — Z79.4 TYPE 2 DIABETES MELLITUS WITH DIABETIC NEPHROPATHY, WITH LONG-TERM CURRENT USE OF INSULIN (CMD): ICD-10-CM

## 2021-07-05 DIAGNOSIS — I10 BENIGN ESSENTIAL HYPERTENSION: ICD-10-CM

## 2021-07-05 LAB — HBA1C MFR BLD: 7.3 % (ref 4.5–5.6)

## 2021-07-05 PROCEDURE — 99214 OFFICE O/P EST MOD 30 MIN: CPT | Performed by: FAMILY MEDICINE

## 2021-07-05 PROCEDURE — 83036 HEMOGLOBIN GLYCOSYLATED A1C: CPT | Performed by: FAMILY MEDICINE

## 2021-07-05 RX ORDER — INSULIN GLARGINE 100 [IU]/ML
18 INJECTION, SOLUTION SUBCUTANEOUS NIGHTLY
Qty: 15 ML | Refills: 0 | Status: CANCELLED | OUTPATIENT
Start: 2021-07-05

## 2021-07-05 RX ORDER — DULAGLUTIDE 1.5 MG/.5ML
1.5 INJECTION, SOLUTION SUBCUTANEOUS
Qty: 6 ML | Refills: 0 | Status: SHIPPED | OUTPATIENT
Start: 2021-07-05 | End: 2021-08-31 | Stop reason: SDUPTHER

## 2021-07-05 RX ORDER — CYANOCOBALAMIN 1000 UG/ML
1000 INJECTION, SOLUTION INTRAMUSCULAR; SUBCUTANEOUS
Status: ACTIVE | OUTPATIENT
Start: 2021-05-03

## 2021-07-05 ASSESSMENT — PATIENT HEALTH QUESTIONNAIRE - PHQ9
SUM OF ALL RESPONSES TO PHQ9 QUESTIONS 1 AND 2: 0
CLINICAL INTERPRETATION OF PHQ2 SCORE: NO FURTHER SCREENING NEEDED
1. LITTLE INTEREST OR PLEASURE IN DOING THINGS: NOT AT ALL
CLINICAL INTERPRETATION OF PHQ9 SCORE: NO FURTHER SCREENING NEEDED
SUM OF ALL RESPONSES TO PHQ9 QUESTIONS 1 AND 2: 0
2. FEELING DOWN, DEPRESSED OR HOPELESS: NOT AT ALL

## 2021-07-05 NOTE — PROGRESS NOTES
As part of the required manual data conversion process for integration, this encounter was created to document a CAM (Clinic Administered Medication) order. This information was copied from the [facility] patient's chart to the [facility] patient chart.     Erasmo Sanders PharmD  July 5, 2021

## 2021-07-06 ENCOUNTER — TELEPHONE (OUTPATIENT)
Dept: FAMILY MEDICINE | Age: 71
End: 2021-07-06

## 2021-07-06 ENCOUNTER — AMBULATORY - HEALTHEAST (OUTPATIENT)
Dept: NURSING | Facility: CLINIC | Age: 71
End: 2021-07-06

## 2021-07-12 ENCOUNTER — TELEPHONE (OUTPATIENT)
Dept: FAMILY MEDICINE | Age: 71
End: 2021-07-12

## 2021-07-12 ENCOUNTER — NURSE ONLY (OUTPATIENT)
Dept: FAMILY MEDICINE | Age: 71
End: 2021-07-12

## 2021-07-12 VITALS — DIASTOLIC BLOOD PRESSURE: 76 MMHG | HEART RATE: 70 BPM | SYSTOLIC BLOOD PRESSURE: 150 MMHG

## 2021-07-12 DIAGNOSIS — I10 BENIGN ESSENTIAL HYPERTENSION: Primary | ICD-10-CM

## 2021-07-12 RX ORDER — HYDROCHLOROTHIAZIDE 25 MG/1
25 TABLET ORAL DAILY
Qty: 30 TABLET | Refills: 1 | Status: CANCELLED | OUTPATIENT
Start: 2021-07-12

## 2021-07-13 ENCOUNTER — RECORDS - HEALTHEAST (OUTPATIENT)
Dept: ADMINISTRATIVE | Facility: CLINIC | Age: 71
End: 2021-07-13

## 2021-07-21 ENCOUNTER — RECORDS - HEALTHEAST (OUTPATIENT)
Dept: ADMINISTRATIVE | Facility: CLINIC | Age: 71
End: 2021-07-21

## 2021-07-22 ENCOUNTER — RECORDS - HEALTHEAST (OUTPATIENT)
Dept: FAMILY MEDICINE | Facility: CLINIC | Age: 71
End: 2021-07-22

## 2021-07-22 DIAGNOSIS — Z12.31 OTHER SCREENING MAMMOGRAM: ICD-10-CM

## 2021-07-28 DIAGNOSIS — M81.0 AGE-RELATED OSTEOPOROSIS WITHOUT CURRENT PATHOLOGICAL FRACTURE: ICD-10-CM

## 2021-08-02 RX ORDER — UBIDECARENONE 100 MG
CAPSULE ORAL
Qty: 90 CAPSULE | Refills: 2 | Status: SHIPPED | OUTPATIENT
Start: 2021-08-02 | End: 2022-05-31

## 2021-08-02 NOTE — TELEPHONE ENCOUNTER
"Last Written Prescription Date:  7/16/20  Last Fill Quantity: 90,  # refills: 4   Last office visit provider:  5/14/21     Requested Prescriptions   Pending Prescriptions Disp Refills     Cholecalciferol (D3-1000) 25 MCG (1000 UT) CAPS [Pharmacy Med Name: VITAMIN D 1,000IU H/PTNCY CAPSULES] 90 capsule 4     Sig: TAKE 1 CAPSULE BY MOUTH DAILY       Vitamin Supplements (Adult) Protocol Passed - 7/28/2021 12:31 PM        Passed - High dose Vitamin D not ordered        Passed - Recent (12 mo) or future (30 days) visit within the authorizing provider's specialty     Patient has had an office visit with the authorizing provider or a provider within the authorizing providers department within the previous 12 mos or has a future within next 30 days. See \"Patient Info\" tab in inbasket, or \"Choose Columns\" in Meds & Orders section of the refill encounter.              Passed - Medication is active on med list             Yan Heath RN 08/02/21 7:53 AM  "

## 2021-08-03 PROBLEM — K56.609 SBO (SMALL BOWEL OBSTRUCTION) (H): Status: RESOLVED | Noted: 2019-06-21 | Resolved: 2019-09-12

## 2021-08-05 ENCOUNTER — ALLIED HEALTH/NURSE VISIT (OUTPATIENT)
Dept: FAMILY MEDICINE | Facility: CLINIC | Age: 71
End: 2021-08-05
Payer: COMMERCIAL

## 2021-08-05 DIAGNOSIS — E53.8 VITAMIN B12 DEFICIENCY (NON ANEMIC): Primary | ICD-10-CM

## 2021-08-05 PROCEDURE — 96372 THER/PROPH/DIAG INJ SC/IM: CPT | Performed by: PHARMACIST

## 2021-08-05 PROCEDURE — 99207 PR NO CHARGE NURSE ONLY: CPT

## 2021-08-05 RX ADMIN — CYANOCOBALAMIN 1000 MCG: 1000 INJECTION, SOLUTION INTRAMUSCULAR; SUBCUTANEOUS at 09:41

## 2021-08-31 ENCOUNTER — TELEPHONE (OUTPATIENT)
Dept: FAMILY MEDICINE | Age: 71
End: 2021-08-31

## 2021-08-31 DIAGNOSIS — E78.5 HYPERLIPIDEMIA: ICD-10-CM

## 2021-08-31 DIAGNOSIS — E11.9 DIABETES (H): ICD-10-CM

## 2021-08-31 RX ORDER — DULAGLUTIDE 1.5 MG/.5ML
1.5 INJECTION, SOLUTION SUBCUTANEOUS
Qty: 6 ML | Refills: 1 | Status: SHIPPED | OUTPATIENT
Start: 2021-08-31 | End: 2022-02-10

## 2021-08-31 RX ORDER — SIMVASTATIN 40 MG
TABLET ORAL
Qty: 90 TABLET | Refills: 0 | Status: SHIPPED | OUTPATIENT
Start: 2021-08-31 | End: 2021-11-30

## 2021-08-31 NOTE — TELEPHONE ENCOUNTER
"Last Written Prescription Date:  3/4/21  Last Fill Quantity: 180,  # refills: 1   Last office visit provider:  5/14/21     Last Written Prescription Date:  9/8/20  Last Fill Quantity: 90,  # refills: 3   Last office visit provider:  5/14/21     Requested Prescriptions   Pending Prescriptions Disp Refills     simvastatin (ZOCOR) 40 MG tablet [Pharmacy Med Name: SIMVASTATIN 40MG TABLETS] 90 tablet 3     Sig: TAKE 1 TABLET(40 MG) BY MOUTH AT BEDTIME       Statins Protocol Passed - 8/31/2021  9:28 AM        Passed - LDL on file in past 12 months     Recent Labs   Lab Test 11/03/20  1059   LDL 41             Passed - No abnormal creatine kinase in past 12 months     No lab results found.             Passed - Recent (12 mo) or future (30 days) visit within the authorizing provider's specialty     Patient has had an office visit with the authorizing provider or a provider within the authorizing providers department within the previous 12 mos or has a future within next 30 days. See \"Patient Info\" tab in inbasket, or \"Choose Columns\" in Meds & Orders section of the refill encounter.              Passed - Medication is active on med list        Passed - Patient is age 18 or older        Passed - No active pregnancy on record        Passed - No positive pregnancy test in past 12 months           metFORMIN (GLUCOPHAGE) 1000 MG tablet [Pharmacy Med Name: METFORMIN 1000MG TABLETS] 180 tablet 1     Sig: TAKE 1 TABLET(1000 MG) BY MOUTH TWICE DAILY WITH MEALS       Biguanide Agents Passed - 8/31/2021  9:28 AM        Passed - Patient is age 10 or older        Passed - Patient has documented A1c within the specified period of time.     If HgbA1C is 8 or greater, it needs to be on file within the past 3 months.  If less than 8, must be on file within the past 6 months.     Recent Labs   Lab Test 04/05/21  0842   A1C 7.6*             Passed - Patient's CR is NOT>1.4 OR Patient's EGFR is NOT<45 within past 12 mos.     Recent Labs   Lab " "Test 04/05/21  0940   GFRESTIMATED >60   GFRESTBLACK >60       Recent Labs   Lab Test 04/05/21  0940   CR 0.72             Passed - Patient does NOT have a diagnosis of CHF.        Passed - Medication is active on med list        Passed - Patient is not pregnant        Passed - Patient has not had a positive pregnancy test within the past 12 mos.         Passed - Recent (6 mo) or future (30 days) visit within the authorizing provider's specialty     Patient had office visit in the last 6 months or has a visit in the next 30 days with authorizing provider or within the authorizing provider's specialty.  See \"Patient Info\" tab in inbasket, or \"Choose Columns\" in Meds & Orders section of the refill encounter.                 Yan Heath RN 08/31/21 2:50 PM  "

## 2021-09-02 RX ORDER — ATORVASTATIN CALCIUM 40 MG/1
40 TABLET, FILM COATED ORAL DAILY
Qty: 90 TABLET | Refills: 0 | Status: SHIPPED | OUTPATIENT
Start: 2021-09-02 | End: 2021-10-18 | Stop reason: SDUPTHER

## 2021-09-07 ENCOUNTER — ALLIED HEALTH/NURSE VISIT (OUTPATIENT)
Dept: FAMILY MEDICINE | Facility: CLINIC | Age: 71
End: 2021-09-07
Payer: COMMERCIAL

## 2021-09-07 DIAGNOSIS — E53.8 VITAMIN B12 DEFICIENCY (NON ANEMIC): Primary | ICD-10-CM

## 2021-09-07 PROCEDURE — 99207 PR NO CHARGE NURSE ONLY: CPT

## 2021-09-07 PROCEDURE — 96372 THER/PROPH/DIAG INJ SC/IM: CPT | Performed by: FAMILY MEDICINE

## 2021-09-07 RX ADMIN — CYANOCOBALAMIN 1000 MCG: 1000 INJECTION, SOLUTION INTRAMUSCULAR; SUBCUTANEOUS at 09:22

## 2021-09-25 ENCOUNTER — HEALTH MAINTENANCE LETTER (OUTPATIENT)
Age: 71
End: 2021-09-25

## 2021-09-28 ENCOUNTER — APPOINTMENT (OUTPATIENT)
Dept: LAB | Age: 71
End: 2021-09-28

## 2021-09-29 ENCOUNTER — LAB SERVICES (OUTPATIENT)
Dept: LAB | Age: 71
End: 2021-09-29

## 2021-09-29 ENCOUNTER — APPOINTMENT (OUTPATIENT)
Dept: LAB | Age: 71
End: 2021-09-29

## 2021-09-29 DIAGNOSIS — E11.36 TYPE 2 DIABETES MELLITUS WITH DIABETIC CATARACT, WITH LONG-TERM CURRENT USE OF INSULIN (CMD): ICD-10-CM

## 2021-09-29 DIAGNOSIS — Z79.4 TYPE 2 DIABETES MELLITUS WITH DIABETIC CATARACT, WITH LONG-TERM CURRENT USE OF INSULIN (CMD): ICD-10-CM

## 2021-09-29 LAB
ALBUMIN SERPL-MCNC: 3.9 G/DL (ref 3.6–5.1)
ALBUMIN/GLOB SERPL: 0.8 {RATIO} (ref 1–2.4)
ALP SERPL-CCNC: 115 UNITS/L (ref 45–117)
ALT SERPL-CCNC: 45 UNITS/L
ANION GAP SERPL CALC-SCNC: 14 MMOL/L (ref 10–20)
AST SERPL-CCNC: 26 UNITS/L
BILIRUB SERPL-MCNC: 0.4 MG/DL (ref 0.2–1)
BUN SERPL-MCNC: 23 MG/DL (ref 6–20)
BUN/CREAT SERPL: 23 (ref 7–25)
CALCIUM SERPL-MCNC: 10.5 MG/DL (ref 8.4–10.2)
CHLORIDE SERPL-SCNC: 105 MMOL/L (ref 98–107)
CHOLEST SERPL-MCNC: 170 MG/DL
CHOLEST/HDLC SERPL: 3.9 {RATIO}
CO2 SERPL-SCNC: 28 MMOL/L (ref 21–32)
CREAT SERPL-MCNC: 1 MG/DL (ref 0.51–0.95)
FASTING DURATION TIME PATIENT: ABNORMAL H
FASTING DURATION TIME PATIENT: ABNORMAL H
GFR SERPLBLD BASED ON 1.73 SQ M-ARVRAT: 56 ML/MIN
GLOBULIN SER-MCNC: 4.6 G/DL (ref 2–4)
GLUCOSE SERPL-MCNC: 109 MG/DL (ref 70–99)
HBA1C MFR BLD: 6.8 % (ref 4.5–5.6)
HDLC SERPL-MCNC: 44 MG/DL
LDLC SERPL CALC-MCNC: 76 MG/DL
NONHDLC SERPL-MCNC: 126 MG/DL
POTASSIUM SERPL-SCNC: 4.2 MMOL/L (ref 3.4–5.1)
PROT SERPL-MCNC: 8.5 G/DL (ref 6.4–8.2)
SODIUM SERPL-SCNC: 143 MMOL/L (ref 135–145)
TRIGL SERPL-MCNC: 248 MG/DL

## 2021-09-29 PROCEDURE — 83036 HEMOGLOBIN GLYCOSYLATED A1C: CPT | Performed by: CLINICAL MEDICAL LABORATORY

## 2021-09-29 PROCEDURE — 80053 COMPREHEN METABOLIC PANEL: CPT | Performed by: INTERNAL MEDICINE

## 2021-09-29 PROCEDURE — 36415 COLL VENOUS BLD VENIPUNCTURE: CPT | Performed by: INTERNAL MEDICINE

## 2021-09-29 PROCEDURE — 80061 LIPID PANEL: CPT | Performed by: CLINICAL MEDICAL LABORATORY

## 2021-10-11 ENCOUNTER — OFFICE VISIT (OUTPATIENT)
Dept: FAMILY MEDICINE | Facility: CLINIC | Age: 71
End: 2021-10-11
Payer: COMMERCIAL

## 2021-10-11 VITALS
OXYGEN SATURATION: 96 % | DIASTOLIC BLOOD PRESSURE: 80 MMHG | BODY MASS INDEX: 23.46 KG/M2 | SYSTOLIC BLOOD PRESSURE: 160 MMHG | HEART RATE: 75 BPM | WEIGHT: 120.1 LBS

## 2021-10-11 DIAGNOSIS — R05.9 COUGH: ICD-10-CM

## 2021-10-11 DIAGNOSIS — R03.0 ELEVATED BLOOD PRESSURE READING WITHOUT DIAGNOSIS OF HYPERTENSION: ICD-10-CM

## 2021-10-11 DIAGNOSIS — E11.43 TYPE 2 DIABETES MELLITUS WITH DIABETIC AUTONOMIC NEUROPATHY, WITHOUT LONG-TERM CURRENT USE OF INSULIN (H): Primary | ICD-10-CM

## 2021-10-11 DIAGNOSIS — G62.89 OTHER POLYNEUROPATHY: ICD-10-CM

## 2021-10-11 LAB
ALBUMIN SERPL-MCNC: 4.1 G/DL (ref 3.5–5)
ALP SERPL-CCNC: 54 U/L (ref 45–120)
ALT SERPL W P-5'-P-CCNC: 21 U/L (ref 0–45)
ANION GAP SERPL CALCULATED.3IONS-SCNC: 11 MMOL/L (ref 5–18)
AST SERPL W P-5'-P-CCNC: 23 U/L (ref 0–40)
BILIRUB SERPL-MCNC: 0.7 MG/DL (ref 0–1)
BUN SERPL-MCNC: 8 MG/DL (ref 8–28)
CALCIUM SERPL-MCNC: 9.9 MG/DL (ref 8.5–10.5)
CHLORIDE BLD-SCNC: 99 MMOL/L (ref 98–107)
CHOLEST SERPL-MCNC: 141 MG/DL
CO2 SERPL-SCNC: 25 MMOL/L (ref 22–31)
CREAT SERPL-MCNC: 0.72 MG/DL (ref 0.6–1.1)
ERYTHROCYTE [DISTWIDTH] IN BLOOD BY AUTOMATED COUNT: 12.9 % (ref 10–15)
FASTING STATUS PATIENT QL REPORTED: YES
GFR SERPL CREATININE-BSD FRML MDRD: 83 ML/MIN/1.73M2
GLUCOSE BLD-MCNC: 140 MG/DL (ref 70–125)
HBA1C MFR BLD: 7.7 % (ref 0–5.6)
HCT VFR BLD AUTO: 35.5 % (ref 35–47)
HDLC SERPL-MCNC: 53 MG/DL
HGB BLD-MCNC: 12 G/DL (ref 11.7–15.7)
LDLC SERPL CALC-MCNC: 54 MG/DL
MCH RBC QN AUTO: 29.3 PG (ref 26.5–33)
MCHC RBC AUTO-ENTMCNC: 33.8 G/DL (ref 31.5–36.5)
MCV RBC AUTO: 87 FL (ref 78–100)
PLATELET # BLD AUTO: 275 10E3/UL (ref 150–450)
POTASSIUM BLD-SCNC: 4.3 MMOL/L (ref 3.5–5)
PROT SERPL-MCNC: 7 G/DL (ref 6–8)
RBC # BLD AUTO: 4.1 10E6/UL (ref 3.8–5.2)
SODIUM SERPL-SCNC: 135 MMOL/L (ref 136–145)
TRIGL SERPL-MCNC: 172 MG/DL
VIT B12 SERPL-MCNC: 465 PG/ML (ref 213–816)
WBC # BLD AUTO: 5 10E3/UL (ref 4–11)

## 2021-10-11 PROCEDURE — 80061 LIPID PANEL: CPT | Performed by: FAMILY MEDICINE

## 2021-10-11 PROCEDURE — 83036 HEMOGLOBIN GLYCOSYLATED A1C: CPT | Performed by: FAMILY MEDICINE

## 2021-10-11 PROCEDURE — 85027 COMPLETE CBC AUTOMATED: CPT | Performed by: FAMILY MEDICINE

## 2021-10-11 PROCEDURE — 99214 OFFICE O/P EST MOD 30 MIN: CPT | Mod: 25 | Performed by: FAMILY MEDICINE

## 2021-10-11 PROCEDURE — 36415 COLL VENOUS BLD VENIPUNCTURE: CPT | Performed by: FAMILY MEDICINE

## 2021-10-11 PROCEDURE — 96372 THER/PROPH/DIAG INJ SC/IM: CPT | Performed by: FAMILY MEDICINE

## 2021-10-11 PROCEDURE — 80053 COMPREHEN METABOLIC PANEL: CPT | Performed by: FAMILY MEDICINE

## 2021-10-11 PROCEDURE — 82607 VITAMIN B-12: CPT | Performed by: FAMILY MEDICINE

## 2021-10-11 RX ORDER — PREGABALIN 25 MG/1
25 CAPSULE ORAL 2 TIMES DAILY
Qty: 60 CAPSULE | Refills: 1 | Status: SHIPPED | OUTPATIENT
Start: 2021-10-11 | End: 2022-01-17

## 2021-10-11 RX ORDER — CODEINE PHOSPHATE/GUAIFENESIN 10-100MG/5
5 LIQUID (ML) ORAL EVERY 4 HOURS PRN
COMMUNITY
End: 2021-10-11

## 2021-10-11 RX ORDER — CODEINE PHOSPHATE/GUAIFENESIN 10-100MG/5
5 LIQUID (ML) ORAL EVERY 4 HOURS PRN
Qty: 180 ML | Refills: 1 | Status: SHIPPED | OUTPATIENT
Start: 2021-10-11 | End: 2022-02-18

## 2021-10-11 RX ADMIN — CYANOCOBALAMIN 1000 MCG: 1000 INJECTION, SOLUTION INTRAMUSCULAR; SUBCUTANEOUS at 10:01

## 2021-10-11 NOTE — PROGRESS NOTES
Assessment & Plan     Type 2 diabetes mellitus with diabetic autonomic neuropathy, without long-term current use of insulin (H)  Well controlled and stable at 7.7. Checking approximately once daily.  Taking asa daily  - Hemoglobin A1c  - Hemoglobin A1c  - Lipid panel reflex to direct LDL Fasting  - Comprehensive metabolic panel (BMP + Alb, Alk Phos, ALT, AST, Total. Bili, TP)  - CBC with platelets  - Vitamin B12    Other polyneuropathy  Only tolerated 100mg of gabapentin, trial of pregabalin with ongoing symptoms. Receiving monthly B12 injections  - pregabalin (LYRICA) 25 MG capsule  Dispense: 60 capsule; Refill: 1    Elevated blood pressure reading without diagnosis of hypertension    Cough  - guaiFENesin-codeine (GUAIFENESIN AC) 100-10 MG/5ML syrup  Dispense: 180 mL; Refill: 1      Return in about 3 months (around 1/11/2022).    Denia Barron MD  [facility] STACI Valles is a 71 year old who presents for the following health issues  accompanied by her daughter who is her :    HPI     BS have been stable. Biggest concern today is pain in legs  Noticed increase in bilateral leg pain, now with pain in lower back and waist.  Does not necessarily get worse with walking.  Feels worse with laying flat.  Right foot typically is better but is starting with same symptoms of numbness and pain.  Failed Cymbalta and nortriptyline, does not tolerate higher dosing of gabapentin, no help with acupuncture  Reviewed MRI results from 3/20, EMG results and consult with spine care    Review of Systems   Constitutional, HEENT, cardiovascular, pulmonary, gi and gu systems are negative, except as otherwise noted.      Objective    BP (!) 160/80 (BP Location: Right arm, Patient Position: Sitting, Cuff Size: Adult Regular)   Pulse 75   Wt 54.5 kg (120 lb 1.6 oz)   SpO2 96%   BMI 23.46 kg/m    Body mass index is 23.46 kg/m .  Physical Exam   GENERAL: healthy, alert and no distress  NECK:  no adenopathy, no asymmetry, masses, or scars and thyroid normal to palpation  RESP: lungs clear to auscultation - no rales, rhonchi or wheezes  CV: regular rate and rhythm, normal S1 S2, no S3 or S4, no murmur, click or rub, no peripheral edema and peripheral pulses strong  ABDOMEN: soft, nontender, no hepatosplenomegaly, no masses and bowel sounds normal  MS: no gross musculoskeletal defects noted, no edema    Results for orders placed or performed in visit on 10/11/21   Hemoglobin A1c     Status: Abnormal   Result Value Ref Range    Hemoglobin A1C 7.7 (H) 0.0 - 5.6 %   Lipid panel reflex to direct LDL Fasting     Status: Abnormal   Result Value Ref Range    Cholesterol 141 <=199 mg/dL    Triglycerides 172 (H) <=149 mg/dL    Direct Measure HDL 53 >=50 mg/dL    LDL Cholesterol Calculated 54 <=129 mg/dL    Patient Fasting > 8hrs? Yes    Comprehensive metabolic panel (BMP + Alb, Alk Phos, ALT, AST, Total. Bili, TP)     Status: Abnormal   Result Value Ref Range    Sodium 135 (L) 136 - 145 mmol/L    Potassium 4.3 3.5 - 5.0 mmol/L    Chloride 99 98 - 107 mmol/L    Carbon Dioxide (CO2) 25 22 - 31 mmol/L    Anion Gap 11 5 - 18 mmol/L    Urea Nitrogen 8 8 - 28 mg/dL    Creatinine 0.72 0.60 - 1.10 mg/dL    Calcium 9.9 8.5 - 10.5 mg/dL    Glucose 140 (H) 70 - 125 mg/dL    Alkaline Phosphatase 54 45 - 120 U/L    AST 23 0 - 40 U/L    ALT 21 0 - 45 U/L    Protein Total 7.0 6.0 - 8.0 g/dL    Albumin 4.1 3.5 - 5.0 g/dL    Bilirubin Total 0.7 0.0 - 1.0 mg/dL    GFR Estimate 83 >60 mL/min/1.73m2   CBC with platelets     Status: Normal   Result Value Ref Range    WBC Count 5.0 4.0 - 11.0 10e3/uL    RBC Count 4.10 3.80 - 5.20 10e6/uL    Hemoglobin 12.0 11.7 - 15.7 g/dL    Hematocrit 35.5 35.0 - 47.0 %    MCV 87 78 - 100 fL    MCH 29.3 26.5 - 33.0 pg    MCHC 33.8 31.5 - 36.5 g/dL    RDW 12.9 10.0 - 15.0 %    Platelet Count 275 150 - 450 10e3/uL   Vitamin B12     Status: Normal   Result Value Ref Range    Vitamin B12 465 213 - 816  pg/mL

## 2021-10-18 ENCOUNTER — APPOINTMENT (OUTPATIENT)
Dept: INTERPRETER SERVICES | Age: 71
End: 2021-10-18

## 2021-10-18 ENCOUNTER — OFFICE VISIT (OUTPATIENT)
Dept: FAMILY MEDICINE | Age: 71
End: 2021-10-18

## 2021-10-18 VITALS
HEIGHT: 59 IN | TEMPERATURE: 96.6 F | DIASTOLIC BLOOD PRESSURE: 84 MMHG | WEIGHT: 162.6 LBS | SYSTOLIC BLOOD PRESSURE: 154 MMHG | BODY MASS INDEX: 32.78 KG/M2 | HEART RATE: 72 BPM

## 2021-10-18 DIAGNOSIS — E11.36 TYPE 2 DIABETES MELLITUS WITH DIABETIC CATARACT, WITHOUT LONG-TERM CURRENT USE OF INSULIN (CMD): Primary | ICD-10-CM

## 2021-10-18 DIAGNOSIS — E11.22 TYPE 2 DIABETES MELLITUS WITH STAGE 3A CHRONIC KIDNEY DISEASE, WITHOUT LONG-TERM CURRENT USE OF INSULIN (CMD): ICD-10-CM

## 2021-10-18 DIAGNOSIS — E11.42 TYPE 2 DIABETES MELLITUS WITH DIABETIC POLYNEUROPATHY, WITHOUT LONG-TERM CURRENT USE OF INSULIN (CMD): ICD-10-CM

## 2021-10-18 DIAGNOSIS — Z23 NEED FOR VACCINATION: ICD-10-CM

## 2021-10-18 DIAGNOSIS — N18.31 TYPE 2 DIABETES MELLITUS WITH STAGE 3A CHRONIC KIDNEY DISEASE, WITHOUT LONG-TERM CURRENT USE OF INSULIN (CMD): ICD-10-CM

## 2021-10-18 DIAGNOSIS — I10 BENIGN ESSENTIAL HYPERTENSION: ICD-10-CM

## 2021-10-18 DIAGNOSIS — I12.9 BENIGN HYPERTENSION WITH CKD (CHRONIC KIDNEY DISEASE) STAGE III (CMD): ICD-10-CM

## 2021-10-18 DIAGNOSIS — N18.30 BENIGN HYPERTENSION WITH CKD (CHRONIC KIDNEY DISEASE) STAGE III (CMD): ICD-10-CM

## 2021-10-18 PROBLEM — Z79.4 TYPE 2 DIABETES MELLITUS WITH HYPERGLYCEMIA, WITH LONG-TERM CURRENT USE OF INSULIN (CMD): Status: RESOLVED | Noted: 2021-04-09 | Resolved: 2021-10-18

## 2021-10-18 PROBLEM — E11.65 TYPE 2 DIABETES MELLITUS WITH HYPERGLYCEMIA, WITH LONG-TERM CURRENT USE OF INSULIN (CMD): Status: RESOLVED | Noted: 2021-04-09 | Resolved: 2021-10-18

## 2021-10-18 PROCEDURE — 90662 IIV NO PRSV INCREASED AG IM: CPT | Performed by: FAMILY MEDICINE

## 2021-10-18 PROCEDURE — 99214 OFFICE O/P EST MOD 30 MIN: CPT | Performed by: FAMILY MEDICINE

## 2021-10-18 PROCEDURE — G0008 ADMIN INFLUENZA VIRUS VAC: HCPCS | Performed by: FAMILY MEDICINE

## 2021-10-18 RX ORDER — ATORVASTATIN CALCIUM 40 MG/1
40 TABLET, FILM COATED ORAL DAILY
Qty: 90 TABLET | Refills: 1 | Status: SHIPPED | OUTPATIENT
Start: 2021-10-18 | End: 2022-01-28

## 2021-11-15 ENCOUNTER — ALLIED HEALTH/NURSE VISIT (OUTPATIENT)
Dept: FAMILY MEDICINE | Facility: CLINIC | Age: 71
End: 2021-11-15
Payer: COMMERCIAL

## 2021-11-15 DIAGNOSIS — E53.8 VITAMIN B12 DEFICIENCY (NON ANEMIC): Primary | ICD-10-CM

## 2021-11-15 PROCEDURE — 96372 THER/PROPH/DIAG INJ SC/IM: CPT | Performed by: PHARMACIST

## 2021-11-15 PROCEDURE — 99207 PR NO CHARGE NURSE ONLY: CPT

## 2021-11-15 RX ADMIN — CYANOCOBALAMIN 1000 MCG: 1000 INJECTION, SOLUTION INTRAMUSCULAR; SUBCUTANEOUS at 09:27

## 2021-11-20 ENCOUNTER — HEALTH MAINTENANCE LETTER (OUTPATIENT)
Age: 71
End: 2021-11-20

## 2021-11-26 DIAGNOSIS — I10 BENIGN ESSENTIAL HYPERTENSION: ICD-10-CM

## 2021-11-26 RX ORDER — AMLODIPINE BESYLATE 10 MG/1
10 TABLET ORAL DAILY
Qty: 90 TABLET | Refills: 0 | Status: SHIPPED | OUTPATIENT
Start: 2021-11-26 | End: 2022-01-28

## 2021-11-27 DIAGNOSIS — E11.42 TYPE 2 DIABETES MELLITUS WITH DIABETIC POLYNEUROPATHY, WITHOUT LONG-TERM CURRENT USE OF INSULIN (CMD): ICD-10-CM

## 2021-11-27 RX ORDER — SITAGLIPTIN 100 MG/1
TABLET, FILM COATED ORAL
Qty: 90 TABLET | Refills: 1 | OUTPATIENT
Start: 2021-11-27

## 2021-11-29 DIAGNOSIS — E78.5 HYPERLIPIDEMIA: ICD-10-CM

## 2021-11-29 DIAGNOSIS — E11.42 TYPE 2 DIABETES MELLITUS WITH DIABETIC POLYNEUROPATHY, WITHOUT LONG-TERM CURRENT USE OF INSULIN (CMD): ICD-10-CM

## 2021-11-29 DIAGNOSIS — E11.9 DIABETES (H): ICD-10-CM

## 2021-11-29 RX ORDER — ATORVASTATIN CALCIUM 40 MG/1
40 TABLET, FILM COATED ORAL DAILY
Qty: 90 TABLET | Refills: 1 | OUTPATIENT
Start: 2021-11-29

## 2021-11-30 RX ORDER — SIMVASTATIN 40 MG
TABLET ORAL
Qty: 90 TABLET | Refills: 3 | Status: SHIPPED | OUTPATIENT
Start: 2021-11-30 | End: 2022-11-25

## 2021-11-30 NOTE — TELEPHONE ENCOUNTER
"Last Written Prescription Date:  08/31/2021-metformin  Last Fill Quantity: 180,  # refills: 0   Last office visit provider:  10/11/2021 with Dr Barron.    Last Written Prescription Date:  08/31/2021-simvastatin  Last Fill Quantity: 90,  # refills: 0   Last office visit provider:  10/11/2021 with Dr Barron.      Requested Prescriptions   Pending Prescriptions Disp Refills     simvastatin (ZOCOR) 40 MG tablet [Pharmacy Med Name: SIMVASTATIN 40MG TABLETS] 90 tablet 0     Sig: TAKE 1 TABLET(40 MG) BY MOUTH AT BEDTIME       Statins Protocol Passed - 11/29/2021  5:40 AM        Passed - LDL on file in past 12 months     Recent Labs   Lab Test 10/11/21  1001   LDL 54             Passed - No abnormal creatine kinase in past 12 months     No lab results found.             Passed - Recent (12 mo) or future (30 days) visit within the authorizing provider's specialty     Patient has had an office visit with the authorizing provider or a provider within the authorizing providers department within the previous 12 mos or has a future within next 30 days. See \"Patient Info\" tab in inbasket, or \"Choose Columns\" in Meds & Orders section of the refill encounter.              Passed - Medication is active on med list        Passed - Patient is age 18 or older        Passed - No active pregnancy on record        Passed - No positive pregnancy test in past 12 months           metFORMIN (GLUCOPHAGE) 1000 MG tablet [Pharmacy Med Name: METFORMIN 1000MG TABLETS] 180 tablet 0     Sig: TAKE 1 TABLET(1000 MG) BY MOUTH TWICE DAILY WITH MEALS       Biguanide Agents Passed - 11/29/2021  5:40 AM        Passed - Patient is age 10 or older        Passed - Patient has documented A1c within the specified period of time.     If HgbA1C is 8 or greater, it needs to be on file within the past 3 months.  If less than 8, must be on file within the past 6 months.     Recent Labs   Lab Test 10/11/21  0844   A1C 7.7*             Passed - Patient's CR is " "NOT>1.4 OR Patient's EGFR is NOT<45 within past 12 mos.     Recent Labs   Lab Test 10/11/21  1001 04/05/21  0940   GFRESTIMATED 83 >60   GFRESTBLACK  --  >60       Recent Labs   Lab Test 10/11/21  1001   CR 0.72             Passed - Patient does NOT have a diagnosis of CHF.        Passed - Medication is active on med list        Passed - Patient is not pregnant        Passed - Patient has not had a positive pregnancy test within the past 12 mos.         Passed - Recent (6 mo) or future (30 days) visit within the authorizing provider's specialty     Patient had office visit in the last 6 months or has a visit in the next 30 days with authorizing provider or within the authorizing provider's specialty.  See \"Patient Info\" tab in inbasket, or \"Choose Columns\" in Meds & Orders section of the refill encounter.                 Nadiya Monroe 11/30/21 2:08 PM  "

## 2021-12-13 ENCOUNTER — ALLIED HEALTH/NURSE VISIT (OUTPATIENT)
Dept: FAMILY MEDICINE | Facility: CLINIC | Age: 71
End: 2021-12-13
Payer: COMMERCIAL

## 2021-12-13 DIAGNOSIS — E53.8 VITAMIN B12 DEFICIENCY (NON ANEMIC): Primary | ICD-10-CM

## 2021-12-13 PROCEDURE — 96372 THER/PROPH/DIAG INJ SC/IM: CPT | Performed by: PHARMACIST

## 2021-12-13 PROCEDURE — 99207 PR NO CHARGE NURSE ONLY: CPT

## 2021-12-13 RX ORDER — CYANOCOBALAMIN 1000 UG/ML
1 INJECTION, SOLUTION INTRAMUSCULAR; SUBCUTANEOUS
Status: CANCELLED | OUTPATIENT
Start: 2021-12-13

## 2021-12-13 RX ADMIN — CYANOCOBALAMIN 1000 MCG: 1000 INJECTION, SOLUTION INTRAMUSCULAR; SUBCUTANEOUS at 09:52

## 2021-12-31 ENCOUNTER — OFFICE VISIT (OUTPATIENT)
Dept: FAMILY MEDICINE | Facility: CLINIC | Age: 71
End: 2021-12-31
Payer: COMMERCIAL

## 2021-12-31 VITALS
TEMPERATURE: 98.7 F | DIASTOLIC BLOOD PRESSURE: 88 MMHG | OXYGEN SATURATION: 97 % | SYSTOLIC BLOOD PRESSURE: 158 MMHG | HEART RATE: 83 BPM

## 2021-12-31 DIAGNOSIS — M19.032 ARTHRITIS OF LEFT WRIST: ICD-10-CM

## 2021-12-31 DIAGNOSIS — M25.532 LEFT WRIST PAIN: Primary | ICD-10-CM

## 2021-12-31 LAB — URATE SERPL-MCNC: 4.9 MG/DL (ref 2–7.5)

## 2021-12-31 PROCEDURE — 99213 OFFICE O/P EST LOW 20 MIN: CPT | Performed by: EMERGENCY MEDICINE

## 2021-12-31 PROCEDURE — 36415 COLL VENOUS BLD VENIPUNCTURE: CPT | Performed by: EMERGENCY MEDICINE

## 2021-12-31 PROCEDURE — 84550 ASSAY OF BLOOD/URIC ACID: CPT | Performed by: EMERGENCY MEDICINE

## 2021-12-31 NOTE — PATIENT INSTRUCTIONS
Patient Education     What Is Arthritis?  Arthritis is a disease that affects the joints. Joints are the parts where bones meet and move. It can affect any joint in your body. There are more than 100 types of arthritis. They include:      Osteoarthritis    Rheumatoid arthritis    Gout    Lupus  If your symptoms are mild, medicines may be enough to ease pain and swelling. For more severe arthritis, you may need surgery. This can improve the condition of the joint. Or it can replace part or all of the joint.       What causes arthritis?  Cartilage is a smooth substance that protects the ends of your bones and provides cushioning. When you have arthritis, this cartilage breaks down and can no longer protect your bones. This can happen from an autoimmune disease or it can happen from wear and tear, infections, or trauma. The bones rub against each other, causing pain and swelling. Over time, small pieces of rough or splintered bone (bone spurs) may develop. The joint's range of motion can become limited.   Symptoms  Some of the more common symptoms of arthritis include:     Joint pain and stiffness. These symptoms get worse with long periods of rest. They may also get worse from using a joint too long or too hard.    Joints that have lost normal shape and motion. They may look swollen and be hard to move.    Sore, inflamed joints. They may look red and feel warm.    Grinding or popping noise. This happens with joint movement.    Tiredness (fatigue). You may feel tired all the time.  Reducing symptoms  You can help ease symptoms in these ways:    Losing weight    Exercising    Strengthening muscles around the joint to reduce the strain on the joint    Using hot and cold packs on your joints    Using over-the-counter and prescription medicines  Talk with your healthcare provider about the best treatments for your condition.   [employer] last reviewed this educational content on 7/1/2019 2000-2021 [employer]. All rights reserved. This information is not intended as a substitute for professional medical care. Always follow your healthcare professional's instructions.

## 2021-12-31 NOTE — PROGRESS NOTES
Impression:  Left wrist arthritis exacerbation.  Could be from some DJD, rule out gout    Plan:  Splint to left wrist, ice to the painful area, diclofenac gel 4 times a day to the wrist, follow-up with primary care, uric acid result is pending      Chief Complaint:  Patient presents with:  Hand Pain: L hand pain X4 days, swelling X1 day, no known injury, hot to the touch         HPI:   Hai Meade is a 71 year old female who presents to this clinic for the evaluation of left wrist pain and swelling.  Patient complains of a 3-day history of pain and swelling in the left wrist.  No history of trauma.  No other joint pains.  She had a similar episode in the past.  No numbness or weakness.  Pain is worse with movement and it makes it hard for her to       PMH:   Past Medical History:   Diagnosis Date     Anemia      Cervical cancer (H)      Cholelithiasis      Diabetes mellitus, type II (H)      Hyperlipidemia      Small bowel obstruction (H)      Vision problems      Past Surgical History:   Procedure Laterality Date     COLECTOMY N/A 6/23/2019    Procedure: WITH OPEN SMALL BOWEL RESECTION;  Surgeon: Ramon Wray MD;  [place] [facility] [place]  Service: General     FLEXIBLE SIGMOIDOSCOPY  2009     HYSTERECTOMY  2007    cervix ca     OOPHORECTOMY  2006    cervix ca     RI BSO, OMENTECTOMY W/SILVIA      Description: Salp-oophorect Bilat W/Omentect, Total Abd Hysterect, Rad Dissect For Malig;  Recorded: 03/28/2007;     RI LAP,DIAGNOSTIC ABDOMEN N/A 6/23/2019    Procedure: LAPAROSCOPY lysis of adhesions,;  Surgeon: Ramon Wray MD;  [place] [facility] [place]  Service: General         ROS:  All other systems negative    Meds:    Current Outpatient Medications:      acetaminophen (TYLENOL) 500 MG tablet, [ACETAMINOPHEN (TYLENOL) 500 MG TABLET] Take 1-2 tablets (500-1,000 mg total) by mouth every 4 (four) hours as needed., Disp: , Rfl: 0     aspirin 81 MG EC tablet, [ASPIRIN 81 MG EC TABLET] Take 81 mg by  mouth daily., Disp: , Rfl:      blood glucose test (ACCU-CHEK LOIDA PLUS TEST STRP) strips, [BLOOD GLUCOSE TEST (ACCU-CHEK LOIDA PLUS TEST STRP) STRIPS] TEST TWICE A DAY, Disp: 200 strip, Rfl: 3     blood pressure monitor Kit, [BLOOD PRESSURE MONITOR KIT] Please dispense 1 Blood pressure kit of pts choice, Disp: 1 each, Rfl: 0     calcium carbonate-vitamin D3 500 mg(1,250mg) -400 unit per tablet, [CALCIUM CARBONATE-VITAMIN D3 500 MG(1,250MG) -400 UNIT PER TABLET] CHEW 1 TABLET TWICE DAILY, Disp: 180 tablet, Rfl: 4     Cholecalciferol (D3-1000) 25 MCG (1000 UT) CAPS, TAKE 1 CAPSULE BY MOUTH DAILY, Disp: 90 capsule, Rfl: 2     gabapentin (NEURONTIN) 100 MG capsule, [GABAPENTIN (NEURONTIN) 100 MG CAPSULE] TAKE ONE CAPSULE BY MOUTH EVERY NIGHT AT BEDTIME, Disp: 90 capsule, Rfl: 3     generic lancets (ACCU-CHEK SOFTCLIX LANCETS), [GENERIC LANCETS (ACCU-CHEK SOFTCLIX LANCETS)] TEST TWICE DAILY, Disp: 200 each, Rfl: 1     glipiZIDE (GLUCOTROL XL) 2.5 MG 24 hr tablet, [GLIPIZIDE (GLUCOTROL XL) 2.5 MG 24 HR TABLET] Take 1 tablet (2.5 mg total) by mouth daily as needed (High blood sugars)., Disp: 90 tablet, Rfl: 4     guaiFENesin-codeine (GUAIFENESIN AC) 100-10 MG/5ML syrup, Take 5 mLs by mouth every 4 hours as needed for congestion, Disp: 180 mL, Rfl: 1     metFORMIN (GLUCOPHAGE) 1000 MG tablet, TAKE 1 TABLET(1000 MG) BY MOUTH TWICE DAILY WITH MEALS, Disp: 180 tablet, Rfl: 1     pregabalin (LYRICA) 25 MG capsule, Take 1 capsule (25 mg) by mouth 2 times daily, Disp: 60 capsule, Rfl: 1     simvastatin (ZOCOR) 40 MG tablet, TAKE 1 TABLET(40 MG) BY MOUTH AT BEDTIME, Disp: 90 tablet, Rfl: 3    Current Facility-Administered Medications:      cyanocobalamin injection 1,000 mcg, 1,000 mcg, Intramuscular, Q30 Days, Erasmo Sanders, PharmD, 1,000 mcg at 12/13/21 0988        Social:  Social History     Socioeconomic History     Marital status:      Spouse name: Not on file     Number of children: Not on file     Years of  education: Not on file     Highest education level: Not on file   Occupational History     Not on file   Tobacco Use     Smoking status: Never Smoker     Smokeless tobacco: Never Used   Substance and Sexual Activity     Alcohol use: No     Drug use: No     Sexual activity: Not on file   Other Topics Concern     Not on file   Social History Narrative    Lives with her family.     Social Determinants of Health     Financial Resource Strain: Not on file   Food Insecurity: Not on file   Transportation Needs: Not on file   Physical Activity: Not on file   Stress: Not on file   Social Connections: Not on file   Intimate Partner Violence: Not on file   Housing Stability: Not on file         Physical Exam:  Vitals:    12/31/21 1529   BP: (!) 158/88   BP Location: Right arm   Patient Position: Sitting   Cuff Size: Adult Regular   Pulse: 83   Temp: 98.7  F (37.1  C)   TempSrc: Tympanic   SpO2: 97%      Left wrist is warm to the touch and tender with some synovial thickening.  Range of motion is limited due to pain.  She has normal range of motion of the fingers and the hand and normal sensation to light touch and radial pulses normal      Results:    No results found for this or any previous visit (from the past 24 hour(s)).           Mendoza Burris MD

## 2022-01-03 ENCOUNTER — TELEPHONE (OUTPATIENT)
Dept: URGENT CARE | Facility: URGENT CARE | Age: 72
End: 2022-01-03
Payer: COMMERCIAL

## 2022-01-03 NOTE — TELEPHONE ENCOUNTER
Called patient with  on the line and message was given. No questions.    Caroline Booker on 1/3/2022 at 3:48 PM

## 2022-01-03 NOTE — TELEPHONE ENCOUNTER
----- Message from Marita Craig PA-C sent at 1/1/2022  9:05 AM CST -----  Team - please call patient with results. [place] [facility] [place]  Has my chart but has never logged in so should call by phone.      Please lt her know the uric acid level is normal, less likely gout.    Please follow up with pcp in 1 week if not improving with the splint and cream given.

## 2022-01-17 ENCOUNTER — OFFICE VISIT (OUTPATIENT)
Dept: FAMILY MEDICINE | Facility: CLINIC | Age: 72
End: 2022-01-17
Payer: COMMERCIAL

## 2022-01-17 VITALS
DIASTOLIC BLOOD PRESSURE: 64 MMHG | HEART RATE: 93 BPM | OXYGEN SATURATION: 97 % | WEIGHT: 121 LBS | BODY MASS INDEX: 23.63 KG/M2 | SYSTOLIC BLOOD PRESSURE: 130 MMHG

## 2022-01-17 DIAGNOSIS — E11.43 TYPE 2 DIABETES MELLITUS WITH DIABETIC AUTONOMIC NEUROPATHY, WITHOUT LONG-TERM CURRENT USE OF INSULIN (H): Primary | ICD-10-CM

## 2022-01-17 DIAGNOSIS — I10 BENIGN ESSENTIAL HYPERTENSION: ICD-10-CM

## 2022-01-17 DIAGNOSIS — G62.89 OTHER POLYNEUROPATHY: ICD-10-CM

## 2022-01-17 DIAGNOSIS — M18.12 ARTHRITIS OF CARPOMETACARPAL (CMC) JOINT OF LEFT THUMB: ICD-10-CM

## 2022-01-17 LAB
HBA1C MFR BLD: 7.7 % (ref 0–5.6)
HOLD SPECIMEN: NORMAL

## 2022-01-17 PROCEDURE — 96372 THER/PROPH/DIAG INJ SC/IM: CPT | Performed by: PHARMACIST

## 2022-01-17 PROCEDURE — 83036 HEMOGLOBIN GLYCOSYLATED A1C: CPT | Performed by: FAMILY MEDICINE

## 2022-01-17 PROCEDURE — 36415 COLL VENOUS BLD VENIPUNCTURE: CPT | Performed by: FAMILY MEDICINE

## 2022-01-17 PROCEDURE — 99213 OFFICE O/P EST LOW 20 MIN: CPT | Performed by: FAMILY MEDICINE

## 2022-01-17 RX ORDER — BLOOD SUGAR DIAGNOSTIC
STRIP MISCELLANEOUS
Qty: 200 STRIP | Refills: 3 | Status: SHIPPED | OUTPATIENT
Start: 2022-01-17 | End: 2022-10-31

## 2022-01-17 RX ORDER — PREGABALIN 25 MG/1
25 CAPSULE ORAL 2 TIMES DAILY
Qty: 60 CAPSULE | Refills: 5 | Status: SHIPPED | OUTPATIENT
Start: 2022-01-17 | End: 2022-10-18

## 2022-01-17 RX ORDER — LISINOPRIL 5 MG/1
5 TABLET ORAL DAILY
Qty: 90 TABLET | Refills: 3 | Status: SHIPPED | OUTPATIENT
Start: 2022-01-17 | End: 2022-10-18

## 2022-01-17 RX ADMIN — CYANOCOBALAMIN 1000 MCG: 1000 INJECTION, SOLUTION INTRAMUSCULAR; SUBCUTANEOUS at 09:29

## 2022-01-17 NOTE — PROGRESS NOTES
Assessment & Plan   Problem List Items Addressed This Visit        Nervous and Auditory    Type 2 diabetes mellitus with diabetic autonomic neuropathy, without long-term current use of insulin (H) - Primary    Relevant Medications    pregabalin (LYRICA) 25 MG capsule    blood glucose (ACCU-CHEK LOIDA PLUS) test strip    blood glucose (NO BRAND SPECIFIED) test strip    Other Relevant Orders    Hemoglobin A1c (Completed)    Peripheral neuropathy    Relevant Medications    pregabalin (LYRICA) 25 MG capsule       Musculoskeletal and Integumentary    Arthritis of carpometacarpal (CMC) joint of left thumb      Other Visit Diagnoses     Benign essential hypertension        Relevant Medications    lisinopril (ZESTRIL) 5 MG tablet           Follow-up in 6 months  Denia Barron MD  [facility] STACI Valles is a 71 year old who presents for the following health issues  accompanied by her daughter. She asked for her daughter to be the .    HPI     Doing well.  Her sugars have been stable.  She is happy with the current regimen.  She tries to remain active and her diet is inconsistent.  She feels her legs have been better on the Lyrica.  She would like to continue on this dose of medications.  She does complain of pain in her left thumb.  It is intermittent.  It can bother her more so with activities.  No chest pain or breathing difficulties.  She is taking medications regularly.    Review of Systems   Constitutional, HEENT, cardiovascular, pulmonary, gi and gu systems are negative, except as otherwise noted.      Objective    /64 (BP Location: Left arm, Patient Position: Sitting, Cuff Size: Adult Regular)   Pulse 93   Wt 54.9 kg (121 lb)   SpO2 97%   BMI 23.63 kg/m    Body mass index is 23.63 kg/m .  Physical Exam   GENERAL: healthy, alert and no distress  NECK: no adenopathy, no asymmetry, masses, or scars and thyroid normal to palpation  RESP: lungs clear to  auscultation - no rales, rhonchi or wheezes  CV: regular rate and rhythm, normal S1 S2, no S3 or S4, no murmur, click or rub, no peripheral edema and peripheral pulses strong  ABDOMEN: soft, nontender, no hepatosplenomegaly, no masses and bowel sounds normal  MS: no gross musculoskeletal defects noted, no edema

## 2022-01-18 ENCOUNTER — MEDICAL CORRESPONDENCE (OUTPATIENT)
Dept: HEALTH INFORMATION MANAGEMENT | Facility: CLINIC | Age: 72
End: 2022-01-18
Payer: COMMERCIAL

## 2022-01-20 ENCOUNTER — MEDICAL CORRESPONDENCE (OUTPATIENT)
Dept: HEALTH INFORMATION MANAGEMENT | Facility: CLINIC | Age: 72
End: 2022-01-20
Payer: COMMERCIAL

## 2022-01-28 ENCOUNTER — MEDICAL CORRESPONDENCE (OUTPATIENT)
Dept: HEALTH INFORMATION MANAGEMENT | Facility: CLINIC | Age: 72
End: 2022-01-28
Payer: COMMERCIAL

## 2022-01-28 DIAGNOSIS — E11.42 TYPE 2 DIABETES MELLITUS WITH DIABETIC POLYNEUROPATHY, WITHOUT LONG-TERM CURRENT USE OF INSULIN (CMD): ICD-10-CM

## 2022-01-28 DIAGNOSIS — I10 BENIGN ESSENTIAL HYPERTENSION: ICD-10-CM

## 2022-01-28 RX ORDER — ATORVASTATIN CALCIUM 40 MG/1
40 TABLET, FILM COATED ORAL DAILY
Qty: 90 TABLET | Refills: 1 | Status: SHIPPED | OUTPATIENT
Start: 2022-01-28 | End: 2022-10-21

## 2022-01-28 RX ORDER — SITAGLIPTIN 100 MG/1
TABLET, FILM COATED ORAL
Qty: 90 TABLET | Refills: 1 | Status: SHIPPED | OUTPATIENT
Start: 2022-01-28 | End: 2023-01-25

## 2022-01-28 RX ORDER — AMLODIPINE BESYLATE 10 MG/1
10 TABLET ORAL DAILY
Qty: 90 TABLET | Refills: 0 | Status: SHIPPED | OUTPATIENT
Start: 2022-01-28 | End: 2022-02-24

## 2022-02-10 RX ORDER — DULAGLUTIDE 1.5 MG/.5ML
INJECTION, SOLUTION SUBCUTANEOUS
Qty: 6 ML | Refills: 2 | Status: SHIPPED | OUTPATIENT
Start: 2022-02-10 | End: 2022-12-21

## 2022-02-17 ENCOUNTER — TELEPHONE (OUTPATIENT)
Dept: FAMILY MEDICINE | Facility: CLINIC | Age: 72
End: 2022-02-17
Payer: COMMERCIAL

## 2022-02-17 DIAGNOSIS — E53.8 VITAMIN B12 DEFICIENCY (NON ANEMIC): Primary | ICD-10-CM

## 2022-02-17 RX ORDER — CYANOCOBALAMIN 1000 UG/ML
1000 INJECTION, SOLUTION INTRAMUSCULAR; SUBCUTANEOUS
Status: ACTIVE | OUTPATIENT
Start: 2022-02-17 | End: 2023-02-12

## 2022-02-18 ENCOUNTER — OFFICE VISIT (OUTPATIENT)
Dept: FAMILY MEDICINE | Facility: CLINIC | Age: 72
End: 2022-02-18
Payer: COMMERCIAL

## 2022-02-18 VITALS
OXYGEN SATURATION: 96 % | DIASTOLIC BLOOD PRESSURE: 85 MMHG | WEIGHT: 121.8 LBS | RESPIRATION RATE: 14 BRPM | HEART RATE: 96 BPM | BODY MASS INDEX: 23.79 KG/M2 | SYSTOLIC BLOOD PRESSURE: 132 MMHG

## 2022-02-18 DIAGNOSIS — R05.9 COUGH: ICD-10-CM

## 2022-02-18 DIAGNOSIS — M79.672 LEFT FOOT PAIN: Primary | ICD-10-CM

## 2022-02-18 PROCEDURE — 99214 OFFICE O/P EST MOD 30 MIN: CPT | Performed by: FAMILY MEDICINE

## 2022-02-18 RX ORDER — FLUTICASONE PROPIONATE 50 MCG
2 SPRAY, SUSPENSION (ML) NASAL DAILY
Qty: 16 G | Refills: 11 | Status: SHIPPED | OUTPATIENT
Start: 2022-02-18 | End: 2022-10-18

## 2022-02-18 RX ORDER — CODEINE PHOSPHATE/GUAIFENESIN 10-100MG/5
5 LIQUID (ML) ORAL EVERY 4 HOURS PRN
Qty: 180 ML | Refills: 1 | Status: SHIPPED | OUTPATIENT
Start: 2022-02-18 | End: 2022-07-18

## 2022-02-18 NOTE — PROGRESS NOTES
OUTPATIENT VISIT NOTE                                                   Date of Visit: 2/18/2022     Chief Complaint   Patient presents with:  Foot Injury: Hit L foot, has swelling  Cough: chronic cough x1 month             History of Present Illness   Hai Meade is a 71 year old female with daughter interpreting for her injured left foot by  Hitting it on an object in the dark about one week ago.  Iced.   Also using tylenol.  Particularly painful at night.  Somewhat more painful with walking.    Also c/o cough for over a month.  Feels like she has somethng to cough out.  At the start no fever.  AT start not much stuffy nose.  Requesting robitussin AC.           MEDICATIONS   Current Outpatient Medications   Medication     acetaminophen (TYLENOL) 500 MG tablet     aspirin 81 MG EC tablet     blood glucose (ACCU-CHEK LOIDA PLUS) test strip     blood glucose (NO BRAND SPECIFIED) test strip     blood pressure monitor Kit     calcium carbonate-vitamin D3 500 mg(1,250mg) -400 unit per tablet     Cholecalciferol (D3-1000) 25 MCG (1000 UT) CAPS     diclofenac (VOLTAREN) 1 % topical gel     fluticasone (FLONASE) 50 MCG/ACT nasal spray     generic lancets (ACCU-CHEK SOFTCLIX LANCETS)     glipiZIDE (GLUCOTROL XL) 2.5 MG 24 hr tablet     guaiFENesin-codeine (GUAIFENESIN AC) 100-10 MG/5ML syrup     lisinopril (ZESTRIL) 5 MG tablet     metFORMIN (GLUCOPHAGE) 1000 MG tablet     pregabalin (LYRICA) 25 MG capsule     simvastatin (ZOCOR) 40 MG tablet     Current Facility-Administered Medications   Medication     cyanocobalamin injection 1,000 mcg     cyanocobalamin injection 1,000 mcg         SOCIAL HISTORY   Social History     Tobacco Use     Smoking status: Never Smoker     Smokeless tobacco: Never Used   Substance Use Topics     Alcohol use: No           Physical Exam   Vitals:    02/18/22 1328   BP: 132/85   BP Location: Right arm   Patient Position: Sitting   Cuff Size: Adult Regular   Pulse: 96   Resp: 14   SpO2: 96%    Weight: 55.2 kg (121 lb 12.8 oz)        GEN:  NAD  LUNGS:  Clear to auscultation without wheezing.  Normal effort.  HEART:  RRR without murmur, rub or gallop   LEFT FOOT:  Bruising on ventral MTP area. Mild bruising laterally. Mild tenderness distal to lateral malleolus and along 5th metatarsal.     Diagnostic Studies   LABS:  Results for orders placed or performed in visit on 02/18/22   XR Chest 2 Views     Status: None    Narrative    EXAM DATE:         02/18/2022    EXAM: X-RAY CHEST, 2 VIEWS, FRONTAL AND LATERAL  LOCATION: [facility]  DATE/TIME: 2/18/2022 2:00 PM    INDICATION: Prolonged cough,pain  COMPARISON: 06/25/2019    IMPRESSION: Minimal linear right infrahilar atelectasis/scarring. No effusions or focal consolidation. Heart size is normal. No acute osseous findings.             XR Foot Left G/E 3 Views     Status: None    Narrative    EXAM DATE:         02/18/2022    EXAM: X-RAY FOOT LEFT, MINIMUM 3 VIEWS  LOCATION: [facility]  DATE/TIME: 2/18/2022 2:15 PM    INDICATION: Foot and ankle pain.  COMPARISON: Left foot radiographic exam 11/29/2018.    IMPRESSION:  No acute change. Mild hallux valgus deformity with bunion. Unchanged mild left first MTP joint osteoarthritis. Dorsal left foot soft tissue swelling. Heel spur. No acute fracture or dislocation identified.             XR Ankle Left G/E 3 Views     Status: None    Narrative    EXAM DATE:         02/18/2022    EXAM: X-RAY ANKLE LEFT, MINIMUM THREE VIEWS  LOCATION: [facility]  DATE/TIME: 2/18/2022 2:30 PM    INDICATION: Left ankle pain.  COMPARISON: 11/29/2018.    IMPRESSION:  Intact left ankle mortise and distal syndesmosis. No acute displaced left ankle fracture. Lateral malleolus left ankle soft tissue swelling. Mild left tibiotalar osteoarthritis with a small subchondral cyst in the tibial plafond. Hindfoot joint spaces   unchanged. Heel  spur.                      Assessment and Plan     Left foot pain  Negative xray.  Contusion.  Ice frequently  Ace wrap and elevate.  Tylenol as needed.  Recheck if not improving.  - XR Foot Left G/E 3 Views  - XR Ankle Left G/E 3 Views    Cough  History not clear if this is really an ongoing problem.  May have post nasal drainage.  Also on lisinopril so that could be it.  Refilled cough syrup.  Trial of fluticasone.--will need to use 4-8 weeks to see if it helps.  Discuss further with her physician if not improving.  - XR Chest 2 Views  - guaiFENesin-codeine (GUAIFENESIN AC) 100-10 MG/5ML syrup  Dispense: 180 mL; Refill: 1  - fluticasone (FLONASE) 50 MCG/ACT nasal spray  Dispense: 16 g; Refill: 11                   Discussed signs / symptoms that warrant urgent / emergent medical attention.     Recheck if worsening or not improving.       Dale Miguel MD          Pertinent History     The following portions of the patient's history were reviewed and updated as appropriate: allergies, current medications, past family history, past medical history, past social history, past surgical history and problem list.

## 2022-02-24 DIAGNOSIS — I10 BENIGN ESSENTIAL HYPERTENSION: ICD-10-CM

## 2022-02-24 RX ORDER — AMLODIPINE BESYLATE 10 MG/1
10 TABLET ORAL DAILY
Qty: 90 TABLET | Refills: 0 | Status: SHIPPED | OUTPATIENT
Start: 2022-02-24 | End: 2022-04-19 | Stop reason: SDUPTHER

## 2022-03-22 ENCOUNTER — ALLIED HEALTH/NURSE VISIT (OUTPATIENT)
Dept: FAMILY MEDICINE | Facility: CLINIC | Age: 72
End: 2022-03-22
Payer: COMMERCIAL

## 2022-03-22 DIAGNOSIS — E53.8 VITAMIN B12 DEFICIENCY (NON ANEMIC): Primary | ICD-10-CM

## 2022-03-22 PROCEDURE — 96372 THER/PROPH/DIAG INJ SC/IM: CPT | Performed by: PHARMACIST

## 2022-03-22 PROCEDURE — 99207 PR NO CHARGE NURSE ONLY: CPT

## 2022-03-22 RX ADMIN — CYANOCOBALAMIN 1000 MCG: 1000 INJECTION, SOLUTION INTRAMUSCULAR; SUBCUTANEOUS at 09:26

## 2022-03-23 ENCOUNTER — EXTERNAL RECORD (OUTPATIENT)
Dept: OTHER | Age: 72
End: 2022-03-23

## 2022-03-24 ENCOUNTER — TELEPHONE (OUTPATIENT)
Dept: OBGYN | Age: 72
End: 2022-03-24

## 2022-03-25 ENCOUNTER — OFFICE VISIT (OUTPATIENT)
Dept: OBGYN | Age: 72
End: 2022-03-25

## 2022-03-25 ENCOUNTER — APPOINTMENT (OUTPATIENT)
Dept: INTERPRETER SERVICES | Age: 72
End: 2022-03-25

## 2022-03-25 ENCOUNTER — LAB SERVICES (OUTPATIENT)
Dept: LAB | Age: 72
End: 2022-03-25

## 2022-03-25 VITALS
WEIGHT: 162 LBS | BODY MASS INDEX: 32.66 KG/M2 | HEIGHT: 59 IN | DIASTOLIC BLOOD PRESSURE: 78 MMHG | SYSTOLIC BLOOD PRESSURE: 152 MMHG

## 2022-03-25 DIAGNOSIS — N83.209 CYST OF OVARY, UNSPECIFIED LATERALITY: Primary | ICD-10-CM

## 2022-03-25 DIAGNOSIS — N83.209 CYST OF OVARY, UNSPECIFIED LATERALITY: ICD-10-CM

## 2022-03-25 LAB — CANCER AG125 SERPL-ACNC: 8 UNITS/ML (ref 0–35)

## 2022-03-25 PROCEDURE — 99212 OFFICE O/P EST SF 10 MIN: CPT | Performed by: OBSTETRICS & GYNECOLOGY

## 2022-03-25 PROCEDURE — 36415 COLL VENOUS BLD VENIPUNCTURE: CPT | Performed by: INTERNAL MEDICINE

## 2022-03-25 PROCEDURE — 86304 IMMUNOASSAY TUMOR CA 125: CPT | Performed by: CLINICAL MEDICAL LABORATORY

## 2022-03-28 ENCOUNTER — TELEPHONE (OUTPATIENT)
Dept: OBGYN | Age: 72
End: 2022-03-28

## 2022-04-10 ENCOUNTER — HOSPITAL ENCOUNTER (OUTPATIENT)
Dept: ULTRASOUND IMAGING | Age: 72
Discharge: HOME | End: 2022-04-10
Attending: OBSTETRICS & GYNECOLOGY

## 2022-04-10 DIAGNOSIS — N83.209 CYST OF OVARY, UNSPECIFIED LATERALITY: ICD-10-CM

## 2022-04-10 PROCEDURE — 76856 US EXAM PELVIC COMPLETE: CPT | Performed by: RADIOLOGY

## 2022-04-10 PROCEDURE — 76830 TRANSVAGINAL US NON-OB: CPT | Performed by: RADIOLOGY

## 2022-04-10 PROCEDURE — 76830 TRANSVAGINAL US NON-OB: CPT

## 2022-04-11 ENCOUNTER — TELEPHONE (OUTPATIENT)
Dept: FAMILY MEDICINE | Age: 72
End: 2022-04-11

## 2022-04-11 DIAGNOSIS — E11.29 TYPE 2 DIABETES MELLITUS WITH MICROALBUMINURIA, WITH LONG-TERM CURRENT USE OF INSULIN (CMD): Primary | ICD-10-CM

## 2022-04-11 DIAGNOSIS — Z79.4 TYPE 2 DIABETES MELLITUS WITH MICROALBUMINURIA, WITH LONG-TERM CURRENT USE OF INSULIN (CMD): Primary | ICD-10-CM

## 2022-04-11 DIAGNOSIS — R80.9 TYPE 2 DIABETES MELLITUS WITH MICROALBUMINURIA, WITH LONG-TERM CURRENT USE OF INSULIN (CMD): Primary | ICD-10-CM

## 2022-04-12 ENCOUNTER — APPOINTMENT (OUTPATIENT)
Dept: LAB | Age: 72
End: 2022-04-12

## 2022-04-18 ENCOUNTER — TELEPHONE (OUTPATIENT)
Dept: OBGYN | Age: 72
End: 2022-04-18

## 2022-04-19 ENCOUNTER — OFFICE VISIT (OUTPATIENT)
Dept: FAMILY MEDICINE | Age: 72
End: 2022-04-19

## 2022-04-19 ENCOUNTER — LAB SERVICES (OUTPATIENT)
Dept: LAB | Age: 72
End: 2022-04-19

## 2022-04-19 ENCOUNTER — IMAGING SERVICES (OUTPATIENT)
Dept: GENERAL RADIOLOGY | Age: 72
End: 2022-04-19
Attending: FAMILY MEDICINE

## 2022-04-19 VITALS
TEMPERATURE: 97.4 F | DIASTOLIC BLOOD PRESSURE: 84 MMHG | BODY MASS INDEX: 31.92 KG/M2 | HEART RATE: 70 BPM | SYSTOLIC BLOOD PRESSURE: 135 MMHG | WEIGHT: 162.6 LBS | OXYGEN SATURATION: 98 % | HEIGHT: 60 IN

## 2022-04-19 DIAGNOSIS — M25.562 ACUTE PAIN OF LEFT KNEE: ICD-10-CM

## 2022-04-19 DIAGNOSIS — Z00.00 WELLNESS EXAMINATION: ICD-10-CM

## 2022-04-19 DIAGNOSIS — N18.30 BENIGN HYPERTENSION WITH CKD (CHRONIC KIDNEY DISEASE) STAGE III (CMD): ICD-10-CM

## 2022-04-19 DIAGNOSIS — Z79.4 TYPE 2 DIABETES MELLITUS WITH DIABETIC CATARACT, WITH LONG-TERM CURRENT USE OF INSULIN (CMD): ICD-10-CM

## 2022-04-19 DIAGNOSIS — E11.29 TYPE 2 DIABETES MELLITUS WITH MICROALBUMINURIA, WITH LONG-TERM CURRENT USE OF INSULIN (CMD): ICD-10-CM

## 2022-04-19 DIAGNOSIS — E11.36 TYPE 2 DIABETES MELLITUS WITH DIABETIC CATARACT, WITH LONG-TERM CURRENT USE OF INSULIN (CMD): ICD-10-CM

## 2022-04-19 DIAGNOSIS — Z00.00 MEDICARE ANNUAL WELLNESS VISIT, SUBSEQUENT: Primary | ICD-10-CM

## 2022-04-19 DIAGNOSIS — I10 BENIGN ESSENTIAL HYPERTENSION: ICD-10-CM

## 2022-04-19 DIAGNOSIS — I50.32 DIASTOLIC CHF, CHRONIC (CMD): ICD-10-CM

## 2022-04-19 DIAGNOSIS — I12.9 BENIGN HYPERTENSION WITH CKD (CHRONIC KIDNEY DISEASE) STAGE III (CMD): ICD-10-CM

## 2022-04-19 DIAGNOSIS — Z79.4 TYPE 2 DIABETES MELLITUS WITH MICROALBUMINURIA, WITH LONG-TERM CURRENT USE OF INSULIN (CMD): ICD-10-CM

## 2022-04-19 DIAGNOSIS — R80.9 TYPE 2 DIABETES MELLITUS WITH MICROALBUMINURIA, WITH LONG-TERM CURRENT USE OF INSULIN (CMD): ICD-10-CM

## 2022-04-19 LAB
ALBUMIN SERPL-MCNC: 4 G/DL (ref 3.6–5.1)
ALBUMIN/GLOB SERPL: 1 {RATIO} (ref 1–2.4)
ALP SERPL-CCNC: 159 UNITS/L (ref 45–117)
ALT SERPL-CCNC: 25 UNITS/L
ANION GAP SERPL CALC-SCNC: 14 MMOL/L (ref 10–20)
AST SERPL-CCNC: 16 UNITS/L
BILIRUB SERPL-MCNC: 0.5 MG/DL (ref 0.2–1)
BUN SERPL-MCNC: 18 MG/DL (ref 6–20)
BUN/CREAT SERPL: 20 (ref 7–25)
CALCIUM SERPL-MCNC: 10.3 MG/DL (ref 8.4–10.2)
CHLORIDE SERPL-SCNC: 104 MMOL/L (ref 98–107)
CHOLEST SERPL-MCNC: 141 MG/DL
CHOLEST/HDLC SERPL: 3.3 {RATIO}
CO2 SERPL-SCNC: 28 MMOL/L (ref 21–32)
CREAT SERPL-MCNC: 0.89 MG/DL (ref 0.51–0.95)
CREAT UR-MCNC: 138 MG/DL
FASTING DURATION TIME PATIENT: 12 HOURS (ref 0–999)
FASTING DURATION TIME PATIENT: 12 HOURS (ref 0–999)
GFR SERPLBLD BASED ON 1.73 SQ M-ARVRAT: 65 ML/MIN
GLOBULIN SER-MCNC: 4 G/DL (ref 2–4)
GLUCOSE SERPL-MCNC: 111 MG/DL (ref 70–99)
HBA1C MFR BLD: 6.3 % (ref 4.5–5.6)
HDLC SERPL-MCNC: 43 MG/DL
LDLC SERPL CALC-MCNC: 54 MG/DL
MICROALBUMIN UR-MCNC: 1.83 MG/DL
MICROALBUMIN/CREAT UR: 13.3 MG/G
NONHDLC SERPL-MCNC: 98 MG/DL
POTASSIUM SERPL-SCNC: 3.9 MMOL/L (ref 3.4–5.1)
PROT SERPL-MCNC: 8 G/DL (ref 6.4–8.2)
SODIUM SERPL-SCNC: 142 MMOL/L (ref 135–145)
TRIGL SERPL-MCNC: 222 MG/DL

## 2022-04-19 PROCEDURE — 80053 COMPREHEN METABOLIC PANEL: CPT | Performed by: INTERNAL MEDICINE

## 2022-04-19 PROCEDURE — 82043 UR ALBUMIN QUANTITATIVE: CPT | Performed by: CLINICAL MEDICAL LABORATORY

## 2022-04-19 PROCEDURE — 73564 X-RAY EXAM KNEE 4 OR MORE: CPT | Performed by: RADIOLOGY

## 2022-04-19 PROCEDURE — 99397 PER PM REEVAL EST PAT 65+ YR: CPT | Performed by: FAMILY MEDICINE

## 2022-04-19 PROCEDURE — 36415 COLL VENOUS BLD VENIPUNCTURE: CPT | Performed by: INTERNAL MEDICINE

## 2022-04-19 PROCEDURE — 99214 OFFICE O/P EST MOD 30 MIN: CPT | Performed by: FAMILY MEDICINE

## 2022-04-19 PROCEDURE — 83036 HEMOGLOBIN GLYCOSYLATED A1C: CPT | Performed by: CLINICAL MEDICAL LABORATORY

## 2022-04-19 PROCEDURE — 80061 LIPID PANEL: CPT | Performed by: CLINICAL MEDICAL LABORATORY

## 2022-04-19 PROCEDURE — 82570 ASSAY OF URINE CREATININE: CPT | Performed by: CLINICAL MEDICAL LABORATORY

## 2022-04-19 PROCEDURE — G0439 PPPS, SUBSEQ VISIT: HCPCS | Performed by: FAMILY MEDICINE

## 2022-04-19 RX ORDER — AMLODIPINE BESYLATE 10 MG/1
10 TABLET ORAL DAILY
Qty: 90 TABLET | Refills: 0 | Status: SHIPPED | OUTPATIENT
Start: 2022-04-19 | End: 2023-01-25

## 2022-04-19 RX ORDER — LOSARTAN POTASSIUM 50 MG/1
50 TABLET ORAL 2 TIMES DAILY
Qty: 180 TABLET | Refills: 0 | Status: SHIPPED | OUTPATIENT
Start: 2022-04-19 | End: 2022-07-25

## 2022-04-19 ASSESSMENT — PATIENT HEALTH QUESTIONNAIRE - PHQ9
2. FEELING DOWN, DEPRESSED OR HOPELESS: NOT AT ALL
1. LITTLE INTEREST OR PLEASURE IN DOING THINGS: NOT AT ALL
SUM OF ALL RESPONSES TO PHQ9 QUESTIONS 1 AND 2: 0
CLINICAL INTERPRETATION OF PHQ2 SCORE: NO FURTHER SCREENING NEEDED
SUM OF ALL RESPONSES TO PHQ9 QUESTIONS 1 AND 2: 0

## 2022-04-20 ENCOUNTER — TELEPHONE (OUTPATIENT)
Dept: FAMILY MEDICINE | Age: 72
End: 2022-04-20

## 2022-04-20 DIAGNOSIS — M25.562 ACUTE PAIN OF LEFT KNEE: Primary | ICD-10-CM

## 2022-04-20 RX ORDER — MELOXICAM 15 MG/1
15 TABLET ORAL DAILY PRN
Qty: 30 TABLET | Refills: 1 | Status: SHIPPED | OUTPATIENT
Start: 2022-04-20 | End: 2022-07-05

## 2022-04-25 ENCOUNTER — OFFICE VISIT (OUTPATIENT)
Dept: OPHTHALMOLOGY | Age: 72
End: 2022-04-25

## 2022-04-25 ENCOUNTER — ALLIED HEALTH/NURSE VISIT (OUTPATIENT)
Dept: FAMILY MEDICINE | Facility: CLINIC | Age: 72
End: 2022-04-25
Payer: COMMERCIAL

## 2022-04-25 DIAGNOSIS — E11.9 TYPE 2 DIABETES MELLITUS WITHOUT RETINOPATHY (CMD): Primary | ICD-10-CM

## 2022-04-25 DIAGNOSIS — H25.813 COMBINED FORMS OF AGE-RELATED CATARACT OF BOTH EYES: ICD-10-CM

## 2022-04-25 DIAGNOSIS — E53.8 VITAMIN B12 DEFICIENCY (NON ANEMIC): Primary | ICD-10-CM

## 2022-04-25 PROCEDURE — 99207 PR NO CHARGE NURSE ONLY: CPT

## 2022-04-25 PROCEDURE — 96372 THER/PROPH/DIAG INJ SC/IM: CPT | Performed by: PHARMACIST

## 2022-04-25 PROCEDURE — 99204 OFFICE O/P NEW MOD 45 MIN: CPT

## 2022-04-25 RX ADMIN — CYANOCOBALAMIN 1000 MCG: 1000 INJECTION, SOLUTION INTRAMUSCULAR; SUBCUTANEOUS at 09:14

## 2022-04-25 ASSESSMENT — REFRACTION_MANIFEST
OD_CYLINDER: SPHERE
OD_SPHERE: +0.25
OS_CYLINDER: +0.75
OS_AXIS: 007
OS_SPHERE: -0.25
METHOD_AUTOREFRACTION: 1

## 2022-04-25 ASSESSMENT — VISUAL ACUITY
OS_SC+: +2
OD_PH_SC+: -2
OD_PH_SC: 20/30
OS_PH_SC: 20/40
OD_SC+: -2
OD_SC: 20/40
METHOD: NUMBERS - LINEAR
OS_SC: 20/60

## 2022-04-25 ASSESSMENT — CUP TO DISC RATIO
OS_RATIO: 0.20R
OD_RATIO: 0.20R

## 2022-04-25 ASSESSMENT — EXTERNAL EXAM - RIGHT EYE: OD_EXAM: NORMAL

## 2022-04-25 ASSESSMENT — TONOMETRY
OD_IOP_MMHG: 16
IOP_METHOD: APPLANATION
OS_IOP_MMHG: 16

## 2022-04-25 ASSESSMENT — EXTERNAL EXAM - LEFT EYE: OS_EXAM: NORMAL

## 2022-04-25 ASSESSMENT — CONF VISUAL FIELD
OD_NORMAL: 1
OS_NORMAL: 1

## 2022-04-25 ASSESSMENT — SLIT LAMP EXAM - LIDS
COMMENTS: NORMAL
COMMENTS: NORMAL

## 2022-04-27 ENCOUNTER — IMMUNIZATION (OUTPATIENT)
Dept: FAMILY MEDICINE | Age: 72
End: 2022-04-27

## 2022-04-27 DIAGNOSIS — Z23 NEED FOR VACCINATION: Primary | ICD-10-CM

## 2022-04-27 PROCEDURE — 0054A COVID 19 12Y AND OLDER PFIZER-BIONTECH - DO NOT DILUTE: CPT | Performed by: INTERNAL MEDICINE

## 2022-04-27 PROCEDURE — 91305 COVID 19 12Y AND OLDER PFIZER-BIONTECH - DO NOT DILUTE: CPT | Performed by: INTERNAL MEDICINE

## 2022-05-03 PROBLEM — M25.562 CHRONIC PAIN OF LEFT KNEE: Status: ACTIVE | Noted: 2022-05-03

## 2022-05-03 PROBLEM — G89.29 CHRONIC PAIN OF LEFT KNEE: Status: ACTIVE | Noted: 2022-05-03

## 2022-05-23 ENCOUNTER — ALLIED HEALTH/NURSE VISIT (OUTPATIENT)
Dept: FAMILY MEDICINE | Facility: CLINIC | Age: 72
End: 2022-05-23
Payer: COMMERCIAL

## 2022-05-23 DIAGNOSIS — E53.8 VITAMIN B12 DEFICIENCY (NON ANEMIC): Primary | ICD-10-CM

## 2022-05-23 PROCEDURE — 99207 PR NO CHARGE NURSE ONLY: CPT

## 2022-05-23 PROCEDURE — 96372 THER/PROPH/DIAG INJ SC/IM: CPT | Performed by: FAMILY MEDICINE

## 2022-05-23 RX ADMIN — CYANOCOBALAMIN 1000 MCG: 1000 INJECTION, SOLUTION INTRAMUSCULAR; SUBCUTANEOUS at 09:47

## 2022-05-30 DIAGNOSIS — M81.0 AGE-RELATED OSTEOPOROSIS WITHOUT CURRENT PATHOLOGICAL FRACTURE: ICD-10-CM

## 2022-05-31 RX ORDER — UBIDECARENONE 100 MG
1 CAPSULE ORAL DAILY
Qty: 90 CAPSULE | Refills: 2 | Status: SHIPPED | OUTPATIENT
Start: 2022-05-31

## 2022-05-31 NOTE — TELEPHONE ENCOUNTER
"Last Written Prescription Date:  8/2/21  Last Fill Quantity: 90,  # refills: 2   Last office visit provider:  1/17/22     Requested Prescriptions   Pending Prescriptions Disp Refills     Cholecalciferol (D3-1000) 25 MCG (1000 UT) CAPS [Pharmacy Med Name: VITAMIN D 1,000IU H/PTNCY CAPSULES] 90 capsule 2     Sig: TAKE 1 CAPSULE BY MOUTH DAILY       Vitamin Supplements (Adult) Protocol Passed - 5/31/2022 10:39 AM        Passed - High dose Vitamin D not ordered        Passed - Recent (12 mo) or future (30 days) visit within the authorizing provider's specialty     Patient has had an office visit with the authorizing provider or a provider within the authorizing providers department within the previous 12 mos or has a future within next 30 days. See \"Patient Info\" tab in inbasket, or \"Choose Columns\" in Meds & Orders section of the refill encounter.              Passed - Medication is active on med list             Yan Heath RN 05/31/22 10:39 AM  "

## 2022-06-21 ENCOUNTER — ALLIED HEALTH/NURSE VISIT (OUTPATIENT)
Dept: FAMILY MEDICINE | Facility: CLINIC | Age: 72
End: 2022-06-21
Payer: COMMERCIAL

## 2022-06-21 DIAGNOSIS — E53.8 VITAMIN B12 DEFICIENCY (NON ANEMIC): Primary | ICD-10-CM

## 2022-06-21 PROCEDURE — 96372 THER/PROPH/DIAG INJ SC/IM: CPT | Performed by: PHARMACIST

## 2022-06-21 PROCEDURE — 99207 PR NO CHARGE NURSE ONLY: CPT

## 2022-06-21 RX ADMIN — CYANOCOBALAMIN 1000 MCG: 1000 INJECTION, SOLUTION INTRAMUSCULAR; SUBCUTANEOUS at 08:34

## 2022-07-02 ENCOUNTER — HEALTH MAINTENANCE LETTER (OUTPATIENT)
Age: 72
End: 2022-07-02

## 2022-07-05 DIAGNOSIS — Z79.4 TYPE 2 DIABETES MELLITUS WITH DIABETIC NEPHROPATHY, WITH LONG-TERM CURRENT USE OF INSULIN (CMD): ICD-10-CM

## 2022-07-05 DIAGNOSIS — E11.21 TYPE 2 DIABETES MELLITUS WITH DIABETIC NEPHROPATHY, WITH LONG-TERM CURRENT USE OF INSULIN (CMD): ICD-10-CM

## 2022-07-05 DIAGNOSIS — M25.562 ACUTE PAIN OF LEFT KNEE: ICD-10-CM

## 2022-07-05 RX ORDER — MELOXICAM 15 MG/1
15 TABLET ORAL DAILY PRN
Qty: 30 TABLET | Refills: 1 | Status: SHIPPED | OUTPATIENT
Start: 2022-07-05 | End: 2022-08-10

## 2022-07-18 ENCOUNTER — OFFICE VISIT (OUTPATIENT)
Dept: FAMILY MEDICINE | Facility: CLINIC | Age: 72
End: 2022-07-18
Payer: COMMERCIAL

## 2022-07-18 VITALS
BODY MASS INDEX: 22.93 KG/M2 | WEIGHT: 117.4 LBS | TEMPERATURE: 97.5 F | HEART RATE: 65 BPM | SYSTOLIC BLOOD PRESSURE: 138 MMHG | OXYGEN SATURATION: 98 % | DIASTOLIC BLOOD PRESSURE: 70 MMHG

## 2022-07-18 DIAGNOSIS — E11.43 TYPE 2 DIABETES MELLITUS WITH DIABETIC AUTONOMIC NEUROPATHY, WITHOUT LONG-TERM CURRENT USE OF INSULIN (H): Primary | ICD-10-CM

## 2022-07-18 DIAGNOSIS — G62.89 OTHER POLYNEUROPATHY: ICD-10-CM

## 2022-07-18 LAB
ANION GAP SERPL CALCULATED.3IONS-SCNC: 10 MMOL/L (ref 7–15)
BUN SERPL-MCNC: 11 MG/DL (ref 8–23)
CALCIUM SERPL-MCNC: 9.2 MG/DL (ref 8.8–10.2)
CHLORIDE SERPL-SCNC: 99 MMOL/L (ref 98–107)
CREAT SERPL-MCNC: 0.68 MG/DL (ref 0.51–0.95)
DEPRECATED HCO3 PLAS-SCNC: 25 MMOL/L (ref 22–29)
GFR SERPL CREATININE-BSD FRML MDRD: >90 ML/MIN/1.73M2
GLUCOSE SERPL-MCNC: 114 MG/DL (ref 70–99)
HBA1C MFR BLD: 8 % (ref 0–5.6)
HOLD SPECIMEN: NORMAL
POTASSIUM SERPL-SCNC: 4 MMOL/L (ref 3.4–5.3)
SODIUM SERPL-SCNC: 134 MMOL/L (ref 136–145)

## 2022-07-18 PROCEDURE — 83036 HEMOGLOBIN GLYCOSYLATED A1C: CPT | Performed by: FAMILY MEDICINE

## 2022-07-18 PROCEDURE — 80048 BASIC METABOLIC PNL TOTAL CA: CPT | Performed by: FAMILY MEDICINE

## 2022-07-18 PROCEDURE — 96372 THER/PROPH/DIAG INJ SC/IM: CPT | Performed by: FAMILY MEDICINE

## 2022-07-18 PROCEDURE — 36415 COLL VENOUS BLD VENIPUNCTURE: CPT | Performed by: FAMILY MEDICINE

## 2022-07-18 PROCEDURE — 99214 OFFICE O/P EST MOD 30 MIN: CPT | Mod: 25 | Performed by: FAMILY MEDICINE

## 2022-07-18 RX ADMIN — CYANOCOBALAMIN 1000 MCG: 1000 INJECTION, SOLUTION INTRAMUSCULAR; SUBCUTANEOUS at 09:50

## 2022-07-18 ASSESSMENT — PAIN SCALES - GENERAL: PAINLEVEL: NO PAIN (0)

## 2022-07-18 NOTE — PROGRESS NOTES
Assessment & Plan     Type 2 diabetes mellitus with diabetic autonomic neuropathy, without long-term current use of insulin (H)  A1c is elevated. Discussed taking glipizide daily- however she states she does not feel good if she takes it daily but will continue taking it as needed (BS in morning higher than 150).  Declines trulicity or other medications today. Will work on diet. Offered visit with diabetes educator and recommended ophthalmology appt- today she declines.  Keep with current medication and recheck in 3 months.  - Hemoglobin A1c  - Hemoglobin A1c  - Extra Tube  - Extra Tube      Denia Barron MD  [facility] STACI Valles is a 72 year old, presenting for the following health issues:  Diabetes and Referral (Pt is wanting to get referred to a massage therapist- not chiropractor)      History of Present Illness       Diabetes:   She presents for follow up of diabetes.  She is checking home blood glucose two times daily. She checks blood glucose at bedtime.  Blood glucose is never over 200 and never under 70. She is aware of hypoglycemia symptoms including dizziness. She has no concerns regarding her diabetes at this time.  She is having numbness in feet. The patient has not had a diabetic eye exam in the last 12 months.         She eats 2-3 servings of fruits and vegetables daily.She consumes 0 sweetened beverage(s) daily.She exercises with enough effort to increase her heart rate 10 to 19 minutes per day.  She exercises with enough effort to increase her heart rate 4 days per week.   She is taking medications regularly.       Diabetes Follow-up    How often are you checking your blood sugar? One time daily  What time of day are you checking your blood sugars (select all that apply)?  Before meals  Have you had any blood sugars above 200?  Yes occ  Have you had any blood sugars below 70?  No    What symptoms do you notice when your blood sugar is low?  None and Not  applicable    What concerns do you have today about your diabetes? None and Other: some higher blood sugars     Do you have any of these symptoms? (Select all that apply)  Burning in feet    Have you had a diabetic eye exam in the last 12 months? No        BP Readings from Last 2 Encounters:   07/18/22 138/70   02/18/22 132/85     Hemoglobin A1C (%)   Date Value   07/18/2022 8.0 (H)   01/17/2022 7.7 (H)     LDL Cholesterol Calculated (mg/dL)   Date Value   10/11/2021 54   11/03/2020 41         Review of Systems   Constitutional, HEENT, cardiovascular, pulmonary, gi and gu systems are negative, except as otherwise noted.      Objective    /70 (BP Location: Left arm, Patient Position: Sitting, Cuff Size: Adult Regular)   Pulse 65   Temp 97.5  F (36.4  C) (Temporal)   Wt 53.3 kg (117 lb 6.4 oz)   SpO2 98%   BMI 22.93 kg/m    Body mass index is 22.93 kg/m .  Physical Exam   GENERAL: healthy, alert and no distress  NECK: no adenopathy, no asymmetry, masses, or scars and thyroid normal to palpation  RESP: lungs clear to auscultation - no rales, rhonchi or wheezes  CV: regular rate and rhythm, normal S1 S2, no S3 or S4, no murmur, click or rub, no peripheral edema and peripheral pulses strong  ABDOMEN: soft, nontender, no hepatosplenomegaly, no masses and bowel sounds normal  MS: no gross musculoskeletal defects noted, no edema    Results for orders placed or performed in visit on 07/18/22 (from the past 24 hour(s))   Hemoglobin A1c   Result Value Ref Range    Hemoglobin A1C 8.0 (H) 0.0 - 5.6 %   Extra Tube    Narrative    The following orders were created for panel order Extra Tube.  Procedure                               Abnormality         Status                     ---------                               -----------         ------                     Extra Red Top Tube[[id number]]                               In process                   Please view results for these tests on the individual orders.                  .  ..   19

## 2022-07-25 RX ORDER — LOSARTAN POTASSIUM 50 MG/1
TABLET ORAL
Qty: 180 TABLET | Refills: 0 | Status: SHIPPED | OUTPATIENT
Start: 2022-07-25 | End: 2023-04-12 | Stop reason: SDUPTHER

## 2022-08-10 DIAGNOSIS — M25.562 ACUTE PAIN OF LEFT KNEE: ICD-10-CM

## 2022-08-10 RX ORDER — MELOXICAM 15 MG/1
15 TABLET ORAL DAILY PRN
Qty: 30 TABLET | Refills: 2 | Status: SHIPPED | OUTPATIENT
Start: 2022-08-10 | End: 2022-11-17

## 2022-08-11 DIAGNOSIS — E11.43 TYPE 2 DIABETES MELLITUS WITH DIABETIC AUTONOMIC NEUROPATHY, WITHOUT LONG-TERM CURRENT USE OF INSULIN (H): Primary | ICD-10-CM

## 2022-08-11 DIAGNOSIS — E11.43 TYPE 2 DIABETES MELLITUS WITH DIABETIC AUTONOMIC NEUROPATHY, WITHOUT LONG-TERM CURRENT USE OF INSULIN (H): ICD-10-CM

## 2022-08-11 RX ORDER — GLIPIZIDE 2.5 MG/1
2.5 TABLET, EXTENDED RELEASE ORAL DAILY PRN
Qty: 90 TABLET | Refills: 4 | Status: SHIPPED | OUTPATIENT
Start: 2022-08-11 | End: 2023-11-22

## 2022-08-11 RX ORDER — LANCETS
EACH MISCELLANEOUS
Qty: 200 EACH | Refills: 1 | Status: SHIPPED | OUTPATIENT
Start: 2022-08-11 | End: 2022-10-18

## 2022-08-11 NOTE — TELEPHONE ENCOUNTER
"Last Written Prescription Date:  4/5/21  Last Fill Quantity: 200,  # refills: 1   Last office visit provider:  7/18/22     Requested Prescriptions   Pending Prescriptions Disp Refills     blood glucose monitoring (SOFTCLIX) lancets [Pharmacy Med Name: SOFTCLIX LANCETS]       Sig: TEST TWICE DAILY       Diabetic Supplies Protocol Failed - 8/11/2022  8:31 AM        Failed - Medication is active on med list        Passed - Patient is 18 years of age or older        Passed - Recent (6 mo) or future (30 days) visit within the authorizing provider's specialty     Patient had office visit in the last 6 months or has a visit in the next 30 days with authorizing provider.  See \"Patient Info\" tab in inbasket, or \"Choose Columns\" in Meds & Orders section of the refill encounter.                 Cindy Roman RN 08/11/22 3:28 PM  "

## 2022-08-22 ENCOUNTER — ALLIED HEALTH/NURSE VISIT (OUTPATIENT)
Dept: FAMILY MEDICINE | Facility: CLINIC | Age: 72
End: 2022-08-22
Payer: COMMERCIAL

## 2022-08-22 DIAGNOSIS — E53.8 VITAMIN B12 DEFICIENCY (NON ANEMIC): Primary | ICD-10-CM

## 2022-08-22 PROCEDURE — 96372 THER/PROPH/DIAG INJ SC/IM: CPT | Performed by: PHARMACIST

## 2022-08-22 PROCEDURE — 99207 PR NO CHARGE NURSE ONLY: CPT

## 2022-08-22 RX ADMIN — CYANOCOBALAMIN 1000 MCG: 1000 INJECTION, SOLUTION INTRAMUSCULAR; SUBCUTANEOUS at 09:22

## 2022-08-30 DIAGNOSIS — M81.0 AGE-RELATED OSTEOPOROSIS WITHOUT CURRENT PATHOLOGICAL FRACTURE: ICD-10-CM

## 2022-08-30 RX ORDER — CALCIUM CARBONATE/VITAMIN D3 500 MG-10
TABLET,CHEWABLE ORAL
Qty: 180 TABLET | Refills: 3 | Status: SHIPPED | OUTPATIENT
Start: 2022-08-30

## 2022-08-30 NOTE — TELEPHONE ENCOUNTER
"Last Written Prescription Date:  6/15/21  Last Fill Quantity: 180,  # refills: 4   Last office visit provider:  7/18/22     Requested Prescriptions   Pending Prescriptions Disp Refills     Calcium Carb-Cholecalciferol 500-10 MG-MCG CHEW [Pharmacy Med Name: CALCIUM 500MG W/ VIT D CHW TABLETS] 180 tablet 4     Sig: CHEW 1 TABLET TWICE DAILY       Vitamin Supplements (Adult) Protocol Passed - 8/30/2022 10:34 AM        Passed - High dose Vitamin D not ordered        Passed - Recent (12 mo) or future (30 days) visit within the authorizing provider's specialty     Patient has had an office visit with the authorizing provider or a provider within the authorizing providers department within the previous 12 mos or has a future within next 30 days. See \"Patient Info\" tab in inbasket, or \"Choose Columns\" in Meds & Orders section of the refill encounter.              Passed - Medication is active on med list             Cindy Roman RN 08/30/22 6:14 PM  "

## 2022-09-20 ENCOUNTER — TELEPHONE (OUTPATIENT)
Dept: FAMILY MEDICINE | Facility: CLINIC | Age: 72
End: 2022-09-20

## 2022-09-20 RX ORDER — METFORMIN HYDROCHLORIDE 500 MG/1
TABLET, EXTENDED RELEASE ORAL
Qty: 360 TABLET | Refills: 0 | Status: SHIPPED | OUTPATIENT
Start: 2022-09-20 | End: 2022-12-16

## 2022-09-20 NOTE — TELEPHONE ENCOUNTER
Unable to reach pt and daughter. I left a vm.    If they return the call, please ask if they can come in at 10:30 AM of 10/18/22.     Please add pt to the slot if she can.

## 2022-09-20 NOTE — TELEPHONE ENCOUNTER
Pts daughter called back and was informed of message below. New appointment time does not work for patient and will keep time she has now.

## 2022-09-26 ENCOUNTER — ALLIED HEALTH/NURSE VISIT (OUTPATIENT)
Dept: FAMILY MEDICINE | Facility: CLINIC | Age: 72
End: 2022-09-26
Payer: COMMERCIAL

## 2022-09-26 DIAGNOSIS — Z00.00 HEALTHCARE MAINTENANCE: Primary | ICD-10-CM

## 2022-09-26 PROCEDURE — 99207 PR NO CHARGE NURSE ONLY: CPT

## 2022-09-26 PROCEDURE — 96372 THER/PROPH/DIAG INJ SC/IM: CPT | Performed by: FAMILY MEDICINE

## 2022-09-26 RX ADMIN — CYANOCOBALAMIN 1000 MCG: 1000 INJECTION, SOLUTION INTRAMUSCULAR; SUBCUTANEOUS at 09:45

## 2022-10-18 ENCOUNTER — OFFICE VISIT (OUTPATIENT)
Dept: FAMILY MEDICINE | Facility: CLINIC | Age: 72
End: 2022-10-18
Payer: COMMERCIAL

## 2022-10-18 VITALS
HEART RATE: 69 BPM | WEIGHT: 117.8 LBS | SYSTOLIC BLOOD PRESSURE: 140 MMHG | DIASTOLIC BLOOD PRESSURE: 80 MMHG | BODY MASS INDEX: 23.01 KG/M2 | OXYGEN SATURATION: 99 % | TEMPERATURE: 97.2 F

## 2022-10-18 DIAGNOSIS — G62.89 OTHER POLYNEUROPATHY: ICD-10-CM

## 2022-10-18 DIAGNOSIS — E53.8 VITAMIN B12 DEFICIENCY (NON ANEMIC): ICD-10-CM

## 2022-10-18 DIAGNOSIS — E11.43 TYPE 2 DIABETES MELLITUS WITH DIABETIC AUTONOMIC NEUROPATHY, WITHOUT LONG-TERM CURRENT USE OF INSULIN (H): Primary | ICD-10-CM

## 2022-10-18 LAB
HBA1C MFR BLD: 7.7 % (ref 0–5.6)
HOLD SPECIMEN: NORMAL

## 2022-10-18 PROCEDURE — 36415 COLL VENOUS BLD VENIPUNCTURE: CPT | Performed by: FAMILY MEDICINE

## 2022-10-18 PROCEDURE — 99214 OFFICE O/P EST MOD 30 MIN: CPT | Mod: 25 | Performed by: FAMILY MEDICINE

## 2022-10-18 PROCEDURE — 83036 HEMOGLOBIN GLYCOSYLATED A1C: CPT | Performed by: FAMILY MEDICINE

## 2022-10-18 PROCEDURE — 80061 LIPID PANEL: CPT | Performed by: FAMILY MEDICINE

## 2022-10-18 PROCEDURE — 96372 THER/PROPH/DIAG INJ SC/IM: CPT | Performed by: PHARMACIST

## 2022-10-18 RX ORDER — LANCETS
1 EACH MISCELLANEOUS 2 TIMES DAILY
Qty: 200 EACH | Refills: 4 | Status: SHIPPED | OUTPATIENT
Start: 2022-10-18 | End: 2023-01-16

## 2022-10-18 RX ADMIN — CYANOCOBALAMIN 1000 MCG: 1000 INJECTION, SOLUTION INTRAMUSCULAR; SUBCUTANEOUS at 09:02

## 2022-10-18 ASSESSMENT — PAIN SCALES - GENERAL: PAINLEVEL: MILD PAIN (2)

## 2022-10-18 NOTE — LETTER
October 24, 2022      Hai Meade  [address] [place] [address]        Dear ,    We are writing to inform you of your test results.    Your test results fall within the expected range(s) or remain unchanged from previous results.  Please continue with current treatment plan. Nice to see you Song.  Take Care.    Resulted Orders   Hemoglobin A1c   Result Value Ref Range    Hemoglobin A1C 7.7 (H) 0.0 - 5.6 %      Comment:      Normal <5.7%   Prediabetes 5.7-6.4%    Diabetes 6.5% or higher     Note: Adopted from ADA consensus guidelines.   Lipid panel reflex to direct LDL Fasting   Result Value Ref Range    Cholesterol 135 <200 mg/dL    Triglycerides 105 <150 mg/dL    Direct Measure HDL 58 >=50 mg/dL    LDL Cholesterol Calculated 56 <=100 mg/dL    Non HDL Cholesterol 77 <130 mg/dL    Narrative    Cholesterol  Desirable:  <200 mg/dL    Triglycerides  Normal:  Less than 150 mg/dL  Borderline High:  150-199 mg/dL  High:  200-499 mg/dL  Very High:  Greater than or equal to 500 mg/dL    Direct Measure HDL  Female:  Greater than or equal to 50 mg/dL   Male:  Greater than or equal to 40 mg/dL    LDL Cholesterol  Desirable:  <100mg/dL  Above Desirable:  100-129 mg/dL   Borderline High:  130-159 mg/dL   High:  160-189 mg/dL   Very High:  >= 190 mg/dL    Non HDL Cholesterol  Desirable:  130 mg/dL  Above Desirable:  130-159 mg/dL  Borderline High:  160-189 mg/dL  High:  190-219 mg/dL  Very High:  Greater than or equal to 220 mg/dL       If you have any questions or concerns, please call the clinic at the number listed above.       Sincerely,      Denia Barron MD

## 2022-10-18 NOTE — PROGRESS NOTES
Assessment & Plan     Type 2 diabetes mellitus with diabetic autonomic neuropathy, without long-term current use of insulin (H)  A1c is at 7.7 where she averages.  She takes metformin and glipizide and declines making any adjustments as she is comfortable with these medications.  She takes her blood sugars twice daily.  Taking asa daily  Stopped taking lisinopril and does not wish to restart  Remains active with walking  Recommended yearly eye exams  - Hemoglobin A1c  - Hemoglobin A1c  - blood glucose monitoring (SOFTCLIX) lancets  Dispense: 200 each; Refill: 4    Other polyneuropathy  Multifactorial  Failed multiple medications including lyrica and nortriptyline.  Has side effects if gabapentin is raised greater than 100. Will continue with this for now  - on exam decreased pulse and coldness L>R. Recommended evaluation for PVD. She declines today but will discuss with her next PCP after my departure.    Vitamin B12 deficiency (non anemic)  Monthly B12 injections      Denia Barron MD  [facility] STACI Valles is a 72 year old, presenting for the following health issues:  Diabetes      Presents with daughter who serves as her  at her request  She feels her blood sugars are better now that she has stopped using butter on her bread  Otherwise diet and activities are stable  She continues with lower extremity pain left > right  Hurts with walking longer distances       Review of Systems   Constitutional, HEENT, cardiovascular, pulmonary, gi and gu systems are negative, except as otherwise noted.      Objective    BP (!) 140/80 (BP Location: Left arm, Patient Position: Sitting, Cuff Size: Adult Regular)   Pulse 69   Temp 97.2  F (36.2  C) (Temporal)   Wt 53.4 kg (117 lb 12.8 oz)   SpO2 99%   BMI 23.01 kg/m    Body mass index is 23.01 kg/m .  Physical Exam   GENERAL: healthy, alert and no distress  NECK: no adenopathy, no asymmetry, masses, or scars and thyroid  normal to palpation  RESP: lungs clear to auscultation - no rales, rhonchi or wheezes  CV: regular rate and rhythm, normal S1 S2, no S3 or S4, no murmur, click or rub, no peripheral edema and peripheral pulses strong  ABDOMEN: soft, nontender, no hepatosplenomegaly, no masses and bowel sounds normal  MS: abnormal pedal  pulse(s) L>R  Left foot cold with abnormal sensory testing, right ok  Results for orders placed or performed in visit on 10/18/22   Hemoglobin A1c     Status: Abnormal   Result Value Ref Range    Hemoglobin A1C 7.7 (H) 0.0 - 5.6 %   Extra Tube     Status: None    Narrative    The following orders were created for panel order Extra Tube.  Procedure                               Abnormality         Status                     ---------                               -----------         ------                     Extra Green Top (Lithium...[[id number]]                      Final result                 Please view results for these tests on the individual orders.   Extra Green Top (Lithium Heparin) Tube     Status: None   Result Value Ref Range    Hold Specimen [facility]

## 2022-10-20 ENCOUNTER — OFFICE VISIT (OUTPATIENT)
Dept: FAMILY MEDICINE | Age: 72
End: 2022-10-20

## 2022-10-20 VITALS
SYSTOLIC BLOOD PRESSURE: 150 MMHG | TEMPERATURE: 98 F | WEIGHT: 163.5 LBS | HEIGHT: 60 IN | HEART RATE: 78 BPM | OXYGEN SATURATION: 99 % | DIASTOLIC BLOOD PRESSURE: 80 MMHG | BODY MASS INDEX: 32.1 KG/M2

## 2022-10-20 DIAGNOSIS — N18.30 BENIGN HYPERTENSION WITH CKD (CHRONIC KIDNEY DISEASE) STAGE III (CMD): Primary | ICD-10-CM

## 2022-10-20 DIAGNOSIS — Z23 NEED FOR VACCINATION: ICD-10-CM

## 2022-10-20 DIAGNOSIS — E11.36 TYPE 2 DIABETES MELLITUS WITH DIABETIC CATARACT, WITHOUT LONG-TERM CURRENT USE OF INSULIN (CMD): ICD-10-CM

## 2022-10-20 DIAGNOSIS — E11.42 TYPE 2 DIABETES MELLITUS WITH DIABETIC POLYNEUROPATHY, WITHOUT LONG-TERM CURRENT USE OF INSULIN (CMD): ICD-10-CM

## 2022-10-20 DIAGNOSIS — I12.9 BENIGN HYPERTENSION WITH CKD (CHRONIC KIDNEY DISEASE) STAGE III (CMD): Primary | ICD-10-CM

## 2022-10-20 LAB
CHOLEST SERPL-MCNC: 135 MG/DL
HBA1C MFR BLD: 6.3 % (ref 4.5–5.6)
HDLC SERPL-MCNC: 58 MG/DL
LDLC SERPL CALC-MCNC: 56 MG/DL
NONHDLC SERPL-MCNC: 77 MG/DL
TRIGL SERPL-MCNC: 105 MG/DL

## 2022-10-20 PROCEDURE — 90662 IIV NO PRSV INCREASED AG IM: CPT | Performed by: FAMILY MEDICINE

## 2022-10-20 PROCEDURE — 99214 OFFICE O/P EST MOD 30 MIN: CPT | Performed by: FAMILY MEDICINE

## 2022-10-20 PROCEDURE — 83036 HEMOGLOBIN GLYCOSYLATED A1C: CPT | Performed by: FAMILY MEDICINE

## 2022-10-20 PROCEDURE — G0008 ADMIN INFLUENZA VIRUS VAC: HCPCS | Performed by: FAMILY MEDICINE

## 2022-10-20 RX ORDER — LANCETS 28 GAUGE
EACH MISCELLANEOUS
Qty: 300 EACH | Refills: 3 | Status: SHIPPED | OUTPATIENT
Start: 2022-10-20 | End: 2023-06-20 | Stop reason: SDUPTHER

## 2022-10-21 DIAGNOSIS — E11.42 TYPE 2 DIABETES MELLITUS WITH DIABETIC POLYNEUROPATHY, WITHOUT LONG-TERM CURRENT USE OF INSULIN (CMD): ICD-10-CM

## 2022-10-21 RX ORDER — ATORVASTATIN CALCIUM 40 MG/1
40 TABLET, FILM COATED ORAL DAILY
Qty: 90 TABLET | Refills: 3 | Status: SHIPPED | OUTPATIENT
Start: 2022-10-21

## 2022-10-31 DIAGNOSIS — E11.43 TYPE 2 DIABETES MELLITUS WITH DIABETIC AUTONOMIC NEUROPATHY, WITHOUT LONG-TERM CURRENT USE OF INSULIN (H): ICD-10-CM

## 2022-10-31 RX ORDER — BLOOD SUGAR DIAGNOSTIC
STRIP MISCELLANEOUS
Qty: 200 STRIP | Refills: 3 | Status: SHIPPED | OUTPATIENT
Start: 2022-10-31 | End: 2023-11-20

## 2022-10-31 NOTE — TELEPHONE ENCOUNTER
Pending Prescriptions:                       Disp   Refills    blood glucose (ACCU-CHEK LOIDA PLUS) test*200 st*3            Sig: TEST ONCE A DAY    Pt is almost out of strips.

## 2022-11-03 ENCOUNTER — TELEPHONE (OUTPATIENT)
Dept: FAMILY MEDICINE | Age: 72
End: 2022-11-03

## 2022-11-03 ENCOUNTER — NURSE ONLY (OUTPATIENT)
Dept: FAMILY MEDICINE | Age: 72
End: 2022-11-03

## 2022-11-03 VITALS — HEART RATE: 66 BPM | DIASTOLIC BLOOD PRESSURE: 84 MMHG | SYSTOLIC BLOOD PRESSURE: 138 MMHG

## 2022-11-03 DIAGNOSIS — I10 BENIGN ESSENTIAL HYPERTENSION: Primary | ICD-10-CM

## 2022-11-17 DIAGNOSIS — M25.562 ACUTE PAIN OF LEFT KNEE: ICD-10-CM

## 2022-11-17 RX ORDER — MELOXICAM 15 MG/1
15 TABLET ORAL DAILY PRN
Qty: 30 TABLET | Refills: 2 | Status: SHIPPED | OUTPATIENT
Start: 2022-11-17 | End: 2022-12-21

## 2022-11-21 ENCOUNTER — ALLIED HEALTH/NURSE VISIT (OUTPATIENT)
Dept: FAMILY MEDICINE | Facility: CLINIC | Age: 72
End: 2022-11-21
Payer: COMMERCIAL

## 2022-11-21 DIAGNOSIS — Z23 ENCOUNTER FOR IMMUNIZATION: Primary | ICD-10-CM

## 2022-11-21 PROCEDURE — 96372 THER/PROPH/DIAG INJ SC/IM: CPT | Performed by: PHARMACIST

## 2022-11-21 PROCEDURE — 99207 PR NO CHARGE NURSE ONLY: CPT

## 2022-11-21 RX ADMIN — CYANOCOBALAMIN 1000 MCG: 1000 INJECTION, SOLUTION INTRAMUSCULAR; SUBCUTANEOUS at 07:59

## 2022-11-24 DIAGNOSIS — E78.5 HYPERLIPIDEMIA: ICD-10-CM

## 2022-11-24 DIAGNOSIS — E11.9 DIABETES (H): ICD-10-CM

## 2022-11-24 DIAGNOSIS — E11.43 TYPE 2 DIABETES MELLITUS WITH DIABETIC AUTONOMIC NEUROPATHY, WITHOUT LONG-TERM CURRENT USE OF INSULIN (H): ICD-10-CM

## 2022-11-25 RX ORDER — GABAPENTIN 100 MG/1
CAPSULE ORAL
Qty: 90 CAPSULE | Refills: 1 | Status: SHIPPED | OUTPATIENT
Start: 2022-11-25 | End: 2023-08-21

## 2022-11-25 RX ORDER — SIMVASTATIN 40 MG
40 TABLET ORAL AT BEDTIME
Qty: 90 TABLET | Refills: 3 | Status: SHIPPED | OUTPATIENT
Start: 2022-11-25 | End: 2023-11-20

## 2022-11-25 NOTE — TELEPHONE ENCOUNTER
"Routing refill request to provider for review/approval because:  Drug not on the [facility] refill protocol     Last Written Prescription Date:  5/31/22  Last Fill Quantity: 90,  # refills: 1   Last office visit provider:  10/18/22     Requested Prescriptions   Pending Prescriptions Disp Refills     gabapentin (NEURONTIN) 100 MG capsule [Pharmacy Med Name: GABAPENTIN 100MG CAPSULES] 90 capsule 1     Sig: TAKE 1 CAPSULE BY MOUTH EVERY NIGHT AT BEDTIME       There is no refill protocol information for this order      Signed Prescriptions Disp Refills    simvastatin (ZOCOR) 40 MG tablet 90 tablet 3     Sig: Take 1 tablet (40 mg) by mouth At Bedtime       Statins Protocol Passed - 11/25/2022 10:09 AM        Passed - LDL on file in past 12 months     Recent Labs   Lab Test 10/18/22  0801   LDL 56             Passed - No abnormal creatine kinase in past 12 months     No lab results found.             Passed - Recent (12 mo) or future (30 days) visit within the authorizing provider's specialty     Patient has had an office visit with the authorizing provider or a provider within the authorizing providers department within the previous 12 mos or has a future within next 30 days. See \"Patient Info\" tab in inbasket, or \"Choose Columns\" in Meds & Orders section of the refill encounter.              Passed - Medication is active on med list        Passed - Patient is age 18 or older        Passed - No active pregnancy on record        Passed - No positive pregnancy test in past 12 months          metFORMIN (GLUCOPHAGE) 1000 MG tablet 180 tablet 1     Sig: TAKE 1 TABLET(1000 MG) BY MOUTH TWICE DAILY WITH MEALS       Biguanide Agents Passed - 11/24/2022  5:44 AM        Passed - Patient is age 10 or older        Passed - Patient has documented A1c within the specified period of time.     If HgbA1C is 8 or greater, it needs to be on file within the past 3 months.  If less than 8, must be on file within the past 6 months.     Recent Labs " "  Lab Test 10/18/22  0801   A1C 7.7*             Passed - Patient's CR is NOT>1.4 OR Patient's EGFR is NOT<45 within past 12 mos.     Recent Labs   Lab Test 07/18/22  0853 10/11/21  1001 04/05/21  0940   GFRESTIMATED >90   < > >60   GFRESTBLACK  --   --  >60    < > = values in this interval not displayed.       Recent Labs   Lab Test 07/18/22  0853   CR 0.68             Passed - Patient does NOT have a diagnosis of CHF.        Passed - Medication is active on med list        Passed - Patient is not pregnant        Passed - Patient has not had a positive pregnancy test within the past 12 mos.         Passed - Recent (6 mo) or future (30 days) visit within the authorizing provider's specialty     Patient had office visit in the last 6 months or has a visit in the next 30 days with authorizing provider or within the authorizing provider's specialty.  See \"Patient Info\" tab in inbasket, or \"Choose Columns\" in Meds & Orders section of the refill encounter.                 Yan Heath RN 11/25/22 10:10 AM  "

## 2022-11-25 NOTE — TELEPHONE ENCOUNTER
"Last Written Prescription Date:  11/30/21  Last Fill Quantity: 90,  # refills: 3   Last office visit provider:  10/18/22     Last Written Prescription Date:  5/29/22  Last Fill Quantity: 180,  # refills: 1   Last office visit provider:  10/18/22    Requested Prescriptions   Pending Prescriptions Disp Refills     simvastatin (ZOCOR) 40 MG tablet [Pharmacy Med Name: SIMVASTATIN 40MG TABLETS] 90 tablet 3     Sig: TAKE 1 TABLET(40 MG) BY MOUTH AT BEDTIME       Statins Protocol Passed - 11/25/2022 10:09 AM        Passed - LDL on file in past 12 months     Recent Labs   Lab Test 10/18/22  0801   LDL 56             Passed - No abnormal creatine kinase in past 12 months     No lab results found.             Passed - Recent (12 mo) or future (30 days) visit within the authorizing provider's specialty     Patient has had an office visit with the authorizing provider or a provider within the authorizing providers department within the previous 12 mos or has a future within next 30 days. See \"Patient Info\" tab in inbasket, or \"Choose Columns\" in Meds & Orders section of the refill encounter.              Passed - Medication is active on med list        Passed - Patient is age 18 or older        Passed - No active pregnancy on record        Passed - No positive pregnancy test in past 12 months           metFORMIN (GLUCOPHAGE) 1000 MG tablet [Pharmacy Med Name: METFORMIN 1000MG TABLETS] 180 tablet 1     Sig: TAKE 1 TABLET(1000 MG) BY MOUTH TWICE DAILY WITH MEALS       Biguanide Agents Passed - 11/24/2022  5:44 AM        Passed - Patient is age 10 or older        Passed - Patient has documented A1c within the specified period of time.     If HgbA1C is 8 or greater, it needs to be on file within the past 3 months.  If less than 8, must be on file within the past 6 months.     Recent Labs   Lab Test 10/18/22  0801   A1C 7.7*             Passed - Patient's CR is NOT>1.4 OR Patient's EGFR is NOT<45 within past 12 mos.     Recent Labs " "  Lab Test 07/18/22  0853 10/11/21  1001 04/05/21  0940   GFRESTIMATED >90   < > >60   GFRESTBLACK  --   --  >60    < > = values in this interval not displayed.       Recent Labs   Lab Test 07/18/22  0853   CR 0.68             Passed - Patient does NOT have a diagnosis of CHF.        Passed - Medication is active on med list        Passed - Patient is not pregnant        Passed - Patient has not had a positive pregnancy test within the past 12 mos.         Passed - Recent (6 mo) or future (30 days) visit within the authorizing provider's specialty     Patient had office visit in the last 6 months or has a visit in the next 30 days with authorizing provider or within the authorizing provider's specialty.  See \"Patient Info\" tab in inbasket, or \"Choose Columns\" in Meds & Orders section of the refill encounter.               gabapentin (NEURONTIN) 100 MG capsule [Pharmacy Med Name: GABAPENTIN 100MG CAPSULES] 90 capsule 1     Sig: TAKE 1 CAPSULE BY MOUTH EVERY NIGHT AT BEDTIME       There is no refill protocol information for this order          Yan Heath RN 11/25/22 10:09 AM  "

## 2022-12-16 RX ORDER — METFORMIN HYDROCHLORIDE 500 MG/1
TABLET, EXTENDED RELEASE ORAL
Qty: 360 TABLET | Refills: 3 | Status: SHIPPED | OUTPATIENT
Start: 2022-12-16

## 2022-12-19 ENCOUNTER — ALLIED HEALTH/NURSE VISIT (OUTPATIENT)
Dept: FAMILY MEDICINE | Facility: CLINIC | Age: 72
End: 2022-12-19
Payer: COMMERCIAL

## 2022-12-19 DIAGNOSIS — E53.8 VITAMIN B12 DEFICIENCY (NON ANEMIC): Primary | ICD-10-CM

## 2022-12-19 PROCEDURE — 96372 THER/PROPH/DIAG INJ SC/IM: CPT | Performed by: FAMILY MEDICINE

## 2022-12-19 PROCEDURE — 99207 PR NO CHARGE NURSE ONLY: CPT

## 2022-12-19 RX ADMIN — CYANOCOBALAMIN 1000 MCG: 1000 INJECTION, SOLUTION INTRAMUSCULAR; SUBCUTANEOUS at 07:54

## 2022-12-21 DIAGNOSIS — M25.562 ACUTE PAIN OF LEFT KNEE: ICD-10-CM

## 2022-12-21 RX ORDER — MELOXICAM 15 MG/1
15 TABLET ORAL DAILY PRN
Qty: 30 TABLET | Refills: 2 | Status: SHIPPED | OUTPATIENT
Start: 2022-12-21 | End: 2023-04-12 | Stop reason: SDUPTHER

## 2022-12-21 RX ORDER — DULAGLUTIDE 1.5 MG/.5ML
INJECTION, SOLUTION SUBCUTANEOUS
Qty: 6 ML | Refills: 2 | Status: SHIPPED | OUTPATIENT
Start: 2022-12-21 | End: 2023-04-12 | Stop reason: SDUPTHER

## 2023-01-07 ENCOUNTER — HEALTH MAINTENANCE LETTER (OUTPATIENT)
Age: 73
End: 2023-01-07

## 2023-01-23 ENCOUNTER — OFFICE VISIT (OUTPATIENT)
Dept: FAMILY MEDICINE | Facility: CLINIC | Age: 73
End: 2023-01-23
Payer: COMMERCIAL

## 2023-01-23 VITALS
TEMPERATURE: 97.4 F | SYSTOLIC BLOOD PRESSURE: 136 MMHG | DIASTOLIC BLOOD PRESSURE: 70 MMHG | HEART RATE: 65 BPM | BODY MASS INDEX: 22.39 KG/M2 | HEIGHT: 61 IN | WEIGHT: 118.6 LBS | OXYGEN SATURATION: 99 % | RESPIRATION RATE: 20 BRPM

## 2023-01-23 DIAGNOSIS — E53.8 VITAMIN B12 DEFICIENCY (NON ANEMIC): ICD-10-CM

## 2023-01-23 DIAGNOSIS — E11.42 TYPE 2 DIABETES MELLITUS WITH DIABETIC POLYNEUROPATHY, WITHOUT LONG-TERM CURRENT USE OF INSULIN (H): Primary | ICD-10-CM

## 2023-01-23 DIAGNOSIS — G62.89 OTHER POLYNEUROPATHY: ICD-10-CM

## 2023-01-23 DIAGNOSIS — Z85.41 HISTORY OF CERVICAL CANCER: ICD-10-CM

## 2023-01-23 PROBLEM — K56.609 SMALL BOWEL OBSTRUCTION (H): Status: RESOLVED | Noted: 2019-06-21 | Resolved: 2023-01-23

## 2023-01-23 LAB
CREAT UR-MCNC: 12.9 MG/DL
HBA1C MFR BLD: 7.8 % (ref 0–5.6)
MICROALBUMIN UR-MCNC: <12 MG/L
MICROALBUMIN/CREAT UR: NORMAL MG/G{CREAT}

## 2023-01-23 PROCEDURE — 83036 HEMOGLOBIN GLYCOSYLATED A1C: CPT | Performed by: FAMILY MEDICINE

## 2023-01-23 PROCEDURE — 82570 ASSAY OF URINE CREATININE: CPT | Performed by: FAMILY MEDICINE

## 2023-01-23 PROCEDURE — 99214 OFFICE O/P EST MOD 30 MIN: CPT | Performed by: FAMILY MEDICINE

## 2023-01-23 PROCEDURE — 82043 UR ALBUMIN QUANTITATIVE: CPT | Performed by: FAMILY MEDICINE

## 2023-01-23 PROCEDURE — 96372 THER/PROPH/DIAG INJ SC/IM: CPT | Performed by: PHARMACIST

## 2023-01-23 PROCEDURE — 36415 COLL VENOUS BLD VENIPUNCTURE: CPT | Performed by: FAMILY MEDICINE

## 2023-01-23 RX ADMIN — CYANOCOBALAMIN 1000 MCG: 1000 INJECTION, SOLUTION INTRAMUSCULAR; SUBCUTANEOUS at 08:55

## 2023-01-23 ASSESSMENT — PATIENT HEALTH QUESTIONNAIRE - PHQ9
SUM OF ALL RESPONSES TO PHQ QUESTIONS 1-9: 13
10. IF YOU CHECKED OFF ANY PROBLEMS, HOW DIFFICULT HAVE THESE PROBLEMS MADE IT FOR YOU TO DO YOUR WORK, TAKE CARE OF THINGS AT HOME, OR GET ALONG WITH OTHER PEOPLE: VERY DIFFICULT
SUM OF ALL RESPONSES TO PHQ QUESTIONS 1-9: 13

## 2023-01-23 ASSESSMENT — PAIN SCALES - GENERAL: PAINLEVEL: NO PAIN (0)

## 2023-01-23 NOTE — PROGRESS NOTES
Assessment & Plan     Type 2 diabetes mellitus with diabetic polyneuropathy, without long-term current use of insulin (H)  A1c is slightly higher than goal.  Would likely recommend increasing glipizide, but would like to see some blood sugar readings.  Recommended follow-up in 3 months at an annual wellness visit with review of sugars.  - Adult Neurology  Referral; Future  - Albumin Random Urine Quantitative with Creat Ratio  - Hemoglobin A1c    Other polyneuropathy  Patient has such significant neuropathy that she is having some motor weakness.  Referred to neurology to see if there are any treatment options other than bracing.  - Adult Neurology  Referral; Future    History of cervical cancer  Patient likely needs continued pap smears.  Will reach out to our pap smear tracking RN in this regard for current recommendation.    Vitamin B12 deficiency (non anemic)  B12 injection will be given today.             Blood sugar testing frequency justification:  Risk of hypoglycemia with medication(s)     Depression Screening Follow Up    PHQ 1/23/2023   PHQ-9 Total Score 13   Q9: Thoughts of better off dead/self-harm past 2 weeks Nearly every day   F/U: Thoughts of suicide or self-harm No   F/U: Safety concerns No           Return in about 3 months (around 4/23/2023) for Routine preventive.    Susan Rivera MD  [facility] STACI Valles is a 72 year old, presenting for the following health issues:  Diabetes and Recheck Medication      History of Present Illness       Diabetes:   She presents for follow up of diabetes.  She is checking home blood glucose two times daily. She checks blood glucose before meals and at bedtime.  Blood glucose is never over 200 and never under 70. She is aware of hypoglycemia symptoms including shakiness. She has no concerns regarding her diabetes at this time.  She is having numbness in feet. The patient has not had a diabetic eye exam in the  last 12 months.         She eats 2-3 servings of fruits and vegetables daily.She consumes 0 sweetened beverage(s) daily.She exercises with enough effort to increase her heart rate 9 or less minutes per day.  She exercises with enough effort to increase her heart rate 3 or less days per week.   She is taking medications regularly.    Today's PHQ-9         PHQ-9 Total Score: 13    PHQ-9 Q9 Thoughts of better off dead/self-harm past 2 weeks :   Nearly every day  Thoughts of suicide or self harm: (P) No  Self-harm Plan:     Self-harm Action:       Safety concerns for self or others: (P) No    How difficult have these problems made it for you to do your work, take care of things at home, or get along with other people: Very difficult         Patient presents today with her daughter Shyam for a visit to establish care and follow up on her diabetes.  She did not bring any blood sugar readings for review today.  She checks before her first meal of the day and at bedtime typically, and recalls that her blood sugar this morning was 144.  She thinks her morning fasting sugars are usually in the 110's to 120's.  Her mood centers around her neuropathy she says.  Her feet are numb and the numbness stretches into her lower legs, and is affecting her ability to move.  She can't walk like she used to, and ambulates with a cane.  This makes her feel very down and depressed.  She previously saw spine care and had an MRI of her lumbar spine along with EMG testing.  She had a central disc bulge, however EMG did not show any radiculopathy, only a sensorimotor polyneuropathy.  She previously took gabapentin, but really doesn't have a lot of pain or burning, rather weakness, stiffness and numbness.  Her other significant health issues include a history of cervical cancer for which she had a hysterectomy at age 56 and B12 deficiency for which she receives monthly injections.    Review of Systems   Constitutional, HEENT, cardiovascular,  "pulmonary, gi and gu systems are negative, except as otherwise noted.      Objective    /70 (BP Location: Left arm, Patient Position: Sitting, Cuff Size: Adult Regular)   Pulse 65   Temp 97.4  F (36.3  C) (Tympanic)   Resp 20   Ht 1.54 m (5' 0.63\")   Wt 53.8 kg (118 lb 9.6 oz)   SpO2 99%   BMI 22.68 kg/m    Body mass index is 22.68 kg/m .  Physical Exam   GENERAL: healthy, alert and no distress  EYES: Eyes grossly normal to inspection, PERRL and conjunctivae and sclerae normal  RESP: lungs clear to auscultation - no rales, rhonchi or wheezes  CV: regular rate and rhythm, normal S1 S2, no S3 or S4, no murmur, click or rub, no peripheral edema and peripheral pulses strong  MS: no gross musculoskeletal defects noted, no edema  SKIN: no suspicious lesions or rashes  PSYCH: mentation appears normal, affect normal/bright    Results for orders placed or performed in visit on 01/23/23   Albumin Random Urine Quantitative with Creat Ratio     Status: None   Result Value Ref Range    Albumin Urine mg/L <12.0 mg/L    Albumin Urine mg/g Cr      Creatinine Urine mg/dL 12.9 mg/dL   Hemoglobin A1c     Status: Abnormal   Result Value Ref Range    Hemoglobin A1C 7.8 (H) 0.0 - 5.6 %                   "

## 2023-01-23 NOTE — PATIENT INSTRUCTIONS

## 2023-01-25 DIAGNOSIS — I10 BENIGN ESSENTIAL HYPERTENSION: ICD-10-CM

## 2023-01-25 DIAGNOSIS — E11.42 TYPE 2 DIABETES MELLITUS WITH DIABETIC POLYNEUROPATHY, WITHOUT LONG-TERM CURRENT USE OF INSULIN (CMD): ICD-10-CM

## 2023-01-25 RX ORDER — AMLODIPINE BESYLATE 10 MG/1
10 TABLET ORAL DAILY
Qty: 90 TABLET | Refills: 0 | Status: SHIPPED | OUTPATIENT
Start: 2023-01-25 | End: 2023-03-15

## 2023-01-25 RX ORDER — AMLODIPINE BESYLATE 10 MG/1
10 TABLET ORAL DAILY
Qty: 90 TABLET | Refills: 0 | Status: CANCELLED | OUTPATIENT
Start: 2023-01-25

## 2023-01-25 RX ORDER — SITAGLIPTIN 100 MG/1
TABLET, FILM COATED ORAL
Qty: 90 TABLET | Refills: 1 | Status: SHIPPED | OUTPATIENT
Start: 2023-01-25 | End: 2023-03-15

## 2023-02-27 ENCOUNTER — ALLIED HEALTH/NURSE VISIT (OUTPATIENT)
Dept: FAMILY MEDICINE | Facility: CLINIC | Age: 73
End: 2023-02-27
Payer: COMMERCIAL

## 2023-02-27 DIAGNOSIS — Z23 ENCOUNTER FOR IMMUNIZATION: Primary | ICD-10-CM

## 2023-02-27 PROCEDURE — 99207 PR NO CHARGE NURSE ONLY: CPT

## 2023-02-27 PROCEDURE — 96372 THER/PROPH/DIAG INJ SC/IM: CPT | Performed by: PHARMACIST

## 2023-02-27 RX ADMIN — CYANOCOBALAMIN 1000 MCG: 1000 INJECTION, SOLUTION INTRAMUSCULAR; SUBCUTANEOUS at 08:20

## 2023-03-07 ENCOUNTER — OFFICE VISIT (OUTPATIENT)
Dept: FAMILY MEDICINE | Facility: CLINIC | Age: 73
End: 2023-03-07
Payer: COMMERCIAL

## 2023-03-07 VITALS
HEART RATE: 79 BPM | WEIGHT: 122.1 LBS | OXYGEN SATURATION: 98 % | TEMPERATURE: 98 F | HEIGHT: 61 IN | RESPIRATION RATE: 16 BRPM | SYSTOLIC BLOOD PRESSURE: 154 MMHG | DIASTOLIC BLOOD PRESSURE: 87 MMHG | BODY MASS INDEX: 23.05 KG/M2

## 2023-03-07 DIAGNOSIS — M81.0 OSTEOPOROSIS WITHOUT CURRENT PATHOLOGICAL FRACTURE, UNSPECIFIED OSTEOPOROSIS TYPE: ICD-10-CM

## 2023-03-07 DIAGNOSIS — E11.43 TYPE 2 DIABETES MELLITUS WITH DIABETIC AUTONOMIC NEUROPATHY, WITHOUT LONG-TERM CURRENT USE OF INSULIN (H): ICD-10-CM

## 2023-03-07 DIAGNOSIS — M81.0 AGE-RELATED OSTEOPOROSIS WITHOUT CURRENT PATHOLOGICAL FRACTURE: ICD-10-CM

## 2023-03-07 DIAGNOSIS — Z12.31 ENCOUNTER FOR SCREENING MAMMOGRAM FOR BREAST CANCER: ICD-10-CM

## 2023-03-07 DIAGNOSIS — E53.8 VITAMIN B 12 DEFICIENCY: ICD-10-CM

## 2023-03-07 DIAGNOSIS — I10 BENIGN ESSENTIAL HYPERTENSION: ICD-10-CM

## 2023-03-07 DIAGNOSIS — G62.89 OTHER POLYNEUROPATHY: Primary | ICD-10-CM

## 2023-03-07 LAB
ANION GAP SERPL CALCULATED.3IONS-SCNC: 13 MMOL/L (ref 7–15)
BUN SERPL-MCNC: 11.3 MG/DL (ref 8–23)
CALCIUM SERPL-MCNC: 9.4 MG/DL (ref 8.8–10.2)
CHLORIDE SERPL-SCNC: 101 MMOL/L (ref 98–107)
CREAT SERPL-MCNC: 0.67 MG/DL (ref 0.51–0.95)
DEPRECATED HCO3 PLAS-SCNC: 24 MMOL/L (ref 22–29)
GFR SERPL CREATININE-BSD FRML MDRD: >90 ML/MIN/1.73M2
GLUCOSE SERPL-MCNC: 129 MG/DL (ref 70–99)
POTASSIUM SERPL-SCNC: 4 MMOL/L (ref 3.4–5.3)
SODIUM SERPL-SCNC: 138 MMOL/L (ref 136–145)
VIT B12 SERPL-MCNC: 659 PG/ML (ref 232–1245)

## 2023-03-07 PROCEDURE — 80048 BASIC METABOLIC PNL TOTAL CA: CPT | Performed by: FAMILY MEDICINE

## 2023-03-07 PROCEDURE — 36415 COLL VENOUS BLD VENIPUNCTURE: CPT | Performed by: FAMILY MEDICINE

## 2023-03-07 PROCEDURE — 99214 OFFICE O/P EST MOD 30 MIN: CPT | Performed by: FAMILY MEDICINE

## 2023-03-07 PROCEDURE — 82607 VITAMIN B-12: CPT | Performed by: FAMILY MEDICINE

## 2023-03-07 RX ORDER — LOSARTAN POTASSIUM 25 MG/1
25 TABLET ORAL DAILY
Qty: 30 TABLET | Refills: 11 | Status: SHIPPED | OUTPATIENT
Start: 2023-03-07 | End: 2024-02-26

## 2023-03-07 RX ORDER — BLOOD SUGAR DIAGNOSTIC
STRIP MISCELLANEOUS
Qty: 200 STRIP | Refills: 3 | Status: CANCELLED | OUTPATIENT
Start: 2023-03-07

## 2023-03-07 ASSESSMENT — PAIN SCALES - GENERAL: PAINLEVEL: NO PAIN (0)

## 2023-03-07 NOTE — PROGRESS NOTES
"Hai Meade  BP (!) 154/87 (BP Location: Right arm, Patient Position: Sitting, Cuff Size: Adult Regular)   Pulse 79   Temp 98  F (36.7  C) (Oral)   Resp 16   Ht 1.54 m (5' 0.63\")   Wt 55.4 kg (122 lb 1.6 oz)   SpO2 98%   BMI 23.35 kg/m       Assessment/Plan:                Hai was seen today for recheck medication and hypertension.    Diagnoses and all orders for this visit:    Other polyneuropathy    Type 2 diabetes mellitus with diabetic autonomic neuropathy, without long-term current use of insulin (H)    Age-related osteoporosis without current pathological fracture    Benign essential hypertension         DISCUSSION  Obtain labs as above.  Initiate losartan 25 mg daily.  Follow-up as planned with new primary care provider for recheck of blood pressure and diabetes as scheduled.  Subjective:     HPI:    Hai Meade is a 72 year old female has medical history that includes type 2 diabetes with neuropathy, B12 deficiency, hypertension, osteoporosis, hyperlipidemia and history of cervical cancer.    She is here today specifically concerned about blood pressure.    She has a history of hypertension had been on treatment with lisinopril that was discontinued in October after patient developed a cough.  Her blood pressure had been in a reasonable range for period of time but has since increased and is now approximately 150 systolic when measured at home.  A similar reading was obtained here today.  Patient is interested in returning to antihypertensive therapy but wants to make sure we avoid the previous medication that caused the cough.    Patient otherwise reports she feels well aside from her ongoing concerns with her legs.  She inquires about massage therapy.  I discussed checking with her insurance to see if the services covered.  We did talk about physical therapy she states she has been through this and declines to consider physical therapy again at this time.    She is noted to have osteoporosis with " last bone density scan obtained in 2020.  She has not been initiated on treatment.  Discussed reassessment of bone density with repeat DEXA scan followed by consideration of active treatment if indicated.    She is overdue for screening mammography is willing to proceed with a mammogram.    Other routine health considerations that should be addressed but were not addressed today are colon cancer screening and updating immunizations.    ROS:  Complete review of systems is obtained.  Other than the specific considerations noted above complete review of systems is negative.          Objective:   Medications:  Current Outpatient Medications   Medication     acetaminophen (TYLENOL) 500 MG tablet     aspirin 81 MG EC tablet     blood glucose (ACCU-CHEK LOIDA PLUS) test strip     blood glucose (NO BRAND SPECIFIED) test strip     blood pressure monitor Kit     Calcium Carb-Cholecalciferol 500-10 MG-MCG CHEW     Cholecalciferol (D3-1000) 25 MCG (1000 UT) CAPS     gabapentin (NEURONTIN) 100 MG capsule     generic lancets (ACCU-CHEK SOFTCLIX LANCETS)     glipiZIDE (GLUCOTROL XL) 2.5 MG 24 hr tablet     metFORMIN (GLUCOPHAGE) 1000 MG tablet     simvastatin (ZOCOR) 40 MG tablet     Current Facility-Administered Medications   Medication     cyanocobalamin injection 1,000 mcg        Allergies:     Allergies   Allergen Reactions     Cymbalta [Duloxetine] Unknown     Nausea and vomiting        Social History     Socioeconomic History     Marital status:      Spouse name: Not on file     Number of children: Not on file     Years of education: Not on file     Highest education level: Not on file   Occupational History     Not on file   Tobacco Use     Smoking status: Never     Smokeless tobacco: Never   Substance and Sexual Activity     Alcohol use: No     Drug use: No     Sexual activity: Not on file   Other Topics Concern     Not on file   Social History Narrative    Lives with her family.     Social Determinants of Health  "    Financial Resource Strain: Not on file   Food Insecurity: Not on file   Transportation Needs: Not on file   Physical Activity: Not on file   Stress: Not on file   Social Connections: Not on file   Intimate Partner Violence: Not on file   Housing Stability: Not on file       No family history on file.     Most Recent Immunizations   Administered Date(s) Administered     COVID-19 Vaccine 18+ (Moderna) 04/03/2021     Pneumo Conj 13-V (2010&after) 10/17/2019     Pneumococcal 23 valent 07/15/2013     TD (ADULT, 7+) 04/04/2019     Td,adult,historic,unspecified 06/12/2006     Tdap (Adacel,Boostrix) 06/12/2006        Wt Readings from Last 3 Encounters:   03/07/23 55.4 kg (122 lb 1.6 oz)   01/23/23 53.8 kg (118 lb 9.6 oz)   10/18/22 53.4 kg (117 lb 12.8 oz)        BP Readings from Last 6 Encounters:   03/07/23 (!) 154/87   01/23/23 136/70   10/18/22 (!) 140/80   07/18/22 138/70   02/18/22 132/85   01/17/22 130/64        Hemoglobin A1C   Date Value Ref Range Status   01/23/2023 7.8 (H) 0.0 - 5.6 % Final     Comment:     Normal <5.7%   Prediabetes 5.7-6.4%    Diabetes 6.5% or higher     Note: Adopted from ADA consensus guidelines.   10/18/2022 7.7 (H) 0.0 - 5.6 % Final     Comment:     Normal <5.7%   Prediabetes 5.7-6.4%    Diabetes 6.5% or higher     Note: Adopted from ADA consensus guidelines.   07/18/2022 8.0 (H) 0.0 - 5.6 % Final     Comment:     Normal <5.7%   Prediabetes 5.7-6.4%    Diabetes 6.5% or higher     Note: Adopted from ADA consensus guidelines.              PHYSICAL EXAM:    BP (!) 154/87 (BP Location: Right arm, Patient Position: Sitting, Cuff Size: Adult Regular)   Pulse 79   Temp 98  F (36.7  C) (Oral)   Resp 16   Ht 1.54 m (5' 0.63\")   Wt 55.4 kg (122 lb 1.6 oz)   SpO2 98%   BMI 23.35 kg/m           General Appearance:    Alert, cooperative, no distress   Eyes:   No scleral icterus or conjunctival irritation       Lungs:     Clear to auscultation bilaterally, respirations unlabored, no wheezes or " crackles   Heart:    Regular rate and rhythm,  No murmur   Extremities:  No edema, no joint swelling or redness, no evidence of any injuries   Skin:  No concerning skin findings, no suspicious moles, no rashes   Neurologic:  On gross examination there is no motor or sensory deficit.  Patient walks with a normal gait

## 2023-03-07 NOTE — LETTER
March 9, 2023      Hai Meade  [address] [place] [address]        Dear ,    We are writing to inform you of your test results.    The electrolytes indication/B12 level is normal.  Blood sugar was only mildly elevated at the time of the test, this is called glucose.  Overall the labs look good.    Resulted Orders   Basic metabolic panel   Result Value Ref Range    Sodium 138 136 - 145 mmol/L    Potassium 4.0 3.4 - 5.3 mmol/L    Chloride 101 98 - 107 mmol/L    Carbon Dioxide (CO2) 24 22 - 29 mmol/L    Anion Gap 13 7 - 15 mmol/L    Urea Nitrogen 11.3 8.0 - 23.0 mg/dL    Creatinine 0.67 0.51 - 0.95 mg/dL    Calcium 9.4 8.8 - 10.2 mg/dL    Glucose 129 (H) 70 - 99 mg/dL    GFR Estimate >90 >60 mL/min/1.73m2      Comment:      eGFR calculated using 2021 CKD-EPI equation.   Vitamin B12   Result Value Ref Range    Vitamin B12 659 232 - 1,245 pg/mL       If you have any questions or concerns, please call the clinic at the number listed above.       Sincerely,      Josue Hoffmann MD

## 2023-03-15 DIAGNOSIS — E11.42 TYPE 2 DIABETES MELLITUS WITH DIABETIC POLYNEUROPATHY, WITHOUT LONG-TERM CURRENT USE OF INSULIN (CMD): ICD-10-CM

## 2023-03-15 DIAGNOSIS — I10 BENIGN ESSENTIAL HYPERTENSION: ICD-10-CM

## 2023-03-15 RX ORDER — AMLODIPINE BESYLATE 10 MG/1
10 TABLET ORAL DAILY
Qty: 90 TABLET | Refills: 0 | Status: SHIPPED | OUTPATIENT
Start: 2023-03-15 | End: 2023-04-12 | Stop reason: SDUPTHER

## 2023-03-15 RX ORDER — SITAGLIPTIN 100 MG/1
TABLET, FILM COATED ORAL
Qty: 90 TABLET | Refills: 1 | Status: SHIPPED | OUTPATIENT
Start: 2023-03-15

## 2023-03-27 ENCOUNTER — ALLIED HEALTH/NURSE VISIT (OUTPATIENT)
Dept: FAMILY MEDICINE | Facility: CLINIC | Age: 73
End: 2023-03-27
Payer: COMMERCIAL

## 2023-03-27 DIAGNOSIS — E53.8 VITAMIN B 12 DEFICIENCY: Primary | ICD-10-CM

## 2023-03-27 PROCEDURE — 99207 PR NO CHARGE NURSE ONLY: CPT

## 2023-03-27 PROCEDURE — 96372 THER/PROPH/DIAG INJ SC/IM: CPT | Performed by: PHARMACIST

## 2023-03-27 PROCEDURE — 99207 PR NO CHARGE NURSE ONLY: CPT | Performed by: PHARMACIST

## 2023-03-27 RX ADMIN — CYANOCOBALAMIN 1000 MCG: 1000 INJECTION, SOLUTION INTRAMUSCULAR; SUBCUTANEOUS at 07:49

## 2023-04-05 ENCOUNTER — LAB SERVICES (OUTPATIENT)
Dept: LAB | Age: 73
End: 2023-04-05

## 2023-04-05 DIAGNOSIS — E11.36 TYPE 2 DIABETES MELLITUS WITH DIABETIC CATARACT, WITHOUT LONG-TERM CURRENT USE OF INSULIN (CMD): ICD-10-CM

## 2023-04-05 LAB
ALBUMIN SERPL-MCNC: 4 G/DL (ref 3.6–5.1)
ALBUMIN/GLOB SERPL: 1 {RATIO} (ref 1–2.4)
ALP SERPL-CCNC: 133 UNITS/L (ref 45–117)
ALT SERPL-CCNC: 28 UNITS/L
ANION GAP SERPL CALC-SCNC: 14 MMOL/L (ref 7–19)
AST SERPL-CCNC: 21 UNITS/L
BILIRUB SERPL-MCNC: 0.7 MG/DL (ref 0.2–1)
BUN SERPL-MCNC: 26 MG/DL (ref 6–20)
BUN/CREAT SERPL: 26 (ref 7–25)
CALCIUM SERPL-MCNC: 10.3 MG/DL (ref 8.4–10.2)
CHLORIDE SERPL-SCNC: 106 MMOL/L (ref 97–110)
CHOLEST SERPL-MCNC: 183 MG/DL
CHOLEST/HDLC SERPL: 3.1 {RATIO}
CO2 SERPL-SCNC: 27 MMOL/L (ref 21–32)
CREAT SERPL-MCNC: 1 MG/DL (ref 0.51–0.95)
FASTING DURATION TIME PATIENT: 12 HOURS (ref 0–999)
FASTING DURATION TIME PATIENT: 12 HOURS (ref 0–999)
GFR SERPLBLD BASED ON 1.73 SQ M-ARVRAT: 59 ML/MIN
GLOBULIN SER-MCNC: 4 G/DL (ref 2–4)
GLUCOSE SERPL-MCNC: 110 MG/DL (ref 70–99)
HBA1C MFR BLD: 6.7 % (ref 4.5–5.6)
HDLC SERPL-MCNC: 60 MG/DL
LDLC SERPL CALC-MCNC: 94 MG/DL
NONHDLC SERPL-MCNC: 123 MG/DL
POTASSIUM SERPL-SCNC: 4.1 MMOL/L (ref 3.4–5.1)
PROT SERPL-MCNC: 8 G/DL (ref 6.4–8.2)
SODIUM SERPL-SCNC: 143 MMOL/L (ref 135–145)
T4 FREE SERPL-MCNC: 0.7 NG/DL (ref 0.8–1.5)
TRIGL SERPL-MCNC: 143 MG/DL
TSH SERPL-ACNC: 8.77 MCUNITS/ML (ref 0.35–5)
VIT B12 SERPL-MCNC: 445 PG/ML (ref 211–911)

## 2023-04-05 PROCEDURE — 82043 UR ALBUMIN QUANTITATIVE: CPT | Performed by: CLINICAL MEDICAL LABORATORY

## 2023-04-05 PROCEDURE — 36415 COLL VENOUS BLD VENIPUNCTURE: CPT | Performed by: INTERNAL MEDICINE

## 2023-04-05 PROCEDURE — 80061 LIPID PANEL: CPT | Performed by: CLINICAL MEDICAL LABORATORY

## 2023-04-05 PROCEDURE — 83036 HEMOGLOBIN GLYCOSYLATED A1C: CPT | Performed by: CLINICAL MEDICAL LABORATORY

## 2023-04-05 PROCEDURE — 84443 ASSAY THYROID STIM HORMONE: CPT | Performed by: CLINICAL MEDICAL LABORATORY

## 2023-04-05 PROCEDURE — 82607 VITAMIN B-12: CPT | Performed by: CLINICAL MEDICAL LABORATORY

## 2023-04-05 PROCEDURE — 82570 ASSAY OF URINE CREATININE: CPT | Performed by: CLINICAL MEDICAL LABORATORY

## 2023-04-05 PROCEDURE — 84439 ASSAY OF FREE THYROXINE: CPT | Performed by: CLINICAL MEDICAL LABORATORY

## 2023-04-05 PROCEDURE — 80053 COMPREHEN METABOLIC PANEL: CPT | Performed by: INTERNAL MEDICINE

## 2023-04-06 LAB
CREAT UR-MCNC: 99 MG/DL
MICROALBUMIN UR-MCNC: 2.04 MG/DL
MICROALBUMIN/CREAT UR: 20.6 MG/G

## 2023-04-12 ENCOUNTER — OFFICE VISIT (OUTPATIENT)
Dept: FAMILY MEDICINE | Age: 73
End: 2023-04-12

## 2023-04-12 VITALS
WEIGHT: 160 LBS | DIASTOLIC BLOOD PRESSURE: 87 MMHG | HEIGHT: 59 IN | HEART RATE: 84 BPM | TEMPERATURE: 96.9 F | SYSTOLIC BLOOD PRESSURE: 130 MMHG | OXYGEN SATURATION: 97 % | BODY MASS INDEX: 32.25 KG/M2 | RESPIRATION RATE: 18 BRPM

## 2023-04-12 DIAGNOSIS — Z00.00 WELLNESS EXAMINATION: ICD-10-CM

## 2023-04-12 DIAGNOSIS — E83.52 HYPERCALCEMIA: ICD-10-CM

## 2023-04-12 DIAGNOSIS — M15.9 GENERALIZED OSTEOARTHRITIS OF MULTIPLE SITES: ICD-10-CM

## 2023-04-12 DIAGNOSIS — I50.32 DIASTOLIC CHF, CHRONIC (CMD): ICD-10-CM

## 2023-04-12 DIAGNOSIS — Z78.0 POSTMENOPAUSAL STATUS (AGE-RELATED) (NATURAL): ICD-10-CM

## 2023-04-12 DIAGNOSIS — E11.22 TYPE 2 DIABETES MELLITUS WITH STAGE 3A CHRONIC KIDNEY DISEASE, WITHOUT LONG-TERM CURRENT USE OF INSULIN (CMD): ICD-10-CM

## 2023-04-12 DIAGNOSIS — E03.9 ACQUIRED HYPOTHYROIDISM: ICD-10-CM

## 2023-04-12 DIAGNOSIS — I10 BENIGN ESSENTIAL HYPERTENSION: ICD-10-CM

## 2023-04-12 DIAGNOSIS — N18.30 BENIGN HYPERTENSION WITH CKD (CHRONIC KIDNEY DISEASE) STAGE III (CMD): ICD-10-CM

## 2023-04-12 DIAGNOSIS — I12.9 BENIGN HYPERTENSION WITH CKD (CHRONIC KIDNEY DISEASE) STAGE III (CMD): ICD-10-CM

## 2023-04-12 DIAGNOSIS — Z12.12 SCREENING FOR COLORECTAL CANCER: ICD-10-CM

## 2023-04-12 DIAGNOSIS — E11.36 TYPE 2 DIABETES MELLITUS WITH DIABETIC CATARACT, WITHOUT LONG-TERM CURRENT USE OF INSULIN (CMD): ICD-10-CM

## 2023-04-12 DIAGNOSIS — Z12.11 SCREENING FOR COLORECTAL CANCER: ICD-10-CM

## 2023-04-12 DIAGNOSIS — N18.31 TYPE 2 DIABETES MELLITUS WITH STAGE 3A CHRONIC KIDNEY DISEASE, WITHOUT LONG-TERM CURRENT USE OF INSULIN (CMD): ICD-10-CM

## 2023-04-12 DIAGNOSIS — Z00.00 MEDICARE ANNUAL WELLNESS VISIT, SUBSEQUENT: Primary | ICD-10-CM

## 2023-04-12 DIAGNOSIS — G89.29 CHRONIC PAIN OF LEFT KNEE: ICD-10-CM

## 2023-04-12 DIAGNOSIS — Z12.31 ENCOUNTER FOR SCREENING MAMMOGRAM FOR MALIGNANT NEOPLASM OF BREAST: ICD-10-CM

## 2023-04-12 DIAGNOSIS — M25.562 CHRONIC PAIN OF LEFT KNEE: ICD-10-CM

## 2023-04-12 PROCEDURE — 99397 PER PM REEVAL EST PAT 65+ YR: CPT | Performed by: FAMILY MEDICINE

## 2023-04-12 PROCEDURE — G0439 PPPS, SUBSEQ VISIT: HCPCS | Performed by: FAMILY MEDICINE

## 2023-04-12 PROCEDURE — 99214 OFFICE O/P EST MOD 30 MIN: CPT | Performed by: FAMILY MEDICINE

## 2023-04-12 RX ORDER — DULAGLUTIDE 1.5 MG/.5ML
1.5 INJECTION, SOLUTION SUBCUTANEOUS
Qty: 6 ML | Refills: 1 | Status: SHIPPED | OUTPATIENT
Start: 2023-04-12 | End: 2023-06-20 | Stop reason: SDUPTHER

## 2023-04-12 RX ORDER — LEVOTHYROXINE SODIUM 0.03 MG/1
25 TABLET ORAL
Qty: 30 TABLET | Refills: 1 | Status: SHIPPED | OUTPATIENT
Start: 2023-04-12

## 2023-04-12 RX ORDER — MELOXICAM 15 MG/1
15 TABLET ORAL DAILY PRN
Qty: 90 TABLET | Refills: 0 | Status: SHIPPED | OUTPATIENT
Start: 2023-04-12

## 2023-04-12 RX ORDER — AMLODIPINE BESYLATE 10 MG/1
10 TABLET ORAL DAILY
Qty: 90 TABLET | Refills: 0 | Status: SHIPPED | OUTPATIENT
Start: 2023-04-12

## 2023-04-12 RX ORDER — LOSARTAN POTASSIUM 50 MG/1
50 TABLET ORAL 2 TIMES DAILY
Qty: 180 TABLET | Refills: 1 | Status: SHIPPED | OUTPATIENT
Start: 2023-04-12

## 2023-04-12 ASSESSMENT — PATIENT HEALTH QUESTIONNAIRE - PHQ9
9. THOUGHTS THAT YOU WOULD BE BETTER OFF DEAD, OR OF HURTING YOURSELF: NOT AT ALL
7. TROUBLE CONCENTRATING ON THINGS, SUCH AS READING THE NEWSPAPER OR WATCHING TELEVISION: SEVERAL DAYS
4. FEELING TIRED OR HAVING LITTLE ENERGY: SEVERAL DAYS
1. LITTLE INTEREST OR PLEASURE IN DOING THINGS: NOT AT ALL
CLINICAL INTERPRETATION OF PHQ2 SCORE: NO FURTHER SCREENING NEEDED
SUM OF ALL RESPONSES TO PHQ QUESTIONS 1-9: 6
SUM OF ALL RESPONSES TO PHQ9 QUESTIONS 1 AND 2: 0
10. IF YOU CHECKED OFF ANY PROBLEMS, HOW DIFFICULT HAVE THESE PROBLEMS MADE IT FOR YOU TO DO YOUR WORK, TAKE CARE OF THINGS AT HOME, OR GET ALONG WITH OTHER PEOPLE: NOT DIFFICULT AT ALL
CLINICAL INTERPRETATION OF PHQ9 SCORE: MILD DEPRESSION
SUM OF ALL RESPONSES TO PHQ9 QUESTIONS 1 AND 2: 0
8. MOVING OR SPEAKING SO SLOWLY THAT OTHER PEOPLE COULD HAVE NOTICED. OR THE OPPOSITE, BEING SO FIGETY OR RESTLESS THAT YOU HAVE BEEN MOVING AROUND A LOT MORE THAN USUAL: SEVERAL DAYS
6. FEELING BAD ABOUT YOURSELF - OR THAT YOU ARE A FAILURE OR HAVE LET YOURSELF OR YOUR FAMILY DOWN: SEVERAL DAYS
3. TROUBLE FALLING OR STAYING ASLEEP OR SLEEPING TOO MUCH: SEVERAL DAYS
2. FEELING DOWN, DEPRESSED OR HOPELESS: NOT AT ALL
5. POOR APPETITE, WEIGHT LOSS, OR OVEREATING: SEVERAL DAYS

## 2023-04-13 ENCOUNTER — TELEPHONE (OUTPATIENT)
Dept: SCHEDULING | Age: 73
End: 2023-04-13

## 2023-04-18 ENCOUNTER — TELEPHONE (OUTPATIENT)
Dept: SCHEDULING | Age: 73
End: 2023-04-18

## 2023-04-19 NOTE — TELEPHONE ENCOUNTER
RECORDS RECEIVED FROM: internal   REASON FOR VISIT: Type 2 diabetes mellitus with diabetic polyneuropathy, without long-term current use of insulin, Other polyneuropathy   Date of Appt: 7/10/23   NOTES (FOR ALL VISITS) STATUS DETAILS   OFFICE NOTE from referring provider Internal Dr Josue Hoffmann @ [facility]:  3/7/23  1/23/23   OFFICE NOTE from other specialist Internal Dr Denia Barron @ [facility]:  10/18/22  7/18/22  10/11/21   MEDICATION LIST Internal    IMAGING  (FOR ALL VISITS)     MRI (HEAD, NECK, SPINE) Internal [facility]:  MRI Lumbar Spine 3/10/20  MRI Brain 4/9/19  MRI Brain 4/23/18

## 2023-04-25 ENCOUNTER — ANCILLARY PROCEDURE (OUTPATIENT)
Dept: MAMMOGRAPHY | Facility: HOSPITAL | Age: 73
End: 2023-04-25
Attending: FAMILY MEDICINE
Payer: COMMERCIAL

## 2023-04-25 DIAGNOSIS — Z12.31 ENCOUNTER FOR SCREENING MAMMOGRAM FOR BREAST CANCER: ICD-10-CM

## 2023-04-25 PROCEDURE — 77067 SCR MAMMO BI INCL CAD: CPT

## 2023-04-26 ENCOUNTER — OFFICE VISIT (OUTPATIENT)
Dept: NEUROLOGY | Facility: CLINIC | Age: 73
End: 2023-04-26
Attending: FAMILY MEDICINE
Payer: COMMERCIAL

## 2023-04-26 VITALS
SYSTOLIC BLOOD PRESSURE: 135 MMHG | HEART RATE: 75 BPM | BODY MASS INDEX: 21.52 KG/M2 | WEIGHT: 114 LBS | OXYGEN SATURATION: 99 % | HEIGHT: 61 IN | DIASTOLIC BLOOD PRESSURE: 79 MMHG

## 2023-04-26 DIAGNOSIS — M21.372 BILATERAL FOOT-DROP: ICD-10-CM

## 2023-04-26 DIAGNOSIS — M21.371 BILATERAL FOOT-DROP: ICD-10-CM

## 2023-04-26 DIAGNOSIS — E11.42 DIABETIC POLYNEUROPATHY ASSOCIATED WITH TYPE 2 DIABETES MELLITUS (H): Primary | ICD-10-CM

## 2023-04-26 PROCEDURE — 99205 OFFICE O/P NEW HI 60 MIN: CPT | Performed by: PSYCHIATRY & NEUROLOGY

## 2023-04-26 NOTE — PROGRESS NOTES
INITIAL NEUROLOGY CONSULTATION    DATE OF VISIT: 4/26/2023  CLINIC LOCATION: [facility]  MRN: [id number]  PATIENT NAME: Hai Meade  YOB: 1950    REASON FOR VISIT: No chief complaint on file.    HISTORY OF PRESENT ILLNESS:                                                    Ms. Hai Meade is 72 year old right handed female patient with past medical history of diabetes mellitus type 2, cervical cancer, hyperlipidemia, vitamin B12 deficiency, peripheral polyneuropathy, and osteoporosis, who was seen today for polyneuropathy management.    Per patient's report, ***.  She is on 100 mg of gabapentin at at bedtime.  Cymbalta caused nausea/vomiting.    Most recent laboratory evaluation from March 2023 includes unremarkable BMP, except elevated glucose of 129, and normal vitamin B12 (659).  Hemoglobin A1C was 7.8 in January 2023.    Brain MRI with and without contrast from 4/8/2019 demonstrated mild age-related changes without additional abnormalities.  Brain MRI from 4/23/2018 demonstrated similar findings.  Head MRA demonstrated marked narrowing of the mid left M1 segment without other areas of high-grade stenosis, branch occlusion, or aneurysm.  Neck MRA from the same day did not demonstrate any significant stenosis, dissection, or pseudoaneurysm.  Lumbar spine MRI without contrast from 3/10/2020 demonstrated moderate disc degeneration at L4-5 abutting traversing L5 nerve roots in addition to less prominent multilevel degenerative changes.  All images were personally reviewed and independently interpreted.    No additional useful information is available in Care Everywhere, which was reviewed.  PAST MEDICAL/SURGICAL HISTORY:                                                    I personally reviewed patient's past medical and surgical history with the patient at today's visit.  MEDICATIONS:                                                    I personally reviewed patient's medications and  allergies with the patient at today's visit.  ALLERGIES:                                                      Allergies   Allergen Reactions     Cymbalta [Duloxetine] Unknown     Nausea and vomiting     EXAM:                                                    VITAL SIGNS:   There were no vitals taken for this visit.  Mini-Cog Assessment:       General: pt is in NAD, cooperative.  Skin: normal turgor, moist mucous membranes, no lesions/rashes noticed.  HEENT: ATNC, EOMI, PERRL, white sclera, normal conjunctiva, no nystagmus or ptosis. No carotid bruits bilaterally.  Respiratory: lung sounds clear to auscultation bilaterally, no crackles, wheezes, rhonchi. Symmetric lung excursion, no accessory respiratory muscle use.  Cardiovascular: normal S1/S2, no murmurs/rubs/gallops.   Abdomen: Not distended.  : deferred.    Neurological:  Mental: alert, follows commands,  /5 with ***/3 on memory recall, no aphasia or dysarthria. Fund of knowledge is {MYAPPROPRIATE:406737}  Cranial Nerves:  CN II: visual acuity - able to accurately count fingers with each eye. Visual fields intact, fundi: discs sharp, no papilledema and normal vessels bilaterally.  CN III, IV, VI: EOM intact, pupils equal and reactive  CN V: facial sensation nl  CN VII: face symmetric, no facial droop  CN VIII: hearing normal  CN IX: palate elevation symmetric, uvula at midline  CN XI SCM normal, shoulder shrug nl  CN XII: tongue midline  Motor: Strength: 5/5 in all major groups of all extremities. Normal tone. No abnormal movements. No pronator drift b/l.  Reflexes: Triceps, biceps, brachioradialis, patellar, and achilles reflexes normal and symmetric. No clonus noted. Toes are down-going b/l.   Sensory: light touch, pinprick, and vibration intact. Romberg: negative.  Coordination: FNF and heel-shin tests intact b/l.   Gait:  Normal, able to tandem walk *** without difficulty.  DATA:   LABS/EEG/IMAGING/OTHER STUDIES: I reviewed pertinent medical records, as  detailed in the history of present illness.  ASSESSMENT AND PLAN:      ASSESSMENT: Hai Meade is a 72 year old female patient with listed above past medical history, who presents with ***.    We had a detailed discussion with the patient regarding her presenting complaints.  The neurological exam today is ***.    DIAGNOSES:  No diagnosis found.  PLAN: There are no Patient Instructions on file for this visit.    Total Time: *** minutes spent on the date of the encounter doing chart review, history and exam, documentation and further activities per the note.    Baltazar Granados MD  [facility] Neurology  (Chart documentation was completed in part with Dragon voice-recognition software. Even though reviewed, some grammatical, spelling, and word errors may remain.)

## 2023-04-26 NOTE — LETTER
4/26/2023         RE: Hai Meade  [address]  [place] [facility] [address]        Dear Colleague,    Thank you for referring your patient, Hai Meade, to the [facility]. Please see a copy of my visit note below.    INITIAL NEUROLOGY CONSULTATION    DATE OF VISIT: 4/26/2023  CLINIC LOCATION: [facility]  MRN: [id number]  PATIENT NAME: Hai Meade  YOB: 1950    REASON FOR VISIT:   Chief Complaint   Patient presents with     New Patient     Diabetic polyneuropathy     HISTORY OF PRESENT ILLNESS:                                                    Ms. Hai Meade is 72 year old right handed female patient with past medical history of diabetes mellitus type 2, cervical cancer, hyperlipidemia, vitamin B12 deficiency, peripheral polyneuropathy, and osteoporosis, who was seen today for polyneuropathy management.  Accompanied by her son.  Visit was conducted with help of professional  over the phone.    Per patient's report, symptoms started over 2 years ago.  The patient developed numbness, tingling, and eventually bilateral feet weakness that affect her ability to walk and causes her to fall.  She is using a cane.  She also has AFO's at home, but currently does not use them.  She denies any additional focal neurological symptoms.  She is on 100 mg of gabapentin at bedtime.  Cymbalta caused nausea/vomiting.    Most recent laboratory evaluation from March 2023 includes unremarkable BMP, except elevated glucose of 129, and normal vitamin B12 (659).  Hemoglobin A1C was 7.8 in January 2023.    Brain MRI with and without contrast from 4/8/2019 demonstrated mild age-related changes without additional abnormalities.  Brain MRI from 4/23/2018 demonstrated similar findings.  Head MRA demonstrated marked narrowing of the mid left M1 segment without other areas of high-grade stenosis, branch occlusion, or aneurysm.  Neck MRA from the same day did not  "demonstrate any significant stenosis, dissection, or pseudoaneurysm.  Lumbar spine MRI without contrast from 3/10/2020 demonstrated moderate disc degeneration at L4-5 abutting traversing L5 nerve roots in addition to less prominent multilevel degenerative changes.  All images were personally reviewed and independently interpreted.    No additional useful information is available in Care Everywhere, which was reviewed.  PAST MEDICAL/SURGICAL HISTORY:                                                    I personally reviewed patient's past medical and surgical history with the patient at today's visit.  MEDICATIONS:                                                    I personally reviewed patient's medications and allergies with the patient at today's visit.  ALLERGIES:                                                      Allergies   Allergen Reactions     Cymbalta [Duloxetine] Unknown     Nausea and vomiting     EXAM:                                                    VITAL SIGNS:   /79 (BP Location: Left arm, Patient Position: Sitting, Cuff Size: Adult Regular)   Pulse 75   Ht 1.54 m (5' 0.63\")   Wt 51.7 kg (114 lb)   SpO2 99%   BMI 21.80 kg/m    Mini-Cog Assessment:  Mini Cog Assessment  Clock Draw Score: 0 Abnormal  3 Item Recall: 2 objects recalled  Mini Cog Total Score: 2    General: pt is in NAD, cooperative.  Skin: normal turgor, moist mucous membranes, no lesions/rashes noticed.  HEENT: ATNC, EOMI, PERRL, white sclera, normal conjunctiva, no nystagmus or ptosis. No carotid bruits bilaterally.  Respiratory: lung sounds clear to auscultation bilaterally, no crackles, wheezes, rhonchi. Symmetric lung excursion, no accessory respiratory muscle use.  Cardiovascular: normal S1/S2, no murmurs/rubs/gallops.   Abdomen: Not distended.  : deferred.    Neurological:  Mental: alert, follows commands, Mini Cog Total Score: 2/5 with 2/3 on memory recall, no aphasia or dysarthria. Fund of knowledge is diminished " for age.  Cranial Nerves:  CN II: visual acuity - able to accurately count fingers with each eye. Visual fields intact, fundi: discs sharp, no papilledema and normal vessels bilaterally.  CN III, IV, VI: EOM intact, pupils equal and reactive  CN V: facial sensation nl  CN VII: face symmetric, no facial droop  CN VIII: hearing normal  CN IX: palate elevation symmetric, uvula at midline  CN XI SCM normal, shoulder shrug nl  CN XII: tongue midline  Motor: Strength: 5/5 in all major groups of both upper extremities, 4/5 proximally in both lower extremities and 1-2/5 distally, including plantar/dorsiflexion, inversion and eversion. Normal tone. No abnormal movements. No pronator drift b/l.  Reflexes: Triceps, biceps, and brachioradialis reflexes are intact, patellar and achilles reflexes are absent bilaterally. No clonus noted. Toes are down-going b/l.   Sensory: light touch, pinprick, and vibration reduced in both lower extremities up to her knees.  Proprioception is affected in both feet.  Romberg: Positive.  Coordination: FNF tests intact b/l.   Gait: Bilateral Steppage, uses a cane.  DATA:   LABS/EEG/IMAGING/OTHER STUDIES: I reviewed pertinent medical records, as detailed in the history of present illness.  ASSESSMENT AND PLAN:      ASSESSMENT: Hai Meade is a 72 year old female patient with listed above past medical history, who presents with diabetic polyneuropathy/bilateral foot drop.    We had a detailed discussion with the patient and her son regarding her presenting complaints.  The neurological exam today is consistent with quite advanced peripheral polyneuropathy, likely related to her diabetes.  Recent B12 is in normal range.  We reviewed lumbar spine MRI images that demonstrated possible involvement of both L5 nerve roots that might contribute to bilateral foot drop.    For further diagnostic clarification, I ordered repeat lumbar spine MRI along with EMG.  Meanwhile, I placed an order for physical therapy  for bilateral lower extremity strengthening and balance difficulty/fall prevention.  I also told her to check with physical therapist if her orthotic devices are appropriate for her.  Discussed necessity of appropriate diabetes control to prevent further progression of peripheral polyneuropathy (hemoglobin A1C should be less than 7.0).    DIAGNOSES:    ICD-10-CM    1. Diabetic polyneuropathy associated with type 2 diabetes mellitus (H)  E11.42 Adult Neurology  Referral      2. Bilateral foot-drop  M21.371 Adult Neurology  Referral    M21.372         PLAN: At today's visit we thoroughly discussed various diagnostic possibilities for patient's symptoms, necessary evaluation, and the plan, which includes:  Orders Placed This Encounter   Procedures     MR Lumbar Spine w/o Contrast     Physical Therapy Referral     EMG     No new medications.     Additional recommendations after the work-up.    Next follow-up appointment is in the next 3 months or earlier if needed.    Total Time: 71 minutes spent on the date of the encounter doing chart review, history and exam, documentation and further activities per the note.    Baltazar Granados MD  [facility] Neurology  (Chart documentation was completed in part with Dragon voice-recognition software. Even though reviewed, some grammatical, spelling, and word errors may remain.)            Again, thank you for allowing me to participate in the care of your patient.        Sincerely,        Baltazar Granados MD

## 2023-04-26 NOTE — PROGRESS NOTES
INITIAL NEUROLOGY CONSULTATION    DATE OF VISIT: 4/26/2023  CLINIC LOCATION: [facility]  MRN: [id number]  PATIENT NAME: Hai Meade  YOB: 1950    REASON FOR VISIT:   Chief Complaint   Patient presents with     New Patient     Diabetic polyneuropathy     HISTORY OF PRESENT ILLNESS:                                                    Ms. Hai Meade is 72 year old right handed female patient with past medical history of diabetes mellitus type 2, cervical cancer, hyperlipidemia, vitamin B12 deficiency, peripheral polyneuropathy, and osteoporosis, who was seen today for polyneuropathy management.  Accompanied by her son.  Visit was conducted with help of professional  over the phone.    Per patient's report, symptoms started over 2 years ago.  The patient developed numbness, tingling, and eventually bilateral feet weakness that affect her ability to walk and causes her to fall.  She is using a cane.  She also has AFO's at home, but currently does not use them.  She denies any additional focal neurological symptoms.  She is on 100 mg of gabapentin at bedtime.  Cymbalta caused nausea/vomiting.    Most recent laboratory evaluation from March 2023 includes unremarkable BMP, except elevated glucose of 129, and normal vitamin B12 (659).  Hemoglobin A1C was 7.8 in January 2023.    Brain MRI with and without contrast from 4/8/2019 demonstrated mild age-related changes without additional abnormalities.  Brain MRI from 4/23/2018 demonstrated similar findings.  Head MRA demonstrated marked narrowing of the mid left M1 segment without other areas of high-grade stenosis, branch occlusion, or aneurysm.  Neck MRA from the same day did not demonstrate any significant stenosis, dissection, or pseudoaneurysm.  Lumbar spine MRI without contrast from 3/10/2020 demonstrated moderate disc degeneration at L4-5 abutting traversing L5 nerve roots in addition to less prominent multilevel degenerative  "changes.  All images were personally reviewed and independently interpreted.    No additional useful information is available in Care Everywhere, which was reviewed.  PAST MEDICAL/SURGICAL HISTORY:                                                    I personally reviewed patient's past medical and surgical history with the patient at today's visit.  MEDICATIONS:                                                    I personally reviewed patient's medications and allergies with the patient at today's visit.  ALLERGIES:                                                      Allergies   Allergen Reactions     Cymbalta [Duloxetine] Unknown     Nausea and vomiting     EXAM:                                                    VITAL SIGNS:   /79 (BP Location: Left arm, Patient Position: Sitting, Cuff Size: Adult Regular)   Pulse 75   Ht 1.54 m (5' 0.63\")   Wt 51.7 kg (114 lb)   SpO2 99%   BMI 21.80 kg/m    Mini-Cog Assessment:  Mini Cog Assessment  Clock Draw Score: 0 Abnormal  3 Item Recall: 2 objects recalled  Mini Cog Total Score: 2    General: pt is in NAD, cooperative.  Skin: normal turgor, moist mucous membranes, no lesions/rashes noticed.  HEENT: ATNC, EOMI, PERRL, white sclera, normal conjunctiva, no nystagmus or ptosis. No carotid bruits bilaterally.  Respiratory: lung sounds clear to auscultation bilaterally, no crackles, wheezes, rhonchi. Symmetric lung excursion, no accessory respiratory muscle use.  Cardiovascular: normal S1/S2, no murmurs/rubs/gallops.   Abdomen: Not distended.  : deferred.    Neurological:  Mental: alert, follows commands, Mini Cog Total Score: 2/5 with 2/3 on memory recall, no aphasia or dysarthria. Fund of knowledge is diminished for age.  Cranial Nerves:  CN II: visual acuity - able to accurately count fingers with each eye. Visual fields intact, fundi: discs sharp, no papilledema and normal vessels bilaterally.  CN III, IV, VI: EOM intact, pupils equal and reactive  CN V: facial " sensation nl  CN VII: face symmetric, no facial droop  CN VIII: hearing normal  CN IX: palate elevation symmetric, uvula at midline  CN XI SCM normal, shoulder shrug nl  CN XII: tongue midline  Motor: Strength: 5/5 in all major groups of both upper extremities, 4/5 proximally in both lower extremities and 1-2/5 distally, including plantar/dorsiflexion, inversion and eversion. Normal tone. No abnormal movements. No pronator drift b/l.  Reflexes: Triceps, biceps, and brachioradialis reflexes are intact, patellar and achilles reflexes are absent bilaterally. No clonus noted. Toes are down-going b/l.   Sensory: light touch, pinprick, and vibration reduced in both lower extremities up to her knees.  Proprioception is affected in both feet.  Romberg: Positive.  Coordination: FNF tests intact b/l.   Gait: Bilateral Steppage, uses a cane.  DATA:   LABS/EEG/IMAGING/OTHER STUDIES: I reviewed pertinent medical records, as detailed in the history of present illness.  ASSESSMENT AND PLAN:      ASSESSMENT: Hai Meade is a 72 year old female patient with listed above past medical history, who presents with diabetic polyneuropathy/bilateral foot drop.    We had a detailed discussion with the patient and her son regarding her presenting complaints.  The neurological exam today is consistent with quite advanced peripheral polyneuropathy, likely related to her diabetes.  Recent B12 is in normal range.  We reviewed lumbar spine MRI images that demonstrated possible involvement of both L5 nerve roots that might contribute to bilateral foot drop.    For further diagnostic clarification, I ordered repeat lumbar spine MRI along with EMG.  Meanwhile, I placed an order for physical therapy for bilateral lower extremity strengthening and balance difficulty/fall prevention.  I also told her to check with physical therapist if her orthotic devices are appropriate for her.  Discussed necessity of appropriate diabetes control to prevent further  progression of peripheral polyneuropathy (hemoglobin A1C should be less than 7.0).    DIAGNOSES:    ICD-10-CM    1. Diabetic polyneuropathy associated with type 2 diabetes mellitus (H)  E11.42 Adult Neurology  Referral      2. Bilateral foot-drop  M21.371 Adult Neurology  Referral    M21.372         PLAN: At today's visit we thoroughly discussed various diagnostic possibilities for patient's symptoms, necessary evaluation, and the plan, which includes:  Orders Placed This Encounter   Procedures     MR Lumbar Spine w/o Contrast     Physical Therapy Referral     EMG     No new medications.     Additional recommendations after the work-up.    Next follow-up appointment is in the next 3 months or earlier if needed.    Total Time: 71 minutes spent on the date of the encounter doing chart review, history and exam, documentation and further activities per the note.    Baltazar Granados MD  [facility] Neurology  (Chart documentation was completed in part with Dragon voice-recognition software. Even though reviewed, some grammatical, spelling, and word errors may remain.)

## 2023-04-26 NOTE — PATIENT INSTRUCTIONS
AFTER VISIT SUMMARY (AVS):    At today's visit we thoroughly discussed various diagnostic possibilities for your symptoms, necessary evaluation, and the plan, which includes:  Orders Placed This Encounter   Procedures    MR Lumbar Spine w/o Contrast    Physical Therapy Referral    EMG     No new medications.     Additional recommendations after the work-up.    Next follow-up appointment is in the next 3 months or earlier if needed.    Please do not hesitate to call me with any questions or concerns.    Thanks.

## 2023-05-01 ENCOUNTER — HOSPITAL ENCOUNTER (OUTPATIENT)
Dept: PHYSICAL THERAPY | Facility: REHABILITATION | Age: 73
Discharge: HOME | End: 2023-05-01
Attending: PSYCHIATRY & NEUROLOGY
Payer: COMMERCIAL

## 2023-05-01 DIAGNOSIS — M21.372 BILATERAL FOOT-DROP: ICD-10-CM

## 2023-05-01 DIAGNOSIS — M21.371 BILATERAL FOOT-DROP: ICD-10-CM

## 2023-05-01 PROCEDURE — 97110 THERAPEUTIC EXERCISES: CPT | Mod: GP

## 2023-05-01 PROCEDURE — 97161 PT EVAL LOW COMPLEX 20 MIN: CPT | Mod: GP

## 2023-05-03 ENCOUNTER — HOSPITAL ENCOUNTER (OUTPATIENT)
Dept: MRI IMAGING | Facility: HOSPITAL | Age: 73
Discharge: HOME | End: 2023-05-03
Attending: PSYCHIATRY & NEUROLOGY | Admitting: PSYCHIATRY & NEUROLOGY
Payer: COMMERCIAL

## 2023-05-03 DIAGNOSIS — M21.372 BILATERAL FOOT-DROP: ICD-10-CM

## 2023-05-03 DIAGNOSIS — M21.371 BILATERAL FOOT-DROP: ICD-10-CM

## 2023-05-03 PROCEDURE — 72148 MRI LUMBAR SPINE W/O DYE: CPT

## 2023-05-05 ENCOUNTER — TELEPHONE (OUTPATIENT)
Dept: NEUROLOGY | Facility: CLINIC | Age: 73
End: 2023-05-05
Payer: COMMERCIAL

## 2023-05-05 NOTE — TELEPHONE ENCOUNTER
Called pt via  to discuss result note- see below.    Pt requested we call her daughters.  CTC on file.      M for Shyam and Jarvis to call back to discuss results.     Keyana MOORE RN, BSN  [facility]

## 2023-05-05 NOTE — TELEPHONE ENCOUNTER
Called Jarvis back, CTC on file. Relayed provider note.     She verbalized understanding and will reach out to PCP regarding the incidental finding of gallstone.     Keyana MOORE RN, BSN  [facility]

## 2023-05-05 NOTE — TELEPHONE ENCOUNTER
[facility] Call Center    Phone Message    May a detailed message be left on voicemail: no     Reason for Call: Other: Pt daughter is returning call for results. Please call back      Action Taken: Other: [facility]    Travel Screening: Not Applicable

## 2023-05-05 NOTE — TELEPHONE ENCOUNTER
----- Message from Baltazar Granados MD sent at 5/4/2023  1:09 PM CDT -----  Please advise the patient that her lumbar spine MRI did not demonstrate any urgent findings related to her neurological issues.  However, there is potential gallstone seen in the bile duct.  This finding could be discussed with primary care provider.  Otherwise, we will discuss details of lumbar spine MRI when she comes back for a follow-up.  Thanks,  Baltazar Granados MD.

## 2023-05-05 NOTE — TELEPHONE ENCOUNTER
[facility] Call Center    Phone Message    May a detailed message be left on voicemail: yes     Reason for Call: Other: Pt's daughter Shyam is calling because she received a call about pt's MRI results. She says pt is not home right now but she usually gets home around 3:30 or 4 so she would like a call back around that time if possible Ph: [phone]      Action Taken: Message routed to:  Other: [facility]    Travel Screening: Not Applicable

## 2023-05-06 NOTE — PROGRESS NOTES
[facility]                                                                                   OUTPATIENT PHYSICAL THERAPY FUNCTIONAL EVALUATION  PLAN OF TREATMENT FOR OUTPATIENT REHABILITATION  (COMPLETE FOR INITIAL CLAIMS ONLY)  Patient's Last Name, First Name, M.I.  YOB: 1950  Hai Meade     Provider's Name   [facility]   Medical Record No.  [id number]     Start of Care Date:  05/01/23   Onset Date:  04/26/23   Type:     _X__PT   ____OT  ____SLP Medical Diagnosis:  Bilateral foot drop     PT Diagnosis:  Impaired balance and gait with BLE weakness Visits from SOC:  1                              __________________________________________________________________________________  Plan of Treatment/Functional Goals:  balance training, gait training, joint mobilization, neuromuscular re-education, ROM, strengthening, stretching, manual therapy     TENS, Microcurrent Electrical Stimulation, Electrical Stimulation/Russion Stimulation, Cryotherapy     GOALS  HEP  Pt will be independent with HEP to improve mobility.  07/23/23    TUG  Pt will complete TUG in less than 13 seconds to demonstrate improved balance and gait to improve functional mobility and increase tolerance completing ADL's, recreational activities and work related tasks.  07/23/23    30 second sit<>stands  Pt will complete greater than 10 sit<>stands in 30 seconds to demonstrate improved BLE strength and balance to improve functional mobility and increase tolerance completing ADL's, recreational activities and work related tasks.  07/23/23                                                           Therapy Frequency:  1 time/week   Predicted Duration of Therapy Intervention:  1 time a week or every other week for a minimum of 12 weeks, minimum of 6 sessions    Krystina Mojica, PT                                     I CERTIFY THE NEED FOR THESE SERVICES FURNISHED UNDER        THIS PLAN OF TREATMENT AND WHILE UNDER MY CARE     (Physician co-signature of this document indicates review and certification of the therapy plan).              Certification Date From:  05/01/23   Certification Date To:  07/23/23    Referring Provider:  Baltazar Granados MD    Initial Assessment  See Epic Evaluation- Start of Care Date: 05/01/23 05/01/23 1500   Quick Adds   Quick Adds Certification   Type of Visit Initial OP PT Evaluation       Present Yes   Language Hmong   General Information   Start of Care Date 05/01/23   Referring Physician Baltazar Granados MD   Orders Evaluate and Treat as Indicated   Additional Orders Please evaluate her current AFOs to see if they are appropriate   Order Date 04/26/23   Medical Diagnosis Bilateral foot drop   Onset of illness/injury or Date of Surgery 04/26/23   Precautions/Limitations no known precautions/limitations   Pertinent history of current problem (include personal factors and/or comorbidities that impact the POC) Pt reports that she wants to walk independently. Pt currently uses cane for support. Pt reports that she started having feet pain about 2 years ago with numbness and tingling. Pt reports that she didn't have any treatment in the past but was given AFOs. Pt reports that she does still have her AFOs, but only wears them when going out. Pt reports that she doesn't normally wear them since they do not fit in her shoes. Pt reports that she was told she has bilateral foot drop. Pt reports that her pain comes and goes. Pt reports that if she does any activity she feels tired and exhausted afterwards and feels like her body is very heavy. Pt reports that she does have random falls, but does not remember them. Pt reports that the falls happen often and does get bruises on her legs. Pt reports numbness and tingling that goes from her  feet up to her knees. Pt reports weakness in both legs with numbness and tingling in feet and legs.   Prior level of function comment Independent   [facility]   Home/Community Accessibility Comments Pt reports has stairs to negotiate at home. Pt reports that she lives with her family who is able to help her.   Current Assistive Devices Standard Cane   Assistive Devices Comments Pt reports that she is interested in using a quad cand or walker   Patient/Family Goals Statement to be able to walk independently   Fall Risk Screen   Fall screen completed by PT   Have you fallen 2 or more times in the past year? Yes   Have you fallen and had an injury in the past year? Yes   Timed Up and Go score (seconds) Trial with cane: 28.74 seconds   Is patient a fall risk? Yes   Abuse Screen (yes response referral indicated)   Feels Unsafe at Home or Work/School no   Feels Threatened by Someone no   Does Anyone Try to Keep You From Having Contact with Others or Doing Things Outside Your Home? no   Physical Signs of Abuse Present no   System Outcome Measures   Outcome Measures   (LEFS: 17/80)   Pain   Patient currently in pain No   Range of Motion (ROM)   ROM Comment WFL BLE and BUE   Strength   Strength Comments WFL BLE and BUE   Transfer Skills   Transfer Comments Independent   Gait   Gait Comments Independent   Balance Special Tests   Balance Special Tests Timed up and go;Sit to stand reps   Balance Special Tests Timed Up and Go   Seconds 28.74 Seconds   Comments Pt ambulates with straight cane   Balance Special Tests Sit to Stand Reps in 30 Seconds   Reps in 30 seconds 5   Comments Pt completes with use of BUE support   Modality Interventions   Planned Modality Interventions TENS;Microcurrent Electrical Stimulation;Electrical Stimulation/Russion Stimulation;Cryotherapy   Planned Therapy Interventions   Planned Therapy Interventions balance training;gait training;joint mobilization;neuromuscular  re-education;ROM;strengthening;stretching;manual therapy   Clinical Impression   Criteria for Skilled Therapeutic Interventions Met yes, treatment indicated   PT Diagnosis Impaired balance and gait with BLE weakness   Influenced by the following impairments TUG time of 28.74 seconds, 5 sit<>stands in 30 seconds, LEFS score of 17/80   Functional limitations due to impairments Impaired functional mobility, decreased tolerance completing ADL's, recreational activities and work related tasks   Clinical Presentation Stable/Uncomplicated   Clinical Decision Making (Complexity) Low complexity   Therapy Frequency 1 time/week   Predicted Duration of Therapy Intervention (days/wks) 1 time a week or every other week for a minimum of 12 weeks, minimum of 6 sessions   Risk & Benefits of therapy have been explained Yes   Patient, Family & other staff in agreement with plan of care Yes   Clinical Impression Comments Pt is a 72 year old female who presents to physical therapy with reports of bilateral foot drop and impaired balance and gait. Pt completes TUG in 28.74 seconds with cane. Pt completes 5 sit<>stands with BUE in 30 seconds. Pt scored 17/80 on LEFS with reports of difficulty standing, walking and running. Pt would benefit from physical therapy to improve balance and improve gait to improve functional mobility and increase tolerance completing ADL's, recreational activities and work related tasks.   GOALS   PT Eval Goals 1;2;3   Goal 1   Goal Identifier HEP   Goal Description Pt will be independent with HEP to improve mobility.   Target Date 07/23/23   Goal 2   Goal Identifier TUG   Goal Description Pt will complete TUG in less than 13 seconds to demonstrate improved balance and gait to improve functional mobility and increase tolerance completing ADL's, recreational activities and work related tasks.   Target Date 07/23/23   Goal 3   Goal Identifier 30 second sit<>stands   Goal Description Pt will complete greater than  10 sit<>stands in 30 seconds to demonstrate improved BLE strength and balance to improve functional mobility and increase tolerance completing ADL's, recreational activities and work related tasks.   Target Date 07/23/23   Total Evaluation Time   PT Eval, Low Complexity Minutes (64059) 10   Therapy Certification   Certification date from 05/01/23   Certification date to 07/23/23   Medical Diagnosis Bilateral foot drop   Certification I certify the need for these services furnished under this plan of treatment and while under my care.  (Physician co-signature of this document indicates review and certification of the therapy plan).     Krystina Mojica, PT, DPT

## 2023-05-08 ENCOUNTER — OFFICE VISIT (OUTPATIENT)
Dept: FAMILY MEDICINE | Facility: CLINIC | Age: 73
End: 2023-05-08
Payer: COMMERCIAL

## 2023-05-08 VITALS
HEART RATE: 66 BPM | SYSTOLIC BLOOD PRESSURE: 122 MMHG | WEIGHT: 117.4 LBS | TEMPERATURE: 98 F | DIASTOLIC BLOOD PRESSURE: 68 MMHG | RESPIRATION RATE: 16 BRPM | OXYGEN SATURATION: 98 % | BODY MASS INDEX: 23.05 KG/M2 | HEIGHT: 60 IN

## 2023-05-08 DIAGNOSIS — Z12.11 SCREEN FOR COLON CANCER: ICD-10-CM

## 2023-05-08 DIAGNOSIS — G62.9 PERIPHERAL POLYNEUROPATHY: ICD-10-CM

## 2023-05-08 DIAGNOSIS — E11.43 TYPE 2 DIABETES MELLITUS WITH DIABETIC AUTONOMIC NEUROPATHY, WITHOUT LONG-TERM CURRENT USE OF INSULIN (H): Primary | ICD-10-CM

## 2023-05-08 DIAGNOSIS — K80.21 CALCULUS OF GALLBLADDER WITH BILIARY OBSTRUCTION BUT WITHOUT CHOLECYSTITIS: ICD-10-CM

## 2023-05-08 LAB
ALBUMIN SERPL BCG-MCNC: 4.2 G/DL (ref 3.5–5.2)
ALP SERPL-CCNC: 59 U/L (ref 35–104)
ALT SERPL W P-5'-P-CCNC: 21 U/L (ref 10–35)
AST SERPL W P-5'-P-CCNC: 22 U/L (ref 10–35)
BILIRUB DIRECT SERPL-MCNC: 0.21 MG/DL (ref 0–0.3)
BILIRUB SERPL-MCNC: 0.6 MG/DL
HBA1C MFR BLD: 8 % (ref 0–5.6)
HOLD SPECIMEN: NORMAL
LIPASE SERPL-CCNC: 59 U/L (ref 13–60)
PROT SERPL-MCNC: 6.3 G/DL (ref 6.4–8.3)

## 2023-05-08 PROCEDURE — 83036 HEMOGLOBIN GLYCOSYLATED A1C: CPT | Performed by: FAMILY MEDICINE

## 2023-05-08 PROCEDURE — 99214 OFFICE O/P EST MOD 30 MIN: CPT | Performed by: FAMILY MEDICINE

## 2023-05-08 PROCEDURE — 83690 ASSAY OF LIPASE: CPT | Performed by: FAMILY MEDICINE

## 2023-05-08 PROCEDURE — 80076 HEPATIC FUNCTION PANEL: CPT | Performed by: FAMILY MEDICINE

## 2023-05-08 PROCEDURE — 36415 COLL VENOUS BLD VENIPUNCTURE: CPT | Performed by: FAMILY MEDICINE

## 2023-05-08 ASSESSMENT — PAIN SCALES - GENERAL: PAINLEVEL: NO PAIN (0)

## 2023-05-08 NOTE — LETTER
May 9, 2023      Hai Meade  [address] [place] [address]        Dear ,    We are writing to inform you of your test results.    Your liver function and pancreas function are both normal.  I do not feel that we need to evaluate your gallbladder further unless you develop discomfort in your upper abdomen.    Resulted Orders   Hemoglobin A1c   Result Value Ref Range    Hemoglobin A1C 8.0 (H) 0.0 - 5.6 %      Comment:      Normal <5.7%   Prediabetes 5.7-6.4%    Diabetes 6.5% or higher     Note: Adopted from ADA consensus guidelines.   Hepatic function panel   Result Value Ref Range    Protein Total 6.3 (L) 6.4 - 8.3 g/dL    Albumin 4.2 3.5 - 5.2 g/dL    Bilirubin Total 0.6 <=1.2 mg/dL    Alkaline Phosphatase 59 35 - 104 U/L    AST 22 10 - 35 U/L    ALT 21 10 - 35 U/L    Bilirubin Direct 0.21 0.00 - 0.30 mg/dL   Lipase   Result Value Ref Range    Lipase 59 13 - 60 U/L       If you have any questions or concerns, please call the clinic at the number listed above.       Sincerely,      Brie Acevedo MD

## 2023-05-08 NOTE — PROGRESS NOTES
Assessment & Plan     Type 2 diabetes mellitus with diabetic autonomic neuropathy, without long-term current use of insulin (H)  Inadequate control.  Hypoglycemia with attempts to take glipizide regularly.  We discussed options.  I think she would benefit from a GLP-1 inhibitor which should help to improve her diabetes control while avoiding side effect of weight gain while avoiding as high a risk of hypoglycemia.  Referral is placed to diabetes education to assist with this along with education regarding diet.  Advised diabetes eye exam, she is resistant as her vision gets blurry for several days after the exam but we discussed importance of monitoring, advised that she discuss this with her ophthalmology team or consider trying a different ophthalmologist.  I'd like her to follow-up with me in 3 months.  - Hemoglobin A1c; Future  - Extra Tube; Future  - Hemoglobin A1c  - Extra Tube  - AMB Adult Diabetes Educator Referral; Future    Peripheral polyneuropathy  Discussed importance of good diabetes control to slow progression as best we can.  May continue to work with neurology.  Does not tolerate gabapentin well except at very low doses, could give consideration to a trial of Lyrica, tricyclic antidepressant, or duloxetine.  We will keep this in mind for future visits.  Order placed for cane per patient request.  - Cane Order    Calculus of gallbladder with biliary obstruction but without cholecystitis  This is asymptomatic.  I'd like to start by checking her liver function and lipase level.  Would have low threshold for further evaluation if elevation in these levels, otherwise I think we could potentially follow clinically.  - Hepatic function panel; Future  - Lipase; Future  - Hepatic function panel  - Lipase    Screen for colon cancer  Referral is placed for colonoscopy.  She is going to consider it but is resistant.  - Colonoscopy Screening  Referral; Future                 MD SALOME Hill  [facility] STACI Vlales is a 72 year old, presenting for the following health issues:  Diabetes (fasting) and Medication Injection (B12)        5/8/2023     8:42 AM   Additional Questions   Roomed by beronica   Accompanied by irene-daughter     Here with her daughter, Irene, who interprets for patient.  She declines certified [facility]  today.  She brings her home blood pressures with her, that she checks them in the morning and they are typically in the 101 40 range, isolated as low as 75 and on average around 120.  She is not typically checking her blood sugar later in the day.  If her morning sugar is 140 or higher she may take glipizide at lunch but otherwise does not take glipizide.  Remains compliant with metformin.  No dedicated regular exercise.  Feels she is doing well from a diet standpoint.  Struggling with polyneuropathy that is led to some motor weakness, recent evaluation by neurology including MRI.  Noting that MRI showed gallstones with possible partial obstruction of the common bile duct with mild dilatation.  She has never had difficulties with abdominal pain, nausea, vomiting, or any postmeal symptoms.  Working with neurology, has follow-up she believes in September.  Gabapentin causes her to feel a bit confused if taking more than 100 mg at bedtime, so uses it very sparingly.  She will be starting physical therapy under the direction of neurology for her neuropathy.  She is overdue for diabetes eye exam, states that her eyes feel very blurry after her dilated eye exam often for several days.  At her last visit she felt that she was poked in her eye and it created irritation.  Resistant to the idea of follow-up.  Requesting a prescription for a 4 pronged cane due to gait instability.    History of Present Illness       Diabetes:   She presents for follow up of diabetes.  She is checking home blood glucose one time daily. She checks blood glucose before meals.  Blood  glucose is never over 200 and never under 70. When her blood glucose is low, the patient is asymptomatic for confusion, blurred vision, lethargy and reports not feeling dizzy, shaky, or weak.  She has no concerns regarding her diabetes at this time.  She is not experiencing numbness or burning in feet, excessive thirst, blurry vision, weight changes or redness, sores or blisters on feet. The patient has not had a diabetic eye exam in the last 12 months.         She eats 0-1 servings of fruits and vegetables daily.She consumes 0 sweetened beverage(s) daily.She exercises with enough effort to increase her heart rate 9 or less minutes per day.  She exercises with enough effort to increase her heart rate 3 or less days per week.   She is taking medications regularly.               Review of Systems         Objective    /68   Pulse 66   Temp 98  F (36.7  C) (Oral)   Resp 16   Ht 1.524 m (5')   Wt 53.3 kg (117 lb 6.4 oz)   SpO2 98%   BMI 22.93 kg/m    Body mass index is 22.93 kg/m .  Physical Exam   Alert and very pleasant.  Heart with regular rate and rhythm.  Lungs clear.  Strong peripheral pulses without edema.  Ambulates with cane.    Office Visit on 05/08/2023   Component Date Value Ref Range Status     Hemoglobin A1C 05/08/2023 8.0 (H)  0.0 - 5.6 % Final    Normal <5.7%   Prediabetes 5.7-6.4%    Diabetes 6.5% or higher     Note: Adopted from ADA consensus guidelines.     Hold Specimen 05/08/2023 [facility]   Final                          DME (Durable Medical Equipment) Orders and Documentation  Orders Placed This Encounter   Procedures     Cane Order      The patient was assessed and it was determined the patient is in need of the following listed DME Supplies/Equipment. Please complete supporting documentation below to demonstrate medical necessity.      DME All Other Item(s) Documentation    List reason for need and supporting documentation for medical necessity below for each DME item.     1.  Diabetic  neuropathy with instability of gait, requiring use of cane to reduce risk of falls.

## 2023-05-08 NOTE — PATIENT INSTRUCTIONS
Televisit with diabetes education to reviewed, exercise, and to discuss the injectable once a week medication.    Schedule your diabetes eye exam.  Let them know that your vision gets blurry for several days after eye exams.    We will check liver function and pancreas function to determine if we need to do further evaluation of gallstones.    Complete physical therapy for your neuropathy.

## 2023-05-09 ENCOUNTER — APPOINTMENT (OUTPATIENT)
Dept: INTERPRETER SERVICES | Facility: CLINIC | Age: 73
End: 2023-05-09
Payer: COMMERCIAL

## 2023-05-12 ENCOUNTER — TELEPHONE (OUTPATIENT)
Dept: FAMILY MEDICINE | Age: 73
End: 2023-05-12

## 2023-05-12 DIAGNOSIS — I10 BENIGN ESSENTIAL HYPERTENSION: Primary | ICD-10-CM

## 2023-05-22 DIAGNOSIS — E11.9 DIABETES (H): ICD-10-CM

## 2023-05-24 NOTE — TELEPHONE ENCOUNTER
"Last Written Prescription Date:  11/25/22  Last Fill Quantity: 180,  # refills: 1   Last office visit provider:  5/8/23     Requested Prescriptions   Pending Prescriptions Disp Refills     metFORMIN (GLUCOPHAGE) 1000 MG tablet [Pharmacy Med Name: METFORMIN 1000MG TABLETS] 180 tablet 2     Sig: TAKE 1 TABLET(1000 MG) BY MOUTH TWICE DAILY WITH MEALS       Biguanide Agents Passed - 5/22/2023  7:18 PM        Passed - Patient is age 10 or older        Passed - Patient has documented A1c within the specified period of time.     If HgbA1C is 8 or greater, it needs to be on file within the past 3 months.  If less than 8, must be on file within the past 6 months.     Recent Labs   Lab Test 05/08/23  0832   A1C 8.0*             Passed - Patient's CR is NOT>1.4 OR Patient's EGFR is NOT<45 within past 12 mos.     Recent Labs   Lab Test 03/07/23  1246 10/11/21  1001 04/05/21  0940   GFRESTIMATED >90   < > >60   GFRESTBLACK  --   --  >60    < > = values in this interval not displayed.       Recent Labs   Lab Test 03/07/23  1246   CR 0.67             Passed - Patient does NOT have a diagnosis of CHF.        Passed - Medication is active on med list        Passed - Patient is not pregnant        Passed - Patient has not had a positive pregnancy test within the past 12 mos.         Passed - Recent (6 mo) or future (30 days) visit within the authorizing provider's specialty     Patient had office visit in the last 6 months or has a visit in the next 30 days with authorizing provider or within the authorizing provider's specialty.  See \"Patient Info\" tab in inbasket, or \"Choose Columns\" in Meds & Orders section of the refill encounter.                 Cindy Roman RN 05/23/23 7:35 PM  "

## 2023-06-05 ENCOUNTER — THERAPY VISIT (OUTPATIENT)
Dept: PHYSICAL THERAPY | Facility: REHABILITATION | Age: 73
End: 2023-06-05
Payer: COMMERCIAL

## 2023-06-05 DIAGNOSIS — M62.81 GENERALIZED MUSCLE WEAKNESS: Primary | ICD-10-CM

## 2023-06-05 DIAGNOSIS — M21.371 BILATERAL FOOT-DROP: ICD-10-CM

## 2023-06-05 DIAGNOSIS — M21.372 BILATERAL FOOT-DROP: ICD-10-CM

## 2023-06-05 PROCEDURE — 97112 NEUROMUSCULAR REEDUCATION: CPT | Mod: CQ | Performed by: PHYSICAL THERAPY ASSISTANT

## 2023-06-05 PROCEDURE — 97110 THERAPEUTIC EXERCISES: CPT | Mod: CQ | Performed by: PHYSICAL THERAPY ASSISTANT

## 2023-06-06 ENCOUNTER — ALLIED HEALTH/NURSE VISIT (OUTPATIENT)
Dept: EDUCATION SERVICES | Facility: CLINIC | Age: 73
End: 2023-06-06
Attending: FAMILY MEDICINE
Payer: COMMERCIAL

## 2023-06-06 VITALS — WEIGHT: 119.6 LBS | BODY MASS INDEX: 23.36 KG/M2

## 2023-06-06 DIAGNOSIS — E11.43 TYPE 2 DIABETES MELLITUS WITH DIABETIC AUTONOMIC NEUROPATHY, WITHOUT LONG-TERM CURRENT USE OF INSULIN (H): ICD-10-CM

## 2023-06-06 PROCEDURE — G0108 DIAB MANAGE TRN  PER INDIV: HCPCS

## 2023-06-06 NOTE — CONFIDENTIAL NOTE
No family today    1 time daily  Some times 2 times  No meter  Today in morning 137  Yesterday 120  This week 1 reading of 75  Feel shaky  Eats something sweet    Metformin 1000mg daily  Not taking glipzide, only if   Took once in last two weeks

## 2023-06-06 NOTE — LETTER
6/6/2023         RE: Hai Meade  [address]  [facility] [address]        Dear Colleague,    Thank you for referring your patient, Hai Meade, to the [facility]. Please see a copy of my visit note below.    Diabetes Self-Management Education & Support    Presents for:      Type of Service: In Person Visit    Assessment Type:   ASSESSMENT:  Hai is here alone today for her Diabetes Education.  Her son and daughter could not come to the appointment today.  We use the help of a professional  via telephone today.  She is here today to talk about her medications.    She is currently taking Metformin 1000mg twice daily with meals.  She is also taking Glipizide 2.5mg when her glucose is over 140 in the morning.  Stated she does have trouble with low glucose when she takes this.  Stated she has taken this once in the last 2 weeks.    Hai in did not bring her meter with her today.  Stated her FBG this morning was 137, yesterday it was 120.  She usually checks her glucose once daily, sometimes twice when she is having trouble with lows.  Stated she had 1 low reading of 75 and this was the same day she took the Glipizide.  Stated she starts to feel shaky when her glucose gets low and she will have something sweet to eat.    When she met with her primary recently they discussed the idea of using an injectable medication once weekly.  Hai is agreeable to this.  Based on her insurance Ozempic is covered best for her.  We reviewed how to use the Ozempic pen and dial the correct dose.  We discussed how the medication works, possible side effects and administration technique.  Instructions were written out of her to take home to her son.    All additional questions and concerns addressed today.    Patient's most recent   Lab Results   Component Value Date    A1C 8.0 05/08/2023     is not meeting goal of <8.0    Diabetes knowledge and skills assessment:   Patient is knowledgeable in  diabetes management concepts related to: Being Active and Monitoring    Continue education with the following diabetes management concepts: Healthy Eating, Taking Medication and Problem Solving    Based on learning assessment above, most appropriate setting for further diabetes education would be: Individual setting.      PLAN    Hai will start taking ozempic 0.25mg once weekly on Sundays.  Discussed not to take Glipizide any more once she starts this.  Will follow up with her via telephone in 1 month.  Asked her to call sooner if she continues to have trouble with low glucose.  Topics to cover at upcoming visits: Monitoring, Taking Medication and Problem Solving    Follow-up: 1 month    See Care Plan for co-developed, patient-state behavior change goals.  AVS provided for patient today.    Education Materials Provided:  Medication Information on Ozempic      SUBJECTIVE/OBJECTIVE:     Cultural Influences/Ethnic Background:  Not  or       Diabetes Symptoms & Complications:          Patient Problem List and Family Medical History reviewed for relevant medical history, current medical status, and diabetes risk factors.    Vitals:  Wt 54.3 kg (119 lb 9.6 oz)   BMI 23.36 kg/m    Estimated body mass index is 23.36 kg/m  as calculated from the following:    Height as of 5/8/23: 1.524 m (5').    Weight as of this encounter: 54.3 kg (119 lb 9.6 oz).   Last 3 BP:   BP Readings from Last 3 Encounters:   05/08/23 122/68   04/26/23 135/79   03/07/23 (!) 154/87       History   Smoking Status     Never   Smokeless Tobacco     Never       Labs:  Lab Results   Component Value Date    A1C 8.0 05/08/2023     Lab Results   Component Value Date     03/07/2023     10/11/2021     Lab Results   Component Value Date    LDL 56 10/18/2022     Direct Measure HDL   Date Value Ref Range Status   10/18/2022 58 >=50 mg/dL Final   ]  GFR Estimate   Date Value Ref Range Status   03/07/2023 >90 >60 mL/min/1.73m2 Final      Comment:     eGFR calculated using 2021 CKD-EPI equation.   04/05/2021 >60 >60 mL/min/1.73m2 Final     GFR Estimate If Black   Date Value Ref Range Status   04/05/2021 >60 >60 mL/min/1.73m2 Final     Lab Results   Component Value Date    CR 0.67 03/07/2023     No results found for: MICROALBUMIN    Healthy Eating:       Being Active:       Monitoring:           Taking Medications:  Diabetes Medication(s)     Biguanides       metFORMIN (GLUCOPHAGE) 1000 MG tablet    TAKE 1 TABLET(1000 MG) BY MOUTH TWICE DAILY WITH MEALS    Sulfonylureas       glipiZIDE (GLUCOTROL XL) 2.5 MG 24 hr tablet    Take 1 tablet (2.5 mg) by mouth daily as needed    Incretin Mimetic Agents       semaglutide (OZEMPIC) 2 MG/3ML pen    Inject 0.25 mg Subcutaneous every 7 days               Problem Solving:                 Reducing Risks:       Healthy Coping:     Patient Activation Measure Survey Score:       View : No data to display.                  Care Plan and Education Provided:  GLP-1 administration technique taught today. Patient verbalized understanding and was able to perform an accurate return demonstration of administration technique. Side effects were discussed, if patient has any abdominal pain, with or without nausea and/or vomiting, stop medication, call provider, clinic or go to the emergency room.     The service provided today was under the supervising provider, Talha Mitchell, who was available if needed.       Time Spent: 60 minutes  Encounter Type: Individual    Any diabetes medication dose changes were made via the CDE Protocol per the patient's referring provider. A copy of this encounter was shared with the provider.

## 2023-06-07 NOTE — PROGRESS NOTES
Diabetes Self-Management Education & Support    Presents for:      Type of Service: In Person Visit    Assessment Type:   ASSESSMENT:  Hai is here alone today for her Diabetes Education.  Her son and daughter could not come to the appointment today.  We use the help of a professional  via telephone today.  She is here today to talk about her medications.    She is currently taking Metformin 1000mg twice daily with meals.  She is also taking Glipizide 2.5mg when her glucose is over 140 in the morning.  Stated she does have trouble with low glucose when she takes this.  Stated she has taken this once in the last 2 weeks.    Hai in did not bring her meter with her today.  Stated her FBG this morning was 137, yesterday it was 120.  She usually checks her glucose once daily, sometimes twice when she is having trouble with lows.  Stated she had 1 low reading of 75 and this was the same day she took the Glipizide.  Stated she starts to feel shaky when her glucose gets low and she will have something sweet to eat.    When she met with her primary recently they discussed the idea of using an injectable medication once weekly.  Hai is agreeable to this.  Based on her insurance Ozempic is covered best for her.  We reviewed how to use the Ozempic pen and dial the correct dose.  We discussed how the medication works, possible side effects and administration technique.  Instructions were written out of her to take home to her son.    All additional questions and concerns addressed today.    Patient's most recent   Lab Results   Component Value Date    A1C 8.0 05/08/2023     is not meeting goal of <8.0    Diabetes knowledge and skills assessment:   Patient is knowledgeable in diabetes management concepts related to: Being Active and Monitoring    Continue education with the following diabetes management concepts: Healthy Eating, Taking Medication and Problem Solving    Based on learning assessment above, most  appropriate setting for further diabetes education would be: Individual setting.      PLAN    Hai will start taking ozempic 0.25mg once weekly on Sundays.  Discussed not to take Glipizide any more once she starts this.  Will follow up with her via telephone in 1 month.  Asked her to call sooner if she continues to have trouble with low glucose.  Topics to cover at upcoming visits: Monitoring, Taking Medication and Problem Solving    Follow-up: 1 month    See Care Plan for co-developed, patient-state behavior change goals.  AVS provided for patient today.    Education Materials Provided:  Medication Information on Ozempic      SUBJECTIVE/OBJECTIVE:     Cultural Influences/Ethnic Background:  Not  or       Diabetes Symptoms & Complications:          Patient Problem List and Family Medical History reviewed for relevant medical history, current medical status, and diabetes risk factors.    Vitals:  Wt 54.3 kg (119 lb 9.6 oz)   BMI 23.36 kg/m    Estimated body mass index is 23.36 kg/m  as calculated from the following:    Height as of 5/8/23: 1.524 m (5').    Weight as of this encounter: 54.3 kg (119 lb 9.6 oz).   Last 3 BP:   BP Readings from Last 3 Encounters:   05/08/23 122/68   04/26/23 135/79   03/07/23 (!) 154/87       History   Smoking Status     Never   Smokeless Tobacco     Never       Labs:  Lab Results   Component Value Date    A1C 8.0 05/08/2023     Lab Results   Component Value Date     03/07/2023     10/11/2021     Lab Results   Component Value Date    LDL 56 10/18/2022     Direct Measure HDL   Date Value Ref Range Status   10/18/2022 58 >=50 mg/dL Final   ]  GFR Estimate   Date Value Ref Range Status   03/07/2023 >90 >60 mL/min/1.73m2 Final     Comment:     eGFR calculated using 2021 CKD-EPI equation.   04/05/2021 >60 >60 mL/min/1.73m2 Final     GFR Estimate If Black   Date Value Ref Range Status   04/05/2021 >60 >60 mL/min/1.73m2 Final     Lab Results   Component Value Date     CR 0.67 03/07/2023     No results found for: MICROALBUMIN    Healthy Eating:       Being Active:       Monitoring:           Taking Medications:  Diabetes Medication(s)     Biguanides       metFORMIN (GLUCOPHAGE) 1000 MG tablet    TAKE 1 TABLET(1000 MG) BY MOUTH TWICE DAILY WITH MEALS    Sulfonylureas       glipiZIDE (GLUCOTROL XL) 2.5 MG 24 hr tablet    Take 1 tablet (2.5 mg) by mouth daily as needed    Incretin Mimetic Agents       semaglutide (OZEMPIC) 2 MG/3ML pen    Inject 0.25 mg Subcutaneous every 7 days               Problem Solving:                 Reducing Risks:       Healthy Coping:     Patient Activation Measure Survey Score:       View : No data to display.                  Care Plan and Education Provided:  GLP-1 administration technique taught today. Patient verbalized understanding and was able to perform an accurate return demonstration of administration technique. Side effects were discussed, if patient has any abdominal pain, with or without nausea and/or vomiting, stop medication, call provider, clinic or go to the emergency room.     The service provided today was under the supervising provider, Talha Mitchell, who was available if needed.       Time Spent: 60 minutes  Encounter Type: Individual    Any diabetes medication dose changes were made via the CDE Protocol per the patient's referring provider. A copy of this encounter was shared with the provider.

## 2023-06-12 ENCOUNTER — ALLIED HEALTH/NURSE VISIT (OUTPATIENT)
Dept: FAMILY MEDICINE | Facility: CLINIC | Age: 73
End: 2023-06-12
Payer: COMMERCIAL

## 2023-06-12 ENCOUNTER — THERAPY VISIT (OUTPATIENT)
Dept: PHYSICAL THERAPY | Facility: REHABILITATION | Age: 73
End: 2023-06-12
Payer: COMMERCIAL

## 2023-06-12 DIAGNOSIS — M21.371 BILATERAL FOOT-DROP: ICD-10-CM

## 2023-06-12 DIAGNOSIS — M21.372 BILATERAL FOOT-DROP: ICD-10-CM

## 2023-06-12 DIAGNOSIS — E53.8 VITAMIN B12 DEFICIENCY (NON ANEMIC): Primary | ICD-10-CM

## 2023-06-12 DIAGNOSIS — M62.81 GENERALIZED MUSCLE WEAKNESS: Primary | ICD-10-CM

## 2023-06-12 PROCEDURE — 97110 THERAPEUTIC EXERCISES: CPT | Mod: CQ | Performed by: PHYSICAL THERAPY ASSISTANT

## 2023-06-12 PROCEDURE — 99207 PR NO CHARGE NURSE ONLY: CPT

## 2023-06-12 PROCEDURE — 96372 THER/PROPH/DIAG INJ SC/IM: CPT | Performed by: PHARMACIST

## 2023-06-12 PROCEDURE — 97112 NEUROMUSCULAR REEDUCATION: CPT | Mod: CQ | Performed by: PHYSICAL THERAPY ASSISTANT

## 2023-06-12 RX ADMIN — CYANOCOBALAMIN 1000 MCG: 1000 INJECTION, SOLUTION INTRAMUSCULAR; SUBCUTANEOUS at 09:50

## 2023-06-19 ENCOUNTER — THERAPY VISIT (OUTPATIENT)
Dept: PHYSICAL THERAPY | Facility: REHABILITATION | Age: 73
End: 2023-06-19
Payer: COMMERCIAL

## 2023-06-19 DIAGNOSIS — M62.81 GENERALIZED MUSCLE WEAKNESS: Primary | ICD-10-CM

## 2023-06-19 DIAGNOSIS — M21.371 BILATERAL FOOT-DROP: ICD-10-CM

## 2023-06-19 DIAGNOSIS — M21.372 BILATERAL FOOT-DROP: ICD-10-CM

## 2023-06-19 PROCEDURE — 97112 NEUROMUSCULAR REEDUCATION: CPT | Mod: GP

## 2023-06-19 PROCEDURE — 97110 THERAPEUTIC EXERCISES: CPT | Mod: GP

## 2023-06-19 NOTE — PROGRESS NOTES
PLAN  Continue therapy per current plan of care.    Beginning/End Dates of Progress Note Reporting Period:  06/19/23 to 06/19/2023    Referring Provider:  Denia Barron       06/19/23 0500   Appointment Info   Signing clinician's name / credentials Krystina Mojica, PT, DPT   Visits Used 4   Medical Diagnosis Bilateral foot drop   PT Tx Diagnosis Impaired balance and gait with BLE weakness   Progress Note/Certification   Start of Care Date 05/01/23   Onset of illness/injury or Date of Surgery 04/26/23   Predicted Duration 1 time a week or every other week for a minimum of 12 weeks, minimum of 6 sessions   Certification date from 05/01/23   Certification date to 07/23/23   Progress Note Due Date 05/30/23  (or 10th visit)   Progress Note Completed Date 06/19/23       Present Yes    ID [id number]   GOALS   PT Goals 2;3   PT Goal 1   Goal Identifier HEP   Goal Description Pt will be independent with HEP to improve mobility.   Goal Progress Progressing   Target Date 07/23/23   PT Goal 2   Goal Identifier TUG   Goal Description Pt will complete TUG in less than 13 seconds to demonstrate improved balance and gait to improve functional mobility and increase tolerance completing ADL's, recreational activities and work related tasks.   Goal Progress Progressing   Target Date 07/23/23   PT Goal 3   Goal Identifier 30 second sit<>stands   Goal Description Pt will complete greater than 10 sit<>stands in 30 seconds to demonstrate improved BLE strength and balance to improve functional mobility and increase tolerance completing ADL's, recreational activities and work related tasks.   Goal Progress Progressing   Target Date 07/23/23   Subjective Report   Subjective Report Pt reports that she has been about the same since her last appt. Pt reports no falls since last appt. Pt reports that her house is too small for her to use a walker, so she tends to reach for walls and furniture when ambulating at  home. Pt wearing BLE AFO during PT session. Pt reports that she can wear them for about 2-3 hours before she has pain in the bottom of her feet. Pt reports that she still has been unable to complete the balance exercises at home since she doesn't feel safe doing them without family at home.   Treatment Interventions (PT)   Interventions Therapeutic Procedure/Exercise;Neuromuscular Re-education   Therapeutic Procedure/Exercise   Therapeutic Procedures: strength, endurance, ROM, flexibillity minutes (05354) 10   Ther Proc 1 Nustep for warm up WL 5 x 8 minutes, review over HEP, standing marches x 10B   Skilled Intervention Reviewed and progressed pt's ex program. Education on exercises for HEP. Verbal cues, tactile cues and demonstration of correct technique.   Patient Response/Progress Tolerated well   Neuromuscular Re-education   Neuromuscular re-ed of mvmt, balance, coord, kinesthetic sense, posture, proprioception minutes (62212) 30   Neuro Re-ed 1 Standing balance exercises in parallel bars: standing narrow base of support x 30 second holds, semi-tandem stance x 30 second holds, narrow base of support on foam x 30 second holds, cueing to decrease UE support, toe taps on foam x 10B, standing toe taps on cones x 20B, step over forward/backward and side/side with yard stick x 20B- cueing on safe stepping over stick, trial step over 4 inch ivet forward/backward x 5- pt unable to step over ivet without hitting toes on ivet, education on safely completing balance exercises at home   Skilled Intervention Reviewed and progressed static balance drills. Education on completing standing balance exercises next to counter for support/safety if family isn't around.   Patient Response/Progress Tolerated well   Education   Learner/Method Patient;Demonstration;Pictures/Video   Plan   Homework PTRx   Home program Standing weight shift, sit<>stand, seated heel/toe raises, roll outs, seated hip flexion, front knee  strengthening, sitting hip abduction squeezes, semi-tandem stance, step forward/backward   Updates to plan of care Removed standing balance with eyes closed   Plan for next session Progress balance and gait exercises; neuro re-ed: balance and gait training; therapeutic exercise: strengthening, ROM, flexibility; STM/massage/manual therapy   Comments   Comments Pt returns today for her follow up appointment. Pt to complete standing balance exercises with modifications. Pt remains appropriate to continue physical therapy.   Total Session Time   Timed Code Treatment Minutes 40   Total Treatment Time (sum of timed and untimed services) 40   Medicare Claim Information   Medical Diagnosis Bilateral foot drop     Krystina Mojica, PT, DPT

## 2023-06-20 ENCOUNTER — LAB SERVICES (OUTPATIENT)
Dept: LAB | Age: 73
End: 2023-06-20

## 2023-06-20 ENCOUNTER — OFFICE VISIT (OUTPATIENT)
Dept: FAMILY MEDICINE | Age: 73
End: 2023-06-20

## 2023-06-20 VITALS
DIASTOLIC BLOOD PRESSURE: 82 MMHG | BODY MASS INDEX: 32.24 KG/M2 | HEART RATE: 60 BPM | SYSTOLIC BLOOD PRESSURE: 126 MMHG | HEIGHT: 59 IN | TEMPERATURE: 97.8 F | WEIGHT: 159.9 LBS

## 2023-06-20 DIAGNOSIS — E03.9 ACQUIRED HYPOTHYROIDISM: ICD-10-CM

## 2023-06-20 DIAGNOSIS — E83.52 HYPERCALCEMIA: ICD-10-CM

## 2023-06-20 DIAGNOSIS — E03.9 ACQUIRED HYPOTHYROIDISM: Primary | ICD-10-CM

## 2023-06-20 DIAGNOSIS — E11.36 TYPE 2 DIABETES MELLITUS WITH DIABETIC CATARACT, WITHOUT LONG-TERM CURRENT USE OF INSULIN (CMD): ICD-10-CM

## 2023-06-20 DIAGNOSIS — I10 BENIGN ESSENTIAL HYPERTENSION: ICD-10-CM

## 2023-06-20 LAB
25(OH)D3+25(OH)D2 SERPL-MCNC: 19.2 NG/ML (ref 30–100)
ALBUMIN SERPL-MCNC: 3.7 G/DL (ref 3.6–5.1)
ALBUMIN/GLOB SERPL: 0.9 {RATIO} (ref 1–2.4)
ALP SERPL-CCNC: 159 UNITS/L (ref 45–117)
ALT SERPL-CCNC: 36 UNITS/L
ANION GAP SERPL CALC-SCNC: 15 MMOL/L (ref 7–19)
AST SERPL-CCNC: 19 UNITS/L
BASOPHILS # BLD: 0 K/MCL (ref 0–0.3)
BASOPHILS NFR BLD: 0 %
BILIRUB SERPL-MCNC: 0.4 MG/DL (ref 0.2–1)
BUN SERPL-MCNC: 33 MG/DL (ref 6–20)
BUN/CREAT SERPL: 41 (ref 7–25)
CALCIUM SERPL-MCNC: 10.3 MG/DL (ref 8.4–10.2)
CHLORIDE SERPL-SCNC: 101 MMOL/L (ref 97–110)
CO2 SERPL-SCNC: 26 MMOL/L (ref 21–32)
CREAT SERPL-MCNC: 0.8 MG/DL (ref 0.51–0.95)
DEPRECATED RDW RBC: 39.8 FL (ref 39–50)
EOSINOPHIL # BLD: 0.1 K/MCL (ref 0–0.5)
EOSINOPHIL NFR BLD: 1 %
ERYTHROCYTE [DISTWIDTH] IN BLOOD: 12.5 % (ref 11–15)
FASTING DURATION TIME PATIENT: 12 HOURS (ref 0–999)
GFR SERPLBLD BASED ON 1.73 SQ M-ARVRAT: 77 ML/MIN
GLOBULIN SER-MCNC: 4 G/DL (ref 2–4)
GLUCOSE SERPL-MCNC: 168 MG/DL (ref 70–99)
HCT VFR BLD CALC: 41.6 % (ref 36–46.5)
HGB BLD-MCNC: 13.9 G/DL (ref 12–15.5)
IMM GRANULOCYTES # BLD AUTO: 0 K/MCL (ref 0–0.2)
IMM GRANULOCYTES # BLD: 0 %
LYMPHOCYTES # BLD: 2.7 K/MCL (ref 1–4)
LYMPHOCYTES NFR BLD: 25 %
MCH RBC QN AUTO: 28.8 PG (ref 26–34)
MCHC RBC AUTO-ENTMCNC: 33.4 G/DL (ref 32–36.5)
MCV RBC AUTO: 86.3 FL (ref 78–100)
MONOCYTES # BLD: 0.7 K/MCL (ref 0.3–0.9)
MONOCYTES NFR BLD: 6 %
NEUTROPHILS # BLD: 7.6 K/MCL (ref 1.8–7.7)
NEUTROPHILS NFR BLD: 68 %
PHOSPHATE SERPL-MCNC: 3.2 MG/DL (ref 2.4–4.7)
PLATELET # BLD AUTO: 161 K/MCL (ref 140–450)
POTASSIUM SERPL-SCNC: 4.3 MMOL/L (ref 3.4–5.1)
PROT SERPL-MCNC: 7.7 G/DL (ref 6.4–8.2)
PTH-INTACT SERPL-MCNC: 83 PG/ML (ref 19–88)
RBC # BLD: 4.82 MIL/MCL (ref 4–5.2)
SODIUM SERPL-SCNC: 138 MMOL/L (ref 135–145)
TSH SERPL-ACNC: 2.16 MCUNITS/ML (ref 0.35–5)
WBC # BLD: 11.1 K/MCL (ref 4.2–11)

## 2023-06-20 PROCEDURE — 82306 VITAMIN D 25 HYDROXY: CPT | Performed by: CLINICAL MEDICAL LABORATORY

## 2023-06-20 PROCEDURE — 80053 COMPREHEN METABOLIC PANEL: CPT | Performed by: INTERNAL MEDICINE

## 2023-06-20 PROCEDURE — 99214 OFFICE O/P EST MOD 30 MIN: CPT | Performed by: FAMILY MEDICINE

## 2023-06-20 PROCEDURE — 36415 COLL VENOUS BLD VENIPUNCTURE: CPT | Performed by: INTERNAL MEDICINE

## 2023-06-20 PROCEDURE — 84443 ASSAY THYROID STIM HORMONE: CPT | Performed by: CLINICAL MEDICAL LABORATORY

## 2023-06-20 PROCEDURE — 83970 ASSAY OF PARATHORMONE: CPT | Performed by: CLINICAL MEDICAL LABORATORY

## 2023-06-20 PROCEDURE — 84100 ASSAY OF PHOSPHORUS: CPT | Performed by: INTERNAL MEDICINE

## 2023-06-20 PROCEDURE — 85025 COMPLETE CBC W/AUTO DIFF WBC: CPT | Performed by: INTERNAL MEDICINE

## 2023-06-20 RX ORDER — DULAGLUTIDE 1.5 MG/.5ML
1.5 INJECTION, SOLUTION SUBCUTANEOUS
Qty: 6 ML | Refills: 1 | Status: SHIPPED | OUTPATIENT
Start: 2023-06-20

## 2023-06-20 RX ORDER — LANCETS 28 GAUGE
EACH MISCELLANEOUS
Qty: 100 EACH | Refills: 3 | Status: SHIPPED | OUTPATIENT
Start: 2023-06-20

## 2023-06-20 ASSESSMENT — PATIENT HEALTH QUESTIONNAIRE - PHQ9
SUM OF ALL RESPONSES TO PHQ9 QUESTIONS 1 AND 2: 0
CLINICAL INTERPRETATION OF PHQ2 SCORE: NO FURTHER SCREENING NEEDED
1. LITTLE INTEREST OR PLEASURE IN DOING THINGS: NOT AT ALL
SUM OF ALL RESPONSES TO PHQ9 QUESTIONS 1 AND 2: 0
2. FEELING DOWN, DEPRESSED OR HOPELESS: NOT AT ALL

## 2023-06-21 ENCOUNTER — TELEPHONE (OUTPATIENT)
Dept: FAMILY MEDICINE | Age: 73
End: 2023-06-21

## 2023-06-26 ENCOUNTER — THERAPY VISIT (OUTPATIENT)
Dept: PHYSICAL THERAPY | Facility: REHABILITATION | Age: 73
End: 2023-06-26
Payer: COMMERCIAL

## 2023-06-26 DIAGNOSIS — M21.371 BILATERAL FOOT-DROP: ICD-10-CM

## 2023-06-26 DIAGNOSIS — M21.372 BILATERAL FOOT-DROP: ICD-10-CM

## 2023-06-26 DIAGNOSIS — M62.81 GENERALIZED MUSCLE WEAKNESS: Primary | ICD-10-CM

## 2023-06-26 PROCEDURE — 97110 THERAPEUTIC EXERCISES: CPT | Mod: GP | Performed by: PHYSICAL THERAPY ASSISTANT

## 2023-06-26 PROCEDURE — 97112 NEUROMUSCULAR REEDUCATION: CPT | Mod: GP | Performed by: PHYSICAL THERAPY ASSISTANT

## 2023-07-10 ENCOUNTER — PRE VISIT (OUTPATIENT)
Dept: NEUROLOGY | Facility: CLINIC | Age: 73
End: 2023-07-10

## 2023-07-17 ENCOUNTER — ALLIED HEALTH/NURSE VISIT (OUTPATIENT)
Dept: FAMILY MEDICINE | Facility: CLINIC | Age: 73
End: 2023-07-17
Payer: COMMERCIAL

## 2023-07-17 DIAGNOSIS — E53.8 VITAMIN B12 DEFICIENCY (NON ANEMIC): Primary | ICD-10-CM

## 2023-07-17 PROCEDURE — 99207 PR NO CHARGE NURSE ONLY: CPT

## 2023-07-17 PROCEDURE — 96372 THER/PROPH/DIAG INJ SC/IM: CPT | Mod: RT | Performed by: PHARMACIST

## 2023-07-17 RX ADMIN — CYANOCOBALAMIN 1000 MCG: 1000 INJECTION, SOLUTION INTRAMUSCULAR; SUBCUTANEOUS at 07:57

## 2023-07-24 ENCOUNTER — OFFICE VISIT (OUTPATIENT)
Dept: NEUROLOGY | Facility: CLINIC | Age: 73
End: 2023-07-24
Attending: PSYCHIATRY & NEUROLOGY
Payer: COMMERCIAL

## 2023-07-24 ENCOUNTER — THERAPY VISIT (OUTPATIENT)
Dept: PHYSICAL THERAPY | Facility: REHABILITATION | Age: 73
End: 2023-07-24
Payer: COMMERCIAL

## 2023-07-24 DIAGNOSIS — M21.371 BILATERAL FOOT-DROP: ICD-10-CM

## 2023-07-24 DIAGNOSIS — E11.42 DIABETIC POLYNEUROPATHY ASSOCIATED WITH TYPE 2 DIABETES MELLITUS (H): ICD-10-CM

## 2023-07-24 DIAGNOSIS — M62.81 GENERALIZED MUSCLE WEAKNESS: Primary | ICD-10-CM

## 2023-07-24 DIAGNOSIS — M21.372 BILATERAL FOOT-DROP: ICD-10-CM

## 2023-07-24 PROCEDURE — 97110 THERAPEUTIC EXERCISES: CPT | Mod: GP

## 2023-07-24 PROCEDURE — 97112 NEUROMUSCULAR REEDUCATION: CPT | Mod: GP

## 2023-07-24 PROCEDURE — 95886 MUSC TEST DONE W/N TEST COMP: CPT | Mod: RT | Performed by: PSYCHIATRY & NEUROLOGY

## 2023-07-24 PROCEDURE — 95910 NRV CNDJ TEST 7-8 STUDIES: CPT | Performed by: PSYCHIATRY & NEUROLOGY

## 2023-07-24 NOTE — LETTER
7/24/2023         RE: Hai Meade  [address]  [place] [address]        Dear Colleague,    Thank you for referring your patient, Hai Meade, to the [facility] [place]. Please see a copy of my visit note below.    See procedure note.       Again, thank you for allowing me to participate in the care of your patient.        Sincerely,        Wesley Daniel MD

## 2023-07-24 NOTE — LETTER
July 25, 2023      Hai Meade  [address]  [place] [address]        Dear ,    We are writing to inform you of your test results.      Resulted Orders   EMG    Narrative    Wesley Daniel MD     7/24/2023  9:32 AM    [facility], [address], [address]  [place] [address]  Tel: [phone]  Fax: [phone]          Full Name: Hai Meade Gender: Female  Patient ID: [id number] YOB: 1950      Visit Date: 7/24/2023 08:29  Age: 73 Years 1 Months Old  Interpreted By: Wesley Daniel MD   Ref Dr.: Baltazar Granados MD  Tech: ST   Height: 5 feet 0 inch  Reason for referral: Evaluate bilateral lowers. c/o weakness,   numbness, tingling, pain in both legs/feet > 10 years. Right =   Left. Diabetic > 20 years.       Motor NCS      Nerve / Sites Lat Amp Dist Cade    ms mV cm m/s   R Peroneal - EDB      Ankle 6.09 0.5 8       Fib head 12.03 0.6 23 39      Pop fossa 15.16 0.4 11 35   L Peroneal - EDB      Ankle NR NR 8       Fib head NR NR 24.5 NR      Pop fossa NR NR 11 NR   R Tibial - AH      Ankle 5.78 4.2 8       Pop fossa 14.95 3.4 33 36   L Tibial - AH      Ankle 6.09 2.3 8       Pop fossa 14.22 2.0 32 39       F  Wave      Nerve Fmin    ms   R Tibial - AH 64.43   L Tibial - AH 71.09       Sensory NCS      Nerve / Sites Onset Lat Pk Lat Amp.2-3 Dist Cade    ms ms  V cm m/s   R Sural - Ankle (Calf)      Calf 3.59 4.43 6.1 14 39   L Sural - Ankle (Calf)      Calf 3.65 4.48 7.0 14 38   R Superficial peroneal - Ankle      Lat leg NR NR NR 12 NR   L Superficial peroneal - Ankle      Lat leg NR NR NR 12 NR       EMG Summary Table     Spontaneous MUAP Rcmt Note   Muscle Fib PSW Fasc IA # Amp Dur PPP Rate Type   R. Gluteus medius None None None N N N N N N N   R. Gluteus nereyda 1+ 1+ None N Sl Mod Red Incr Incr N N N   R. L3 paraspinal None None None Incr N N N N N N   R. L4 paraspinal None None None Incr N N N N  N N   R. L5 paraspinal 1+ 1+ None N N N N N N N   R. S1 paraspinal 1+ 1+ None N N N N N N N   R. Iliopsoas 1+ 1+ None N Sl Red Incr Incr N N N   R. Adductor will 1+ 1+ None N Sl Red Incr Incr N N N   R. Quadriceps 1+ 1+ None N Sl Red N N N N CRD   R. Tibialis anterior 1+ 1+ None N Yan Red Incr Incr N N N   R. Peroneus longus 1+ 1+ None N Yan Red Incr Incr N N N   R. Gastrocnemius (Medial head) 1+ 1+ None N Yan Red Incr Incr N   N N   R. Gastrocnemius (Lateral head) 1+ 1+ None N Yan Red Incr Incr N   N N   R. Tibialis posterior 1+ 1+ None N Yan Red Incr Incr N N N   L. L3 paraspinal None None None Incr N N N N N N   L. L4 paraspinal None None None Incr N N N N N N   L. L5 paraspinal Occ Occ None N N N N N N N   L. S1 paraspinal Occ Occ None N N N N N N N   L. Iliopsoas 1+ 1+ None N Sl Red Incr Incr N N CRD   L. Adductor will 1+ 1+ None N Sl Red Incr Incr N N CRD   L. Quadriceps 1+ 1+ None N Sl Red Incr Incr N N N   L. Tibialis anterior None None None N None - - - - Can't Contract     L. Peroneus longus 1+ 1+ None N None - - - - Can't Contract   L. Gastrocnemius (Medial head) 1+ 1+ None N None - - - - Can't   Contract   L. Tibialis posterior 1+ 1+ None N None - - - - Can't Contract     SUMMARY  Nerve conduction and EMG study of bilateral lower extremities   shows:    Prolonged right peroneal distal motor latency with decreased   amplitude and decreased conduction velocity.  Absent left peroneal motor CMAP.  Normal right tibial distal motor latency with low normal   amplitude and decreased conduction velocity.  Prolonged left tibial distal motor latency with decreased   amplitude and decreased conduction velocity.  Absent bilateral sural and superficial peroneal sensory SNAP.  Monopolar needle exam as above.    CLINICAL INTERPRETATION:  This is an abnormal nerve conduction and EMG study.  The study is   suggestive of a severe sensorimotor polyneuropathy in both legs.    The needle study further shows increased  activity in multiple   muscle groups.  Due to presence of severe underlying neuropathy   the study is unable to rule out a superimposed bilateral L4/L5/S1   radiculopathy.  Further clinical correlation is needed.     Wesley Daniel MD  Neurologist  [facility]  Tel:- [phone]       Neuropathy is a condition caused by her diabetes and affecting her nerves.  It was already discussed during her initial visit, and we could discuss it further when she comes back for follow-up.  If desired, the return visit could be scheduled at the earlier date.     If you have any questions or concerns, please call the clinic at the number listed above.       Sincerely,      Baltazar Granados MD

## 2023-07-24 NOTE — PROCEDURES
[facility]     [facility]  [address] [place] [address], [address]  [place] [address]  Tel: [phone]  Fax: [phone]          Full Name: Hai Meade Gender: Female  Patient ID: [id number] YOB: 1950      Visit Date: 7/24/2023 08:29  Age: 73 Years 1 Months Old  Interpreted By: Wesley Daniel MD   Ref Dr.: Baltazar Granados MD  Tech: ST   Height: 5 feet 0 inch  Reason for referral: Evaluate bilateral lowers. c/o weakness, numbness, tingling, pain in both legs/feet > 10 years. Right = Left. Diabetic > 20 years.       Motor NCS      Nerve / Sites Lat Amp Dist Cade    ms mV cm m/s   R Peroneal - EDB      Ankle 6.09 0.5 8       Fib head 12.03 0.6 23 39      Pop fossa 15.16 0.4 11 35   L Peroneal - EDB      Ankle NR NR 8       Fib head NR NR 24.5 NR      Pop fossa NR NR 11 NR   R Tibial - AH      Ankle 5.78 4.2 8       Pop fossa 14.95 3.4 33 36   L Tibial - AH      Ankle 6.09 2.3 8       Pop fossa 14.22 2.0 32 39       F  Wave      Nerve Fmin    ms   R Tibial - AH 64.43   L Tibial - AH 71.09       Sensory NCS      Nerve / Sites Onset Lat Pk Lat Amp.2-3 Dist Cade    ms ms  V cm m/s   R Sural - Ankle (Calf)      Calf 3.59 4.43 6.1 14 39   L Sural - Ankle (Calf)      Calf 3.65 4.48 7.0 14 38   R Superficial peroneal - Ankle      Lat leg NR NR NR 12 NR   L Superficial peroneal - Ankle      Lat leg NR NR NR 12 NR       EMG Summary Table     Spontaneous MUAP Rcmt Note   Muscle Fib PSW Fasc IA # Amp Dur PPP Rate Type   R. Gluteus medius None None None N N N N N N N   R. Gluteus nereyda 1+ 1+ None N Sl Mod Red Incr Incr N N N   R. L3 paraspinal None None None Incr N N N N N N   R. L4 paraspinal None None None Incr N N N N N N   R. L5 paraspinal 1+ 1+ None N N N N N N N   R. S1 paraspinal 1+ 1+ None N N N N N N N   R. Iliopsoas 1+ 1+ None N Sl Red Incr Incr N N N   R. Adductor will 1+ 1+ None N Sl Red Incr Incr N N N   R. Quadriceps 1+ 1+ None N Sl  Red N N N N CRD   R. Tibialis anterior 1+ 1+ None N Yan Red Incr Incr N N N   R. Peroneus longus 1+ 1+ None N Yan Red Incr Incr N N N   R. Gastrocnemius (Medial head) 1+ 1+ None N Yan Red Incr Incr N N N   R. Gastrocnemius (Lateral head) 1+ 1+ None N Yan Red Incr Incr N N N   R. Tibialis posterior 1+ 1+ None N Yan Red Incr Incr N N N   L. L3 paraspinal None None None Incr N N N N N N   L. L4 paraspinal None None None Incr N N N N N N   L. L5 paraspinal Occ Occ None N N N N N N N   L. S1 paraspinal Occ Occ None N N N N N N N   L. Iliopsoas 1+ 1+ None N Sl Red Incr Incr N N CRD   L. Adductor will 1+ 1+ None N Sl Red Incr Incr N N CRD   L. Quadriceps 1+ 1+ None N Sl Red Incr Incr N N N   L. Tibialis anterior None None None N None - - - - Can't Contract   L. Peroneus longus 1+ 1+ None N None - - - - Can't Contract   L. Gastrocnemius (Medial head) 1+ 1+ None N None - - - - Can't Contract   L. Tibialis posterior 1+ 1+ None N None - - - - Can't Contract     SUMMARY  Nerve conduction and EMG study of bilateral lower extremities shows:    Prolonged right peroneal distal motor latency with decreased amplitude and decreased conduction velocity.  Absent left peroneal motor CMAP.  Normal right tibial distal motor latency with low normal amplitude and decreased conduction velocity.  Prolonged left tibial distal motor latency with decreased amplitude and decreased conduction velocity.  Absent bilateral sural and superficial peroneal sensory SNAP.  Monopolar needle exam as above.    CLINICAL INTERPRETATION:  This is an abnormal nerve conduction and EMG study.  The study is suggestive of a severe sensorimotor polyneuropathy in both legs.  The needle study further shows increased activity in multiple muscle groups.  Due to presence of severe underlying neuropathy the study is unable to rule out a superimposed bilateral L4/L5/S1 radiculopathy.  Further clinical correlation is needed.     Wesley Daniel MD  Neurologist  [facility]  [place] [facility]  Tel:- [phone]

## 2023-07-25 ENCOUNTER — APPOINTMENT (OUTPATIENT)
Dept: INTERPRETER SERVICES | Facility: CLINIC | Age: 73
End: 2023-07-25
Payer: COMMERCIAL

## 2023-07-25 ENCOUNTER — TELEPHONE (OUTPATIENT)
Dept: NEUROLOGY | Facility: CLINIC | Age: 73
End: 2023-07-25
Payer: COMMERCIAL

## 2023-07-25 NOTE — TELEPHONE ENCOUNTER
Called patient using  services to advise on provider message regarding results:      EMG is consistent with peripheral polyneuropathy.  We will discuss details during the return visit.   Neuropathy is a condition caused by her diabetes and affecting her nerves.  It was already discussed during her initial visit, and we could discuss it further when she comes back for follow-up.  If desired, the return visit could be scheduled at the earlier date.   Thanks,   Baltazar Granados MD.     Patient understands and will see us in September. Patient will call and check if any sooner appointments if she decides she would like to be seen sooner.         Cintia Wayne MA

## 2023-07-27 NOTE — PROGRESS NOTES
[facility]                                                                                   OUTPATIENT PHYSICAL THERAPY    PLAN OF TREATMENT FOR OUTPATIENT REHABILITATION   Patient's Last Name, First Name, Hai Berumen YOB: 1950   Provider's Name   [facility]   Medical Record No.  [id number]     Onset Date: 04/26/23  Start of Care Date: 05/01/23     Medical Diagnosis:  Bilateral foot drop      PT Treatment Diagnosis:  Impaired balance and gait with BLE weakness Plan of Treatment  Frequency/Duration:  / 1 time a week or every other week for a minimum of 12 weeks, minimum of 6 sessions    Certification date from 07/24/23 to 10/15/23         See note for plan of treatment details and functional goals     Krystina Mojica PT                         I CERTIFY THE NEED FOR THESE SERVICES FURNISHED UNDER        THIS PLAN OF TREATMENT AND WHILE UNDER MY CARE     (Physician attestation of this document indicates review and certification of the therapy plan).                Referring Provider:  Baltazar Granados MD      Initial Assessment  See Epic Evaluation- Start of Care Date: 05/01/23 07/24/23 0500   Appointment Info   Signing clinician's name / credentials Krystina Mojica PT, DPT   Visits Used 6   Medical Diagnosis Bilateral foot drop   PT Tx Diagnosis Impaired balance and gait with BLE weakness   Progress Note/Certification   Start of Care Date 05/01/23   Onset of illness/injury or Date of Surgery 04/26/23   Predicted Duration 1 time a week or every other week for a minimum of 12 weeks, minimum of 6 sessions   Certification date from 07/24/23   Certification date to 10/15/23   Progress Note Due Date 05/30/23  (or 10th visit)   Progress Note Completed Date 06/19/23       Present Yes   Preferred Language ONG    ID [id number]   GOALS   PT Goals 2;3   PT Goal 1   Goal Identifier HEP   Goal  Description Pt will be independent with HEP to improve mobility.   Goal Progress Progressing   Target Date 10/15/23   PT Goal 2   Goal Identifier TUG   Goal Description Pt will complete TUG in less than 13 seconds to demonstrate improved balance and gait to improve functional mobility and increase tolerance completing ADL's, recreational activities and work related tasks.   Goal Progress Progressing   Target Date 10/15/23   PT Goal 3   Goal Identifier 30 second sit<>stands   Goal Description Pt will complete greater than 10 sit<>stands in 30 seconds to demonstrate improved BLE strength and balance to improve functional mobility and increase tolerance completing ADL's, recreational activities and work related tasks.   Goal Progress Progressing   Target Date 07/23/23   Subjective Report   Subjective Report Pt reports that she still has difficulty with her balance. Pt reports that she can't balance herself. Pt reports that she has had two falls recently, one inside and one outside. Pt reports that she feels like her cane doesn't help when she feels like she will fall. Pt wearing AFO's during session. Pt reports that ambulation is better when wearing AFOs.   Treatment Interventions (PT)   Interventions Therapeutic Procedure/Exercise;Neuromuscular Re-education   Therapeutic Procedure/Exercise   Therapeutic Procedures: strength, endurance, ROM, flexibillity minutes ([id number]) 25   Ther Proc 1 Nustep for warm up WL 6 x 7 minutes, to improve LE strength and functional mobility: SLR x 10B, education and cueing for pt to complete exercise slowly, pt reports that she does these exercises at home in the morning, pt reports that she feels more of a workout when she completes them slowly, sidelying hip abduction x 10B, bridges x 10- slow rise and slow lower, standing marches x 10B   Skilled Intervention Reviewed and progressed pt's ex program. Education on exercises for HEP. Verbal cues, tactile cues and demonstration of correct  technique.   Patient Response/Progress Tolerated well   Neuromuscular Re-education   Neuromuscular re-ed of mvmt, balance, coord, kinesthetic sense, posture, proprioception minutes ([id number]) 15   Neuro Re-ed 1 Standing balance exercises in parallel bars: semi-tandem stance x 30 second holds, wide base of support on firm/foam x 30 second holds each, narrow base of support on firm/foam x 30 second holds each   Skilled Intervention Reviewed and progressed static balance drills. Education on completing standing balance exercises next to counter for support/safety if family isn't around.   Patient Response/Progress Tolerated well   Education   Learner/Method Patient;Demonstration;Pictures/Video   Plan   Homework PTRx   Home program Standing weight shift, sit<>stand, seated heel/toe raises, roll outs, seated hip flexion, front knee strengthening, sitting hip abduction squeezes, semi-tandem stance, step forward/backward, SLR, bridges, sidelying hip abduction SLR   Plan for next session Assess TUG, Progress balance and gait exercises; neuro re-ed: balance and gait training; therapeutic exercise: strengthening, ROM, flexibility; STM/massage/manual therapy   Comments   Comments Pt returns today for her follow up appointment. Pt tolerates progression of strengthening and balance exercises. Pt remains appropriate to continue physical therapy.   Total Session Time   Timed Code Treatment Minutes 40   Total Treatment Time (sum of timed and untimed services) 40   Medicare Claim Information   Medical Diagnosis Bilateral foot drop       Krystina Mojica, PT, DPT

## 2023-07-31 ENCOUNTER — THERAPY VISIT (OUTPATIENT)
Dept: PHYSICAL THERAPY | Facility: REHABILITATION | Age: 73
End: 2023-07-31
Payer: COMMERCIAL

## 2023-07-31 DIAGNOSIS — M21.372 BILATERAL FOOT-DROP: ICD-10-CM

## 2023-07-31 DIAGNOSIS — M21.371 BILATERAL FOOT-DROP: ICD-10-CM

## 2023-07-31 DIAGNOSIS — M62.81 GENERALIZED MUSCLE WEAKNESS: Primary | ICD-10-CM

## 2023-07-31 PROCEDURE — 97110 THERAPEUTIC EXERCISES: CPT | Mod: GP

## 2023-08-15 NOTE — TELEPHONE ENCOUNTER
I changed the prescription to 4 mg tablet #5 with instructions of half tablet twice daily for 5 days   Reason contacted:  symptom  Information relayed:  Patients daughter notified of the below message. Patient has presented to the ER for evaluation of symptoms.   Additional questions:  No  Further follow-up needed:  No  Okay to leave a detailed message:  No

## 2023-08-21 ENCOUNTER — THERAPY VISIT (OUTPATIENT)
Dept: PHYSICAL THERAPY | Facility: REHABILITATION | Age: 73
End: 2023-08-21
Payer: COMMERCIAL

## 2023-08-21 ENCOUNTER — OFFICE VISIT (OUTPATIENT)
Dept: FAMILY MEDICINE | Facility: CLINIC | Age: 73
End: 2023-08-21
Attending: FAMILY MEDICINE
Payer: COMMERCIAL

## 2023-08-21 VITALS
SYSTOLIC BLOOD PRESSURE: 132 MMHG | DIASTOLIC BLOOD PRESSURE: 70 MMHG | OXYGEN SATURATION: 98 % | WEIGHT: 107.9 LBS | RESPIRATION RATE: 16 BRPM | HEIGHT: 60 IN | TEMPERATURE: 98 F | BODY MASS INDEX: 21.18 KG/M2 | HEART RATE: 71 BPM

## 2023-08-21 DIAGNOSIS — Z12.11 SCREEN FOR COLON CANCER: ICD-10-CM

## 2023-08-21 DIAGNOSIS — M62.81 GENERALIZED MUSCLE WEAKNESS: Primary | ICD-10-CM

## 2023-08-21 DIAGNOSIS — E53.8 VITAMIN B12 DEFICIENCY (NON ANEMIC): ICD-10-CM

## 2023-08-21 DIAGNOSIS — Z00.00 MEDICARE ANNUAL WELLNESS VISIT, SUBSEQUENT: Primary | ICD-10-CM

## 2023-08-21 DIAGNOSIS — E11.43 TYPE 2 DIABETES MELLITUS WITH DIABETIC AUTONOMIC NEUROPATHY, WITHOUT LONG-TERM CURRENT USE OF INSULIN (H): ICD-10-CM

## 2023-08-21 DIAGNOSIS — M21.372 BILATERAL FOOT-DROP: ICD-10-CM

## 2023-08-21 DIAGNOSIS — Z85.41 HISTORY OF CERVICAL CANCER: ICD-10-CM

## 2023-08-21 DIAGNOSIS — G62.9 PERIPHERAL POLYNEUROPATHY: ICD-10-CM

## 2023-08-21 DIAGNOSIS — Z11.59 NEED FOR HEPATITIS C SCREENING TEST: ICD-10-CM

## 2023-08-21 DIAGNOSIS — M21.371 BILATERAL FOOT-DROP: ICD-10-CM

## 2023-08-21 DIAGNOSIS — E78.5 HYPERLIPIDEMIA, UNSPECIFIED HYPERLIPIDEMIA TYPE: ICD-10-CM

## 2023-08-21 DIAGNOSIS — M81.0 AGE-RELATED OSTEOPOROSIS WITHOUT CURRENT PATHOLOGICAL FRACTURE: ICD-10-CM

## 2023-08-21 LAB
ERYTHROCYTE [DISTWIDTH] IN BLOOD BY AUTOMATED COUNT: 11.9 % (ref 10–15)
HBA1C MFR BLD: 7.4 % (ref 0–5.6)
HCT VFR BLD AUTO: 31.2 % (ref 35–47)
HCV AB SERPL QL IA: NONREACTIVE
HGB BLD-MCNC: 10.9 G/DL (ref 11.7–15.7)
HOLD SPECIMEN: NORMAL
MCH RBC QN AUTO: 30.2 PG (ref 26.5–33)
MCHC RBC AUTO-ENTMCNC: 34.9 G/DL (ref 31.5–36.5)
MCV RBC AUTO: 86 FL (ref 78–100)
PLATELET # BLD AUTO: 211 10E3/UL (ref 150–450)
RBC # BLD AUTO: 3.61 10E6/UL (ref 3.8–5.2)
WBC # BLD AUTO: 4.8 10E3/UL (ref 4–11)

## 2023-08-21 PROCEDURE — 99213 OFFICE O/P EST LOW 20 MIN: CPT | Mod: 25 | Performed by: FAMILY MEDICINE

## 2023-08-21 PROCEDURE — 85027 COMPLETE CBC AUTOMATED: CPT | Performed by: FAMILY MEDICINE

## 2023-08-21 PROCEDURE — 36415 COLL VENOUS BLD VENIPUNCTURE: CPT | Performed by: FAMILY MEDICINE

## 2023-08-21 PROCEDURE — 96372 THER/PROPH/DIAG INJ SC/IM: CPT | Performed by: PHARMACIST

## 2023-08-21 PROCEDURE — 86803 HEPATITIS C AB TEST: CPT | Performed by: FAMILY MEDICINE

## 2023-08-21 PROCEDURE — 83036 HEMOGLOBIN GLYCOSYLATED A1C: CPT | Performed by: FAMILY MEDICINE

## 2023-08-21 PROCEDURE — 97112 NEUROMUSCULAR REEDUCATION: CPT | Mod: GP

## 2023-08-21 PROCEDURE — 97110 THERAPEUTIC EXERCISES: CPT | Mod: GP

## 2023-08-21 PROCEDURE — G0439 PPPS, SUBSEQ VISIT: HCPCS | Performed by: FAMILY MEDICINE

## 2023-08-21 RX ORDER — GABAPENTIN 100 MG/1
CAPSULE ORAL
Qty: 90 CAPSULE | Refills: 3 | Status: SHIPPED | OUTPATIENT
Start: 2023-08-21 | End: 2024-02-26

## 2023-08-21 RX ADMIN — CYANOCOBALAMIN 1000 MCG: 1000 INJECTION, SOLUTION INTRAMUSCULAR; SUBCUTANEOUS at 10:42

## 2023-08-21 ASSESSMENT — ENCOUNTER SYMPTOMS
CONSTIPATION: 0
SHORTNESS OF BREATH: 0
HEMATOCHEZIA: 0
EYE PAIN: 0
HEARTBURN: 0
BREAST MASS: 0
CHILLS: 0
FREQUENCY: 0
DYSURIA: 0
NERVOUS/ANXIOUS: 0
SORE THROAT: 0
DIARRHEA: 0
HEMATURIA: 0
NAUSEA: 0
ABDOMINAL PAIN: 0
PARESTHESIAS: 0
FEVER: 0
ARTHRALGIAS: 0
HEADACHES: 0
DIZZINESS: 0
MYALGIAS: 0
COUGH: 0
PALPITATIONS: 0
JOINT SWELLING: 0
WEAKNESS: 0

## 2023-08-21 ASSESSMENT — ACTIVITIES OF DAILY LIVING (ADL)
CURRENT_FUNCTION: HOUSEWORK REQUIRES ASSISTANCE
CURRENT_FUNCTION: TRANSPORTATION REQUIRES ASSISTANCE

## 2023-08-21 ASSESSMENT — PAIN SCALES - GENERAL: PAINLEVEL: NO PAIN (0)

## 2023-08-21 NOTE — LETTER
August 21, 2023      Hai Meade  [address] [place] [address]        Dear ,    We are writing to inform you of your test results.    No signs of hepatitis C infection on routine screening.  You remain mildly anemic.    Resulted Orders   Hepatitis C Screen Reflex to HCV RNA Quant and Genotype   Result Value Ref Range    Hepatitis C Antibody Nonreactive Nonreactive    Narrative    Assay performance characteristics have not been established for newborns, infants, and children.   Hemoglobin A1c   Result Value Ref Range    Hemoglobin A1C 7.4 (H) 0.0 - 5.6 %      Comment:      Normal <5.7%   Prediabetes 5.7-6.4%    Diabetes 6.5% or higher     Note: Adopted from ADA consensus guidelines.   CBC with platelets   Result Value Ref Range    WBC Count 4.8 4.0 - 11.0 10e3/uL    RBC Count 3.61 (L) 3.80 - 5.20 10e6/uL    Hemoglobin 10.9 (L) 11.7 - 15.7 g/dL    Hematocrit 31.2 (L) 35.0 - 47.0 %    MCV 86 78 - 100 fL    MCH 30.2 26.5 - 33.0 pg    MCHC 34.9 31.5 - 36.5 g/dL    RDW 11.9 10.0 - 15.0 %    Platelet Count 211 150 - 450 10e3/uL       If you have any questions or concerns, please call the clinic at the number listed above.       Sincerely,      Brie Acevedo MD

## 2023-08-21 NOTE — PROGRESS NOTES
SUBJECTIVE:   Hai is a 73 year old who presents for Preventive Visit.      8/21/2023     9:43 AM   Additional Questions   Roomed by Belle   Accompanied by irene- daughter       Are you in the first 12 months of your Medicare coverage?  No    Here today with her daughter.  They decline certified .  Scheduled for annual wellness visit, patient adamant that she does not want a complete physical exam but is agreeable to review of annual wellness components otherwise.  She is due for colon cancer screening, is not interested in this.  She has known osteoporosis by most recent bone density scan, is not interested in further treatment and specifically is not interested in Fosamax based on information she has learned from friends at the [facility]..  She continues to take calcium daily instead.  Activity level currently limited due to foot discomfort, followed by physical therapy and is working on this.  She has difficulty with transportation and housework, receives adequate support from her daughter and declines assistance.  Feels like her overall health is fair, mental health with some struggles but feels adequately supported.  History of cervical cancer stage IIb status post radical hysterectomy followed by radiation and chemotherapy in 2007.  At that time a Pap smear was recommended yearly, she is no longer interested in completing this.  Most recent Pap smear was 2017 and was normal.  Remains on metformin and glipizide in management of diabetes.  Takes the glipizide 2.5 mg extended release just as needed, needing about 2 times per week when blood sugar is in the 150s in the morning.  In June she did a trial of Ozempic but this caused her to feel very sick for over a week similar to when she had her COVID-vaccine, so she declines resuming this and would like to stay on her same regimen.  Blood sugars in general have been in the 100-1 50 range most of the time in the morning, sometimes as high as 170s,  "rarely higher.  Has not had any low sugars but feels like she needs to eat a lot on days when she is taking her dose of glipizide.  We note A1c today has improved.  Known diabetic neuropathy, working with Dr. Cline of neurology, has had recent testing, remains on gabapentin, and will follow-up with neurology in September to review plan.  She is due for an eye exam but notes that her eyes are often blurry for several days after her eye exam and therefore she declines having them further.  Remains on simvastatin and management of dyslipidemia.  Previous history of anemia, receiving vitamin B12 injections monthly, due for follow-up of this.    Healthy Habits:     In general, how would you rate your overall health?  Fair    Frequency of exercise:  2-3 days/week    Duration of exercise:  Less than 15 minutes    Do you usually eat at least 4 servings of fruit and vegetables a day, include whole grains    & fiber and avoid regularly eating high fat or \"junk\" foods?  Yes    Taking medications regularly:  Yes    Medication side effects:  None    Ability to successfully perform activities of daily living:  Transportation requires assistance and housework requires assistance    Home Safety:  Lack of grab bars in the bathroom    Hearing Impairment:  No hearing concerns    In the past 6 months, have you been bothered by leaking of urine? Yes    In general, how would you rate your overall mental or emotional health?  Fair    Additional concerns today:  No        Have you ever done Advance Care Planning? (For example, a Health Directive, POLST, or a discussion with a medical provider or your loved ones about your wishes): No, advance care planning information given to patient to review.  Advanced care planning was discussed at today's visit.       Fall risk  Fallen 2 or more times in the past year?: No  Any fall with injury in the past year?: No    Cognitive Screening Not appropriate due to known dementia not done due to " language       Reviewed and updated as needed this visit by clinical staff   Tobacco  Allergies  Meds              Reviewed and updated as needed this visit by Provider                 Social History     Tobacco Use    Smoking status: Never    Smokeless tobacco: Never   Substance Use Topics    Alcohol use: No             8/21/2023     9:49 AM   Alcohol Use   Prescreen: >3 drinks/day or >7 drinks/week? Not Applicable     Do you have a current opioid prescription? No  Do you use any other controlled substances or medications that are not prescribed by a provider? None      Current providers sharing in care for this patient include:   Patient Care Team:  Brie Acevedo MD as PCP - General (Family Medicine)  Sandra Veliz, PharmD as Pharmacist (Pharmacist)  Erasmo Sanders, PharmD as Pharmacist (Pharmacist)  Susan Rivera MD as MD (Family Medicine)  Baltazar Granados MD as MD (Neurology)  Brie Acevedo MD as Assigned PCP  Wesley Daniel MD as Assigned Neuroscience Provider    The following health maintenance items are reviewed in Epic and correct as of today:  Health Maintenance   Topic Date Due    ADVANCE CARE PLANNING  Never done    HEPATITIS C SCREENING  Never done    ZOSTER IMMUNIZATION (1 of 2) Never done    MEDICARE ANNUAL WELLNESS VISIT  Never done    COLORECTAL CANCER SCREENING  04/27/2018    EYE EXAM  02/10/2021    COVID-19 Vaccine (3 - Moderna series) 05/29/2021    INFLUENZA VACCINE (1) 09/01/2023    LIPID  10/18/2023    DIABETIC FOOT EXAM  10/18/2023    A1C  11/08/2023    MICROALBUMIN  01/23/2024    BMP  03/07/2024    ANNUAL REVIEW OF HM ORDERS  05/08/2024    FALL RISK ASSESSMENT  08/21/2024    MAMMO SCREENING  04/25/2025    DTAP/TDAP/TD IMMUNIZATION (4 - Td or Tdap) 04/04/2029    DEXA  02/25/2035    PHQ-2 (once per calendar year)  Completed    Pneumococcal Vaccine: 65+ Years  Completed    IPV IMMUNIZATION  Aged Out    MENINGITIS IMMUNIZATION  Aged Out     Lab work is in  process  Labs reviewed in EPIC  BP Readings from Last 3 Encounters:   08/21/23 132/70   05/08/23 122/68   04/26/23 135/79    Wt Readings from Last 3 Encounters:   08/21/23 48.9 kg (107 lb 14.4 oz)   06/06/23 54.3 kg (119 lb 9.6 oz)   05/08/23 53.3 kg (117 lb 6.4 oz)                  Patient Active Problem List   Diagnosis    Anemia    Joint Pain, Localized In The Knee    Type 2 diabetes mellitus with diabetic autonomic neuropathy, without long-term current use of insulin (H)    Cholelithiasis    History of cervical cancer    Urinary incontinence    Hyperlipidemia    Peripheral neuropathy    Gastroesophageal reflux disease without esophagitis    Vitamin B12 deficiency (non anemic)    Age-related osteoporosis without current pathological fracture    Arthritis of carpometacarpal (CMC) joint of left thumb     Past Surgical History:   Procedure Laterality Date    COLECTOMY N/A 6/23/2019    Procedure: WITH OPEN SMALL BOWEL RESECTION;  Surgeon: Ramon Wray MD;  Location: [facility];  Service: General    FLEXIBLE SIGMOIDOSCOPY  2009    HYSTERECTOMY  2007    cervix ca    OOPHORECTOMY  2006    cervix ca    MO BSO, OMENTECTOMY W/SILVIA      Description: Salp-oophorect Bilat W/Omentect, Total Abd Hysterect, Rad Dissect For Malig;  Recorded: 03/28/2007;    MO LAP,DIAGNOSTIC ABDOMEN N/A 6/23/2019    Procedure: LAPAROSCOPY lysis of adhesions,;  Surgeon: Ramon Wray MD;  Location: [facility];  Service: General       Social History     Tobacco Use    Smoking status: Never    Smokeless tobacco: Never   Substance Use Topics    Alcohol use: No     No family history on file.      Current Outpatient Medications   Medication Sig Dispense Refill    acetaminophen (TYLENOL) 500 MG tablet [ACETAMINOPHEN (TYLENOL) 500 MG TABLET] Take 1-2 tablets (500-1,000 mg total) by mouth every 4 (four) hours as needed.  0    aspirin 81 MG EC tablet [ASPIRIN 81 MG EC TABLET] Take 81 mg by mouth daily.      blood glucose (ACCU-CHEK  LOIDA PLUS) test strip TEST ONCE A  strip 3    blood glucose (NO BRAND SPECIFIED) lancets standard Use to test blood sugar 1  times daily or as directed. 100 lancet. 3    blood glucose (NO BRAND SPECIFIED) test strip Use to test blood sugar one times daily or as directed. 200 strip 3    blood pressure monitor Kit [BLOOD PRESSURE MONITOR KIT] Please dispense 1 Blood pressure kit of pts choice 1 each 0    Calcium Carb-Cholecalciferol 500-10 MG-MCG CHEW CHEW 1 TABLET TWICE DAILY 180 tablet 3    Cholecalciferol (D3-1000) 25 MCG (1000 UT) CAPS Take 1 capsule by mouth daily 90 capsule 2    gabapentin (NEURONTIN) 100 MG capsule TAKE 1 CAPSULE BY MOUTH EVERY NIGHT AT BEDTIME 90 capsule 3    generic lancets (ACCU-CHEK SOFTCLIX LANCETS) [GENERIC LANCETS (ACCU-CHEK SOFTCLIX LANCETS)] TEST TWICE DAILY 200 each 1    glipiZIDE (GLUCOTROL XL) 2.5 MG 24 hr tablet Take 1 tablet (2.5 mg) by mouth daily as needed 90 tablet 4    losartan (COZAAR) 25 MG tablet Take 1 tablet (25 mg) by mouth daily 30 tablet 11    metFORMIN (GLUCOPHAGE) 1000 MG tablet TAKE 1 TABLET(1000 MG) BY MOUTH TWICE DAILY WITH MEALS 180 tablet 1    simvastatin (ZOCOR) 40 MG tablet Take 1 tablet (40 mg) by mouth At Bedtime 90 tablet 3     Allergies   Allergen Reactions    Cymbalta [Duloxetine] Unknown     Nausea and vomiting     Recent Labs   Lab Test 08/21/23  0955 05/08/23  0832 03/07/23  1246 01/23/23  0904 10/18/22  0801 07/18/22  0853 01/17/22  0818 10/11/21  1001 10/11/21  0844 04/05/21  0940 04/05/21  0842 11/03/20  1059 04/10/19  0641 04/09/19  1036   A1C 7.4* 8.0*  --  7.8* 7.7*  --    < >  --    < >  --    < > 7.7*   < >  --    LDL  --   --   --   --  56  --   --  54  --   --   --  41   < >  --    HDL  --   --   --   --  58  --   --  53  --   --   --  54   < >  --    TRIG  --   --   --   --  105  --   --  172*  --   --   --  194*   < >  --    ALT  --  21  --   --   --   --   --  21  --   --   --  23   < >  --    CR  --   --  0.67  --   --  0.68  --   0.72  --  0.72  --  0.88   < >  --    GFRESTIMATED  --   --  >90  --   --  >90  --  83   < > >60  --  >60   < >  --    GFRESTBLACK  --   --   --   --   --   --   --   --   --  >60  --  >60   < >  --    POTASSIUM  --   --  4.0  --   --  4.0  --  4.3  --  3.8  --  4.3   < >  --    TSH  --   --   --   --   --   --   --   --   --   --   --   --   --  0.95    < > = values in this interval not displayed.       Pertinent mammograms are reviewed under the imaging tab.    Review of Systems   Constitutional:  Negative for chills and fever.   HENT:  Negative for congestion, ear pain, hearing loss and sore throat.    Eyes:  Negative for pain and visual disturbance.   Respiratory:  Negative for cough and shortness of breath.    Cardiovascular:  Negative for chest pain, palpitations and peripheral edema.   Gastrointestinal:  Negative for abdominal pain, constipation, diarrhea, heartburn, hematochezia and nausea.   Breasts:  Negative for tenderness, breast mass and discharge.   Genitourinary:  Negative for dysuria, frequency, genital sores, hematuria, pelvic pain, urgency, vaginal bleeding and vaginal discharge.   Musculoskeletal:  Negative for arthralgias, joint swelling and myalgias.   Skin:  Negative for rash.   Neurological:  Negative for dizziness, weakness, headaches and paresthesias.   Psychiatric/Behavioral:  Negative for mood changes. The patient is not nervous/anxious.          OBJECTIVE:   /70   Pulse 71   Temp 98  F (36.7  C) (Oral)   Resp 16   Ht 1.524 m (5')   Wt 48.9 kg (107 lb 14.4 oz)   SpO2 98%   BMI 21.07 kg/m   Estimated body mass index is 21.07 kg/m  as calculated from the following:    Height as of this encounter: 1.524 m (5').    Weight as of this encounter: 48.9 kg (107 lb 14.4 oz).  Physical Exam  GENERAL APPEARANCE: healthy, alert and no distress  RESP: lungs clear to auscultation - no rales, rhonchi or wheezes  CV: regular rate and rhythm, normal S1 S2, no S3 or S4, no murmur, click or rub,  no peripheral edema and peripheral pulses strong  ABDOMEN: soft, nontender, no hepatosplenomegaly, no masses and bowel sounds normal  MS: no musculoskeletal defects are noted and gait is age appropriate without ataxia  SKIN: no suspicious lesions or rashes  PSYCH: mentation appears normal and affect normal/bright    Diagnostic Test Results:  Labs reviewed in Epic    ASSESSMENT / PLAN:     Medicare annual wellness visit, subsequent  At today's visit, we discussed lifestyle interventions to promote self-management and wellness, including maintenance of a healthy weight, healthy diet, regular physical activity and exercise, and falls prevention.  She declines COVID-vaccine due to side effects previously, declines shingles vaccine.  She declines colon cancer screening.  She feels she has adequate assistance in activities of daily living.  Further information provided.  She declines bone density screening.  She declines cervical cancer screening today but will consider at her next visit.    Type 2 diabetes mellitus with diabetic autonomic neuropathy, without long-term current use of insulin (H)  Currently meeting goals of care with A1c of 7.4%.  Did not tolerate Ozempic due to significant side effects.  She is on the lowest available dose of glipizide, taking the full tab and unfortunately extended release cannot be cut in half.  She will continue to use it sparingly.  We may need to consider alternatives in the future but I think for now this is fine she seems to be managing well.  We will keep her A1c goal less than 8 due to age.  Advised diabetes eye exam, she will consider, encouraged her to consider requesting that they not dilate her eyes to see if this helps the blurriness after the visit.  - Hemoglobin A1c; Future  - Extra Tube; Standing  - Hemoglobin A1c  - Extra Tube    Peripheral polyneuropathy  She will follow-up with neurology.  Continue gabapentin.    Hyperlipidemia, unspecified hyperlipidemia  type  Encouraged healthy lifestyle habits.  Continue simvastatin.    Age-related osteoporosis without current pathological fracture  She is aware of her increased risk of fracture.  She is not interested in additional treatments and therefore we will defer follow-up DEXA.  Advise adequate calcium intake.  Advised weightbearing activities and measures to reduce risk of falls.    History of cervical cancer  We will check CBC, continue yearly CMP assessment to assess for metastatic disease.  We will plan to complete visual inspection of vaginal cuff at her next visit.  - CBC with platelets; Future  - CBC with platelets    Vitamin B12 deficiency (non anemic)  We will check follow-up vitamin B12 level and CBC today.  - CBC with platelets; Future  - CBC with platelets    Need for hepatitis C screening test  - Hepatitis C Screen Reflex to HCV RNA Quant and Genotype    Screen for colon cancer  She declines screening.      COUNSELING:  Reviewed preventive health counseling, as reflected in patient instructions        She reports that she has never smoked. She has never used smokeless tobacco.      Appropriate preventive services were discussed with this patient, including applicable screening as appropriate for cardiovascular disease, diabetes, osteopenia/osteoporosis, and glaucoma.  As appropriate for age/gender, discussed screening for colorectal cancer, prostate cancer, breast cancer, and cervical cancer. Checklist reviewing preventive services available has been given to the patient.    Reviewed patients plan of care and provided an AVS. The Basic Care Plan (routine screening as documented in Health Maintenance) for Hai meets the Care Plan requirement. This Care Plan has been established and reviewed with the Patient and daughter.          Brie Acevedo MD  [facility]    Identified Health Risks:  I have reviewed Opioid Use Disorder and Substance Use Disorder risk factors and made any needed  "referrals.   The patient was provided with suggestions to help her develop a healthy physical lifestyle.  The patient reports that she has difficulty with activities of daily living.  She currently feels adequately supported and declines further resources.  Information on urinary incontinence and treatment options given to patient.  The patient was provided with suggestions to help her develop a healthy emotional lifestyle.    Answers submitted by the patient for this visit:  Annual Preventive Visit (Submitted on 8/21/2023)  Chief Complaint: Annual Exam:  In general, how would you rate your overall physical health?: fair  Frequency of exercise:: 2-3 days/week  Do you usually eat at least 4 servings of fruit and vegetables a day, include whole grains & fiber, and avoid regularly eating high fat or \"junk\" foods? : Yes  Taking medications regularly:: Yes  Medication side effects:: None  Activities of Daily Living: transportation requires assistance, housework requires assistance  Home safety: lack of grab bars in the bathroom  Hearing Impairment:: no hearing concerns  In the past 6 months, have you been bothered by leaking of urine?: Yes  abdominal pain: No  Blood in stool: No  Blood in urine: No  chest pain: No  chills: No  congestion: No  constipation: No  cough: No  diarrhea: No  dizziness: No  ear pain: No  eye pain: No  nervous/anxious: No  fever: No  frequency: No  genital sores: No  headaches: No  hearing loss: No  heartburn: No  arthralgias: No  joint swelling: No  peripheral edema: No  mood changes: No  myalgias: No  nausea: No  dysuria: No  palpitations: No  Skin sensation changes: No  sore throat: No  urgency: No  rash: No  shortness of breath: No  visual disturbance: No  weakness: No  pelvic pain: No  vaginal bleeding: No  vaginal discharge: No  tenderness: No  breast mass: No  breast discharge: No  In general, how would you rate your overall mental or emotional health?: fair  Additional concerns today:: " No  Exercise outside of work (Submitted on 8/21/2023)  Chief Complaint: Annual Exam:  Duration of exercise:: Less than 15 minutes

## 2023-08-21 NOTE — PROGRESS NOTES
"The patient was provided with suggestions to help her develop a healthy physical lifestyle.  The patient reports that she has difficulty with activities of daily living. I have asked that the patient make a follow up appointment in *** weeks where this issue will be further evaluated and addressed.  Information on urinary incontinence and treatment options given to patient.  The patient was provided with suggestions to help her develop a healthy emotional lifestyle.Answers submitted by the patient for this visit:  Annual Preventive Visit (Submitted on 8/21/2023)  Chief Complaint: Annual Exam:  In general, how would you rate your overall physical health?: fair  Frequency of exercise:: 2-3 days/week  Do you usually eat at least 4 servings of fruit and vegetables a day, include whole grains & fiber, and avoid regularly eating high fat or \"junk\" foods? : Yes  Taking medications regularly:: Yes  Medication side effects:: None  Activities of Daily Living: transportation requires assistance, housework requires assistance  Home safety: lack of grab bars in the bathroom  Hearing Impairment:: no hearing concerns  In the past 6 months, have you been bothered by leaking of urine?: Yes  abdominal pain: No  Blood in stool: No  Blood in urine: No  chest pain: No  chills: No  congestion: No  constipation: No  cough: No  diarrhea: No  dizziness: No  ear pain: No  eye pain: No  nervous/anxious: No  fever: No  frequency: No  genital sores: No  headaches: No  hearing loss: No  heartburn: No  arthralgias: No  joint swelling: No  peripheral edema: No  mood changes: No  myalgias: No  nausea: No  dysuria: No  palpitations: No  Skin sensation changes: No  sore throat: No  urgency: No  rash: No  shortness of breath: No  visual disturbance: No  weakness: No  pelvic pain: No  vaginal bleeding: No  vaginal discharge: No  tenderness: No  breast mass: No  breast discharge: No  In general, how would you rate your overall mental or emotional " health?: fair  Additional concerns today:: No  Exercise outside of work (Submitted on 8/21/2023)  Chief Complaint: Annual Exam:  Duration of exercise:: Less than 15 minutes

## 2023-08-21 NOTE — PATIENT INSTRUCTIONS
I recommend scheduling an eye exam.  If your eyes are very blurry after the exam, see if they can do the exam without dilating her eyes.  And on dilated eye exam is better than no eye exam at all.    Continue metformin, continue glipizide just as needed.  The tab canNOT be cut in half.    Your bone density is low, meaning you have a high risk of fracture.  Based on our discussion today, you are not interested in medication to prevent fracture, but let me know if you change your mind.    Recommend considering screening for colon cancer.  Let me know if you would like to do this either with the stool test Cologuard or with a colonoscopy.      Patient Education   Personalized Prevention Plan  You are due for the preventive services outlined below.  Your care team is available to assist you in scheduling these services.  If you have already completed any of these items, please share that information with your care team to update in your medical record.  Health Maintenance Due   Topic Date Due    Discuss Advance Care Planning  Never done    Hepatitis C Screening  Never done    Zoster (Shingles) Vaccine (1 of 2) Never done    Colorectal Cancer Screening  04/27/2018    Eye Exam  02/10/2021    COVID-19 Vaccine (3 - Moderna series) 05/29/2021     Your Health Risk Assessment indicates you feel you are not in good health    A healthy lifestyle helps keep the body fit and the mind alert. It helps protect you from disease, helps you fight disease, and helps prevent chronic disease (disease that doesn't go away) from getting worse. This is important as you get older and begin to notice twinges in muscles and joints and a decline in the strength and stamina you once took for granted. A healthy lifestyle includes good healthcare, good nutrition, weight control, recreation, and regular exercise. Avoid harmful substances and do what you can to keep safe. Another part of a healthy lifestyle is stay mentally active and socially  involved.    Good healthcare   Have a wellness visit every year.   If you have new symptoms, let us know right away. Don't wait until the next checkup.   Take medicines exactly as prescribed and keep your medicines in a safe place. Tell us if your medicine causes problems.   Healthy diet and weight control   Eat 3 or 4 small, nutritious, low-fat, high-fiber meals a day. Include a variety of fruits, vegetables, and whole-grain foods.   Make sure you get enough calcium in your diet. Calcium, vitamin D, and exercise help prevent osteoporosis (bone thinning).   If you live alone, try eating with others when you can. That way you get a good meal and have company while you eat it.   Try to keep a healthy weight. If you eat more calories than your body uses for energy, it will be stored as fat and you will gain weight.     Recreation   Recreation is not limited to sports and team events. It includes any activity that provides relaxation, interest, enjoyment, and exercise. Recreation provides an outlet for physical, mental, and social energy. It can give a sense of worth and achievement. It can help you stay healthy.    Mental Exercise and Social Involvement  Mental and emotional health is as important as physical health. Keep in touch with friends and family. Stay as active as possible. Continue to learn and challenge yourself.   Things you can do to stay mentally active are:  Learn something new, like a foreign language or musical instrument.   Play SCRABBLE or do crossword puzzles. If you cannot find people to play these games with you at home, you can play them with others on your computer through the Internet.   Join a games club--anything from card games to chess or checkers or lawn bowling.   Start a new hobby.   Go back to school.   Volunteer.   Read.   Keep up with world events.  Activities of Daily Living    Your Health Risk Assessment indicates you have difficulties with activities of daily living such as  housework, bathing, preparing meals, taking medication, etc. Please make a follow up appointment for us to address this issue in more detail.  Bladder Training: Care Instructions  Your Care Instructions     Bladder training is used to treat urge incontinence and stress incontinence. Urge incontinence means that the need to urinate comes on so fast that you can't get to a toilet in time. Stress incontinence means that you leak urine because of pressure on your bladder. For example, it may happen when you laugh, cough, or lift something heavy.  Bladder training can increase how long you can wait before you have to urinate. It can also help your bladder hold more urine. And it can give you better control over the urge to urinate.  It is important to remember that bladder training takes a few weeks to a few months to make a difference. You may not see results right away, but don't give up.  Follow-up care is a key part of your treatment and safety. Be sure to make and go to all appointments, and call your doctor if you are having problems. It's also a good idea to know your test results and keep a list of the medicines you take.  How can you care for yourself at home?  Work with your doctor to come up with a bladder training program that is right for you. You may use one or more of the following methods.  Delayed urination  In the beginning, try to keep from urinating for 5 minutes after you first feel the need to go.  While you wait, take deep, slow breaths to relax. Kegel exercises can also help you delay the need to go to the bathroom.  After some practice, when you can easily wait 5 minutes to urinate, try to wait 10 minutes before you urinate.  Slowly increase the waiting period until you are able to control when you have to urinate.  Scheduled urination  Empty your bladder when you first wake up in the morning.  Schedule times throughout the day when you will urinate.  Start by going to the bathroom every hour,  "even if you don't need to go.  Slowly increase the time between trips to the bathroom.  When you have found a schedule that works well for you, keep doing it.  If you wake up during the night and have to urinate, do it. Apply your schedule to waking hours only.  Kegel exercises  These tighten and strengthen pelvic muscles, which can help you control the flow of urine. (If doing these exercises causes pain, stop doing them and talk with your doctor.) To do Kegel exercises:  Squeeze your muscles as if you were trying not to pass gas. Or squeeze your muscles as if you were stopping the flow of urine. Your belly, legs, and buttocks shouldn't move.  Hold the squeeze for 3 seconds, then relax for 5 to 10 seconds.  Start with 3 seconds, then add 1 second each week until you are able to squeeze for 10 seconds.  Repeat the exercise 10 times a session. Do 3 to 8 sessions a day.  When should you call for help?  Watch closely for changes in your health, and be sure to contact your doctor if:    Your incontinence is getting worse.     You do not get better as expected.   Where can you learn more?  Go to https://www.[email or web address].net/patiented  Enter V684 in the search box to learn more about \"Bladder Training: Care Instructions.\"  Current as of: March 1, 2023               Content Version: 13.7    0078-0225 [employer].   Care instructions adapted under license by your healthcare professional. If you have questions about a medical condition or this instruction, always ask your healthcare professional. [employer] disclaims any warranty or liability for your use of this information.      Your Health Risk Assessment indicates you feel you are not in good emotional health.    Recreation   Recreation is not limited to sports and team events. It includes any activity that provides relaxation, interest, enjoyment, and exercise. Recreation provides an outlet for physical, mental, and social energy. It can give a " sense of worth and achievement. It can help you stay healthy.    Mental Exercise and Social Involvement  Mental and emotional health is as important as physical health. Keep in touch with friends and family. Stay as active as possible. Continue to learn and challenge yourself.   Things you can do to stay mentally active are:  Learn something new, like a foreign language or musical instrument.   Play SCRABBLE or do crossword puzzles. If you cannot find people to play these games with you at home, you can play them with others on your computer through the Internet.   Join a games club--anything from card games to chess or checkers or lawn bowling.   Start a new hobby.   Go back to school.   Volunteer.   Read.   Keep up with world events.

## 2023-08-24 ENCOUNTER — TELEPHONE (OUTPATIENT)
Dept: FAMILY MEDICINE | Age: 73
End: 2023-08-24

## 2023-08-28 ENCOUNTER — THERAPY VISIT (OUTPATIENT)
Dept: PHYSICAL THERAPY | Facility: REHABILITATION | Age: 73
End: 2023-08-28
Payer: COMMERCIAL

## 2023-08-28 DIAGNOSIS — M21.372 BILATERAL FOOT-DROP: ICD-10-CM

## 2023-08-28 DIAGNOSIS — M62.81 GENERALIZED MUSCLE WEAKNESS: Primary | ICD-10-CM

## 2023-08-28 DIAGNOSIS — M21.371 BILATERAL FOOT-DROP: ICD-10-CM

## 2023-08-28 PROCEDURE — 97535 SELF CARE MNGMENT TRAINING: CPT | Mod: GP

## 2023-08-28 PROCEDURE — 97110 THERAPEUTIC EXERCISES: CPT | Mod: GP

## 2023-09-25 ENCOUNTER — ALLIED HEALTH/NURSE VISIT (OUTPATIENT)
Dept: FAMILY MEDICINE | Facility: CLINIC | Age: 73
End: 2023-09-25
Payer: COMMERCIAL

## 2023-09-25 DIAGNOSIS — E53.8 VITAMIN B12 DEFICIENCY (NON ANEMIC): Primary | ICD-10-CM

## 2023-09-25 PROCEDURE — 96372 THER/PROPH/DIAG INJ SC/IM: CPT | Mod: LT | Performed by: PHARMACIST

## 2023-09-25 PROCEDURE — 99207 PR NO CHARGE NURSE ONLY: CPT

## 2023-09-25 RX ADMIN — CYANOCOBALAMIN 1000 MCG: 1000 INJECTION, SOLUTION INTRAMUSCULAR; SUBCUTANEOUS at 09:04

## 2023-10-19 ENCOUNTER — OFFICE VISIT (OUTPATIENT)
Dept: NEUROLOGY | Facility: CLINIC | Age: 73
End: 2023-10-19
Payer: COMMERCIAL

## 2023-10-19 VITALS — SYSTOLIC BLOOD PRESSURE: 138 MMHG | HEART RATE: 69 BPM | DIASTOLIC BLOOD PRESSURE: 79 MMHG | OXYGEN SATURATION: 96 %

## 2023-10-19 DIAGNOSIS — E11.42 DIABETIC POLYNEUROPATHY ASSOCIATED WITH TYPE 2 DIABETES MELLITUS (H): Primary | ICD-10-CM

## 2023-10-19 DIAGNOSIS — M21.372 BILATERAL FOOT-DROP: ICD-10-CM

## 2023-10-19 DIAGNOSIS — M21.371 BILATERAL FOOT-DROP: ICD-10-CM

## 2023-10-19 PROCEDURE — 99215 OFFICE O/P EST HI 40 MIN: CPT | Performed by: PSYCHIATRY & NEUROLOGY

## 2023-10-19 NOTE — PROGRESS NOTES
Hai Meade is a 73 year old female who presents for:  Chief Complaint   Patient presents with    Follow Up     Review EMG- Neuropathy         Initial Vitals:  /79 (BP Location: Right arm, Patient Position: Sitting, Cuff Size: Adult Regular)   Pulse 69   SpO2 96%  Estimated body mass index is 21.07 kg/m  as calculated from the following:    Height as of 8/21/23: 1.524 m (5').    Weight as of 8/21/23: 48.9 kg (107 lb 14.4 oz).. There is no height or weight on file to calculate BSA. BP completed using cuff size: regular    Minerva Guo

## 2023-10-19 NOTE — PATIENT INSTRUCTIONS
AFTER VISIT SUMMARY (AVS):    At today's visit we thoroughly discussed current symptoms, evaluation results, diagnosis, available treatment options, and the plan.    We discussed trial of alpha lipoic acid to help your neuropathy.  Also discussed importance of keeping your hemoglobin A1C under 7 to prevent progression of neuropathy.    Please connect with your primary care doctor regarding gallstone, found on your MRI.    Physical therapy is also important to preserve the function.    Next follow-up appointment is in the next 6 months or earlier if needed.    Please do not hesitate to call me with any questions or concerns.    Thanks.

## 2023-10-19 NOTE — PROGRESS NOTES
ESTABLISHED PATIENT NEUROLOGY NOTE    DATE OF VISIT: 10/19/2023  CLINIC LOCATION: [facility] [place]  MRN: [id number]  PATIENT NAME: Hai Meade  YOB: 1950    REASON FOR VISIT:   Chief Complaint   Patient presents with    Follow Up     Review EMG- Neuropathy      SUBJECTIVE:                                                      HISTORY OF PRESENT ILLNESS: Patient is here to follow up regarding peripheral polyneuropathy/bilateral foot drop. Please refer to my initial note from 4/26/2023 for further information.  Accompanied by her son today.    Since the last visit, the patient reports mild worsening of her symptoms.  She feels that her feet are slightly more weak.  She completed physical therapy, but does not do exercises regularly.  She denies interval development of new neurological symptoms.    MRI of the lumbar spine from 5/3/2023 did not demonstrate significant change compared to the previous study from March 2020, according to report.  Moderate spinal canal stenosis at L4-5 was seen along with mild to moderate bilateral neuroforaminal stenosis at the same level and mild bilateral neuroforaminal stenosis at L3-4 and at L5-S1.  In addition, dilatation of common bile duct with questionable intraductal filling defect was seen.  MRCP was advised to consider for further evaluation.  Images were personally reviewed and independently interpreted.  On my personal review, it appears that the degree of spinal canal stenosis at L4-5 somewhat worsened compared to 2020.    EMG from 7/24/2023 demonstrated severe sensorimotor polyneuropathy in both legs with significant neurogenic muscle changes on needle EMG with note that superimposed L4-S1 bilateral radiculopathy could not be completely excluded.  Data from EMG report were personally reviewed and independently interpreted.  EXAM:                                                    Physical Exam:   Vitals: /79 (BP Location: Right arm, Patient  Position: Sitting, Cuff Size: Adult Regular)   Pulse 69   SpO2 96%     General: pt is in NAD, cooperative.  Skin: normal turgor, moist mucous membranes, no lesions/rashes noticed.  HEENT: ATNC, white sclera, normal conjunctiva.  Respiratory: Symmetric lung excursion, no accessory respiratory muscle use.  Abdomen: Non distended.  Neurological: awake, cooperative, follows commands, no exam changes compared to the previous visits.  ASSESSMENT AND PLAN:                                                    Assessment: 73 year old female patient presents for follow-up of bilateral foot drop/diabetic polyneuropathy after completion of recommended work-up.  EMG was consistent with findings of severe sensorimotor polyneuropathy, though superimposed bilateral L4-S1 radiculopathy was not excluded.  However, lumbar spine MRI was not significantly changed, compared to 2020, and did not demonstrate severe degree of spinal stenosis or neuroforaminal narrowing to suspect multilevel radiculopathy.  I discussed findings with the patient and her son.  In my opinion, her bilateral foot drop is related to severe polyneuropathy.  Treatment at this point is symptomatic.  The rest of our discussion is summarized below.    Diagnoses:    ICD-10-CM    1. Diabetic polyneuropathy associated with type 2 diabetes mellitus (H)  E11.42       2. Bilateral foot-drop  M21.371     M21.372         Plan: At today's visit we thoroughly discussed current symptoms, evaluation results, diagnosis, available treatment options, and the plan.    We discussed trial of alpha lipoic acid to help her neuropathy.  Also discussed importance of keeping hemoglobin A1C under 7 to prevent progression of neuropathy.    We discussed that exercises shown by physical therapist are also important to preserve the function.  I encouraged the patient to do them regularly.    I advised the patient to connect with her primary care doctor regarding gallstone, found incidentally on  MRI.    Next follow-up appointment is in the next 6 months or earlier if needed.    Total Time: 45 minutes spent on the date of the encounter doing chart review, history and exam, documentation and further activities per the note.  Additional time was needed to answer numerous questions that the patient and her son had.    Baltazar Granados MD  [facility]  (Chart documentation was completed in part with Dragon voice-recognition software. Even though reviewed, some grammatical, spelling, and word errors may remain.)

## 2023-10-19 NOTE — LETTER
10/19/2023         RE: Hai Meade  [address]  [place] [address]        Dear Colleague,    Thank you for referring your patient, Hai Meade, to the [facility]. Please see a copy of my visit note below.    ESTABLISHED PATIENT NEUROLOGY NOTE    DATE OF VISIT: 10/19/2023  CLINIC LOCATION: [facility]  MRN: [id number]  PATIENT NAME: Hai Meade  YOB: 1950    REASON FOR VISIT:   Chief Complaint   Patient presents with     Follow Up     Review EMG- Neuropathy      SUBJECTIVE:                                                      HISTORY OF PRESENT ILLNESS: Patient is here to follow up regarding peripheral polyneuropathy/bilateral foot drop. Please refer to my initial note from 4/26/2023 for further information.  Accompanied by her son today.    Since the last visit, the patient reports mild worsening of her symptoms.  She feels that her feet are slightly more weak.  She completed physical therapy, but does not do exercises regularly.  She denies interval development of new neurological symptoms.    MRI of the lumbar spine from 5/3/2023 did not demonstrate significant change compared to the previous study from March 2020, according to report.  Moderate spinal canal stenosis at L4-5 was seen along with mild to moderate bilateral neuroforaminal stenosis at the same level and mild bilateral neuroforaminal stenosis at L3-4 and at L5-S1.  In addition, dilatation of common bile duct with questionable intraductal filling defect was seen.  MRCP was advised to consider for further evaluation.  Images were personally reviewed and independently interpreted.  On my personal review, it appears that the degree of spinal canal stenosis at L4-5 somewhat worsened compared to 2020.    EMG from 7/24/2023 demonstrated severe sensorimotor polyneuropathy in both legs with significant neurogenic muscle changes on needle EMG with note that superimposed L4-S1 bilateral  radiculopathy could not be completely excluded.  Data from EMG report were personally reviewed and independently interpreted.  EXAM:                                                    Physical Exam:   Vitals: /79 (BP Location: Right arm, Patient Position: Sitting, Cuff Size: Adult Regular)   Pulse 69   SpO2 96%     General: pt is in NAD, cooperative.  Skin: normal turgor, moist mucous membranes, no lesions/rashes noticed.  HEENT: ATNC, white sclera, normal conjunctiva.  Respiratory: Symmetric lung excursion, no accessory respiratory muscle use.  Abdomen: Non distended.  Neurological: awake, cooperative, follows commands, no exam changes compared to the previous visits.  ASSESSMENT AND PLAN:                                                    Assessment: 73 year old female patient presents for follow-up of bilateral foot drop/diabetic polyneuropathy after completion of recommended work-up.  EMG was consistent with findings of severe sensorimotor polyneuropathy, though superimposed bilateral L4-S1 radiculopathy was not excluded.  However, lumbar spine MRI was not significantly changed, compared to 2020, and did not demonstrate severe degree of spinal stenosis or neuroforaminal narrowing to suspect multilevel radiculopathy.  I discussed findings with the patient and her son.  In my opinion, her bilateral foot drop is related to severe polyneuropathy.  Treatment at this point is symptomatic.  The rest of our discussion is summarized below.    Diagnoses:    ICD-10-CM    1. Diabetic polyneuropathy associated with type 2 diabetes mellitus (H)  E11.42       2. Bilateral foot-drop  M21.371     M21.372         Plan: At today's visit we thoroughly discussed current symptoms, evaluation results, diagnosis, available treatment options, and the plan.    We discussed trial of alpha lipoic acid to help her neuropathy.  Also discussed importance of keeping hemoglobin A1C under 7 to prevent progression of neuropathy.    We  discussed that exercises shown by physical therapist are also important to preserve the function.  I encouraged the patient to do them regularly.    I advised the patient to connect with her primary care doctor regarding gallstone, found incidentally on MRI.    Next follow-up appointment is in the next 6 months or earlier if needed.    Total Time: 45 minutes spent on the date of the encounter doing chart review, history and exam, documentation and further activities per the note.  Additional time was needed to answer numerous questions that the patient and her son had.    Baltazar Granados MD  [facility]  (Chart documentation was completed in part with Dragon voice-recognition software. Even though reviewed, some grammatical, spelling, and word errors may remain.)    Hai Meade is a 73 year old female who presents for:  Chief Complaint   Patient presents with     Follow Up     Review EMG- Neuropathy         Initial Vitals:  /79 (BP Location: Right arm, Patient Position: Sitting, Cuff Size: Adult Regular)   Pulse 69   SpO2 96%  Estimated body mass index is 21.07 kg/m  as calculated from the following:    Height as of 8/21/23: 1.524 m (5').    Weight as of 8/21/23: 48.9 kg (107 lb 14.4 oz).. There is no height or weight on file to calculate BSA. BP completed using cuff size: regular    Minerva Guo       Again, thank you for allowing me to participate in the care of your patient.        Sincerely,        Baltazar Granados MD

## 2023-10-23 ENCOUNTER — ALLIED HEALTH/NURSE VISIT (OUTPATIENT)
Dept: FAMILY MEDICINE | Facility: CLINIC | Age: 73
End: 2023-10-23
Payer: COMMERCIAL

## 2023-10-23 DIAGNOSIS — E53.8 VITAMIN B12 DEFICIENCY (NON ANEMIC): Primary | ICD-10-CM

## 2023-10-23 PROCEDURE — 96372 THER/PROPH/DIAG INJ SC/IM: CPT | Mod: RT | Performed by: PHARMACIST

## 2023-10-23 PROCEDURE — 99207 PR NO CHARGE NURSE ONLY: CPT

## 2023-10-23 RX ADMIN — CYANOCOBALAMIN 1000 MCG: 1000 INJECTION, SOLUTION INTRAMUSCULAR; SUBCUTANEOUS at 09:05

## 2023-11-19 DIAGNOSIS — E11.9 DIABETES (H): ICD-10-CM

## 2023-11-19 DIAGNOSIS — E78.5 HYPERLIPIDEMIA: ICD-10-CM

## 2023-11-20 ENCOUNTER — APPOINTMENT (OUTPATIENT)
Dept: CT IMAGING | Facility: HOSPITAL | Age: 73
End: 2023-11-20
Attending: EMERGENCY MEDICINE
Payer: COMMERCIAL

## 2023-11-20 ENCOUNTER — APPOINTMENT (OUTPATIENT)
Dept: RADIOLOGY | Facility: HOSPITAL | Age: 73
End: 2023-11-20
Attending: EMERGENCY MEDICINE
Payer: COMMERCIAL

## 2023-11-20 ENCOUNTER — HOSPITAL ENCOUNTER (OUTPATIENT)
Facility: HOSPITAL | Age: 73
Setting detail: OBSERVATION
Discharge: HOME HEALTH | End: 2023-11-22
Attending: EMERGENCY MEDICINE | Admitting: EMERGENCY MEDICINE
Payer: COMMERCIAL

## 2023-11-20 DIAGNOSIS — S22.42XA CLOSED FRACTURE OF MULTIPLE RIBS OF LEFT SIDE, INITIAL ENCOUNTER: ICD-10-CM

## 2023-11-20 DIAGNOSIS — K59.03 DRUG-INDUCED CONSTIPATION: Primary | ICD-10-CM

## 2023-11-20 LAB
ALBUMIN UR-MCNC: NEGATIVE MG/DL
ANION GAP SERPL CALCULATED.3IONS-SCNC: 13 MMOL/L (ref 7–15)
APPEARANCE UR: ABNORMAL
BACTERIA #/AREA URNS HPF: ABNORMAL /HPF
BASOPHILS # BLD AUTO: 0 10E3/UL (ref 0–0.2)
BASOPHILS NFR BLD AUTO: 0 %
BILIRUB UR QL STRIP: NEGATIVE
BUN SERPL-MCNC: 9.5 MG/DL (ref 8–23)
CALCIUM SERPL-MCNC: 9.6 MG/DL (ref 8.8–10.2)
CHLORIDE SERPL-SCNC: 93 MMOL/L (ref 98–107)
COLOR UR AUTO: ABNORMAL
CREAT SERPL-MCNC: 0.54 MG/DL (ref 0.51–0.95)
DEPRECATED HCO3 PLAS-SCNC: 25 MMOL/L (ref 22–29)
EGFRCR SERPLBLD CKD-EPI 2021: >90 ML/MIN/1.73M2
EOSINOPHIL # BLD AUTO: 0.1 10E3/UL (ref 0–0.7)
EOSINOPHIL NFR BLD AUTO: 1 %
ERYTHROCYTE [DISTWIDTH] IN BLOOD BY AUTOMATED COUNT: 12.2 % (ref 10–15)
GLUCOSE BLDC GLUCOMTR-MCNC: 133 MG/DL (ref 70–99)
GLUCOSE SERPL-MCNC: 135 MG/DL (ref 70–99)
GLUCOSE UR STRIP-MCNC: NEGATIVE MG/DL
HCT VFR BLD AUTO: 35.4 % (ref 35–47)
HGB BLD-MCNC: 12 G/DL (ref 11.7–15.7)
HGB UR QL STRIP: NEGATIVE
IMM GRANULOCYTES # BLD: 0 10E3/UL
IMM GRANULOCYTES NFR BLD: 0 %
KETONES UR STRIP-MCNC: NEGATIVE MG/DL
LEUKOCYTE ESTERASE UR QL STRIP: ABNORMAL
LYMPHOCYTES # BLD AUTO: 1.3 10E3/UL (ref 0.8–5.3)
LYMPHOCYTES NFR BLD AUTO: 13 %
MCH RBC QN AUTO: 29.3 PG (ref 26.5–33)
MCHC RBC AUTO-ENTMCNC: 33.9 G/DL (ref 31.5–36.5)
MCV RBC AUTO: 87 FL (ref 78–100)
MONOCYTES # BLD AUTO: 0.6 10E3/UL (ref 0–1.3)
MONOCYTES NFR BLD AUTO: 6 %
MUCOUS THREADS #/AREA URNS LPF: PRESENT /LPF
NEUTROPHILS # BLD AUTO: 7.6 10E3/UL (ref 1.6–8.3)
NEUTROPHILS NFR BLD AUTO: 80 %
NITRATE UR QL: NEGATIVE
NRBC # BLD AUTO: 0 10E3/UL
NRBC BLD AUTO-RTO: 0 /100
PH UR STRIP: 6.5 [PH] (ref 5–7)
PLATELET # BLD AUTO: 238 10E3/UL (ref 150–450)
POTASSIUM SERPL-SCNC: 3.6 MMOL/L (ref 3.4–5.3)
RBC # BLD AUTO: 4.09 10E6/UL (ref 3.8–5.2)
RBC URINE: 3 /HPF
SODIUM SERPL-SCNC: 131 MMOL/L (ref 135–145)
SODIUM UR-SCNC: 87 MMOL/L
SP GR UR STRIP: 1.01 (ref 1–1.03)
TROPONIN T SERPL HS-MCNC: 14 NG/L
UROBILINOGEN UR STRIP-MCNC: <2 MG/DL
WBC # BLD AUTO: 9.6 10E3/UL (ref 4–11)
WBC URINE: 20 /HPF

## 2023-11-20 PROCEDURE — 96361 HYDRATE IV INFUSION ADD-ON: CPT

## 2023-11-20 PROCEDURE — 82962 GLUCOSE BLOOD TEST: CPT

## 2023-11-20 PROCEDURE — 36415 COLL VENOUS BLD VENIPUNCTURE: CPT | Performed by: EMERGENCY MEDICINE

## 2023-11-20 PROCEDURE — 84484 ASSAY OF TROPONIN QUANT: CPT | Performed by: INTERNAL MEDICINE

## 2023-11-20 PROCEDURE — 80048 BASIC METABOLIC PNL TOTAL CA: CPT | Performed by: EMERGENCY MEDICINE

## 2023-11-20 PROCEDURE — 250N000013 HC RX MED GY IP 250 OP 250 PS 637: Performed by: EMERGENCY MEDICINE

## 2023-11-20 PROCEDURE — 72128 CT CHEST SPINE W/O DYE: CPT

## 2023-11-20 PROCEDURE — 96374 THER/PROPH/DIAG INJ IV PUSH: CPT

## 2023-11-20 PROCEDURE — G0378 HOSPITAL OBSERVATION PER HR: HCPCS

## 2023-11-20 PROCEDURE — 96375 TX/PRO/DX INJ NEW DRUG ADDON: CPT

## 2023-11-20 PROCEDURE — 81001 URINALYSIS AUTO W/SCOPE: CPT | Performed by: INTERNAL MEDICINE

## 2023-11-20 PROCEDURE — 72125 CT NECK SPINE W/O DYE: CPT

## 2023-11-20 PROCEDURE — 250N000011 HC RX IP 250 OP 636: Performed by: EMERGENCY MEDICINE

## 2023-11-20 PROCEDURE — 99285 EMERGENCY DEPT VISIT HI MDM: CPT | Mod: 25

## 2023-11-20 PROCEDURE — 99223 1ST HOSP IP/OBS HIGH 75: CPT | Performed by: INTERNAL MEDICINE

## 2023-11-20 PROCEDURE — 87186 SC STD MICRODIL/AGAR DIL: CPT | Mod: XU | Performed by: INTERNAL MEDICINE

## 2023-11-20 PROCEDURE — 85025 COMPLETE CBC W/AUTO DIFF WBC: CPT | Performed by: EMERGENCY MEDICINE

## 2023-11-20 PROCEDURE — 70450 CT HEAD/BRAIN W/O DYE: CPT

## 2023-11-20 PROCEDURE — 250N000011 HC RX IP 250 OP 636: Mod: JZ | Performed by: INTERNAL MEDICINE

## 2023-11-20 PROCEDURE — 71101 X-RAY EXAM UNILAT RIBS/CHEST: CPT | Mod: LT

## 2023-11-20 PROCEDURE — 258N000003 HC RX IP 258 OP 636: Performed by: INTERNAL MEDICINE

## 2023-11-20 PROCEDURE — 250N000013 HC RX MED GY IP 250 OP 250 PS 637: Performed by: INTERNAL MEDICINE

## 2023-11-20 PROCEDURE — 84300 ASSAY OF URINE SODIUM: CPT | Performed by: INTERNAL MEDICINE

## 2023-11-20 RX ORDER — AMOXICILLIN 250 MG
2 CAPSULE ORAL 2 TIMES DAILY PRN
Status: DISCONTINUED | OUTPATIENT
Start: 2023-11-20 | End: 2023-11-22 | Stop reason: HOSPADM

## 2023-11-20 RX ORDER — SIMVASTATIN 10 MG
40 TABLET ORAL AT BEDTIME
Status: DISCONTINUED | OUTPATIENT
Start: 2023-11-20 | End: 2023-11-22 | Stop reason: HOSPADM

## 2023-11-20 RX ORDER — GABAPENTIN 100 MG/1
100 CAPSULE ORAL AT BEDTIME
Status: DISCONTINUED | OUTPATIENT
Start: 2023-11-20 | End: 2023-11-22 | Stop reason: HOSPADM

## 2023-11-20 RX ORDER — NALOXONE HYDROCHLORIDE 0.4 MG/ML
0.4 INJECTION, SOLUTION INTRAMUSCULAR; INTRAVENOUS; SUBCUTANEOUS
Status: DISCONTINUED | OUTPATIENT
Start: 2023-11-20 | End: 2023-11-22 | Stop reason: HOSPADM

## 2023-11-20 RX ORDER — LIDOCAINE 4 G/G
1 PATCH TOPICAL
Status: DISCONTINUED | OUTPATIENT
Start: 2023-11-20 | End: 2023-11-22 | Stop reason: HOSPADM

## 2023-11-20 RX ORDER — NALOXONE HYDROCHLORIDE 0.4 MG/ML
0.2 INJECTION, SOLUTION INTRAMUSCULAR; INTRAVENOUS; SUBCUTANEOUS
Status: DISCONTINUED | OUTPATIENT
Start: 2023-11-20 | End: 2023-11-22 | Stop reason: HOSPADM

## 2023-11-20 RX ORDER — HYDRALAZINE HYDROCHLORIDE 20 MG/ML
10 INJECTION INTRAMUSCULAR; INTRAVENOUS EVERY 6 HOURS PRN
Status: DISCONTINUED | OUTPATIENT
Start: 2023-11-20 | End: 2023-11-22 | Stop reason: HOSPADM

## 2023-11-20 RX ORDER — ACETAMINOPHEN 325 MG/1
975 TABLET ORAL 3 TIMES DAILY
Status: DISCONTINUED | OUTPATIENT
Start: 2023-11-20 | End: 2023-11-22 | Stop reason: HOSPADM

## 2023-11-20 RX ORDER — SIMVASTATIN 40 MG
40 TABLET ORAL AT BEDTIME
Qty: 90 TABLET | Refills: 0 | Status: SHIPPED | OUTPATIENT
Start: 2023-11-20 | End: 2024-02-19

## 2023-11-20 RX ORDER — LIDOCAINE 4 G/G
1 PATCH TOPICAL ONCE
Status: COMPLETED | OUTPATIENT
Start: 2023-11-20 | End: 2023-11-21

## 2023-11-20 RX ORDER — LOSARTAN POTASSIUM 25 MG/1
25 TABLET ORAL DAILY
Status: DISCONTINUED | OUTPATIENT
Start: 2023-11-21 | End: 2023-11-22 | Stop reason: HOSPADM

## 2023-11-20 RX ORDER — AMOXICILLIN 250 MG
1 CAPSULE ORAL 2 TIMES DAILY PRN
Status: DISCONTINUED | OUTPATIENT
Start: 2023-11-20 | End: 2023-11-22 | Stop reason: HOSPADM

## 2023-11-20 RX ORDER — OXYCODONE HYDROCHLORIDE 5 MG/1
5 TABLET ORAL EVERY 6 HOURS PRN
Status: DISCONTINUED | OUTPATIENT
Start: 2023-11-20 | End: 2023-11-22 | Stop reason: HOSPADM

## 2023-11-20 RX ORDER — POLYETHYLENE GLYCOL 3350 17 G/17G
17 POWDER, FOR SOLUTION ORAL DAILY
Status: DISCONTINUED | OUTPATIENT
Start: 2023-11-20 | End: 2023-11-22 | Stop reason: HOSPADM

## 2023-11-20 RX ORDER — OXYCODONE HYDROCHLORIDE 5 MG/1
5 TABLET ORAL ONCE
Status: COMPLETED | OUTPATIENT
Start: 2023-11-20 | End: 2023-11-20

## 2023-11-20 RX ORDER — MORPHINE SULFATE 2 MG/ML
2 INJECTION, SOLUTION INTRAMUSCULAR; INTRAVENOUS ONCE
Status: COMPLETED | OUTPATIENT
Start: 2023-11-20 | End: 2023-11-20

## 2023-11-20 RX ORDER — SODIUM CHLORIDE 9 MG/ML
INJECTION, SOLUTION INTRAVENOUS CONTINUOUS
Status: DISCONTINUED | OUTPATIENT
Start: 2023-11-20 | End: 2023-11-21

## 2023-11-20 RX ORDER — ASPIRIN 81 MG/1
81 TABLET ORAL EVERY EVENING
Status: DISCONTINUED | OUTPATIENT
Start: 2023-11-20 | End: 2023-11-22 | Stop reason: HOSPADM

## 2023-11-20 RX ORDER — ONDANSETRON 4 MG/1
4 TABLET, ORALLY DISINTEGRATING ORAL EVERY 6 HOURS PRN
Status: DISCONTINUED | OUTPATIENT
Start: 2023-11-20 | End: 2023-11-22 | Stop reason: HOSPADM

## 2023-11-20 RX ORDER — CYCLOBENZAPRINE HCL 10 MG
10 TABLET ORAL ONCE
Status: COMPLETED | OUTPATIENT
Start: 2023-11-20 | End: 2023-11-20

## 2023-11-20 RX ORDER — ONDANSETRON 2 MG/ML
4 INJECTION INTRAMUSCULAR; INTRAVENOUS EVERY 6 HOURS PRN
Status: DISCONTINUED | OUTPATIENT
Start: 2023-11-20 | End: 2023-11-22 | Stop reason: HOSPADM

## 2023-11-20 RX ORDER — HYDROMORPHONE HCL IN WATER/PF 6 MG/30 ML
0.2 PATIENT CONTROLLED ANALGESIA SYRINGE INTRAVENOUS EVERY 4 HOURS PRN
Status: DISCONTINUED | OUTPATIENT
Start: 2023-11-20 | End: 2023-11-21

## 2023-11-20 RX ADMIN — SODIUM CHLORIDE: 9 INJECTION, SOLUTION INTRAVENOUS at 19:50

## 2023-11-20 RX ADMIN — OXYCODONE HYDROCHLORIDE 5 MG: 5 TABLET ORAL at 14:24

## 2023-11-20 RX ADMIN — HYDROMORPHONE HYDROCHLORIDE 0.2 MG: 0.2 INJECTION, SOLUTION INTRAMUSCULAR; INTRAVENOUS; SUBCUTANEOUS at 22:17

## 2023-11-20 RX ADMIN — Medication 81 MG: at 19:48

## 2023-11-20 RX ADMIN — ACETAMINOPHEN 975 MG: 325 TABLET ORAL at 19:49

## 2023-11-20 RX ADMIN — SIMVASTATIN 40 MG: 10 TABLET, FILM COATED ORAL at 22:17

## 2023-11-20 RX ADMIN — LIDOCAINE 1 PATCH: 4 PATCH TOPICAL at 19:50

## 2023-11-20 RX ADMIN — LIDOCAINE 1 PATCH: 4 PATCH TOPICAL at 14:26

## 2023-11-20 RX ADMIN — CYCLOBENZAPRINE HYDROCHLORIDE 10 MG: 10 TABLET, FILM COATED ORAL at 14:24

## 2023-11-20 RX ADMIN — MORPHINE SULFATE 2 MG: 2 INJECTION, SOLUTION INTRAMUSCULAR; INTRAVENOUS at 17:08

## 2023-11-20 RX ADMIN — GABAPENTIN 100 MG: 100 CAPSULE ORAL at 22:17

## 2023-11-20 ASSESSMENT — ENCOUNTER SYMPTOMS: BACK PAIN: 1

## 2023-11-20 ASSESSMENT — ACTIVITIES OF DAILY LIVING (ADL)
DEPENDENT_IADLS:: CLEANING;COOKING;LAUNDRY;SHOPPING;MEAL PREPARATION;MEDICATION MANAGEMENT;TRANSPORTATION;MONEY MANAGEMENT
ADLS_ACUITY_SCORE: 35
ADLS_ACUITY_SCORE: 35
ADLS_ACUITY_SCORE: 37
ADLS_ACUITY_SCORE: 35
ADLS_ACUITY_SCORE: 37

## 2023-11-20 NOTE — ED PROVIDER NOTES
EMERGENCY DEPARTMENT ENCOUNTER     NAME: Hai Meade   AGE: 73 year old female   YOB: 1950   MRN: [id number]   EVALUATION DATE & TIME: 11/20/2023  1:44 PM   PCP: Brie Acevedo     Chief Complaint   Patient presents with    Fall    Back Pain    Shoulder Pain    Shortness of Breath   :    FINAL IMPRESSION       1. Closed fracture of multiple ribs of left side, initial encounter           ED COURSE & MEDICAL DECISION MAKING      Pertinent Labs & Imaging studies reviewed. (See chart for details)   73 year old female  presents to the Emergency Department for evaluation of left posterior rib pain after mechanical fall in the garage today. Initial Vitals Reviewed. Initial exam notable for patient who is tearful and needs assistance just to sit up in the bed.  She has tenderness point of the left posterior lateral inferior rib cage margin.  Notably, there is no cervical or thoracic spine tenderness and no crepitus or deformity, no overlying skin change.  I suspect probable rib fracture versus contusion.  Family witnessed and said she did not hit her head or lose consciousness.  I do not think she requires imaging of her head or spine based on this mechanism.  We did a rib series which shows 3 posterior rib fractures consistent with her pain.  There is no pneumothorax or other abnormality.  She is not hypoxic.  We did trial a lidocaine patch and oral medications with oxycodone and Flexeril and she is still very uncomfortable and cannot get up without assistance.  Therefore, I will place an IV and start IV pain control.  Case discussed with hospitalist for admission.  I will also notify general surgery as per protocol.        2:04 PM Met with the patient and performed my initial exam.  4:26 PM Updated patient on imaging results.      At the conclusion of the encounter I discussed the results of all of the tests and the disposition. The questions were answered. The patient or family acknowledged understanding  and was agreeable with the care plan.     0 minutes critical care time, see procedure note below for details if relevant    Medical Decision Making    History:  Supplemental history from: Family Member/Significant Other  External Record(s) reviewed: Documented in chart, if applicable.    Work Up:  Chart documentation includes differential considered and any EKGs or imaging independently interpreted by provider, where specified.  In additional to work up documented, I considered the following work up: Documented in chart, if applicable.    External consultation:  Discussion of management with another provider: Hospitalist    Complicating factors:  Care impacted by chronic illness: Cancer/Chemotherapy, Diabetes, and Hyperlipidemia  Care affected by social determinants of health: Access to Medical Care    Disposition considerations: Admit.        MEDICATIONS GIVEN IN THE EMERGENCY:   Medications   Lidocaine (LIDOCARE) 4 % Patch 1 patch (1 patch Transdermal $Patch/Med Applied 11/20/23 1426)   morphine (PF) injection 2 mg (has no administration in time range)   oxyCODONE (ROXICODONE) tablet 5 mg (5 mg Oral $Given 11/20/23 1424)   cyclobenzaprine (FLEXERIL) tablet 10 mg (10 mg Oral $Given 11/20/23 1424)      NEW PRESCRIPTIONS STARTED AT TODAY'S ER VISIT   New Prescriptions    No medications on file     ================================================================   HISTORY OF PRESENT ILLNESS       Patient information was obtained from: Patient's family   Use of Intrepreter: N/A  Hai Meade is a 73 year old female with history of cervical cancer, type 2 diabetes mellitus, peripheral neuropathy, cholelithiasis, hyperlipidemia, postoperative ileus, and oophorectomy, who presents to the ED via private car for evaluation of a fall, back pain, shoulder pain, and shortness of breath.    Per patient's family member, patient reports that patient fell on her left side at around 10 am. Notes that she would gradually feel  "left-sided lower back pain that radiates up to upper back, near the left-sided ribs and left-sided shoulder. Reports pain gradually worsens with movement and when she breaths in and out. She describes the pain as \"stabbing\" pain that radiates up. She didn't hit her head. She hasn't taken pain medications. No other reported complaints or concerns at this time.     ================================================================  Review of Systems   Musculoskeletal:  Positive for back pain (left-sided).        Positive for left-sided shoulder pain   All other systems reviewed and are negative.        PAST HISTORY     PAST MEDICAL HISTORY:   Past Medical History:   Diagnosis Date    Anemia     Cervical cancer (H)     Cholelithiasis     Diabetes mellitus, type II (H)     Hyperlipidemia     Postoperative ileus (H)     Small bowel obstruction (H)     Vision problems       PAST SURGICAL HISTORY:   Past Surgical History:   Procedure Laterality Date    COLECTOMY N/A 6/23/2019    Procedure: WITH OPEN SMALL BOWEL RESECTION;  Surgeon: Ramon Wray MD;  Location: [facility];  Service: General    FLEXIBLE SIGMOIDOSCOPY  2009    HYSTERECTOMY  2007    cervix ca    OOPHORECTOMY  2006    cervix ca    LA BSO, OMENTECTOMY W/SILVIA      Description: Salp-oophorect Bilat W/Omentect, Total Abd Hysterect, Rad Dissect For Malig;  Recorded: 03/28/2007;    LA LAP,DIAGNOSTIC ABDOMEN N/A 6/23/2019    Procedure: LAPAROSCOPY lysis of adhesions,;  Surgeon: Ramon Wray MD;  Location: [facility];  Service: General      CURRENT MEDICATIONS:   acetaminophen (TYLENOL) 500 MG tablet  aspirin 81 MG EC tablet  blood glucose (ACCU-CHEK LOIDA PLUS) test strip  blood glucose (NO BRAND SPECIFIED) lancets standard  blood glucose (NO BRAND SPECIFIED) test strip  blood pressure monitor Kit  Calcium Carb-Cholecalciferol 500-10 MG-MCG CHEW  Cholecalciferol (D3-1000) 25 MCG (1000 UT) CAPS  gabapentin (NEURONTIN) 100 MG capsule  generic lancets " (ACCU-CHEK SOFTCLIX LANCETS)  glipiZIDE (GLUCOTROL XL) 2.5 MG 24 hr tablet  losartan (COZAAR) 25 MG tablet  metFORMIN (GLUCOPHAGE) 1000 MG tablet  simvastatin (ZOCOR) 40 MG tablet      ALLERGIES:   Allergies   Allergen Reactions    Cymbalta [Duloxetine] Unknown     Nausea and vomiting      FAMILY HISTORY:   No family history on file.   SOCIAL HISTORY:   Social History     Socioeconomic History    Marital status:    Tobacco Use    Smoking status: Never    Smokeless tobacco: Never   Vaping Use    Vaping Use: Never used   Substance and Sexual Activity    Alcohol use: No    Drug use: No   Social History Narrative    Lives with her family.        VITALS  Patient Vitals for the past 24 hrs:   BP Temp Temp src Pulse Resp SpO2 Weight   11/20/23 1500 (!) 182/86 -- -- 68 -- 97 % --   11/20/23 1445 (!) 206/97 -- -- 67 -- 98 % --   11/20/23 1430 (!) 221/104 -- -- 71 -- 97 % --   11/20/23 1415 (!) 206/100 -- -- 71 -- 98 % --   11/20/23 1400 (!) 214/88 -- -- 71 -- 99 % --   11/20/23 1345 (!) 238/109 -- -- -- -- -- --   11/20/23 1337 (!) 194/92 98.6  F (37  C) Oral 71 20 99 % 52.2 kg (115 lb)        ================================================================    PHYSICAL EXAM     VITAL SIGNS: BP (!) 182/86   Pulse 68   Temp 98.6  F (37  C) (Oral)   Resp 20   Wt 52.2 kg (115 lb)   SpO2 97%   BMI 22.46 kg/m     Constitutional:  Awake, no acute distress   HENT:  Atraumatic, oropharynx without exudate or erythema, membranes moist  Lymph:  No adenopathy  Eyes: EOM intact, PERRL, no injection  Neck: Supple  Respiratory:  Clear to auscultation bilaterally, no wheezes or crackles   Cardiovascular:  Regular rate and rhythm, single S1 and S2   GI:  Soft, nontender, nondistended, no rebound or guarding   Musculoskeletal:  Moves all extremities, no lower extremity edema, no deformities. No spinal tenderness. Left posterior inferior rib tenderness.  Skin:  Warm, dry  Neurologic:  Alert and oriented x3, no focal deficits noted        ================================================================  LAB       All pertinent labs reviewed and interpreted.   Labs Ordered and Resulted from Time of ED Arrival to Time of ED Departure - No data to display     ===============================================================  RADIOLOGY       Reviewed all pertinent imaging. Please see official radiology report.   Ribs XR, unilat 3 views + PA chest,  left   Final Result   IMPRESSION: Acute mildly displaced fractures lateral aspect left seventh, eighth and ninth ribs. Bones are demineralized.      Multiple strands of linear atelectasis in both lower lungs. Lungs are otherwise clear. No pneumothorax. There may be a trace amount of pleural fluid and/or thickening inferior left hemithorax. Mild generalized cardiac enlargement. Normal pulmonary    vascularity. Calcified tortuous thoracic aorta. Cholelithiasis.      CT Thoracic Spine w/o Contrast   Final Result   IMPRESSION:   CERVICAL SPINE CT:   1.  No fracture or posttraumatic subluxation.   2.  Multilevel cervical spondylosis most significant at C5-C6 as above.      THORACIC SPINE CT:   1.  No fracture or posttraumatic subluxation.   2.  Mild thoracic spondylosis without high-grade spinal canal or neural foraminal stenosis.   3.  Cholelithiasis.         Cervical spine CT w/o contrast   Final Result   IMPRESSION:   CERVICAL SPINE CT:   1.  No fracture or posttraumatic subluxation.   2.  Multilevel cervical spondylosis most significant at C5-C6 as above.      THORACIC SPINE CT:   1.  No fracture or posttraumatic subluxation.   2.  Mild thoracic spondylosis without high-grade spinal canal or neural foraminal stenosis.   3.  Cholelithiasis.         Head CT w/o contrast   Final Result   IMPRESSION:   1.  No CT evidence for acute intracranial process.    2.  Brain atrophy and presumed chronic microvascular ischemic changes as above.                ================================================================  EKG         I have independently reviewed and interpreted the EKG(s) documented above.     ================================================================  PROCEDURES         I, Zenobia Russell, am serving as a scribe to document services personally performed by Dr. Yun based on my observation and the provider's statements to me. I, Svetlana Yun MD attest that Zenobia Russell is acting in a scribe capacity, has observed my performance of the services and has documented them in accordance with my direction.     Svetlana Yun M.D.   Emergency Medicine   [facility] EMERGENCY DEPARTMENT  [address] [place] [id number]  [phone]  Dept: [phone]      Svetlana Yun MD  11/20/23 8208

## 2023-11-20 NOTE — MEDICATION SCRIBE - ADMISSION MEDICATION HISTORY
Medication Scribe Admission Medication History    Admission medication history is complete. The information provided in this note is only as accurate as the sources available at the time of the update.    Information Source(s): Patient and Family member via in-person    Pertinent Information: Patient reports having assistance from daughters in maintaining her medications. A daughter was present for interview and also acted as an .    Patient reports taking some of their daily medications sporadically: Calcium, D3, and Gabapentin    Changes made to PTA medication list:  Added: None  Deleted: None  Changed: None    Medication Affordability: No       Allergies reviewed with patient and updates made in EHR: yes    Medication History Completed By: Randy Wang 11/20/2023 5:11 PM    Current Facility-Administered Medications for the 11/20/23 encounter (Hospital Encounter)   Medication    cyanocobalamin injection 1,000 mcg     PTA Med List   Medication Sig Last Dose    acetaminophen (TYLENOL) 500 MG tablet [ACETAMINOPHEN (TYLENOL) 500 MG TABLET] Take 1-2 tablets (500-1,000 mg total) by mouth every 4 (four) hours as needed. Unknown at prn    aspirin 81 MG EC tablet Take 81 mg by mouth every evening 11/19/2023 at pm    Calcium Carb-Cholecalciferol 500-10 MG-MCG CHEW CHEW 1 TABLET TWICE DAILY Past Week at am    Cholecalciferol (D3-1000) 25 MCG (1000 UT) CAPS Take 1 capsule by mouth daily Past Week at am    gabapentin (NEURONTIN) 100 MG capsule TAKE 1 CAPSULE BY MOUTH EVERY NIGHT AT BEDTIME Past Week at am    glipiZIDE (GLUCOTROL XL) 2.5 MG 24 hr tablet Take 1 tablet (2.5 mg) by mouth daily as needed Past Month at prn    losartan (COZAAR) 25 MG tablet Take 1 tablet (25 mg) by mouth daily 11/20/2023 at am    metFORMIN (GLUCOPHAGE) 1000 MG tablet TAKE 1 TABLET(1000 MG) BY MOUTH TWICE DAILY WITH MEALS 11/20/2023 at am    simvastatin (ZOCOR) 40 MG tablet TAKE 1 TABLET(40 MG) BY MOUTH AT BEDTIME 11/19/2023 at pm

## 2023-11-20 NOTE — ED TRIAGE NOTES
Pt fell in garage at 10am landing on a bucket on left side, c/o 10/10 sharp stabbing  pain, worsens with deep breath. Denies thinners or hitting head     Triage Assessment (Adult)       Row Name 11/20/23 1348          Triage Assessment    Airway WDL WDL        Respiratory WDL    Respiratory WDL X  sob

## 2023-11-20 NOTE — H&P
[facility]    History and Physical - Hospitalist Service       Date of Admission:  11/20/2023    Assessment & Plan      73 year old female with past medical history of cervical cancer, type 2 diabetes mellitus, peripheral neuropathy, cholelithiasis, hyperlipidemia, and hypertension who presents to the ED via private car for evaluation of a fall, back pain, shoulder pain.  Patient found to have multiple left-sided rib fracture.  Admitted for pain control    Traumatic acute left 3 ribs fracture  - S/P mechanical fall  -X-ray ribs shows acute mildly displaced fracture of lateral aspect of 7 8 and 9 ribs.  -Start scheduled Tylenol, lidocaine patch  - Add oxycodone as needed.  -IV Dilaudid as needed for breakthrough pain  -Trauma surgery consult    S/P fall  - Patient had recurrent fall as per family  - CT thoracic spine shows multilevel cervical spondylosis  - CT head is negative for acute intracranial process.  - PT/OT evaluation    Hypertensive urgency  - Elevated blood pressure, secondary to pain  - Resume home medications  - Start IV hydralazine.  - Check echo    Diabetes mellitus type 2  -Hold home metformin as patient had CT contrast  -Resume home medications  -Cover insulin scale  -Check Hemoglobin A1c    Hyponatremia  - Mild  - Start IV fluid  - Check urine sodium    Nausea  -After receiving morphine  -Advance diet as tolerated  -Continue Zofran as needed  -Change to Dilaudid IV       Observation Goals: -diagnostic tests and consults completed and resulted, -vital signs normal or at patient baseline, -tolerating oral intake to maintain hydration, -adequate pain control on oral analgesics, -returns to baseline functional status, -safe disposition plan has been identified, Nurse to notify provider when observation goals have been met and patient is ready for discharge.  Diet: Moderate Consistent Carb (60 g CHO per Meal) Diet    DVT Prophylaxis: Pneumatic Compression Devices  Lr  "Catheter: Not present  Lines: None     Cardiac Monitoring: None  Code Status: Full Code      Clinically Significant Risk Factors Present on Admission                # Drug Induced Platelet Defect: home medication list includes an antiplatelet medication   # Hypertension: Home medication list includes antihypertensive(s)     # DMII: A1C = N/A within past 6 months               Disposition Plan      Expected Discharge Date: 11/21/2023                  Tee Flores MD  Hospitalist Service  [facility]  Securely message with [employer] (more info)  Text page via [employer] Paging/Directory     ______________________________________________________________________    Chief Complaint   Chest wall pain s/p fall    History is obtained from the patient    History of Present Illness   Hai Meade is a 73 year old female with past medical history of cervical cancer, type 2 diabetes mellitus, peripheral neuropathy, cholelithiasis, hyperlipidemia, and hypertension who presents to the ED via private car for evaluation of a fall, back pain, shoulder pain.  Basically, patient reports that patient fell on her left side at around 10 am. Notes that she would gradually feel left-sided lower back pain that radiates up to upper back, near the left-sided ribs and left-sided shoulder. Reports pain gradually worsens with movement and when she breaths in and out. She describes the pain as \"stabbing\" pain that radiates up. She didn't hit her head. She hasn't taken pain medications. No other reported complaints or concerns at this time.  No history of head trauma or loss of consciousness.  No shortness of breath.  Of note the patient had multiple falls in the past but not as bad as this 1.      Past Medical History    Past Medical History:   Diagnosis Date    Anemia     Cervical cancer (H)     Cholelithiasis     Diabetes mellitus, type II (H)     Hyperlipidemia     Postoperative ileus (H)     Small bowel obstruction (H)     " Vision problems        Past Surgical History   Past Surgical History:   Procedure Laterality Date    COLECTOMY N/A 6/23/2019    Procedure: WITH OPEN SMALL BOWEL RESECTION;  Surgeon: Ramon Wray MD;  [place] [facility] [place]: General    FLEXIBLE SIGMOIDOSCOPY  2009    HYSTERECTOMY  2007    cervix ca    OOPHORECTOMY  2006    cervix ca    HI BSO, OMENTECTOMY W/SIVLIA      Description: Salp-oophorect Bilat W/Omentect, Total Abd Hysterect, Rad Dissect For Malig;  Recorded: 03/28/2007;    HI LAP,DIAGNOSTIC ABDOMEN N/A 6/23/2019    Procedure: LAPAROSCOPY lysis of adhesions,;  Surgeon: Ramon Wray MD;  [place] [facility] [place]  Service: General       Prior to Admission Medications   Prior to Admission Medications   Prescriptions Last Dose Informant Patient Reported? Taking?   Calcium Carb-Cholecalciferol 500-10 MG-MCG CHEW Past Week at am  No Yes   Sig: CHEW 1 TABLET TWICE DAILY   Cholecalciferol (D3-1000) 25 MCG (1000 UT) CAPS Past Week at am  No Yes   Sig: Take 1 capsule by mouth daily   acetaminophen (TYLENOL) 500 MG tablet Unknown at prn  No Yes   Sig: [ACETAMINOPHEN (TYLENOL) 500 MG TABLET] Take 1-2 tablets (500-1,000 mg total) by mouth every 4 (four) hours as needed.   aspirin 81 MG EC tablet 11/19/2023 at pm  Yes Yes   Sig: Take 81 mg by mouth every evening   blood glucose (NO BRAND SPECIFIED) lancets standard n/a at n/a  No No   Sig: Use to test blood sugar 1  times daily or as directed.   gabapentin (NEURONTIN) 100 MG capsule Past Week at am  No Yes   Sig: TAKE 1 CAPSULE BY MOUTH EVERY NIGHT AT BEDTIME   generic lancets (ACCU-CHEK SOFTCLIX LANCETS) n/a at n/a  No No   Sig: [GENERIC LANCETS (ACCU-CHEK SOFTCLIX LANCETS)] TEST TWICE DAILY   glipiZIDE (GLUCOTROL XL) 2.5 MG 24 hr tablet Past Month at prn  No Yes   Sig: Take 1 tablet (2.5 mg) by mouth daily as needed   losartan (COZAAR) 25 MG tablet 11/20/2023 at am  No Yes   Sig: Take 1 tablet (25 mg) by mouth daily   metFORMIN (GLUCOPHAGE)  1000 MG tablet 11/20/2023 at am  No Yes   Sig: TAKE 1 TABLET(1000 MG) BY MOUTH TWICE DAILY WITH MEALS   simvastatin (ZOCOR) 40 MG tablet 11/19/2023 at pm  No Yes   Sig: TAKE 1 TABLET(40 MG) BY MOUTH AT BEDTIME      Facility-Administered Medications Last Administration Doses Remaining   cyanocobalamin injection 1,000 mcg 10/23/2023  9:05 AM            Review of Systems    The 10 point Review of Systems is negative other than noted in the HPI or here.     Social History   I have reviewed this patient's social history and updated it with pertinent information if needed.  Social History     Tobacco Use    Smoking status: Never    Smokeless tobacco: Never   Vaping Use    Vaping Use: Never used   Substance Use Topics    Alcohol use: No    Drug use: No         Family History     Positive for diabetes and hypertension      Allergies   Allergies   Allergen Reactions    Cymbalta [Duloxetine] Unknown     Nausea and vomiting        Physical Exam   Vital Signs: Temp: 98.6  F (37  C) Temp src: Oral BP: (!) 182/86 Pulse: 68   Resp: 20 SpO2: 97 % O2 Device: None (Room air)    Weight: 115 lbs 0 oz    General Appearance: Sleeping comfortable in bed in no acute respiratory distress  Respiratory: Decreased breath sounds on lung base with scattered rhonchi, positive tenderness on left anterior chest wall.  Cardiovascular: Normal heart sound, no murmur.  GI: Soft, normal bowel sounds, no tenderness.  Skin: Dry, warm, no rash.  Other: No focal deficit.    Medical Decision Making       75 MINUTES SPENT BY ME on the date of service doing chart review, history, exam, documentation & further activities per the note.      Data     I have personally reviewed the following data over the past 24 hrs:    9.6  \   12.0   / 238     131 (L) 93 (L) 9.5 /  133 (H)   3.6 25 0.54 \       Imaging results reviewed over the past 24 hrs:   Recent Results (from the past 24 hour(s))   Head CT w/o contrast    Narrative    EXAM: CT HEAD W/O CONTRAST  [facility]ATION: [facility]  DATE: 11/20/2023    INDICATION: Pain post fall  COMPARISON: MRI 04/08/2019  TECHNIQUE: Routine CT Head without IV contrast. Multiplanar reformats. Dose reduction techniques were used.    FINDINGS:  INTRACRANIAL CONTENTS: No intracranial hemorrhage, extraaxial collection, or mass effect.  No CT evidence of acute infarct. Mild presumed chronic small vessel ischemic changes. Mild generalized volume loss. No hydrocephalus.     VISUALIZED ORBITS/SINUSES/MASTOIDS: No intraorbital abnormality. No paranasal sinus mucosal disease. No middle ear or mastoid effusion.    BONES/SOFT TISSUES: No scalp hematoma. No skull fracture.      Impression    IMPRESSION:  1.  No CT evidence for acute intracranial process.   2.  Brain atrophy and presumed chronic microvascular ischemic changes as above.     Cervical spine CT w/o contrast    Narrative    EXAM: CT CERVICAL SPINE W/O CONTRAST, CT THORACIC SPINE W/O CONTRAST  LOCATION: [facility]  DATE: 11/20/2023    INDICATION: Pain post fall  COMPARISON: None.  TECHNIQUE:  1) Routine CT Cervical Spine without IV contrast. Multiplanar reformats. Dose reduction techniques were used.   2) Routine CT Thoracic Spine without IV contrast. Multiplanar reformats. Dose reduction techniques were used.     FINDINGS:    CERVICAL SPINE CT:  VERTEBRA: Straightened cervical lordosis and mild levocurvature. Preserved vertebral body heights. No fracture or posttraumatic subluxation.     CANAL/FORAMINA: Diffuse cervical spondylosis with mild to moderate loss of disc height and associated endplate/uncovertebral spurring at C4-C5 and C5-C6. Mild to moderate spinal canal stenosis at C5-C6 and mild spinal canal stenosis at C4-C5. Mild to   moderate right and moderate to severe left neural foraminal stenosis at C5-C6. No additional high-grade bony neural foraminal stenosis identified.    PARASPINAL: Prevertebral soft tissues within normal limits for  thickness. Mildly heterogeneous thyroid gland without dominant nodule.    THORACIC SPINE CT:  VERTEBRA: 12 rib-bearing thoracic type vertebra. Diffuse osteopenia. Minor thoracic dextrocurvature. Preserved vertebral body heights. No fracture or posttraumatic subluxation.     CANAL/FORAMINA: Mild diffuse thoracic spondylosis with minor loss of disc height and mild ventral bridging osteophytes at multiple mid and lower thoracic levels. No CT evidence for high-grade central spinal canal or neural foraminal stenosis.    PARASPINAL: Motion degraded evaluation of the lung parenchyma with some patchy groundglass opacities that may reflect atelectasis, but correlation with any respiratory symptoms is advised. Partially visualized cholelithiasis.      Impression    IMPRESSION:  CERVICAL SPINE CT:  1.  No fracture or posttraumatic subluxation.  2.  Multilevel cervical spondylosis most significant at C5-C6 as above.    THORACIC SPINE CT:  1.  No fracture or posttraumatic subluxation.  2.  Mild thoracic spondylosis without high-grade spinal canal or neural foraminal stenosis.  3.  Cholelithiasis.     CT Thoracic Spine w/o Contrast    Narrative    EXAM: CT CERVICAL SPINE W/O CONTRAST, CT THORACIC SPINE W/O CONTRAST  LOCATION: [facility]  DATE: 11/20/2023    INDICATION: Pain post fall  COMPARISON: None.  TECHNIQUE:  1) Routine CT Cervical Spine without IV contrast. Multiplanar reformats. Dose reduction techniques were used.   2) Routine CT Thoracic Spine without IV contrast. Multiplanar reformats. Dose reduction techniques were used.     FINDINGS:    CERVICAL SPINE CT:  VERTEBRA: Straightened cervical lordosis and mild levocurvature. Preserved vertebral body heights. No fracture or posttraumatic subluxation.     CANAL/FORAMINA: Diffuse cervical spondylosis with mild to moderate loss of disc height and associated endplate/uncovertebral spurring at C4-C5 and C5-C6. Mild to moderate spinal canal stenosis at  C5-C6 and mild spinal canal stenosis at C4-C5. Mild to   moderate right and moderate to severe left neural foraminal stenosis at C5-C6. No additional high-grade bony neural foraminal stenosis identified.    PARASPINAL: Prevertebral soft tissues within normal limits for thickness. Mildly heterogeneous thyroid gland without dominant nodule.    THORACIC SPINE CT:  VERTEBRA: 12 rib-bearing thoracic type vertebra. Diffuse osteopenia. Minor thoracic dextrocurvature. Preserved vertebral body heights. No fracture or posttraumatic subluxation.     CANAL/FORAMINA: Mild diffuse thoracic spondylosis with minor loss of disc height and mild ventral bridging osteophytes at multiple mid and lower thoracic levels. No CT evidence for high-grade central spinal canal or neural foraminal stenosis.    PARASPINAL: Motion degraded evaluation of the lung parenchyma with some patchy groundglass opacities that may reflect atelectasis, but correlation with any respiratory symptoms is advised. Partially visualized cholelithiasis.      Impression    IMPRESSION:  CERVICAL SPINE CT:  1.  No fracture or posttraumatic subluxation.  2.  Multilevel cervical spondylosis most significant at C5-C6 as above.    THORACIC SPINE CT:  1.  No fracture or posttraumatic subluxation.  2.  Mild thoracic spondylosis without high-grade spinal canal or neural foraminal stenosis.  3.  Cholelithiasis.     Ribs XR, unilat 3 views + PA chest,  left    Narrative    EXAM: XR RIBS AND CHEST LEFT 3 VIEWS  LOCATION: [facility]  DATE: 11/20/2023    INDICATION: fall, pain  COMPARISON: CXR 02/18/2022      Impression    IMPRESSION: Acute mildly displaced fractures lateral aspect left seventh, eighth and ninth ribs. Bones are demineralized.    Multiple strands of linear atelectasis in both lower lungs. Lungs are otherwise clear. No pneumothorax. There may be a trace amount of pleural fluid and/or thickening inferior left hemithorax. Mild generalized  cardiac enlargement. Normal pulmonary   vascularity. Calcified tortuous thoracic aorta. Cholelithiasis.

## 2023-11-21 ENCOUNTER — APPOINTMENT (OUTPATIENT)
Dept: OCCUPATIONAL THERAPY | Facility: HOSPITAL | Age: 73
End: 2023-11-21
Attending: INTERNAL MEDICINE
Payer: COMMERCIAL

## 2023-11-21 ENCOUNTER — APPOINTMENT (OUTPATIENT)
Dept: RADIOLOGY | Facility: HOSPITAL | Age: 73
End: 2023-11-21
Attending: PHYSICIAN ASSISTANT
Payer: COMMERCIAL

## 2023-11-21 ENCOUNTER — APPOINTMENT (OUTPATIENT)
Dept: CARDIOLOGY | Facility: HOSPITAL | Age: 73
End: 2023-11-21
Attending: INTERNAL MEDICINE
Payer: COMMERCIAL

## 2023-11-21 ENCOUNTER — APPOINTMENT (OUTPATIENT)
Dept: PHYSICAL THERAPY | Facility: HOSPITAL | Age: 73
End: 2023-11-21
Attending: INTERNAL MEDICINE
Payer: COMMERCIAL

## 2023-11-21 LAB
ALBUMIN SERPL BCG-MCNC: 4.1 G/DL (ref 3.5–5.2)
ALP SERPL-CCNC: 72 U/L (ref 40–150)
ALT SERPL W P-5'-P-CCNC: 20 U/L (ref 0–50)
ANION GAP SERPL CALCULATED.3IONS-SCNC: 8 MMOL/L (ref 7–15)
AST SERPL W P-5'-P-CCNC: 19 U/L (ref 0–45)
BILIRUB SERPL-MCNC: 0.8 MG/DL
BUN SERPL-MCNC: 11.4 MG/DL (ref 8–23)
CALCIUM SERPL-MCNC: 9.3 MG/DL (ref 8.8–10.2)
CHLORIDE SERPL-SCNC: 98 MMOL/L (ref 98–107)
CREAT SERPL-MCNC: 0.67 MG/DL (ref 0.51–0.95)
DEPRECATED HCO3 PLAS-SCNC: 26 MMOL/L (ref 22–29)
EGFRCR SERPLBLD CKD-EPI 2021: >90 ML/MIN/1.73M2
ERYTHROCYTE [DISTWIDTH] IN BLOOD BY AUTOMATED COUNT: 12.5 % (ref 10–15)
GLUCOSE BLDC GLUCOMTR-MCNC: 166 MG/DL (ref 70–99)
GLUCOSE BLDC GLUCOMTR-MCNC: 311 MG/DL (ref 70–99)
GLUCOSE SERPL-MCNC: 133 MG/DL (ref 70–99)
HCT VFR BLD AUTO: 34.6 % (ref 35–47)
HGB BLD-MCNC: 11.3 G/DL (ref 11.7–15.7)
LVEF ECHO: NORMAL
MCH RBC QN AUTO: 28.8 PG (ref 26.5–33)
MCHC RBC AUTO-ENTMCNC: 32.7 G/DL (ref 31.5–36.5)
MCV RBC AUTO: 88 FL (ref 78–100)
PLATELET # BLD AUTO: 212 10E3/UL (ref 150–450)
POTASSIUM SERPL-SCNC: 4.1 MMOL/L (ref 3.4–5.3)
PROT SERPL-MCNC: 6.4 G/DL (ref 6.4–8.3)
RBC # BLD AUTO: 3.92 10E6/UL (ref 3.8–5.2)
SODIUM SERPL-SCNC: 132 MMOL/L (ref 135–145)
TROPONIN T SERPL HS-MCNC: 17 NG/L
WBC # BLD AUTO: 5.1 10E3/UL (ref 4–11)

## 2023-11-21 PROCEDURE — 99207 PR APP CREDIT; MD BILLING SHARED VISIT: CPT

## 2023-11-21 PROCEDURE — 250N000013 HC RX MED GY IP 250 OP 250 PS 637: Performed by: INTERNAL MEDICINE

## 2023-11-21 PROCEDURE — 97530 THERAPEUTIC ACTIVITIES: CPT | Mod: GP

## 2023-11-21 PROCEDURE — 82962 GLUCOSE BLOOD TEST: CPT

## 2023-11-21 PROCEDURE — 97166 OT EVAL MOD COMPLEX 45 MIN: CPT | Mod: GO

## 2023-11-21 PROCEDURE — 99213 OFFICE O/P EST LOW 20 MIN: CPT | Performed by: PHYSICIAN ASSISTANT

## 2023-11-21 PROCEDURE — G0378 HOSPITAL OBSERVATION PER HR: HCPCS

## 2023-11-21 PROCEDURE — 258N000003 HC RX IP 258 OP 636: Performed by: INTERNAL MEDICINE

## 2023-11-21 PROCEDURE — 97535 SELF CARE MNGMENT TRAINING: CPT | Mod: GO

## 2023-11-21 PROCEDURE — 250N000012 HC RX MED GY IP 250 OP 636 PS 637: Performed by: INTERNAL MEDICINE

## 2023-11-21 PROCEDURE — 71045 X-RAY EXAM CHEST 1 VIEW: CPT

## 2023-11-21 PROCEDURE — 96361 HYDRATE IV INFUSION ADD-ON: CPT

## 2023-11-21 PROCEDURE — 99233 SBSQ HOSP IP/OBS HIGH 50: CPT | Mod: FS | Performed by: INTERNAL MEDICINE

## 2023-11-21 PROCEDURE — 93306 TTE W/DOPPLER COMPLETE: CPT | Mod: 26 | Performed by: INTERNAL MEDICINE

## 2023-11-21 PROCEDURE — 84484 ASSAY OF TROPONIN QUANT: CPT | Performed by: INTERNAL MEDICINE

## 2023-11-21 PROCEDURE — 36415 COLL VENOUS BLD VENIPUNCTURE: CPT | Performed by: INTERNAL MEDICINE

## 2023-11-21 PROCEDURE — 85027 COMPLETE CBC AUTOMATED: CPT | Performed by: INTERNAL MEDICINE

## 2023-11-21 PROCEDURE — 80053 COMPREHEN METABOLIC PANEL: CPT | Performed by: INTERNAL MEDICINE

## 2023-11-21 PROCEDURE — 93306 TTE W/DOPPLER COMPLETE: CPT

## 2023-11-21 PROCEDURE — 97161 PT EVAL LOW COMPLEX 20 MIN: CPT | Mod: GP

## 2023-11-21 RX ORDER — SODIUM CHLORIDE 9 MG/ML
INJECTION, SOLUTION INTRAVENOUS CONTINUOUS
Status: ACTIVE | OUTPATIENT
Start: 2023-11-21 | End: 2023-11-21

## 2023-11-21 RX ORDER — NICOTINE POLACRILEX 4 MG
15-30 LOZENGE BUCCAL
Status: DISCONTINUED | OUTPATIENT
Start: 2023-11-21 | End: 2023-11-22 | Stop reason: HOSPADM

## 2023-11-21 RX ORDER — DEXTROSE MONOHYDRATE 25 G/50ML
25-50 INJECTION, SOLUTION INTRAVENOUS
Status: DISCONTINUED | OUTPATIENT
Start: 2023-11-21 | End: 2023-11-22 | Stop reason: HOSPADM

## 2023-11-21 RX ORDER — GLIPIZIDE 2.5 MG/1
2.5 TABLET, EXTENDED RELEASE ORAL
Status: DISCONTINUED | OUTPATIENT
Start: 2023-11-22 | End: 2023-11-22 | Stop reason: HOSPADM

## 2023-11-21 RX ADMIN — SODIUM CHLORIDE: 9 INJECTION, SOLUTION INTRAVENOUS at 09:34

## 2023-11-21 RX ADMIN — METFORMIN HYDROCHLORIDE 1000 MG: 500 TABLET, FILM COATED ORAL at 17:06

## 2023-11-21 RX ADMIN — INSULIN ASPART 4 UNITS: 100 INJECTION, SOLUTION INTRAVENOUS; SUBCUTANEOUS at 17:01

## 2023-11-21 RX ADMIN — POLYETHYLENE GLYCOL 3350 17 G: 17 POWDER, FOR SOLUTION ORAL at 08:29

## 2023-11-21 RX ADMIN — LIDOCAINE 1 PATCH: 4 PATCH TOPICAL at 21:10

## 2023-11-21 RX ADMIN — Medication 81 MG: at 21:11

## 2023-11-21 RX ADMIN — ACETAMINOPHEN 975 MG: 325 TABLET ORAL at 14:45

## 2023-11-21 RX ADMIN — SIMVASTATIN 40 MG: 10 TABLET, FILM COATED ORAL at 21:21

## 2023-11-21 RX ADMIN — ACETAMINOPHEN 975 MG: 325 TABLET ORAL at 21:11

## 2023-11-21 RX ADMIN — LOSARTAN POTASSIUM 25 MG: 25 TABLET, FILM COATED ORAL at 08:27

## 2023-11-21 RX ADMIN — ACETAMINOPHEN 975 MG: 325 TABLET ORAL at 08:28

## 2023-11-21 RX ADMIN — GABAPENTIN 100 MG: 100 CAPSULE ORAL at 21:21

## 2023-11-21 ASSESSMENT — ACTIVITIES OF DAILY LIVING (ADL)
ADLS_ACUITY_SCORE: 37
ADLS_ACUITY_SCORE: 38
ADLS_ACUITY_SCORE: 30
ADLS_ACUITY_SCORE: 38
ADLS_ACUITY_SCORE: 31
ADLS_ACUITY_SCORE: 37
ADLS_ACUITY_SCORE: 30
ADLS_ACUITY_SCORE: 38

## 2023-11-21 NOTE — PROGRESS NOTES
11/21/23 4025   Appointment Info   Signing Clinician's Name / Credentials (OT) Debbie Soriano GORDO OTR/L CLT   Quick Adds   Quick Adds Certification   Living Environment   People in Home child(mark), adult   Current Living Arrangements house   Self-Care   Activity/Exercise/Self-Care Comment A with monique bathing   General Information   Onset of Illness/Injury or Date of Surgery 11/20/23   Referring Physician    Additional Occupational Profile Info/Pertinent History of Current Problem 73 year old female with past medical history of cervical cancer, type 2 diabetes mellitus, peripheral neuropathy, cholelithiasis, hyperlipidemia, and hypertension who presents to the ED via private car for evaluation of a fall, back pain, shoulder pain.  Patient found to have multiple left-sided rib fracture.  Admitted for pain control   Bed Mobility   Bed Mobility supine-sit   Supine-Sit [place] (Bed Mobility) maximum assist (25% patient effort)  (from family)   Transfers   Transfers sit-stand transfer   Sit-Stand Transfer   Sit-Stand [place] (Transfers) contact guard   Balance   Balance Comments EOB- SBA   Activities of Daily Living   BADL Assessment/Intervention toileting   Toileting   [place] (Toileting) adjust/manage clothing;perform perineal hygiene;supervision   Clinical Impression   Criteria for Skilled Therapeutic Interventions Met (OT) Evaluation only  (and treat only)   OT Diagnosis decreased ADLs   OT Problem List-Impairments impacting ADL pain;strength   Assessment of Occupational Performance 1-3 Performance Deficits   Identified Performance Deficits bed mob, trsfs   Planned Therapy Interventions (OT) ADL retraining;transfer training   Clinical Decision Making Complexity (OT) detailed assessment/moderate complexity   Risk & Benefits of therapy have been explained care plan/treatment goals reviewed   OT Total Evaluation Time   OT Eval, Moderate Complexity Minutes (24251) 10   Therapy  Certification   Medical Diagnosis closed fracture of multiple ribs   Start of Care Date 11/21/23   Certification date from 11/21/23   Certification date to 11/28/23   OT Goals   Therapy Frequency (OT) Daily   OT Predicted Duration/Target Date for Goal Attainment 11/21/23   OT Goals Bed Mobility   OT: Bed Mobility Moderate assist   Interventions   Interventions Quick Adds Self-Care/Home Management   Self-Care/Home Management   Self-Care/Home Mgmt/ADL, Compensatory, Meal Prep Minutes (78741) 12   Treatment Detail/Skilled Intervention stood at sink with SBA for hand washing, ambulation in room /hallways with fww and SBA. Education to patient and family on use of a bath chair at home for safety. Both agree to that plan. Attempted bed mob -log roll, too painful. Caregiver instruction given on how to help patient in/out of bed  safetly instead of pulling on pateint   OT Discharge Planning   OT Discharge Recommendation (DC Rec) home with assist   OT Brief overview of current status No further skilled OT indicated. Has support from family at home.   OT Equipment Needed at Discharge shower chair   Total Session Time   Timed Code Treatment Minutes 12   Total Session Time (sum of timed and untimed services) 22      [facility]  OUTPATIENT OCCUPATIONAL THERAPY  EVALUATION  PLAN OF TREATMENT FOR OUTPATIENT REHABILITATION  (COMPLETE FOR INITIAL CLAIMS ONLY)  Patient's Last Name, First Name, M.I.  YOB: 1950  Hai Meade                          Provider's Name  [facility] Medical Record No.  [id number]                             Onset Date:  11/20/23   Start of Care Date:  11/21/23   Type:     ___PT   _X_OT   ___SLP Medical Diagnosis:  closed fracture of multiple ribs                    OT Diagnosis:  decreased ADLs Visits from SOC:  1     See note for plan of treatment, functional goals and certification details    I CERTIFY THE NEED FOR THESE SERVICES  FURNISHED UNDER        THIS PLAN OF TREATMENT AND WHILE UNDER MY CARE     (Physician co-signature of this document indicates review and certification of the therapy plan).

## 2023-11-21 NOTE — CONSULTS
General Surgery Consultation  Hai Meade MRN# [id number]   Age/Sex: 73 year old female YOB: 1950     Reason for consult: 1. Closed fracture of multiple ribs of left side, initial encounter            Requesting physician: Dr Flores                   Assessment and Plan:   Assessment:  -Acute fractures lateral aspect left seventh, eighth and ninth ribs   -improved pain.   Plan:  Daily xray.  Pain control  IS with flutter.   PT/OT     Addendum-repeat x-ray this morning shows just a small pleural effusion with no pneumothorax.  Will be important to work on pulmonary function and use incentive spirometry.  We will sign off at this time.  Please call if there is any questions concerns.         Chief Complaint:     Chief Complaint   Patient presents with    Fall    Back Pain    Shoulder Pain    Shortness of Breath        History is obtained from the patient    HPI:   73 year old female with past medical history of cervical cancer, type 2 diabetes mellitus, peripheral neuropathy, cholelithiasis, hyperlipidemia, and hypertension who presents to the ED via private car for evaluation of a fall, back pain, shoulder pain. Hx of falls at home. This time fell onto her left side at home around 10 am. Had instant pain that worsen. Worse with breathing and movement. Had some nausea. Did not hit her head or lose conscience. Originally complained of pain in back, shoulder and left rib cage. Today feeling much better. Pain only on lateral chest wall on the left. No dyspnea. Feeling ok. Uses a walker at home.           Past Medical History:     Past Medical History:   Diagnosis Date    Anemia     Cervical cancer (H)     Cholelithiasis     Diabetes mellitus, type II (H)     Hyperlipidemia     Postoperative ileus (H)     Small bowel obstruction (H)     Vision problems               Past Surgical History:     Past Surgical History:   Procedure Laterality Date    COLECTOMY N/A 6/23/2019    Procedure: WITH OPEN SMALL BOWEL  RESECTION;  Surgeon: Ramon Wray MD;  Location: [facility];  Service: General    FLEXIBLE SIGMOIDOSCOPY  2009    HYSTERECTOMY  2007    cervix ca    OOPHORECTOMY  2006    cervix ca    IL BSO, OMENTECTOMY W/SILVIA      Description: Salp-oophorect Bilat W/Omentect, Total Abd Hysterect, Rad Dissect For Guanaco;  Recorded: 03/28/2007;    IL LAP,DIAGNOSTIC ABDOMEN N/A 6/23/2019    Procedure: LAPAROSCOPY lysis of adhesions,;  Surgeon: Ramon Wray MD;  Location: [facility];  Service: General             Social History:    reports that she has never smoked. She has never used smokeless tobacco. She reports that she does not drink alcohol and does not use drugs.           Family History:   No family history on file.           Allergies:     Allergies   Allergen Reactions    Cymbalta [Duloxetine] Unknown     Nausea and vomiting              Medications:     Prior to Admission medications    Medication Sig Start Date End Date Taking? Authorizing Provider   acetaminophen (TYLENOL) 500 MG tablet [ACETAMINOPHEN (TYLENOL) 500 MG TABLET] Take 1-2 tablets (500-1,000 mg total) by mouth every 4 (four) hours as needed. 7/1/19  Yes Octaviano Martinez MD   aspirin 81 MG EC tablet Take 81 mg by mouth every evening 4/26/18  Yes Provider, Historical   Calcium Carb-Cholecalciferol 500-10 MG-MCG CHEW CHEW 1 TABLET TWICE DAILY 8/30/22  Yes Denia Barron MD   Cholecalciferol (D3-1000) 25 MCG (1000 UT) CAPS Take 1 capsule by mouth daily 5/31/22  Yes Denia Barron MD   gabapentin (NEURONTIN) 100 MG capsule TAKE 1 CAPSULE BY MOUTH EVERY NIGHT AT BEDTIME 8/21/23  Yes Brie Acevedo MD   glipiZIDE (GLUCOTROL XL) 2.5 MG 24 hr tablet Take 1 tablet (2.5 mg) by mouth daily as needed 8/11/22  Yes Denia Barron MD   losartan (COZAAR) 25 MG tablet Take 1 tablet (25 mg) by mouth daily 3/7/23  Yes Josue Hoffmann MD   metFORMIN (GLUCOPHAGE) 1000 MG tablet TAKE 1 TABLET(1000 MG) BY MOUTH TWICE DAILY WITH MEALS  11/20/23  Yes Brie Acevedo MD   simvastatin (ZOCOR) 40 MG tablet TAKE 1 TABLET(40 MG) BY MOUTH AT BEDTIME 11/20/23  Yes Denia Barron MD   blood glucose (NO BRAND SPECIFIED) lancets standard Use to test blood sugar 1  times daily or as directed. 3/7/23   Josue Hoffmann MD   generic lancets (ACCU-CHEK SOFTCLIX LANCETS) [GENERIC LANCETS (ACCU-CHEK SOFTCLIX LANCETS)] TEST TWICE DAILY 4/5/21   Denia Barron MD              Review of Systems:   The Review of Systems is negative other than noted in the HPI            Physical Exam:   Patient Vitals for the past 24 hrs:   BP Temp Temp src Pulse Resp SpO2 Weight   11/21/23 0812 (!) 143/73 97.8  F (36.6  C) Temporal 88 18 97 % --   11/21/23 0349 (!) 150/76 96.8  F (36  C) Axillary 95 18 95 % --   11/20/23 2355 (!) 145/75 97.9  F (36.6  C) Oral 64 18 97 % --   11/20/23 1728 -- -- -- -- -- -- 52.2 kg (115 lb)   11/20/23 1500 (!) 182/86 -- -- 68 -- 97 % --   11/20/23 1445 (!) 206/97 -- -- 67 -- 98 % --   11/20/23 1430 (!) 221/104 -- -- 71 -- 97 % --   11/20/23 1415 (!) 206/100 -- -- 71 -- 98 % --   11/20/23 1400 (!) 214/88 -- -- 71 -- 99 % --   11/20/23 1345 (!) 238/109 -- -- -- -- -- --   11/20/23 1337 (!) 194/92 98.6  F (37  C) Oral 71 20 99 % 52.2 kg (115 lb)          Intake/Output Summary (Last 24 hours) at 11/21/2023 1022  Last data filed at 11/21/2023 0400  Gross per 24 hour   Intake 615 ml   Output --   Net 615 ml      Constitutional:   awake, alert, cooperative, no apparent distress, and appears stated age       Eyes:   PERRL, conjunctiva/corneas clear, EOM's intact; no scleral edema or icterus noted        ENT:   Normocephalic, without obvious abnormality, atraumatic, Lips, mucosa, and tongue normal        Hematologic / Lymphatic:   No ln       Lungs:   Normal respiratory effort, no accessory muscle use       Cardiovascular:   Regular rate and rhythm       Abdomen:   Soft nontender       Musculoskeletal:   No obvious swelling, bruising or  deformity, or crepitus. Tender left chest wall.        Skin:   Skin color and texture normal for patient, no rashes or lesions              Data:         All imaging studies reviewed by me.    Results for orders placed or performed during the hospital encounter of 11/20/23 (from the past 24 hour(s))   Head CT w/o contrast    Narrative    EXAM: CT HEAD W/O CONTRAST  LOCATION: [facility]  DATE: 11/20/2023    INDICATION: Pain post fall  COMPARISON: MRI 04/08/2019  TECHNIQUE: Routine CT Head without IV contrast. Multiplanar reformats. Dose reduction techniques were used.    FINDINGS:  INTRACRANIAL CONTENTS: No intracranial hemorrhage, extraaxial collection, or mass effect.  No CT evidence of acute infarct. Mild presumed chronic small vessel ischemic changes. Mild generalized volume loss. No hydrocephalus.     VISUALIZED ORBITS/SINUSES/MASTOIDS: No intraorbital abnormality. No paranasal sinus mucosal disease. No middle ear or mastoid effusion.    BONES/SOFT TISSUES: No scalp hematoma. No skull fracture.      Impression    IMPRESSION:  1.  No CT evidence for acute intracranial process.   2.  Brain atrophy and presumed chronic microvascular ischemic changes as above.     Cervical spine CT w/o contrast    Narrative    EXAM: CT CERVICAL SPINE W/O CONTRAST, CT THORACIC SPINE W/O CONTRAST  LOCATION: [facility]  DATE: 11/20/2023    INDICATION: Pain post fall  COMPARISON: None.  TECHNIQUE:  1) Routine CT Cervical Spine without IV contrast. Multiplanar reformats. Dose reduction techniques were used.   2) Routine CT Thoracic Spine without IV contrast. Multiplanar reformats. Dose reduction techniques were used.     FINDINGS:    CERVICAL SPINE CT:  VERTEBRA: Straightened cervical lordosis and mild levocurvature. Preserved vertebral body heights. No fracture or posttraumatic subluxation.     CANAL/FORAMINA: Diffuse cervical spondylosis with mild to moderate loss of disc height and  associated endplate/uncovertebral spurring at C4-C5 and C5-C6. Mild to moderate spinal canal stenosis at C5-C6 and mild spinal canal stenosis at C4-C5. Mild to   moderate right and moderate to severe left neural foraminal stenosis at C5-C6. No additional high-grade bony neural foraminal stenosis identified.    PARASPINAL: Prevertebral soft tissues within normal limits for thickness. Mildly heterogeneous thyroid gland without dominant nodule.    THORACIC SPINE CT:  VERTEBRA: 12 rib-bearing thoracic type vertebra. Diffuse osteopenia. Minor thoracic dextrocurvature. Preserved vertebral body heights. No fracture or posttraumatic subluxation.     CANAL/FORAMINA: Mild diffuse thoracic spondylosis with minor loss of disc height and mild ventral bridging osteophytes at multiple mid and lower thoracic levels. No CT evidence for high-grade central spinal canal or neural foraminal stenosis.    PARASPINAL: Motion degraded evaluation of the lung parenchyma with some patchy groundglass opacities that may reflect atelectasis, but correlation with any respiratory symptoms is advised. Partially visualized cholelithiasis.      Impression    IMPRESSION:  CERVICAL SPINE CT:  1.  No fracture or posttraumatic subluxation.  2.  Multilevel cervical spondylosis most significant at C5-C6 as above.    THORACIC SPINE CT:  1.  No fracture or posttraumatic subluxation.  2.  Mild thoracic spondylosis without high-grade spinal canal or neural foraminal stenosis.  3.  Cholelithiasis.     CT Thoracic Spine w/o Contrast    Narrative    EXAM: CT CERVICAL SPINE W/O CONTRAST, CT THORACIC SPINE W/O CONTRAST  LOCATION: [facility]  DATE: 11/20/2023    INDICATION: Pain post fall  COMPARISON: None.  TECHNIQUE:  1) Routine CT Cervical Spine without IV contrast. Multiplanar reformats. Dose reduction techniques were used.   2) Routine CT Thoracic Spine without IV contrast. Multiplanar reformats. Dose reduction techniques were used.      FINDINGS:    CERVICAL SPINE CT:  VERTEBRA: Straightened cervical lordosis and mild levocurvature. Preserved vertebral body heights. No fracture or posttraumatic subluxation.     CANAL/FORAMINA: Diffuse cervical spondylosis with mild to moderate loss of disc height and associated endplate/uncovertebral spurring at C4-C5 and C5-C6. Mild to moderate spinal canal stenosis at C5-C6 and mild spinal canal stenosis at C4-C5. Mild to   moderate right and moderate to severe left neural foraminal stenosis at C5-C6. No additional high-grade bony neural foraminal stenosis identified.    PARASPINAL: Prevertebral soft tissues within normal limits for thickness. Mildly heterogeneous thyroid gland without dominant nodule.    THORACIC SPINE CT:  VERTEBRA: 12 rib-bearing thoracic type vertebra. Diffuse osteopenia. Minor thoracic dextrocurvature. Preserved vertebral body heights. No fracture or posttraumatic subluxation.     CANAL/FORAMINA: Mild diffuse thoracic spondylosis with minor loss of disc height and mild ventral bridging osteophytes at multiple mid and lower thoracic levels. No CT evidence for high-grade central spinal canal or neural foraminal stenosis.    PARASPINAL: Motion degraded evaluation of the lung parenchyma with some patchy groundglass opacities that may reflect atelectasis, but correlation with any respiratory symptoms is advised. Partially visualized cholelithiasis.      Impression    IMPRESSION:  CERVICAL SPINE CT:  1.  No fracture or posttraumatic subluxation.  2.  Multilevel cervical spondylosis most significant at C5-C6 as above.    THORACIC SPINE CT:  1.  No fracture or posttraumatic subluxation.  2.  Mild thoracic spondylosis without high-grade spinal canal or neural foraminal stenosis.  3.  Cholelithiasis.     Ribs XR, unilat 3 views + PA chest,  left    Narrative    EXAM: XR RIBS AND CHEST LEFT 3 VIEWS  LOCATION: [facility]  DATE: 11/20/2023    INDICATION: fall,  pain  COMPARISON: CXR 02/18/2022      Impression    IMPRESSION: Acute mildly displaced fractures lateral aspect left seventh, eighth and ninth ribs. Bones are demineralized.    Multiple strands of linear atelectasis in both lower lungs. Lungs are otherwise clear. No pneumothorax. There may be a trace amount of pleural fluid and/or thickening inferior left hemithorax. Mild generalized cardiac enlargement. Normal pulmonary   vascularity. Calcified tortuous thoracic aorta. Cholelithiasis.   Basic metabolic panel   Result Value Ref Range    Sodium 131 (L) 135 - 145 mmol/L    Potassium 3.6 3.4 - 5.3 mmol/L    Chloride 93 (L) 98 - 107 mmol/L    Carbon Dioxide (CO2) 25 22 - 29 mmol/L    Anion Gap 13 7 - 15 mmol/L    Urea Nitrogen 9.5 8.0 - 23.0 mg/dL    Creatinine 0.54 0.51 - 0.95 mg/dL    GFR Estimate >90 >60 mL/min/1.73m2    Calcium 9.6 8.8 - 10.2 mg/dL    Glucose 135 (H) 70 - 99 mg/dL   CBC with platelets differential    Narrative    The following orders were created for panel order CBC with platelets differential.  Procedure                               Abnormality         Status                     ---------                               -----------         ------                     CBC with platelets and d...[[id number]]                      Final result                 Please view results for these tests on the individual orders.   CBC with platelets and differential   Result Value Ref Range    WBC Count 9.6 4.0 - 11.0 10e3/uL    RBC Count 4.09 3.80 - 5.20 10e6/uL    Hemoglobin 12.0 11.7 - 15.7 g/dL    Hematocrit 35.4 35.0 - 47.0 %    MCV 87 78 - 100 fL    MCH 29.3 26.5 - 33.0 pg    MCHC 33.9 31.5 - 36.5 g/dL    RDW 12.2 10.0 - 15.0 %    Platelet Count 238 150 - 450 10e3/uL    % Neutrophils 80 %    % Lymphocytes 13 %    % Monocytes 6 %    % Eosinophils 1 %    % Basophils 0 %    % Immature Granulocytes 0 %    NRBCs per 100 WBC 0 <1 /100    Absolute Neutrophils 7.6 1.6 - 8.3 10e3/uL    Absolute Lymphocytes 1.3 0.8 -  5.3 10e3/uL    Absolute Monocytes 0.6 0.0 - 1.3 10e3/uL    Absolute Eosinophils 0.1 0.0 - 0.7 10e3/uL    Absolute Basophils 0.0 0.0 - 0.2 10e3/uL    Absolute Immature Granulocytes 0.0 <=0.4 10e3/uL    Absolute NRBCs 0.0 10e3/uL   Troponin T, High Sensitivity   Result Value Ref Range    Troponin T, High Sensitivity 14 <=14 ng/L   Glucose by meter   Result Value Ref Range    GLUCOSE BY METER POCT 133 (H) 70 - 99 mg/dL   UA with Microscopic reflex to Culture    Specimen: Urine, Clean Catch   Result Value Ref Range    Color Urine Light Yellow Colorless, Straw, Light Yellow, Yellow    Appearance Urine Turbid (A) Clear    Glucose Urine Negative Negative mg/dL    Bilirubin Urine Negative Negative    Ketones Urine Negative Negative mg/dL    Specific Gravity Urine 1.009 1.001 - 1.030    Blood Urine Negative Negative    pH Urine 6.5 5.0 - 7.0    Protein Albumin Urine Negative Negative mg/dL    Urobilinogen Urine <2.0 <2.0 mg/dL    Nitrite Urine Negative Negative    Leukocyte Esterase Urine 250 Devin/uL (A) Negative    Bacteria Urine Few (A) None Seen /HPF    Mucus Urine Present (A) None Seen /LPF    RBC Urine 3 (H) <=2 /HPF    WBC Urine 20 (H) <=5 /HPF    Narrative    Urine Culture ordered based on laboratory criteria   Sodium random urine   Result Value Ref Range    Sodium Urine mmol/L 87 mmol/L   Comprehensive metabolic panel   Result Value Ref Range    Sodium 132 (L) 135 - 145 mmol/L    Potassium 4.1 3.4 - 5.3 mmol/L    Carbon Dioxide (CO2) 26 22 - 29 mmol/L    Anion Gap 8 7 - 15 mmol/L    Urea Nitrogen 11.4 8.0 - 23.0 mg/dL    Creatinine 0.67 0.51 - 0.95 mg/dL    GFR Estimate >90 >60 mL/min/1.73m2    Calcium 9.3 8.8 - 10.2 mg/dL    Chloride 98 98 - 107 mmol/L    Glucose 133 (H) 70 - 99 mg/dL    Alkaline Phosphatase 72 40 - 150 U/L    AST 19 0 - 45 U/L    ALT 20 0 - 50 U/L    Protein Total 6.4 6.4 - 8.3 g/dL    Albumin 4.1 3.5 - 5.2 g/dL    Bilirubin Total 0.8 <=1.2 mg/dL   CBC with platelets   Result Value Ref Range    WBC  Count 5.1 4.0 - 11.0 10e3/uL    RBC Count 3.92 3.80 - 5.20 10e6/uL    Hemoglobin 11.3 (L) 11.7 - 15.7 g/dL    Hematocrit 34.6 (L) 35.0 - 47.0 %    MCV 88 78 - 100 fL    MCH 28.8 26.5 - 33.0 pg    MCHC 32.7 31.5 - 36.5 g/dL    RDW 12.5 10.0 - 15.0 %    Platelet Count 212 150 - 450 10e3/uL   Troponin T, High Sensitivity   Result Value Ref Range    Troponin T, High Sensitivity 17 (H) <=14 ng/L        AMERICO Ribeiro  [facility]  [address] [place] [id number]  Phone- [phone]  Fax- [phone]

## 2023-11-21 NOTE — PROGRESS NOTES
"PRIMARY DIAGNOSIS: \"GENERIC\" NURSING  OUTPATIENT/OBSERVATION GOALS TO BE MET BEFORE DISCHARGE:  ADLs back to baseline: partially met    Activity and level of assistance: Up with standby assistance.    Pain status: Improved-controlled with oral pain medications.    Return to near baseline physical activity: partially met     Discharge Planner Nurse   Safe discharge environment identified: Yes  Barriers to discharge: Yes       Entered by: Meghann Doyle RN 11/21/2023 12:07 PM     Please review provider order for any additional goals.   Nurse to notify provider when observation goals have been met and patient is ready for discharge.  "

## 2023-11-21 NOTE — PLAN OF CARE
"PRIMARY DIAGNOSIS: \"GENERIC\" NURSING  OUTPATIENT/OBSERVATION GOALS TO BE MET BEFORE DISCHARGE:  ADLs back to baseline: Yes    Activity and level of assistance: Up with standby assistance.    Pain status: Improved-controlled with oral pain medications.    Return to near baseline physical activity: Yes     Discharge Planner Nurse   Safe discharge environment identified: Yes  Barriers to discharge: No       Entered by: Aretha Morillo RN 11/21/2023 5:39 PM     Please review provider order for any additional goals.   Nurse to notify provider when observation goals have been met and patient is ready for discharge.Goal Outcome Evaluation:                        "

## 2023-11-21 NOTE — CONSULTS
Care Management Initial Consult    General Information  Assessment completed with: Children, daughter Shyam  Type of CM/SW Visit: Initial Assessment    Primary Care Provider verified and updated as needed: Yes   Readmission within the last 30 days: no previous admission in last 30 days      Reason for Consult: discharge planning  Advance Care Planning: Advance Care Planning Reviewed: no concerns identified          Communication Assessment  Patient's communication style: spoken language (non-English)             Cognitive  Cognitive/Neuro/Behavioral: WDL                      Living Environment:   People in home: child(mark), adult     Current living Arrangements: house      Able to return to prior arrangements: yes       Family/Social Support:  Care provided by: self, child(mark)  Provides care for: no one  Marital Status:   Children          Description of Support System: Supportive, Involved    Support Assessment: Adequate family and caregiver support    Current Resources:   Patient receiving home care services: No     Community Resources: PCA  Equipment currently used at home:    Supplies currently used at home: Diabetic Supplies    Employment/Financial:  Employment Status:          Financial Concerns:             Does the patient's insurance plan have a 3 day qualifying hospital stay waiver?  No    Lifestyle & Psychosocial Needs:  Social Determinants of Health     Food Insecurity: Not on file   Depression: Not at risk (10/19/2023)    PHQ-2     PHQ-2 Score: 0   Housing Stability: Not on file   Tobacco Use: Low Risk  (10/19/2023)    Patient History     Smoking Tobacco Use: Never     Smokeless Tobacco Use: Never     Passive Exposure: Not on file   Financial Resource Strain: Not on file   Alcohol Use: Not on file   Transportation Needs: Not on file   Physical Activity: Not on file   Interpersonal Safety: Not on file   Stress: Not on file   Social Connections: Not on file       Functional Status:  Prior to  admission patient needed assistance:   Dependent ADLs:: Ambulation-cane, Bathing, Dressing  Dependent IADLs:: Cleaning, Cooking, Laundry, Shopping, Meal Preparation, Medication Management, Transportation, Money Management       Mental Health Status:          Chemical Dependency Status:                Values/Beliefs:  Spiritual, Cultural Beliefs, Mor[employer] Practices, Values that affect care:                 Additional Information:  Lives w/adult children. DIL PCA. FRAUSTO discussed. PT/OT and surgery consulted. CM to follow.  Family to transport    Linda Hernandez RN

## 2023-11-21 NOTE — PROGRESS NOTES
A&O Hmong. Family in room. VSS on RA. Scheduled tylenol. Bruises, fractures L flank. Transferring to P4. Report given.

## 2023-11-21 NOTE — PROGRESS NOTES
11/21/23 1106   Appointment Info   Signing Clinician's Name / Credentials (PT) Molly Barreto PT   Quick Adds   Quick Adds Certification       Present yes   Language Hmong.  Family did assist.   Living Environment   People in Home child(mark), adult  (son)   Current Living Arrangements house  (2 level home with split level entry.)   Home Accessibility stairs to enter home;stairs within home   Number of Stairs, Main Entrance 1   Stair Railings, Main Entrance none   Number of Stairs, Within Home, Primary six   Stair Railings, Within Home, Primary railing on left side (ascending)  (6 down as well with left rail)   Transportation Anticipated family or friend will provide   Living Environment Comments Pt does walk with 2 canes to walk and needs supervision.  Indep with bed mobility and transfers, Walk in shower with shower chair.  Pt has a higher bed.   Self-Care   Current Activity Tolerance moderate   Equipment Currently Used at Home cane, straight;shower chair  (2 canes)   Activity/Exercise/Self-Care Comment Set up for bathing and assist with dressing.  (Assist with home ADLs and pt does cook.)   General Information   Onset of Illness/Injury or Date of Surgery 11/20/23   Referring Physician Tee Flores   Patient/Family Therapy Goals Statement (PT) To go home pending her pain. .   Pertinent History of Current Problem (include personal factors and/or comorbidities that impact the POC) Per the chart, 73 year old female with past medical history of cervical cancer, type 2 diabetes mellitus, peripheral neuropathy, cholelithiasis, hyperlipidemia, and hypertension who presents to the ED via private car for evaluation of a fall, back pain, shoulder pain.  Patient found to have multiple left-sided rib fracture.  Admitted for pain control   Existing Precautions/Restrictions   (Pt was left at the EOB with call light and family in the room.  Nursing notified.)   Weight-Bearing Status - LLE weight-bearing as  tolerated   Weight-Bearing Status - RLE weight-bearing as tolerated   Cognition   Orientation Status (Cognition) oriented x 4   Pain Assessment   Patient Currently in Pain   (Pt has left rib pain she rates at a 8/10.)   Posture    Posture Comments Some cues for posture.   Range of Motion (ROM)   Range of Motion ROM is WNL   ROM Comment bilat LEs with slight dec with left DF due to weakness.   Strength (Manual Muscle Testing)   Strength Comments Bilat LEs WFL with L DF 3/5 and PF 4/5. 5.   Bed Mobility   Comment, (Bed Mobility) Supine>sit with family assisting the pt. .  (Cues for technique for the pt to do more and technique.)   Transfers   Comment, (Transfers) Sit<>stand with FWW with CGA.   Gait/Stairs (Locomotion)   [place] Level (Gait) contact guard   Assistive Device (Gait) walker, front-wheeled  (FWW)   Distance in Feet (Gait) 10'   Pattern (Gait) step-through;swing-through   Deviations/Abnormal Patterns (Gait) gait speed decreased   Balance   Balance Comments CGA with FWW.   Sensory Examination   Sensory Perception WNL   Sensory Perception Comments Scot Meade   Clinical Impression   Criteria for Skilled Therapeutic Intervention Yes, treatment indicated   PT Diagnosis (PT) Impaired functional mobility   Influenced by the following impairments weakness, increased pain, decreased endurance.   Functional limitations due to impairments bed mobility, transfers, gait and steps.   Clinical Presentation (PT Evaluation Complexity) stable   Clinical Presentation Rationale Pt presents medically diagnosed.   Clinical Decision Making (Complexity) low complexity   Planned Therapy Interventions (PT) bed mobility training;gait training;stair training;strengthening;transfer training   Risk & Benefits of therapy have been explained evaluation/treatment results reviewed;care plan/treatment goals reviewed;risks/benefits reviewed;patient;son   PT Total Evaluation Time   PT Amber, Low Complexity Minutes (73265) 13   Therapy  Certification   Start of care date 11/21/23   Certification date from 11/21/23   Certification date to 11/28/23   Medical Diagnosis Closed fracture of multiple ribs ont he left side.   Physical Therapy Goals   PT Frequency Daily   PT Predicted Duration/Target Date for Goal Attainment 11/28/23   PT Goals Bed Mobility;Transfers;Gait;Stairs   PT: Bed Mobility Supervision/stand-by assist;Supine to/from sit;Rolling   PT: Transfers Supervision/stand-by assist;Bed to/from chair;Sit to/from stand;Assistive device   PT: Gait Supervision/stand-by assist;Rolling walker;100 feet  (or 2 SECs)   PT: Stairs Minimal assist;8 stairs;Rail on left   Interventions   Interventions Quick Adds Gait Training;Therapeutic Activity   Therapeutic Activity   Therapeutic Activities: dynamic activities to improve functional performance Minutes (99394) 15   Treatment Detail/Skilled Intervention Supine>sit with Cues for technique and PT did talk to the pt on bed modifications and cues for technique  PT did encourage the pt to move as much as she can.  Her family member did assist the pt out of bed.  Sit<>stand with CGA with the pt using the FWW.  Cues for hand placement.  Toilet transfer with CGA with cues for walker safety.  Her PCA did assist her with clothing.  Cues to use the rail and cues for walker safety by the sink.  f   Gait Training   Gait Training Minutes (55800) 5   Symptoms Noted During/After Treatment (Gait Training) increased pain   Treatment Detail/Skilled Intervention Pt walked slowly and did use the FWW.  Pt was able to manage the FWW.   Distance in Feet 20' and 10'   [place] Level (Gait Training) contact guard   Physical Assistance Level (Gait Training) verbal cues;1 person assist   Weight Bearing (Gait Training) weight-bearing as tolerated   Assistive Device (Gait Training) rolling walker  (FWW)   Pattern Analysis (Gait Training) swing-through gait   Gait Analysis Deviations decreased jayden;decreased step length;decreased  stride length   Impairments (Gait Analysis/Training) pain;balance impaired;strength decreased   Stair Railings   (Pt did not want to do the steps and PT did educate the pt and family on how to assist the pt on the steps.)   PT Discharge Planning   PT Plan Gait w SECs,bed mobility, transfers steps   PT Discharge Recommendation (DC Rec) home with home care physical therapy;home with assist   PT Rationale for DC Rec Pt should be able to progress to dc to home with family to assist.  Home PT is recommended.  They might get a 4WW for home from an out side place.  She does not like the FWW   PT Brief overview of current status PT eval, bed mobility with min A and cues for technique, transfers with FWW CGA , toilet transfers with min A x 1.   Total Session Time   Timed Code Treatment Minutes 20   Total Session Time (sum of timed and untimed services) 33   [facility]  OUTPATIENT PHYSICAL THERAPY EVALUATION  PLAN OF TREATMENT FOR OUTPATIENT REHABILITATION  (COMPLETE FOR INITIAL CLAIMS ONLY)  Patient's Last Name, First Name, M.I.  YOB: 1950  Hai Meade                        Provider's Name  [facility] Medical Record No.  [id number]                             Onset Date:  11/20/23   Start of Care Date:  11/21/23   Type:     _X_PT   ___OT   ___SLP Medical Diagnosis:  Closed fracture of multiple ribs ont he left side.              PT Diagnosis:  Impaired functional mobility Visits from SOC:  1     See note for plan of treatment, functional goals and certification details    I CERTIFY THE NEED FOR THESE SERVICES FURNISHED UNDER        THIS PLAN OF TREATMENT AND WHILE UNDER MY CARE     (Physician co-signature of this document indicates review and certification of the therapy plan).

## 2023-11-21 NOTE — PROGRESS NOTES
"PRIMARY DIAGNOSIS: \"GENERIC\" NURSING  OUTPATIENT/OBSERVATION GOALS TO BE MET BEFORE DISCHARGE:  ADLs back to baseline: Yes    Activity and level of assistance: Up with standby assistance.    Pain status: Improved but still requiring IV narcotics.    Return to near baseline physical activity: No     Discharge Planner Nurse   Safe discharge environment identified: Yes  Barriers to discharge: Yes       Entered by: Candi Agee RN 11/20/2023 11:02 PM   C/o left side pain 9/10 PRN dilaudid effective, family at bedside, urine collected for UA/UC and sent to lab.  Please review provider order for any additional goals.   Nurse to notify provider when observation goals have been met and patient is ready for discharge.  "

## 2023-11-21 NOTE — PROGRESS NOTES
"PRIMARY DIAGNOSIS: \"GENERIC\" NURSING  OUTPATIENT/OBSERVATION GOALS TO BE MET BEFORE DISCHARGE:  ADLs back to baseline: No    Activity and level of assistance: Up with standby assistance.    Pain status: Improved-controlled with oral pain medications.    Return to near baseline physical activity: No     Discharge Planner Nurse   Safe discharge environment identified: No  Barriers to discharge: Yes       Entered by: Meghann Doyle RN 11/21/2023 10:17 AM     Please review provider order for any additional goals.   Nurse to notify provider when observation goals have been met and patient is ready for discharge.  "

## 2023-11-21 NOTE — PROGRESS NOTES
"PRIMARY DIAGNOSIS: \"GENERIC\" NURSING  OUTPATIENT/OBSERVATION GOALS TO BE MET BEFORE DISCHARGE:  ADLs back to baseline: Yes    Activity and level of assistance: Up with standby assistance.    Pain status: Improved but still requiring IV narcotics.    Return to near baseline physical activity: No     Discharge Planner Nurse   Safe discharge environment identified: Yes  Barriers to discharge: Yes       Entered by: Candi Agee RN 11/20/2023 8:38 PM    A&O pain 8/10 left side, PRN Dilaudid effective, BSS, NS 75ml/hr, family at bedside   Please review provider order for any additional goals.   Nurse to notify provider when observation goals have been met and patient is ready for discharge.  "

## 2023-11-21 NOTE — PLAN OF CARE
"A&Ox4, up to bathroom A1-2. Endorses pain 5/10, declining pain medication for now. RA, VSS. PIV with NS 2 75ml/hr    Problem: Adult Inpatient Plan of Care  Goal: Plan of Care Review  Description: The Plan of Care Review/Shift note should be completed every shift.  The Outcome Evaluation is a brief statement about your assessment that the patient is improving, declining, or no change.  This information will be displayed automatically on your shift  note.  Outcome: Progressing  Goal: Patient-Specific Goal (Individualized)  Description: You can add care plan individualizations to a care plan. Examples of Individualization might be:  \"Parent requests to be called daily at 9am for status\", \"I have a hard time hearing out of my right ear\", or \"Do not touch me to wake me up as it startles  me\".  Outcome: Progressing  Goal: Absence of Hospital-Acquired Illness or Injury  Outcome: Progressing  Intervention: Identify and Manage Fall Risk  Recent Flowsheet Documentation  Taken 11/21/2023 0400 by Roma Man RN  Safety Promotion/Fall Prevention: activity supervised  Taken 11/20/2023 2345 by Roma Man RN  Safety Promotion/Fall Prevention: activity supervised  Intervention: Prevent Skin Injury  Recent Flowsheet Documentation  Taken 11/21/2023 0400 by Roma Man RN  Body Position: position changed independently  Taken 11/20/2023 2345 by Roma Man RN  Body Position: turned  Intervention: Prevent Infection  Recent Flowsheet Documentation  Taken 11/21/2023 0400 by Roma Man RN  Infection Prevention: hand hygiene promoted  Taken 11/20/2023 2345 by Roma Man RN  Infection Prevention: hand hygiene promoted  Goal: Optimal Comfort and Wellbeing  Outcome: Progressing  Intervention: Monitor Pain and Promote Comfort  Recent Flowsheet Documentation  Taken 11/20/2023 2355 by Roma Man RN  Pain Management Interventions: declines  Goal: Readiness for Transition of Care  Outcome: " Progressing     11/21/23

## 2023-11-21 NOTE — PROGRESS NOTES
[facility]    Medicine Progress Note - Hospitalist Service    Date of Admission:  11/20/2023    Assessment & Plan   Hai Meade is a 73 year old female with past medical history of cervical cancer, type 2 diabetes mellitus, peripheral neuropathy, cholelithiasis, hyperlipidemia, and hypertension who was admitted 11/20/23 for multiple left-sided rib fracture.     Traumatic acute left 3 ribs fracture  - S/P mechanical fall  - Xray (11/20) ribs shows acute mildly displaced fracture of lateral aspect of 7 8 and 9 ribs with small left pleural effusion  - CXR (11/21) unchanged. Repeat CXR in AM.  - Continue scheduled Tylenol, lidocaine patch  - PRN oxycodone 5 mg q 6 hrs  - continue home gabapentin  - Appreciate surgery consult, no need for surgical intervention. Rec PF with IS  - Continue PT/OT    S/P fall  - Patient had recurrent fall as per family  - CT thoracic spine shows multilevel cervical spondylosis  - CT head is negative for acute intracranial process.  - PT/OT evaluation  - Echo with EF 60-65%, some moderate LVH, mild left atrial dilation     Hypertensive urgency  - Elevated blood pressure, secondary to pain  - Resume home Losartan  - Continue IV hydralazine per parameters.    Diabetes mellitus type 2  - Resume home Losartan, metformin   - Cover insulin scale  - Check Hemoglobin A1c    Hyponatremia  - Mild, recheck BMP in AM  - Urine sodium within normal limits  - Continue IV fluid       Observation Goals: -diagnostic tests and consults completed and resulted, -vital signs normal or at patient baseline, -tolerating oral intake to maintain hydration, -adequate pain control on oral analgesics, -returns to baseline functional status, -safe disposition plan has been identified, Nurse to notify provider when observation goals have been met and patient is ready for discharge.  Diet: Moderate Consistent Carb (60 g CHO per Meal) Diet    DVT Prophylaxis: Pneumatic Compression Devices and Ambulate  every shift  Lr Catheter: Not present  Lines: None     Cardiac Monitoring: None  Code Status: Full Code      Clinically Significant Risk Factors Present on Admission                # Drug Induced Platelet Defect: home medication list includes an antiplatelet medication   # Hypertension: Home medication list includes antihypertensive(s)     # DMII: A1C = N/A within past 6 months               Disposition Plan      Expected Discharge Date: 11/21/2023      Destination: home with family          The patient's care was discussed with the Attending Physician, Dr. Luiz Riddle who independently met with and assessed the patient and is agreement with the assessment and plan     Mary Cardenas PA-C  Hospitalist Service  [facility]  Securely message with [employer] (more info)  Text page via [facility] Paging/Directory   ______________________________________________________________________    Interval History   Met with patient in room, resting comfortably in bed. No hypoxic events, not requiring any supplemental oxygen. Patient states pain is well controlled, describing 5/10 pain which she states is tolerable. Does report pain with deep inspiration, but denies cough, wheezing or shortness of breath. Denies leg pain or swelling. Agreeable to PT consultation.    Physical Exam   Vital Signs: Temp: 96.8  F (36  C) Temp src: Axillary BP: (!) 150/76 Pulse: 95   Resp: 18 SpO2: 95 % O2 Device: None (Room air)    Weight: 115 lbs .01 oz    Constitutional: awake, alert, no apparent distress, and appears stated age  Eyes: Lids and lashes normal, extra ocular muscles intact, sclera clear, conjunctiva normal  Respiratory: No increased work of breathing, no accessory muscle use. Minimally decreased BS to left lung base, some scattered rhonci.   Cardiovascular: Regular rate and rhythm, normal S1 and S2, no S3 or S4, and no murmur noted  GI: Soft, non-distended, non-tender, normal bowel sounds  Skin: Normal skin color,  texture, turgor. No rashes and no jaundice  Musculoskeletal: no lower extremity pitting edema present. 2+ DP pulses  Neurologic: Awake, alert.   Neuropsychiatric: Appropriate mood, affect and eye contact. Cooperative.    Medical Decision Making             Data     I have personally reviewed the following data over the past 24 hrs:    5.1  \   11.3 (L)   / 212     132 (L) 98 11.4 /  133 (H)   4.1 26 0.67 \     ALT: 20 AST: 19 AP: 72 TBILI: 0.8   ALB: 4.1 TOT PROTEIN: 6.4 LIPASE: N/A     Trop: 17 (H) BNP: N/A       Imaging results reviewed over the past 24 hrs:   Recent Results (from the past 24 hour(s))   Head CT w/o contrast    Narrative    EXAM: CT HEAD W/O CONTRAST  LOCATION: [facility]  DATE: 11/20/2023    INDICATION: Pain post fall  COMPARISON: MRI 04/08/2019  TECHNIQUE: Routine CT Head without IV contrast. Multiplanar reformats. Dose reduction techniques were used.    FINDINGS:  INTRACRANIAL CONTENTS: No intracranial hemorrhage, extraaxial collection, or mass effect.  No CT evidence of acute infarct. Mild presumed chronic small vessel ischemic changes. Mild generalized volume loss. No hydrocephalus.     VISUALIZED ORBITS/SINUSES/MASTOIDS: No intraorbital abnormality. No paranasal sinus mucosal disease. No middle ear or mastoid effusion.    BONES/SOFT TISSUES: No scalp hematoma. No skull fracture.      Impression    IMPRESSION:  1.  No CT evidence for acute intracranial process.   2.  Brain atrophy and presumed chronic microvascular ischemic changes as above.     Cervical spine CT w/o contrast    Narrative    EXAM: CT CERVICAL SPINE W/O CONTRAST, CT THORACIC SPINE W/O CONTRAST  LOCATION: [facility]  DATE: 11/20/2023    INDICATION: Pain post fall  COMPARISON: None.  TECHNIQUE:  1) Routine CT Cervical Spine without IV contrast. Multiplanar reformats. Dose reduction techniques were used.   2) Routine CT Thoracic Spine without IV contrast. Multiplanar reformats.  Dose reduction techniques were used.     FINDINGS:    CERVICAL SPINE CT:  VERTEBRA: Straightened cervical lordosis and mild levocurvature. Preserved vertebral body heights. No fracture or posttraumatic subluxation.     CANAL/FORAMINA: Diffuse cervical spondylosis with mild to moderate loss of disc height and associated endplate/uncovertebral spurring at C4-C5 and C5-C6. Mild to moderate spinal canal stenosis at C5-C6 and mild spinal canal stenosis at C4-C5. Mild to   moderate right and moderate to severe left neural foraminal stenosis at C5-C6. No additional high-grade bony neural foraminal stenosis identified.    PARASPINAL: Prevertebral soft tissues within normal limits for thickness. Mildly heterogeneous thyroid gland without dominant nodule.    THORACIC SPINE CT:  VERTEBRA: 12 rib-bearing thoracic type vertebra. Diffuse osteopenia. Minor thoracic dextrocurvature. Preserved vertebral body heights. No fracture or posttraumatic subluxation.     CANAL/FORAMINA: Mild diffuse thoracic spondylosis with minor loss of disc height and mild ventral bridging osteophytes at multiple mid and lower thoracic levels. No CT evidence for high-grade central spinal canal or neural foraminal stenosis.    PARASPINAL: Motion degraded evaluation of the lung parenchyma with some patchy groundglass opacities that may reflect atelectasis, but correlation with any respiratory symptoms is advised. Partially visualized cholelithiasis.      Impression    IMPRESSION:  CERVICAL SPINE CT:  1.  No fracture or posttraumatic subluxation.  2.  Multilevel cervical spondylosis most significant at C5-C6 as above.    THORACIC SPINE CT:  1.  No fracture or posttraumatic subluxation.  2.  Mild thoracic spondylosis without high-grade spinal canal or neural foraminal stenosis.  3.  Cholelithiasis.     CT Thoracic Spine w/o Contrast    Narrative    EXAM: CT CERVICAL SPINE W/O CONTRAST, CT THORACIC SPINE W/O CONTRAST  LOCATION: [facility]  DATE: 11/20/2023    INDICATION: Pain post fall  COMPARISON: None.  TECHNIQUE:  1) Routine CT Cervical Spine without IV contrast. Multiplanar reformats. Dose reduction techniques were used.   2) Routine CT Thoracic Spine without IV contrast. Multiplanar reformats. Dose reduction techniques were used.     FINDINGS:    CERVICAL SPINE CT:  VERTEBRA: Straightened cervical lordosis and mild levocurvature. Preserved vertebral body heights. No fracture or posttraumatic subluxation.     CANAL/FORAMINA: Diffuse cervical spondylosis with mild to moderate loss of disc height and associated endplate/uncovertebral spurring at C4-C5 and C5-C6. Mild to moderate spinal canal stenosis at C5-C6 and mild spinal canal stenosis at C4-C5. Mild to   moderate right and moderate to severe left neural foraminal stenosis at C5-C6. No additional high-grade bony neural foraminal stenosis identified.    PARASPINAL: Prevertebral soft tissues within normal limits for thickness. Mildly heterogeneous thyroid gland without dominant nodule.    THORACIC SPINE CT:  VERTEBRA: 12 rib-bearing thoracic type vertebra. Diffuse osteopenia. Minor thoracic dextrocurvature. Preserved vertebral body heights. No fracture or posttraumatic subluxation.     CANAL/FORAMINA: Mild diffuse thoracic spondylosis with minor loss of disc height and mild ventral bridging osteophytes at multiple mid and lower thoracic levels. No CT evidence for high-grade central spinal canal or neural foraminal stenosis.    PARASPINAL: Motion degraded evaluation of the lung parenchyma with some patchy groundglass opacities that may reflect atelectasis, but correlation with any respiratory symptoms is advised. Partially visualized cholelithiasis.      Impression    IMPRESSION:  CERVICAL SPINE CT:  1.  No fracture or posttraumatic subluxation.  2.  Multilevel cervical spondylosis most significant at C5-C6 as above.    THORACIC SPINE CT:  1.  No fracture or posttraumatic subluxation.  2.   Mild thoracic spondylosis without high-grade spinal canal or neural foraminal stenosis.  3.  Cholelithiasis.     Ribs XR, unilat 3 views + PA chest,  left    Narrative    EXAM: XR RIBS AND CHEST LEFT 3 VIEWS  LOCATION: [facility]  DATE: 2023    INDICATION: fall, pain  COMPARISON: CXR 2022      Impression    IMPRESSION: Acute mildly displaced fractures lateral aspect left seventh, eighth and ninth ribs. Bones are demineralized.    Multiple strands of linear atelectasis in both lower lungs. Lungs are otherwise clear. No pneumothorax. There may be a trace amount of pleural fluid and/or thickening inferior left hemithorax. Mild generalized cardiac enlargement. Normal pulmonary   vascularity. Calcified tortuous thoracic aorta. Cholelithiasis.   XR Chest Port 1 View    Narrative    EXAM: XR CHEST PORT 1 VIEW  LOCATION: [facility]  DATE: 2023    INDICATION: lateral aspect left seventh, eighth and ninth ribs fractures. follow up  COMPARISON: Chest radiograph 2023, CT thoracic spine 2023      Impression    IMPRESSION: Unchanged small left pleural effusion with left greater than right basilar opacities, likely atelectasis. No pneumothorax. Fractures of the posterior left 7th-9th ribs again noted. Unchanged cardiac silhouette and mediastinal contours.    Echocardiogram Complete   Result Value    LVEF  60-65%    Narrative    [id number]  [id number]  [id number]  [id number]^[email or web address]^[place] [address]     Name: OSCAR SHERWOOD  MRN: [id number]  : 1950  Study Date: 2023 10:56 AM  Age: 73 yrs  Gender: Female  Patient Location: [facility]  Reason For Study: Hypertension (HTN)  Ordering Physician: MINE LO  Performed By: BEBA     BSA: 1.5 m2  Height: 60 in  Weight: 115 lb  HR: 73  ______________________________________________________________________________  Procedure  Complete Echo  Adult.  ______________________________________________________________________________  Interpretation Summary     1. The left ventricle is normal in size. Left ventricular function is  normal.The ejection fraction is 60-65%. There is moderate concentric left  ventricular hypertrophy. Left ventricular diastolic function is abnormal.  Normal left ventricular wall motion.  2. Normal right ventricle size and systolic function.  3. The left atrium is mildly dilated.  4. Mild ascending aorta dilatation (39 mm).  5. No hemodynamically significant valvular abnormalities on 2D or color flow  imaging.  ______________________________________________________________________________  Left Ventricle  The left ventricle is normal in size. Left ventricular function is normal.The  ejection fraction is 60-65%. There is moderate concentric left ventricular  hypertrophy. Left ventricular diastolic function is abnormal. Normal left  ventricular wall motion.     Right Ventricle  Normal right ventricle size and systolic function.     Atria  The left atrium is mildly dilated. Right atrium not well visualized. Right  atrial size is normal.     Mitral Valve  The mitral valve leaflets are mildly thickened. There is trace mitral  regurgitation. There is no mitral valve stenosis.     Tricuspid Valve  The tricuspid valve is not well visualized. Right ventricular systolic  pressure could not be approximated due to inadequate tricuspid regurgitation.  No tricuspid regurgitation. There is no tricuspid stenosis.     Aortic Valve  There is mild trileaflet aortic sclerosis. There is trace aortic  regurgitation. No hemodynamically significant valvular aortic stenosis.     Pulmonic Valve  The pulmonic valve is not well seen, but is grossly normal. This degree of  valvular regurgitation is within normal limits. There is trace pulmonic  valvular regurgitation.     Vessels  The aorta root is normal. Ascending Aorta dilatation is present. IVC  diameter  <2.1 cm collapsing >50% with sniff suggests a normal RA pressure of 3 mmHg.     Pericardium  There is no pericardial effusion. Small left pleural effusion.     ______________________________________________________________________________  MMode/2D Measurements & Calculations  IVSd: 1.1 cm  LVIDd: 4.5 cm  LVIDs: 2.3 cm  LVPWd: 1.2 cm     FS: 48.9 %  LV mass(C)d: 182.1 grams  LV mass(C)dI: 123.4 grams/m2  Ao root diam: 3.0 cm  LA dimension: 2.8 cm  asc Aorta Diam: 3.9 cm  LA/Ao: 0.92  LVOT diam: 1.8 cm  LVOT area: 2.6 cm2  Ao root diam index Ht(cm/m): 2.0  Ao root diam index BSA (cm/m2): 2.1  Asc Ao diam index BSA (cm/m2): 2.6  Asc Ao diam index Ht(cm/m): 2.6  LA Volume Indexed (AL/bp): 29.5 ml/m2     RWT: 0.51  TAPSE: 2.3 cm     Time Measurements  MM HR: 69.0 BPM     Doppler Measurements & Calculations  MV E max cade: 65.1 cm/sec  MV A max cade: 100.7 cm/sec  MV E/A: 0.65  MV max P.3 mmHg  MV mean P.9 mmHg  MV V2 VTI: 23.2 cm  MVA(VTI): 2.9 cm2  MV dec slope: 220.5 cm/sec2  MV dec time: 0.30 sec  Ao V2 max: 142.5 cm/sec  Ao max P.0 mmHg  Ao V2 mean: 111.6 cm/sec  Ao mean P.2 mmHg  Ao V2 VTI: 33.8 cm  IVANNA(I,D): 2.0 cm2  IVANNA(V,D): 2.0 cm2  LV V1 max P.7 mmHg  LV V1 max: 108.8 cm/sec  LV V1 VTI: 26.1 cm  SV(LVOT): 68.2 ml  SI(LVOT): 46.3 ml/m2  PA acc time: 0.06 sec  AV Cade Ratio (DI): 0.76  IVANNA Index (cm2/m2): 1.4     E/E': 10.7  E/E' av.4  Lateral E/e': 8.2  Medial E/e': 10.7  Peak E' Cade: 6.1 cm/sec  RV S Cade: 11.4 cm/sec     ______________________________________________________________________________  Report approved by: Lu Lara 2023 11:47 AM

## 2023-11-22 ENCOUNTER — APPOINTMENT (OUTPATIENT)
Dept: RADIOLOGY | Facility: HOSPITAL | Age: 73
End: 2023-11-22
Attending: PHYSICIAN ASSISTANT
Payer: COMMERCIAL

## 2023-11-22 VITALS
HEIGHT: 60 IN | HEART RATE: 78 BPM | BODY MASS INDEX: 23.11 KG/M2 | SYSTOLIC BLOOD PRESSURE: 180 MMHG | TEMPERATURE: 97.6 F | RESPIRATION RATE: 18 BRPM | WEIGHT: 117.73 LBS | DIASTOLIC BLOOD PRESSURE: 88 MMHG | OXYGEN SATURATION: 96 %

## 2023-11-22 DIAGNOSIS — E11.43 TYPE 2 DIABETES MELLITUS WITH DIABETIC AUTONOMIC NEUROPATHY, WITHOUT LONG-TERM CURRENT USE OF INSULIN (H): ICD-10-CM

## 2023-11-22 LAB
ANION GAP SERPL CALCULATED.3IONS-SCNC: 10 MMOL/L (ref 7–15)
BUN SERPL-MCNC: 11.1 MG/DL (ref 8–23)
CALCIUM SERPL-MCNC: 9 MG/DL (ref 8.8–10.2)
CHLORIDE SERPL-SCNC: 102 MMOL/L (ref 98–107)
CREAT SERPL-MCNC: 0.62 MG/DL (ref 0.51–0.95)
DEPRECATED HCO3 PLAS-SCNC: 21 MMOL/L (ref 22–29)
EGFRCR SERPLBLD CKD-EPI 2021: >90 ML/MIN/1.73M2
GLUCOSE BLDC GLUCOMTR-MCNC: 140 MG/DL (ref 70–99)
GLUCOSE SERPL-MCNC: 151 MG/DL (ref 70–99)
HOLD SPECIMEN: NORMAL
POTASSIUM SERPL-SCNC: 3.9 MMOL/L (ref 3.4–5.3)
SODIUM SERPL-SCNC: 133 MMOL/L (ref 135–145)

## 2023-11-22 PROCEDURE — 82962 GLUCOSE BLOOD TEST: CPT

## 2023-11-22 PROCEDURE — 71045 X-RAY EXAM CHEST 1 VIEW: CPT

## 2023-11-22 PROCEDURE — G0378 HOSPITAL OBSERVATION PER HR: HCPCS

## 2023-11-22 PROCEDURE — 250N000013 HC RX MED GY IP 250 OP 250 PS 637: Performed by: INTERNAL MEDICINE

## 2023-11-22 PROCEDURE — 99239 HOSP IP/OBS DSCHRG MGMT >30: CPT | Performed by: INTERNAL MEDICINE

## 2023-11-22 PROCEDURE — 36415 COLL VENOUS BLD VENIPUNCTURE: CPT | Performed by: INTERNAL MEDICINE

## 2023-11-22 PROCEDURE — 80048 BASIC METABOLIC PNL TOTAL CA: CPT | Performed by: INTERNAL MEDICINE

## 2023-11-22 RX ORDER — LIDOCAINE 4 G/G
1 PATCH TOPICAL EVERY 24 HOURS
Qty: 5 PATCH | Refills: 0 | Status: SHIPPED | OUTPATIENT
Start: 2023-11-22

## 2023-11-22 RX ORDER — OXYCODONE HYDROCHLORIDE 5 MG/1
5 TABLET ORAL EVERY 6 HOURS PRN
Qty: 6 TABLET | Refills: 0 | Status: SHIPPED | OUTPATIENT
Start: 2023-11-22

## 2023-11-22 RX ORDER — AMOXICILLIN 250 MG
1 CAPSULE ORAL 2 TIMES DAILY PRN
COMMUNITY
Start: 2023-11-22

## 2023-11-22 RX ORDER — POLYETHYLENE GLYCOL 3350 17 G/17G
17 POWDER, FOR SOLUTION ORAL DAILY
COMMUNITY
Start: 2023-11-22

## 2023-11-22 RX ORDER — GLIPIZIDE 2.5 MG/1
TABLET, EXTENDED RELEASE ORAL
Qty: 90 TABLET | Refills: 0 | Status: SHIPPED | OUTPATIENT
Start: 2023-11-22 | End: 2024-02-26

## 2023-11-22 RX ADMIN — METFORMIN HYDROCHLORIDE 1000 MG: 500 TABLET, FILM COATED ORAL at 09:16

## 2023-11-22 RX ADMIN — LOSARTAN POTASSIUM 25 MG: 25 TABLET, FILM COATED ORAL at 09:16

## 2023-11-22 RX ADMIN — ACETAMINOPHEN 975 MG: 325 TABLET ORAL at 09:17

## 2023-11-22 RX ADMIN — GLIPIZIDE 2.5 MG: 2.5 TABLET, FILM COATED, EXTENDED RELEASE ORAL at 09:16

## 2023-11-22 RX ADMIN — INSULIN ASPART 1 UNITS: 100 INJECTION, SOLUTION INTRAVENOUS; SUBCUTANEOUS at 09:15

## 2023-11-22 RX ADMIN — POLYETHYLENE GLYCOL 3350 17 G: 17 POWDER, FOR SOLUTION ORAL at 09:18

## 2023-11-22 ASSESSMENT — ACTIVITIES OF DAILY LIVING (ADL)
ADLS_ACUITY_SCORE: 30

## 2023-11-22 NOTE — DISCHARGE SUMMARY
[facility]    Discharge Summary  Hospitalist    Date of Admission:  11/20/2023  Date of Discharge:  11/22/2023  Discharging Provider: Luiz Riddle MD  Date of Service (when I saw the patient): 11/22/23    Discharge Diagnoses   Fall  Acute left 3 ribs fracture  HTN  DM 2 on insulin  Hyponatremia    History of Present Illness   Hai Meade is an 73 year old female who presented with fall and left ribs pain    Hospital Course   Hai Meade is a 73 year old female with past medical history of cervical cancer, type 2 diabetes mellitus, peripheral neuropathy, cholelithiasis, hyperlipidemia, and hypertension who was admitted 11/20/23 for multiple left-sided rib fracture.      Traumatic acute left 3 ribs fracture  - S/P mechanical fall  - Xray (11/20) ribs shows acute mildly displaced fracture of lateral aspect of 7 8 and 9 ribs with small left pleural effusion  - CXR (11/21 and 11/22) unchanged.   - Continue scheduled Tylenol, lidocaine patch  - PRN oxycodone 5 mg q 6 hrs  - continue home gabapentin  - Appreciate surgery consult, no need for surgical intervention. Rec PF with IS  - Continue PT/OT: home with pt     S/P fall  - Patient had recurrent fall as per family  - CT thoracic spine shows multilevel cervical spondylosis  - CT head is negative for acute intracranial process.  - PT/OT evaluation  - Echo with EF 60-65%, some moderate LVH, mild left atrial dilation      Hypertensive urgency  - Elevated blood pressure, secondary to pain  - Resume home Losartan  - Continue IV hydralazine per parameters.     Diabetes mellitus type 2  - Resume home Losartan, metformin   - Cover insulin scale     Hyponatremia  - Mild, recheck BMP in AM; slowly better  - Urine sodium within normal limits  - Continue IV NS fluid          Luiz Riddle MD    Significant Results and Procedures   See below    Pending Results   These results will be followed up by PCP  Unresulted Labs Ordered in the Past 30 Days of this  Admission       Date and Time Order Name Status Description    11/22/2023  5:57 AM Extra Purple Top EDTA (LAB USE ONLY) In process     11/20/2023 10:48 PM Urine Culture Preliminary             Code Status   Full Code       Primary Care Physician   Brie Acevedo    General.  Awake alert oriented not in acute distress.  HEENT.  Pupils equal round react to light, anicteric, EOM intact.  Neck supple no JVD.  CVS regular rhythm no murmur gallops.  Lungs.  Clear to auscultation bilateral no wheezing or rales.  Left rib cage tenderness  Abdomen.  Soft nontender bowel sounds present.  Extremities.  No edema no calf tenderness.  Neurological.  Awake and alert. No focal deficit.  Skin no rash. No pallor.  Psych. Normal mood.      Discharge Disposition   Discharged to home  Condition at discharge: Fair    Consultations This Hospital Stay   PHYSICAL THERAPY ADULT IP CONSULT  OCCUPATIONAL THERAPY ADULT IP CONSULT  TRAUMA SURGERY IP CONSULT  CARE MANAGEMENT / SOCIAL WORK IP CONSULT    Time Spent on this Encounter   I, Luiz Riddle MD, personally saw the patient today and spent greater than 30 minutes discharging this patient.    Discharge Orders      Home Care Referral      Reason for your hospital stay    Fall  Multiple ribs fracture  Hyponatremia  DM 2     Follow-up and recommended labs and tests     Follow up with primary care provider, Brie Acevedo, within 7 days to evaluate medication change, for hospital follow- up, and regarding new diagnosis.  The following labs/tests are recommended: cbc, bmp.     Activity    Your activity upon discharge: activity as tolerated; fall precaution, use assists as needed     When to contact your care team    Call your primary doctor if you have any of the following: any concerns.     Diet    Follow this diet upon discharge: Orders Placed This Encounter      Moderate Consistent Carb (60 g CHO per Meal) Diet     Discharge Medications   Current Discharge Medication List        START  taking these medications    Details   Lidocaine (LIDOCARE) 4 % Patch Place 1 patch onto the skin every 24 hours To prevent lidocaine toxicity, patient should be patch free for 12 hrs daily.  Qty: 5 patch, Refills: 0    Associated Diagnoses: Closed fracture of multiple ribs of left side, initial encounter      oxyCODONE (ROXICODONE) 5 MG tablet Take 1 tablet (5 mg) by mouth every 6 hours as needed for severe pain  Qty: 6 tablet, Refills: 0    Associated Diagnoses: Closed fracture of multiple ribs of left side, initial encounter      polyethylene glycol (MIRALAX) 17 GM/Dose powder Take 17 g by mouth daily    Associated Diagnoses: Drug-induced constipation      senna-docusate (SENOKOT-S/PERICOLACE) 8.6-50 MG tablet Take 1 tablet by mouth 2 times daily as needed for constipation    Associated Diagnoses: Drug-induced constipation           CONTINUE these medications which have NOT CHANGED    Details   acetaminophen (TYLENOL) 500 MG tablet [ACETAMINOPHEN (TYLENOL) 500 MG TABLET] Take 1-2 tablets (500-1,000 mg total) by mouth every 4 (four) hours as needed.  Refills: 0    Associated Diagnoses: SBO (small bowel obstruction) (H)      aspirin 81 MG EC tablet Take 81 mg by mouth every evening      Calcium Carb-Cholecalciferol 500-10 MG-MCG CHEW CHEW 1 TABLET TWICE DAILY  Qty: 180 tablet, Refills: 3    Associated Diagnoses: Age-related osteoporosis without current pathological fracture      Cholecalciferol (D3-1000) 25 MCG (1000 UT) CAPS Take 1 capsule by mouth daily  Qty: 90 capsule, Refills: 2    Associated Diagnoses: Age-related osteoporosis without current pathological fracture      gabapentin (NEURONTIN) 100 MG capsule TAKE 1 CAPSULE BY MOUTH EVERY NIGHT AT BEDTIME  Qty: 90 capsule, Refills: 3    Associated Diagnoses: Type 2 diabetes mellitus with diabetic autonomic neuropathy, without long-term current use of insulin (H)      glipiZIDE (GLUCOTROL XL) 2.5 MG 24 hr tablet Take 1 tablet (2.5 mg) by mouth daily as  needed  Qty: 90 tablet, Refills: 4    Associated Diagnoses: Type 2 diabetes mellitus with diabetic autonomic neuropathy, without long-term current use of insulin (H)      losartan (COZAAR) 25 MG tablet Take 1 tablet (25 mg) by mouth daily  Qty: 30 tablet, Refills: 11    Associated Diagnoses: Benign essential hypertension      metFORMIN (GLUCOPHAGE) 1000 MG tablet TAKE 1 TABLET(1000 MG) BY MOUTH TWICE DAILY WITH MEALS  Qty: 180 tablet, Refills: 2    Associated Diagnoses: Diabetes (H)      simvastatin (ZOCOR) 40 MG tablet TAKE 1 TABLET(40 MG) BY MOUTH AT BEDTIME  Qty: 90 tablet, Refills: 0    Comments: Please inform patient to keep upcoming appointment. This is a teresa refill.  Associated Diagnoses: Hyperlipidemia      blood glucose (NO BRAND SPECIFIED) lancets standard Use to test blood sugar 1  times daily or as directed.  Qty: 100 lancet., Refills: 3    Comments: Okay to substitute based on patient's preference or insurance coverage  Associated Diagnoses: Type 2 diabetes mellitus with diabetic autonomic neuropathy, without long-term current use of insulin (H)      generic lancets (ACCU-CHEK SOFTCLIX LANCETS) [GENERIC LANCETS (ACCU-CHEK SOFTCLIX LANCETS)] TEST TWICE DAILY  Qty: 200 each, Refills: 1    Associated Diagnoses: Type 2 diabetes mellitus (H)           Allergies   Allergies   Allergen Reactions    Cymbalta [Duloxetine] Unknown     Nausea and vomiting     Data   Most Recent 3 CBC's:  Recent Labs   Lab Test 11/21/23  0734 11/20/23  1703 08/21/23  0955   WBC 5.1 9.6 4.8   HGB 11.3* 12.0 10.9*   MCV 88 87 86    238 211      Most Recent 3 BMP's:  Recent Labs   Lab Test 11/22/23  0808 11/22/23  0601 11/21/23  2124 11/21/23  1650 11/21/23  0734 11/20/23  1704 11/20/23  1703   NA  --  133*  --   --  132*  --  131*   POTASSIUM  --  3.9  --   --  4.1  --  3.6   CHLORIDE  --  102  --   --  98  --  93*   CO2  --  21*  --   --  26  --  25   BUN  --  11.1  --   --  11.4  --  9.5   CR  --  0.62  --   --  0.67  --   0.54   ANIONGAP  --  10  --   --  8  --  13   TU  --  9.0  --   --  9.3  --  9.6   * 151* 166*   < > 133*   < > 135*    < > = values in this interval not displayed.     Most Recent 2 LFT's:  Recent Labs   Lab Test 11/21/23  0734 05/08/23  0832   AST 19 22   ALT 20 21   ALKPHOS 72 59   BILITOTAL 0.8 0.6     Most Recent INR's and Anticoagulation Dosing History:  Anticoagulation Dose History          Latest Ref Rng & Units 4/22/2018 4/8/2019   Recent Dosing and Labs   INR 0.90 - 1.10 0.92  1.01      Most Recent 3 Troponin's:No lab results found.  Most Recent Cholesterol Panel:  Recent Labs   Lab Test 10/18/22  0801   CHOL 135   LDL 56   HDL 58   TRIG 105     Most Recent 6 Bacteria Isolates From Any Culture (See EPIC Reports for Culture Details):No lab results found.  Most Recent TSH, T4 and A1c Labs:  Recent Labs   Lab Test 08/21/23  0955 06/22/19  0639 04/09/19  1036   TSH  --   --  0.95   A1C 7.4*   < >  --     < > = values in this interval not displayed.     Results for orders placed or performed during the hospital encounter of 11/20/23   Ribs XR, unilat 3 views + PA chest,  left    Narrative    EXAM: XR RIBS AND CHEST LEFT 3 VIEWS  LOCATION: [facility]  DATE: 11/20/2023    INDICATION: fall, pain  COMPARISON: CXR 02/18/2022      Impression    IMPRESSION: Acute mildly displaced fractures lateral aspect left seventh, eighth and ninth ribs. Bones are demineralized.    Multiple strands of linear atelectasis in both lower lungs. Lungs are otherwise clear. No pneumothorax. There may be a trace amount of pleural fluid and/or thickening inferior left hemithorax. Mild generalized cardiac enlargement. Normal pulmonary   vascularity. Calcified tortuous thoracic aorta. Cholelithiasis.   Head CT w/o contrast    Narrative    EXAM: CT HEAD W/O CONTRAST  LOCATION: [facility]  DATE: 11/20/2023    INDICATION: Pain post fall  COMPARISON: MRI 04/08/2019  TECHNIQUE: Routine CT  Head without IV contrast. Multiplanar reformats. Dose reduction techniques were used.    FINDINGS:  INTRACRANIAL CONTENTS: No intracranial hemorrhage, extraaxial collection, or mass effect.  No CT evidence of acute infarct. Mild presumed chronic small vessel ischemic changes. Mild generalized volume loss. No hydrocephalus.     VISUALIZED ORBITS/SINUSES/MASTOIDS: No intraorbital abnormality. No paranasal sinus mucosal disease. No middle ear or mastoid effusion.    BONES/SOFT TISSUES: No scalp hematoma. No skull fracture.      Impression    IMPRESSION:  1.  No CT evidence for acute intracranial process.   2.  Brain atrophy and presumed chronic microvascular ischemic changes as above.     Cervical spine CT w/o contrast    Narrative    EXAM: CT CERVICAL SPINE W/O CONTRAST, CT THORACIC SPINE W/O CONTRAST  LOCATION: [facility]  DATE: 11/20/2023    INDICATION: Pain post fall  COMPARISON: None.  TECHNIQUE:  1) Routine CT Cervical Spine without IV contrast. Multiplanar reformats. Dose reduction techniques were used.   2) Routine CT Thoracic Spine without IV contrast. Multiplanar reformats. Dose reduction techniques were used.     FINDINGS:    CERVICAL SPINE CT:  VERTEBRA: Straightened cervical lordosis and mild levocurvature. Preserved vertebral body heights. No fracture or posttraumatic subluxation.     CANAL/FORAMINA: Diffuse cervical spondylosis with mild to moderate loss of disc height and associated endplate/uncovertebral spurring at C4-C5 and C5-C6. Mild to moderate spinal canal stenosis at C5-C6 and mild spinal canal stenosis at C4-C5. Mild to   moderate right and moderate to severe left neural foraminal stenosis at C5-C6. No additional high-grade bony neural foraminal stenosis identified.    PARASPINAL: Prevertebral soft tissues within normal limits for thickness. Mildly heterogeneous thyroid gland without dominant nodule.    THORACIC SPINE CT:  VERTEBRA: 12 rib-bearing thoracic type  vertebra. Diffuse osteopenia. Minor thoracic dextrocurvature. Preserved vertebral body heights. No fracture or posttraumatic subluxation.     CANAL/FORAMINA: Mild diffuse thoracic spondylosis with minor loss of disc height and mild ventral bridging osteophytes at multiple mid and lower thoracic levels. No CT evidence for high-grade central spinal canal or neural foraminal stenosis.    PARASPINAL: Motion degraded evaluation of the lung parenchyma with some patchy groundglass opacities that may reflect atelectasis, but correlation with any respiratory symptoms is advised. Partially visualized cholelithiasis.      Impression    IMPRESSION:  CERVICAL SPINE CT:  1.  No fracture or posttraumatic subluxation.  2.  Multilevel cervical spondylosis most significant at C5-C6 as above.    THORACIC SPINE CT:  1.  No fracture or posttraumatic subluxation.  2.  Mild thoracic spondylosis without high-grade spinal canal or neural foraminal stenosis.  3.  Cholelithiasis.     CT Thoracic Spine w/o Contrast    Narrative    EXAM: CT CERVICAL SPINE W/O CONTRAST, CT THORACIC SPINE W/O CONTRAST  LOCATION: [facility]  DATE: 11/20/2023    INDICATION: Pain post fall  COMPARISON: None.  TECHNIQUE:  1) Routine CT Cervical Spine without IV contrast. Multiplanar reformats. Dose reduction techniques were used.   2) Routine CT Thoracic Spine without IV contrast. Multiplanar reformats. Dose reduction techniques were used.     FINDINGS:    CERVICAL SPINE CT:  VERTEBRA: Straightened cervical lordosis and mild levocurvature. Preserved vertebral body heights. No fracture or posttraumatic subluxation.     CANAL/FORAMINA: Diffuse cervical spondylosis with mild to moderate loss of disc height and associated endplate/uncovertebral spurring at C4-C5 and C5-C6. Mild to moderate spinal canal stenosis at C5-C6 and mild spinal canal stenosis at C4-C5. Mild to   moderate right and moderate to severe left neural foraminal stenosis at  C5-C6. No additional high-grade bony neural foraminal stenosis identified.    PARASPINAL: Prevertebral soft tissues within normal limits for thickness. Mildly heterogeneous thyroid gland without dominant nodule.    THORACIC SPINE CT:  VERTEBRA: 12 rib-bearing thoracic type vertebra. Diffuse osteopenia. Minor thoracic dextrocurvature. Preserved vertebral body heights. No fracture or posttraumatic subluxation.     CANAL/FORAMINA: Mild diffuse thoracic spondylosis with minor loss of disc height and mild ventral bridging osteophytes at multiple mid and lower thoracic levels. No CT evidence for high-grade central spinal canal or neural foraminal stenosis.    PARASPINAL: Motion degraded evaluation of the lung parenchyma with some patchy groundglass opacities that may reflect atelectasis, but correlation with any respiratory symptoms is advised. Partially visualized cholelithiasis.      Impression    IMPRESSION:  CERVICAL SPINE CT:  1.  No fracture or posttraumatic subluxation.  2.  Multilevel cervical spondylosis most significant at C5-C6 as above.    THORACIC SPINE CT:  1.  No fracture or posttraumatic subluxation.  2.  Mild thoracic spondylosis without high-grade spinal canal or neural foraminal stenosis.  3.  Cholelithiasis.     XR Chest Port 1 View    Narrative    EXAM: XR CHEST PORT 1 VIEW  LOCATION: [facility]  DATE: 11/21/2023    INDICATION: lateral aspect left seventh, eighth and ninth ribs fractures. follow up  COMPARISON: Chest radiograph 11/20/2023, CT thoracic spine 11/20/2023      Impression    IMPRESSION: Unchanged small left pleural effusion with left greater than right basilar opacities, likely atelectasis. No pneumothorax. Fractures of the posterior left 7th-9th ribs again noted. Unchanged cardiac silhouette and mediastinal contours.    XR Chest Port 1 View    Narrative    EXAM: XR CHEST PORT 1 VIEW  LOCATION: [facility]  DATE: 11/22/2023    INDICATION:  lateral aspect left seventh, eighth and ninth ribs fractures. follow up  COMPARISON: 2023      Impression    IMPRESSION: Stable cardiomediastinal contours. Persistent but improved bibasilar infiltrates. Upper lung zones are clear. No visible pneumothorax. Left lateral rib fractures again seen.   Echocardiogram Complete     Value    LVEF  60-65%    [facility]    [id number]  [id number]  [id number]  [id number]^TERESITA^MINE^[place] [address]     Name: OSCAR SHERWOOD  MRN: [id number]  : 1950  Study Date: 2023 10:56 AM  Age: 73 yrs  Gender: Female  Patient Location: [facility]  Reason For Study: Hypertension (HTN)  Ordering Physician: MINE LO  Performed By: BEBA     BSA: 1.5 m2  Height: 60 in  Weight: 115 lb  HR: 73  ______________________________________________________________________________  Procedure  Complete Echo Adult.  ______________________________________________________________________________  Interpretation Summary     1. The left ventricle is normal in size. Left ventricular function is  normal.The ejection fraction is 60-65%. There is moderate concentric left  ventricular hypertrophy. Left ventricular diastolic function is abnormal.  Normal left ventricular wall motion.  2. Normal right ventricle size and systolic function.  3. The left atrium is mildly dilated.  4. Mild ascending aorta dilatation (39 mm).  5. No hemodynamically significant valvular abnormalities on 2D or color flow  imaging.  ______________________________________________________________________________  Left Ventricle  The left ventricle is normal in size. Left ventricular function is normal.The  ejection fraction is 60-65%. There is moderate concentric left ventricular  hypertrophy. Left ventricular diastolic function is abnormal. Normal left  ventricular wall motion.     Right Ventricle  Normal right ventricle size and systolic function.     Atria  The left atrium is mildly  dilated. Right atrium not well visualized. Right  atrial size is normal.     Mitral Valve  The mitral valve leaflets are mildly thickened. There is trace mitral  regurgitation. There is no mitral valve stenosis.     Tricuspid Valve  The tricuspid valve is not well visualized. Right ventricular systolic  pressure could not be approximated due to inadequate tricuspid regurgitation.  No tricuspid regurgitation. There is no tricuspid stenosis.     Aortic Valve  There is mild trileaflet aortic sclerosis. There is trace aortic  regurgitation. No hemodynamically significant valvular aortic stenosis.     Pulmonic Valve  The pulmonic valve is not well seen, but is grossly normal. This degree of  valvular regurgitation is within normal limits. There is trace pulmonic  valvular regurgitation.     Vessels  The aorta root is normal. Ascending Aorta dilatation is present. IVC diameter  <2.1 cm collapsing >50% with sniff suggests a normal RA pressure of 3 mmHg.     Pericardium  There is no pericardial effusion. Small left pleural effusion.     ______________________________________________________________________________  MMode/2D Measurements & Calculations  IVSd: 1.1 cm  LVIDd: 4.5 cm  LVIDs: 2.3 cm  LVPWd: 1.2 cm     FS: 48.9 %  LV mass(C)d: 182.1 grams  LV mass(C)dI: 123.4 grams/m2  Ao root diam: 3.0 cm  LA dimension: 2.8 cm  asc Aorta Diam: 3.9 cm  LA/Ao: 0.92  LVOT diam: 1.8 cm  LVOT area: 2.6 cm2  Ao root diam index Ht(cm/m): 2.0  Ao root diam index BSA (cm/m2): 2.1  Asc Ao diam index BSA (cm/m2): 2.6  Asc Ao diam index Ht(cm/m): 2.6  LA Volume Indexed (AL/bp): 29.5 ml/m2     RWT: 0.51  TAPSE: 2.3 cm     Time Measurements  MM HR: 69.0 BPM     Doppler Measurements & Calculations  MV E max gay: 65.1 cm/sec  MV A max gay: 100.7 cm/sec  MV E/A: 0.65  MV max P.3 mmHg  MV mean P.9 mmHg  MV V2 VTI: 23.2 cm  MVA(VTI): 2.9 cm2  MV dec slope: 220.5 cm/sec2  MV dec time: 0.30 sec  Ao V2 max: 142.5 cm/sec  Ao max P.0  mmHg  Ao V2 mean: 111.6 cm/sec  Ao mean P.2 mmHg  Ao V2 VTI: 33.8 cm  IVANNA(I,D): 2.0 cm2  IVANNA(V,D): 2.0 cm2  LV V1 max P.7 mmHg  LV V1 max: 108.8 cm/sec  LV V1 VTI: 26.1 cm  SV(LVOT): 68.2 ml  SI(LVOT): 46.3 ml/m2  PA acc time: 0.06 sec  AV Cade Ratio (DI): 0.76  IVANNA Index (cm2/m2): 1.4     E/E': 10.7  E/E' av.4  Lateral E/e': 8.2  Medial E/e': 10.7  Peak E' Cade: 6.1 cm/sec  RV S Cade: 11.4 cm/sec     ______________________________________________________________________________  Report approved by: Lu Lara 2023 11:47 AM

## 2023-11-22 NOTE — PROGRESS NOTES
Physical Therapy Discharge Summary    Reason for therapy discharge:    Discharged to home.    Progress towards therapy goal(s). See goals on Care Plan in [facility] electronic health record for goal details.  Goals not met.  Barriers to achieving goals:   discharge from facility.    Therapy recommendation(s):    Continued therapy is recommended.  Rationale/Recommendations:  Home PT.

## 2023-11-22 NOTE — PLAN OF CARE
"PRIMARY DIAGNOSIS: \"GENERIC\" NURSING  OUTPATIENT/OBSERVATION GOALS TO BE MET BEFORE DISCHARGE:  ADLs back to baseline: Yes    Activity and level of assistance: Up with standby assistance.    Pain status: Improved-controlled with oral pain medications.    Return to near baseline physical activity: Yes     Discharge Planner Nurse   Safe discharge environment identified: Yes  Barriers to discharge: No       Entered by: Olegario Haynes RN 11/22/2023 8:17 AM     Please review provider order for any additional goals.   Nurse to notify provider when observation goals have been met and patient is ready for discharge.Goal Outcome Evaluation:                        "

## 2023-11-22 NOTE — PLAN OF CARE
"PRIMARY DIAGNOSIS: \"GENERIC\" NURSING  OUTPATIENT/OBSERVATION GOALS TO BE MET BEFORE DISCHARGE:  ADLs back to baseline: Yes    Activity and level of assistance: Up with standby assistance.    Pain status: Improved-controlled with oral pain medications.    Return to near baseline physical activity: Yes     Discharge Planner Nurse   Safe discharge environment identified: Yes  Barriers to discharge: No       Entered by: Olegario Haynes RN 11/22/2023 8:15 AM     Please review provider order for any additional goals.   Nurse to notify provider when observation goals have been met and patient is ready for discharge.Goal Outcome Evaluation:                        "

## 2023-11-22 NOTE — PROGRESS NOTES
Care Management Discharge Note    Discharge Date: 11/22/2023       Discharge Disposition:  home no needs per pt  Discharge Services:  declining home care  Discharge DME:  n/a    Discharge Transportation: family or friend will provide    Private pay costs discussed: Not applicable    Education Provided on the Discharge Plan:  yes  Persons Notified of Discharge Plans: yes  Patient/Family in Agreement with the Plan: yes      Handoff Referral Completed: Yes    Additional Information:  No needs anticipated from CM at discharge per pt (declining home PT); independent at baseline. Pt to follow up with PCP post discharge if needs arise. Family to transport home.  11:30 AM    MARY Morton  11/22/2023

## 2023-11-24 ENCOUNTER — TELEPHONE (OUTPATIENT)
Dept: FAMILY MEDICINE | Facility: CLINIC | Age: 73
End: 2023-11-24
Payer: COMMERCIAL

## 2023-11-24 DIAGNOSIS — N39.0 URINARY TRACT INFECTION WITHOUT HEMATURIA, SITE UNSPECIFIED: Primary | ICD-10-CM

## 2023-11-24 RX ORDER — NITROFURANTOIN 25; 75 MG/1; MG/1
100 CAPSULE ORAL 2 TIMES DAILY
Qty: 10 CAPSULE | Refills: 0 | Status: SHIPPED | OUTPATIENT
Start: 2023-11-24 | End: 2023-11-29

## 2023-11-24 NOTE — TELEPHONE ENCOUNTER
Patient was seen at ED for a fall/left rib pain. During visit a UA Culture was collected. Results came back and MD at ED wanted PCP to review and address.     Please review results and send in prescription if appropriate.

## 2023-11-24 NOTE — RESULT ENCOUNTER NOTE
Called patient's contact (daughter) and informed the result of urine culture. As per daughter patient has no UTI symptoms (no fever/chill, burning pain with urination, etc). I recommended pt/daughter to call pcp if any concerns for UTI.

## 2023-11-24 NOTE — TELEPHONE ENCOUNTER
Reason for Call:  Appointment Request    Patient requesting this type of appt:  Hospital/ED Follow-Up     Requested provider: Brie Acevedo    Reason patient unable to be scheduled: Not within requested timeframe    When does patient want to be seen/preferred time: 3-7 days    Comments: Hospital F/U    Okay to leave a detailed message?: Yes at Other phone number:  [phone]    Call taken on 11/24/2023 at 7:58 AM by Scarlett Houston

## 2023-11-24 NOTE — TELEPHONE ENCOUNTER
I spoke to Shyam, patient's EC and relayed message that a prescription has been sent.     Shyam would like to know if the patient needs to be seen for a hospital follow up. Patient was IP at [facility] from 11/20-11/22.

## 2023-11-25 LAB
BACTERIA UR CULT: ABNORMAL
BACTERIA UR CULT: ABNORMAL

## 2023-11-27 ENCOUNTER — ALLIED HEALTH/NURSE VISIT (OUTPATIENT)
Dept: FAMILY MEDICINE | Facility: CLINIC | Age: 73
End: 2023-11-27
Payer: COMMERCIAL

## 2023-11-27 DIAGNOSIS — Z23 ENCOUNTER FOR IMMUNIZATION: Primary | ICD-10-CM

## 2023-11-27 PROCEDURE — 99207 PR NO CHARGE NURSE ONLY: CPT

## 2023-11-27 PROCEDURE — 96372 THER/PROPH/DIAG INJ SC/IM: CPT | Performed by: PHARMACIST

## 2023-11-27 RX ADMIN — CYANOCOBALAMIN 1000 MCG: 1000 INJECTION, SOLUTION INTRAMUSCULAR; SUBCUTANEOUS at 08:49

## 2023-11-27 NOTE — CONFIDENTIAL NOTE
Called pt, requested to call daughter to schedule appt for follow up. Called daughter, requested to call back later to schedule.

## 2023-11-27 NOTE — PROGRESS NOTES
Patient received her B-12 injection today. Tolerated well and was instructed on when to come back next.

## 2023-11-29 ENCOUNTER — OFFICE VISIT (OUTPATIENT)
Dept: FAMILY MEDICINE | Facility: CLINIC | Age: 73
End: 2023-11-29
Payer: COMMERCIAL

## 2023-11-29 VITALS
SYSTOLIC BLOOD PRESSURE: 147 MMHG | BODY MASS INDEX: 23.3 KG/M2 | WEIGHT: 119.3 LBS | RESPIRATION RATE: 20 BRPM | DIASTOLIC BLOOD PRESSURE: 79 MMHG | HEART RATE: 80 BPM | OXYGEN SATURATION: 98 % | TEMPERATURE: 98.1 F

## 2023-11-29 DIAGNOSIS — E11.43 TYPE 2 DIABETES MELLITUS WITH DIABETIC AUTONOMIC NEUROPATHY, WITHOUT LONG-TERM CURRENT USE OF INSULIN (H): ICD-10-CM

## 2023-11-29 DIAGNOSIS — S22.42XD CLOSED FRACTURE OF MULTIPLE RIBS OF LEFT SIDE WITH ROUTINE HEALING, SUBSEQUENT ENCOUNTER: Primary | ICD-10-CM

## 2023-11-29 PROCEDURE — 99214 OFFICE O/P EST MOD 30 MIN: CPT | Performed by: FAMILY MEDICINE

## 2023-11-29 RX ORDER — NAPROXEN 500 MG/1
500 TABLET ORAL 2 TIMES DAILY WITH MEALS
Qty: 20 TABLET | Refills: 0 | Status: CANCELLED | OUTPATIENT
Start: 2023-11-29

## 2023-11-29 RX ORDER — RESPIRATORY SYNCYTIAL VIRUS VACCINE 120MCG/0.5
0.5 KIT INTRAMUSCULAR ONCE
Qty: 1 EACH | Refills: 0 | Status: CANCELLED | OUTPATIENT
Start: 2023-11-29 | End: 2023-11-29

## 2023-11-29 ASSESSMENT — PAIN SCALES - GENERAL: PAINLEVEL: EXTREME PAIN (9)
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
## 2023-12-05 ENCOUNTER — TELEPHONE (OUTPATIENT)
Dept: FAMILY MEDICINE | Age: 73
End: 2023-12-05

## 2023-12-26 ENCOUNTER — ALLIED HEALTH/NURSE VISIT (OUTPATIENT)
Dept: FAMILY MEDICINE | Facility: CLINIC | Age: 73
End: 2023-12-26
Payer: COMMERCIAL

## 2023-12-26 DIAGNOSIS — E53.8 VITAMIN B 12 DEFICIENCY: Primary | ICD-10-CM

## 2023-12-26 DIAGNOSIS — I10 BENIGN ESSENTIAL HYPERTENSION: ICD-10-CM

## 2023-12-26 PROCEDURE — 99207 PR NO CHARGE NURSE ONLY: CPT

## 2023-12-26 PROCEDURE — 96372 THER/PROPH/DIAG INJ SC/IM: CPT | Performed by: PHARMACIST

## 2023-12-26 RX ORDER — LOSARTAN POTASSIUM 25 MG/1
25 TABLET ORAL DAILY
Qty: 90 TABLET | OUTPATIENT
Start: 2023-12-26

## 2023-12-26 RX ADMIN — CYANOCOBALAMIN 1000 MCG: 1000 INJECTION, SOLUTION INTRAMUSCULAR; SUBCUTANEOUS at 09:52

## 2023-12-29 RX ORDER — LOSARTAN POTASSIUM 50 MG/1
50 TABLET ORAL 2 TIMES DAILY
Qty: 180 TABLET | Refills: 0 | Status: SHIPPED | OUTPATIENT
Start: 2023-12-29

## 2024-01-05 DIAGNOSIS — I10 BENIGN ESSENTIAL HYPERTENSION: ICD-10-CM

## 2024-01-05 RX ORDER — AMLODIPINE BESYLATE 10 MG/1
10 TABLET ORAL DAILY
Qty: 90 TABLET | Refills: 0 | Status: SHIPPED | OUTPATIENT
Start: 2024-01-05

## 2024-01-08 RX ORDER — MELOXICAM 15 MG/1
15 TABLET ORAL DAILY PRN
Qty: 90 TABLET | Refills: 0 | Status: SHIPPED | OUTPATIENT
Start: 2024-01-08

## 2024-01-12 ENCOUNTER — TELEPHONE (OUTPATIENT)
Dept: FAMILY MEDICINE | Facility: CLINIC | Age: 74
End: 2024-01-12
Payer: COMMERCIAL

## 2024-01-12 DIAGNOSIS — E11.43 TYPE 2 DIABETES MELLITUS WITH DIABETIC AUTONOMIC NEUROPATHY, WITHOUT LONG-TERM CURRENT USE OF INSULIN (H): Primary | ICD-10-CM

## 2024-01-12 NOTE — TELEPHONE ENCOUNTER
Reason for call:  Medication     If this is a refill request, has the caller requested the refill from the pharmacy already? No  Will the patient be using a [facility]? No  Name of the pharmacy and phone number for the current request: [facility] [phone].     Pharmacy is calling and requesting a refill on the patient's medication Rolanda Test strips. Please advise and call pharmacy back with updates please and thank you.    Name of the medication requested:Blood Glucose (ACC -Check Rolanda plus Test strip)    Other request: none    Phone number to reach patient:  Other phone number:  [phone]

## 2024-01-15 ENCOUNTER — TELEPHONE (OUTPATIENT)
Dept: FAMILY MEDICINE | Age: 74
End: 2024-01-15

## 2024-01-25 ENCOUNTER — APPOINTMENT (OUTPATIENT)
Dept: FAMILY MEDICINE | Age: 74
End: 2024-01-25

## 2024-01-25 ENCOUNTER — TELEPHONE (OUTPATIENT)
Dept: FAMILY MEDICINE | Age: 74
End: 2024-01-25

## 2024-01-25 DIAGNOSIS — K05.00 ACUTE GINGIVITIS, PLAQUE INDUCED: ICD-10-CM

## 2024-01-25 DIAGNOSIS — N18.30 BENIGN HYPERTENSION WITH CKD (CHRONIC KIDNEY DISEASE) STAGE III (CMD): ICD-10-CM

## 2024-01-25 DIAGNOSIS — E11.69 DYSLIPIDEMIA ASSOCIATED WITH TYPE 2 DIABETES MELLITUS (CMD): ICD-10-CM

## 2024-01-25 DIAGNOSIS — I12.9 BENIGN HYPERTENSION WITH CKD (CHRONIC KIDNEY DISEASE) STAGE III (CMD): ICD-10-CM

## 2024-01-25 DIAGNOSIS — E55.9 VITAMIN D DEFICIENCY: ICD-10-CM

## 2024-01-25 DIAGNOSIS — I50.32 DIASTOLIC CHF, CHRONIC (CMD): ICD-10-CM

## 2024-01-25 DIAGNOSIS — E11.36 TYPE 2 DIABETES MELLITUS WITH DIABETIC CATARACT, WITHOUT LONG-TERM CURRENT USE OF INSULIN (CMD): ICD-10-CM

## 2024-01-25 DIAGNOSIS — E03.9 ACQUIRED HYPOTHYROIDISM: ICD-10-CM

## 2024-01-25 DIAGNOSIS — Z76.89 ENCOUNTER TO ESTABLISH CARE: Primary | ICD-10-CM

## 2024-01-25 DIAGNOSIS — E78.5 DYSLIPIDEMIA ASSOCIATED WITH TYPE 2 DIABETES MELLITUS (CMD): ICD-10-CM

## 2024-01-25 LAB — HBA1C MFR BLD: 7.1 % (ref 4.5–5.6)

## 2024-01-25 PROCEDURE — 99214 OFFICE O/P EST MOD 30 MIN: CPT | Performed by: FAMILY MEDICINE

## 2024-01-25 PROCEDURE — 83036 HEMOGLOBIN GLYCOSYLATED A1C: CPT | Performed by: FAMILY MEDICINE

## 2024-01-25 RX ORDER — DULAGLUTIDE 1.5 MG/.5ML
1.5 INJECTION, SOLUTION SUBCUTANEOUS
Qty: 6 ML | Refills: 3 | Status: SHIPPED | OUTPATIENT
Start: 2024-01-25 | End: 2025-01-24

## 2024-01-25 RX ORDER — AMOXICILLIN AND CLAVULANATE POTASSIUM 875; 125 MG/1; MG/1
1 TABLET, FILM COATED ORAL 2 TIMES DAILY
Qty: 14 TABLET | Refills: 0 | Status: SHIPPED | OUTPATIENT
Start: 2024-01-25 | End: 2024-02-01

## 2024-01-25 RX ORDER — BLOOD-GLUCOSE METER
KIT MISCELLANEOUS
Qty: 1 EACH | Refills: 0 | Status: SHIPPED | OUTPATIENT
Start: 2024-01-25

## 2024-01-25 NOTE — PROGRESS NOTES
DISCHARGE  Reason for Discharge: Patient has failed to schedule further appointments.    Equipment Issued: none    Discharge Plan: Patient to continue home program.  Pt to receive new referral for PT from doctor to schedule more PT appointments.    Referring Provider:  Denia Barron       08/28/23 0500   Appointment Info   Signing clinician's name / credentials Krystina Mojica, PT, DPT   Visits Used 9   Medical Diagnosis Bilateral foot drop   PT Tx Diagnosis Impaired balance and gait with BLE weakness   Progress Note/Certification   Start of Care Date 05/01/23   Onset of illness/injury or Date of Surgery 04/26/23   Therapy Frequency 1 time a week   Predicted Duration 1 time a week or every other week for a minimum of 12 weeks, minimum of 6 sessions   Certification date from 07/24/23   Certification date to 10/15/23   Progress Note Due Date 05/30/23  (or 10th visit)   Progress Note Completed Date 06/19/23       Present Yes    ID or First/Last Name in person   Preferred Language HMONG   GOALS   PT Goals 2;3   PT Goal 1   Goal Identifier HEP   Goal Description Pt will be independent with HEP to improve mobility.   Goal Progress Progressing   Target Date 10/15/23   PT Goal 2   Goal Identifier TUG   Goal Description Pt will complete TUG in less than 13 seconds to demonstrate improved balance and gait to improve functional mobility and increase tolerance completing ADL's, recreational activities and work related tasks.   Goal Progress Progressing- pt completes TUG in 25 seconds on 8/28/2023   Target Date 10/15/23   PT Goal 3   Goal Identifier 30 second sit<>stands   Goal Description Pt will complete greater than 10 sit<>stands in 30 seconds to demonstrate improved BLE strength and balance to improve functional mobility and increase tolerance completing ADL's, recreational activities and work related tasks.   Goal Progress Progressing- pt completes 5 sit<>stands in 30 seconds on  8/28/2023   Target Date 10/15/23   Subjective Report   Subjective Report Pt reports that she has been doing well. Pt reports that she was trying to use a NuStep at the gym, but reports that it was too difficult. Pt reports that she is okay with continuing with exercises at home and calling to make more appointments as able.   Objective Measure 1   Objective Measure Ambulation   Details Pt ambulates with straight cane and has bilateral AFOs on throughout session   Treatment Interventions (PT)   Interventions Therapeutic Procedure/Exercise;Self Care/Home Management   Therapeutic Procedure/Exercise   Therapeutic Procedures: strength, endurance, ROM, flexibillity minutes (62312) 11   Ther Proc 1 To improve functional mobility: NuStep x lvl 3 x 7 minutes, review over HEP, education on how to use NuStep at gym, education on continuing exercises, discussion with pt to make more appointments as needed   Skilled Intervention Reviewed and progressed pt's ex program. Education on exercises for HEP. Verbal cues, tactile cues and demonstration of correct technique.   Patient Response/Progress Tolerated well   Self Care/home Management   ADL/Home Mgmt Training (47120) 15   Self Care 1 Education on safety at home. Education for pt to look into getting a walker since that would provide more stability. Pt reports that she does not want to use a walker at this time. Education for pt to keep active at home safely to keep up strength and functional mobility. Education on nerves and healing process. Pt encouraged to keep up with activities and to not get discouraged with inability to do certain activities.   Patient Response/Progress Pt verbalizes understanding   Eval/Assessments   Assessments Physical Performance Test/Measures   Physical Performance Test/measures   Physical Performance Test/Measurement, Minutes (32584) 5   Physical Performance Test/Measurement Details TUG and 30 second sit<>stand   Skilled Intervention Pt completes TUG  in 25 seconds with use of cane. Pt completes total of 5 during 30 seconds without use of BUE.   Patient Response/Progress Tolerated well   Progress Pt's TUG score indicates pt is a fall risk and demonstrates improved LE strength, not requiring BUE support with sit<>stand transfers.   Education   Learner/Method Patient;Demonstration;Pictures/Video   Plan   Homework PTRx   Home program Roll outs, seated hip flexion, front knee strengthening, sitting hip abduction squeezes, SLR, bridges, sidelying hip abduction SLR, hamstring stretch, standing gastroc stretch, sidelying quadriceps stretch, sit<>stand   Plan for next session Keep PT chart open, pt to call and schedule more appointments as able, Nerve glides, Progress balance and gait exercises; neuro re-ed: balance and gait training; therapeutic exercise: strengthening, ROM, flexibility; STM/massage/manual therapy   Comments   Comments Pt returns today for her follow up appointment. Pt demonstrates . Pt remains appropriate to continue physical therapy.   Total Session Time   Timed Code Treatment Minutes 30   Total Treatment Time (sum of timed and untimed services) 30   Medicare Claim Information   Medical Diagnosis Bilateral foot drop       Krystina Mojica, PT, DPT

## 2024-01-28 VITALS
DIASTOLIC BLOOD PRESSURE: 85 MMHG | SYSTOLIC BLOOD PRESSURE: 132 MMHG | TEMPERATURE: 97.6 F | BODY MASS INDEX: 31.93 KG/M2 | HEART RATE: 86 BPM | WEIGHT: 158.4 LBS | HEIGHT: 59 IN | OXYGEN SATURATION: 98 %

## 2024-01-29 ENCOUNTER — ALLIED HEALTH/NURSE VISIT (OUTPATIENT)
Dept: FAMILY MEDICINE | Facility: CLINIC | Age: 74
End: 2024-01-29
Payer: COMMERCIAL

## 2024-01-29 DIAGNOSIS — E53.8 VITAMIN B12 DEFICIENCY (NON ANEMIC): Primary | ICD-10-CM

## 2024-01-29 PROCEDURE — 99207 PR NO CHARGE NURSE ONLY: CPT

## 2024-01-29 PROCEDURE — 96372 THER/PROPH/DIAG INJ SC/IM: CPT | Performed by: PHARMACIST

## 2024-01-29 RX ADMIN — CYANOCOBALAMIN 1000 MCG: 1000 INJECTION, SOLUTION INTRAMUSCULAR; SUBCUTANEOUS at 08:29

## 2024-02-17 DIAGNOSIS — E78.5 HYPERLIPIDEMIA: ICD-10-CM

## 2024-02-19 ENCOUNTER — OFFICE VISIT (OUTPATIENT)
Dept: FAMILY MEDICINE | Facility: CLINIC | Age: 74
End: 2024-02-19
Payer: COMMERCIAL

## 2024-02-19 ENCOUNTER — HOSPITAL ENCOUNTER (OUTPATIENT)
Dept: GENERAL RADIOLOGY | Facility: HOSPITAL | Age: 74
Discharge: HOME | End: 2024-02-19
Attending: NURSE PRACTITIONER | Admitting: NURSE PRACTITIONER
Payer: COMMERCIAL

## 2024-02-19 VITALS
OXYGEN SATURATION: 99 % | TEMPERATURE: 98.1 F | SYSTOLIC BLOOD PRESSURE: 164 MMHG | DIASTOLIC BLOOD PRESSURE: 92 MMHG | BODY MASS INDEX: 22.42 KG/M2 | RESPIRATION RATE: 18 BRPM | WEIGHT: 114.8 LBS | HEART RATE: 92 BPM

## 2024-02-19 DIAGNOSIS — I10 BENIGN ESSENTIAL HYPERTENSION: ICD-10-CM

## 2024-02-19 DIAGNOSIS — E11.43 TYPE 2 DIABETES MELLITUS WITH DIABETIC AUTONOMIC NEUROPATHY, WITHOUT LONG-TERM CURRENT USE OF INSULIN (H): ICD-10-CM

## 2024-02-19 DIAGNOSIS — R05.2 SUBACUTE COUGH: ICD-10-CM

## 2024-02-19 DIAGNOSIS — T30.0 BURN: Primary | ICD-10-CM

## 2024-02-19 PROCEDURE — 99215 OFFICE O/P EST HI 40 MIN: CPT | Performed by: NURSE PRACTITIONER

## 2024-02-19 PROCEDURE — 71046 X-RAY EXAM CHEST 2 VIEWS: CPT

## 2024-02-19 RX ORDER — FLUTICASONE PROPIONATE 50 MCG
2 SPRAY, SUSPENSION (ML) NASAL DAILY
Qty: 16 G | Refills: 3 | Status: SHIPPED | OUTPATIENT
Start: 2024-02-19 | End: 2024-03-20

## 2024-02-19 RX ORDER — SIMVASTATIN 40 MG
40 TABLET ORAL AT BEDTIME
Qty: 90 TABLET | Refills: 0 | Status: SHIPPED | OUTPATIENT
Start: 2024-02-19 | End: 2024-02-26

## 2024-02-19 RX ORDER — BENZONATATE 100 MG/1
100 CAPSULE ORAL 3 TIMES DAILY PRN
Qty: 20 CAPSULE | Refills: 0 | Status: SHIPPED | OUTPATIENT
Start: 2024-02-19 | End: 2024-08-26

## 2024-02-19 ASSESSMENT — ENCOUNTER SYMPTOMS
BURN: 1
WHEEZING: 0
SHORTNESS OF BREATH: 0
FEVER: 0
DIZZINESS: 0
COUGH: 1
SORE THROAT: 0
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
## 2024-02-26 ENCOUNTER — OFFICE VISIT (OUTPATIENT)
Dept: FAMILY MEDICINE | Facility: CLINIC | Age: 74
End: 2024-02-26
Payer: COMMERCIAL

## 2024-02-26 VITALS
WEIGHT: 114 LBS | HEIGHT: 60 IN | DIASTOLIC BLOOD PRESSURE: 86 MMHG | HEART RATE: 80 BPM | TEMPERATURE: 98 F | BODY MASS INDEX: 22.38 KG/M2 | SYSTOLIC BLOOD PRESSURE: 138 MMHG | RESPIRATION RATE: 18 BRPM | OXYGEN SATURATION: 98 %

## 2024-02-26 DIAGNOSIS — R09.82 POSTNASAL DRIP: ICD-10-CM

## 2024-02-26 DIAGNOSIS — E78.5 HYPERLIPIDEMIA, UNSPECIFIED HYPERLIPIDEMIA TYPE: ICD-10-CM

## 2024-02-26 DIAGNOSIS — E53.8 VITAMIN B12 DEFICIENCY (NON ANEMIC): ICD-10-CM

## 2024-02-26 DIAGNOSIS — I10 BENIGN ESSENTIAL HYPERTENSION: ICD-10-CM

## 2024-02-26 DIAGNOSIS — E11.43 TYPE 2 DIABETES MELLITUS WITH DIABETIC AUTONOMIC NEUROPATHY, WITHOUT LONG-TERM CURRENT USE OF INSULIN (H): Primary | ICD-10-CM

## 2024-02-26 DIAGNOSIS — T30.0 BURN: ICD-10-CM

## 2024-02-26 LAB
CHOLEST SERPL-MCNC: 122 MG/DL
CREAT UR-MCNC: 16.2 MG/DL
HBA1C MFR BLD: 7.7 % (ref 0–5.6)
HDLC SERPL-MCNC: 62 MG/DL
HOLD SPECIMEN: NORMAL
LDLC SERPL CALC-MCNC: 46 MG/DL
MICROALBUMIN UR-MCNC: <12 MG/L
MICROALBUMIN/CREAT UR: NORMAL MG/G{CREAT}
NONHDLC SERPL-MCNC: 60 MG/DL
TRIGL SERPL-MCNC: 72 MG/DL

## 2024-02-26 PROCEDURE — 80061 LIPID PANEL: CPT | Performed by: FAMILY MEDICINE

## 2024-02-26 PROCEDURE — 82570 ASSAY OF URINE CREATININE: CPT | Performed by: FAMILY MEDICINE

## 2024-02-26 PROCEDURE — 96372 THER/PROPH/DIAG INJ SC/IM: CPT | Performed by: PHARMACIST

## 2024-02-26 PROCEDURE — 83036 HEMOGLOBIN GLYCOSYLATED A1C: CPT | Performed by: FAMILY MEDICINE

## 2024-02-26 PROCEDURE — 82043 UR ALBUMIN QUANTITATIVE: CPT | Performed by: FAMILY MEDICINE

## 2024-02-26 PROCEDURE — 36415 COLL VENOUS BLD VENIPUNCTURE: CPT | Performed by: FAMILY MEDICINE

## 2024-02-26 PROCEDURE — 99214 OFFICE O/P EST MOD 30 MIN: CPT | Mod: 25 | Performed by: FAMILY MEDICINE

## 2024-02-26 RX ORDER — LOSARTAN POTASSIUM 25 MG/1
25 TABLET ORAL DAILY
Qty: 90 TABLET | Refills: 4 | Status: SHIPPED | OUTPATIENT
Start: 2024-02-26

## 2024-02-26 RX ORDER — GABAPENTIN 100 MG/1
100 CAPSULE ORAL AT BEDTIME
Qty: 90 CAPSULE | Refills: 4 | Status: SHIPPED | OUTPATIENT
Start: 2024-02-26

## 2024-02-26 RX ORDER — CYANOCOBALAMIN 1000 UG/ML
1000 INJECTION, SOLUTION INTRAMUSCULAR; SUBCUTANEOUS
Status: ACTIVE | OUTPATIENT
Start: 2024-02-26 | End: 2025-02-20

## 2024-02-26 RX ORDER — SIMVASTATIN 40 MG
40 TABLET ORAL AT BEDTIME
Qty: 90 TABLET | Refills: 4 | Status: SHIPPED | OUTPATIENT
Start: 2024-02-26

## 2024-02-26 RX ORDER — RESPIRATORY SYNCYTIAL VIRUS VACCINE 120MCG/0.5
0.5 KIT INTRAMUSCULAR ONCE
Qty: 1 EACH | Refills: 0 | Status: CANCELLED | OUTPATIENT
Start: 2024-02-26 | End: 2024-02-26

## 2024-02-26 RX ORDER — GLIPIZIDE 2.5 MG/1
TABLET, EXTENDED RELEASE ORAL
Qty: 90 TABLET | Refills: 4 | Status: SHIPPED | OUTPATIENT
Start: 2024-02-26

## 2024-02-26 RX ADMIN — CYANOCOBALAMIN 1000 MCG: 1000 INJECTION, SOLUTION INTRAMUSCULAR; SUBCUTANEOUS at 09:41

## 2024-02-26 ASSESSMENT — PATIENT HEALTH QUESTIONNAIRE - PHQ9
SUM OF ALL RESPONSES TO PHQ QUESTIONS 1-9: 10
10. IF YOU CHECKED OFF ANY PROBLEMS, HOW DIFFICULT HAVE THESE PROBLEMS MADE IT FOR YOU TO DO YOUR WORK, TAKE CARE OF THINGS AT HOME, OR GET ALONG WITH OTHER PEOPLE: SOMEWHAT DIFFICULT
SUM OF ALL RESPONSES TO PHQ QUESTIONS 1-9: 10

## 2024-02-26 ASSESSMENT — PAIN SCALES - GENERAL: PAINLEVEL: NO PAIN (0)

## 2024-02-26 NOTE — LETTER
February 26, 2024      Hai REGINA Meade  [address] [place] [address]        Dear ,    We are writing to inform you of your test results.    Normal urine test results-- no signs of damage to your kidneys from diabetes.    Your cholesterol looks great!    Resulted Orders   Hemoglobin A1c   Result Value Ref Range    Hemoglobin A1C 7.7 (H) 0.0 - 5.6 %      Comment:      Normal <5.7%   Prediabetes 5.7-6.4%    Diabetes 6.5% or higher     Note: Adopted from ADA consensus guidelines.   Albumin Random Urine Quantitative with Creat Ratio   Result Value Ref Range    Creatinine Urine mg/dL 16.2 mg/dL      Comment:      The reference ranges have not been established in urine creatinine. The results should be integrated into the clinical context for interpretation.    Albumin Urine mg/L <12.0 mg/L      Comment:      The reference ranges have not been established in urine albumin. The results should be integrated into the clinical context for interpretation.    Albumin Urine mg/g Cr        Comment:      Unable to calculate, urine albumin and/or urine creatinine is outside detectable limits.  Microalbuminuria is defined as an albumin:creatinine ratio of 17 to 299 for males and 25 to 299 for females. A ratio of albumin:creatinine of 300 or higher is indicative of overt proteinuria.  Due to biologic variability, positive results should be confirmed by a second, first-morning random or 24-hour timed urine specimen. If there is discrepancy, a third specimen is recommended. When 2 out of 3 results are in the microalbuminuria range, this is evidence for incipient nephropathy and warrants increased efforts at glucose control, blood pressure control, and institution of therapy with an angiotensin-converting-enzyme (ACE) inhibitor (if the patient can tolerate it).     Lipid panel reflex to direct LDL Non-fasting   Result Value Ref Range    Cholesterol 122 <200 mg/dL    Triglycerides 72 <150 mg/dL    Direct Measure HDL 62 >=50  mg/dL    LDL Cholesterol Calculated 46 <=100 mg/dL    Non HDL Cholesterol 60 <130 mg/dL    Narrative    Cholesterol  Desirable:  <200 mg/dL    Triglycerides  Normal:  Less than 150 mg/dL  Borderline High:  150-199 mg/dL  High:  200-499 mg/dL  Very High:  Greater than or equal to 500 mg/dL    Direct Measure HDL  Female:  Greater than or equal to 50 mg/dL   Male:  Greater than or equal to 40 mg/dL    LDL Cholesterol  Desirable:  <100mg/dL  Above Desirable:  100-129 mg/dL   Borderline High:  130-159 mg/dL   High:  160-189 mg/dL   Very High:  >= 190 mg/dL    Non HDL Cholesterol  Desirable:  130 mg/dL  Above Desirable:  130-159 mg/dL  Borderline High:  160-189 mg/dL  High:  190-219 mg/dL  Very High:  Greater than or equal to 220 mg/dL       If you have any questions or concerns, please call the clinic at the number listed above.       Sincerely,      Brie Acevedo MD
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
## 2024-02-26 NOTE — PATIENT INSTRUCTIONS
Continue to use a hydrocolloid bandages on your right shin.  I have placed a new vascular center referral for wound care.    Schedule a diabetes eye appointment.    For the cough, I suspect this is from postnasal drainage.  Continue fluticasone nasal spray.  You may take an over-the-counter antihistamine such as Claritin or Zyrtec as needed.    Follow-up with me in 6 months.

## 2024-02-28 ENCOUNTER — APPOINTMENT (OUTPATIENT)
Dept: INTERPRETER SERVICES | Facility: CLINIC | Age: 74
End: 2024-02-28
Payer: COMMERCIAL

## 2024-03-05 ENCOUNTER — TRANSFERRED RECORDS (OUTPATIENT)
Dept: HEALTH INFORMATION MANAGEMENT | Facility: CLINIC | Age: 74
End: 2024-03-05
Payer: COMMERCIAL

## 2024-03-06 NOTE — PATIENT INSTRUCTIONS
Wound care supplies were ordered today through [facility] and if you are not receiving your supplies or have a question on your bill please contact Franchesca Llanos at [phone]. Please allow 2-5 business days for delivery of supplies. You may get a call from a 1-800 # if there are additional information Tressa needs. It is important to  or return their call. PLEASE NOTE: If you need to return your supplies, you MUST call customer service within 15 days of delivery date.         Wound Care Instructions    3 TIMES PER WEEK and as needed    Primary Dressing: Apply betadine into/onto the wounds- you can purchase this online or in store at [facility]    Secondary dressing: Cover with island dressing    It is ok to get your wound wet in the bath or shower    High Protein Foods  When you have an open ulcer, your bodies protein needs are much higher, so it is recommended eat good sources of protein or take a protein supplement!    Protein Supplements  -Premier Protein  -Ensure  -Boost  -Glucerna, if diabetic    Chicken  -Chicken breast, 3.5oz.-30 grams protein  -Chicken meat, cooked, 4 oz.    Beef  -Hamburger lulú, 4 oz-28 grams protein  -Steak, 6 oz-42 grams  -Most cuts of beef- 7 grams of protein per ounce    Fish  -Most fish fillets or steaks are about 22 grams of protein for 3 1/2 oz(100 grams) of cooked fish, or 6 grams per ounce  -Tuna, 6 oz can-40 grams of protein    Pork  -Pork chop, average-22 grams protein  -Pork loin or tenderloin, 4 oz.-29 grams  -Ham, 3oz serving- 19 grams  -Verde, 1 slice-3 grams    Eggs and Dairy  -Egg, large-7 grams  -Milk, 1 cup-8 grams  -Cottage cheese, 1/2 cup-15 grams  -Greek yogurt, 1 cup-usually 8-12 grams, check label    Beans  -Soy milk, 1 cup-6-10 grams  -Most beans(black, haile, lentils, etc.) about 7-10 grams protein per half cup of cooked beans    Nuts and Seeds  -Peanut butter, 2 Tablespoons- 8 grams protein  -Almonds, 1/4 cup- 8 grams  -Peanuts, 1/4 cup-9 grams  -Sunflower  seeds, 1/4 cup- 6 grams        If for some reason you are not able to get your dressing(s) changed as outlined above (due to illness, lack of supplies, lack of help) please do the following: remove old, soiled dressings; wash the wounds with saline; pat dry; apply ABD pad or other absorbant pad and secure with rolled gauze; avoid tape directly on your skin; Call the clinic as soon as possible to let us know what the current issues are in receiving wound care [phone].      SEEK MEDICAL CARE IF:  You have an increase in swelling, pain, or redness around the wound.  You have an increase in the amount of pus coming from the wound.  There is a bad smell coming from the wound.  The wound appears to be worsening/enlarging  You have a fever greater than 101.5 F      It is ok to continue current wound care treatment/products for the next 2-3 days until new wound care supplies are ordered and arrive. If longer than this please contact our office at [phone].    If you have a 2 layer or 4 layer compression wrap on these are safe to have on for ONLY 7 days. If for some reason you are not able to get the wrap(s) changed (due to illness; lack of supplies, lack of help, lack of transportation) please do the following: unwrap the old 2 or 4 layer compression wrap; avoid using scissors as you could cut your skin and cause wounds; use tubular compression when available. Call to reschedule your home care or clinic visit appointment as soon as possible.    Please NOTE: if you are 15 minutes late to your clinic appointment you will have to be rescheduled. Please call our clinic as soon as possible if you know you will not be able to get to your appointment at [phone].    If you fail to show up to 3 scheduled clinic appointments you will be dismissed from our clinic.              We want to hear from you!  In the next few weeks, you should receive a call or email to complete a survey about your visit at [facility]. Please help us improve your appointment experience by letting us know how we did today. We strive to make your experience good and value any ways in which we could do better.      We value your input and suggestions.    Thank you for choosing the [facility]!

## 2024-03-11 ENCOUNTER — OFFICE VISIT (OUTPATIENT)
Dept: VASCULAR SURGERY | Facility: CLINIC | Age: 74
End: 2024-03-11
Attending: NURSE PRACTITIONER
Payer: COMMERCIAL

## 2024-03-11 VITALS
SYSTOLIC BLOOD PRESSURE: 170 MMHG | DIASTOLIC BLOOD PRESSURE: 93 MMHG | TEMPERATURE: 97.8 F | RESPIRATION RATE: 16 BRPM | HEART RATE: 84 BPM

## 2024-03-11 DIAGNOSIS — T30.0 BURN: ICD-10-CM

## 2024-03-11 DIAGNOSIS — L97.912 SKIN ULCER OF RIGHT LOWER LEG WITH FAT LAYER EXPOSED (H): Primary | ICD-10-CM

## 2024-03-11 PROCEDURE — 99215 OFFICE O/P EST HI 40 MIN: CPT | Performed by: FAMILY MEDICINE

## 2024-03-11 PROCEDURE — G0463 HOSPITAL OUTPT CLINIC VISIT: HCPCS | Performed by: FAMILY MEDICINE

## 2024-03-11 ASSESSMENT — PAIN SCALES - GENERAL: PAINLEVEL: NO PAIN (0)
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
## 2024-03-25 ENCOUNTER — ALLIED HEALTH/NURSE VISIT (OUTPATIENT)
Dept: FAMILY MEDICINE | Facility: CLINIC | Age: 74
End: 2024-03-25
Payer: COMMERCIAL

## 2024-03-25 DIAGNOSIS — E53.8 VITAMIN B12 DEFICIENCY (NON ANEMIC): Primary | ICD-10-CM

## 2024-03-25 PROCEDURE — 96372 THER/PROPH/DIAG INJ SC/IM: CPT | Mod: LT | Performed by: PHARMACIST

## 2024-03-25 PROCEDURE — 99207 PR NO CHARGE NURSE ONLY: CPT

## 2024-03-25 RX ADMIN — CYANOCOBALAMIN 1000 MCG: 1000 INJECTION, SOLUTION INTRAMUSCULAR; SUBCUTANEOUS at 08:15

## 2024-03-27 NOTE — PATIENT INSTRUCTIONS
Wound care supplies were not ordered or needed today.        Wound Care Instructions    3 TIMES PER WEEK and as needed    Primary Dressing: Apply betadine into/onto the wounds- you can purchase this online or in store at [facility]    Secondary dressing: Cover with island dressing    It is ok to get your wound wet in the bath or shower      If for some reason you are not able to get your dressing(s) changed as outlined above (due to illness, lack of supplies, lack of help) please do the following: remove old, soiled dressings; wash the wounds with saline; pat dry; apply ABD pad or other absorbant pad and secure with rolled gauze; avoid tape directly on your skin; Call the clinic as soon as possible to let us know what the current issues are in receiving wound care [phone].      SEEK MEDICAL CARE IF:  You have an increase in swelling, pain, or redness around the wound.  You have an increase in the amount of pus coming from the wound.  There is a bad smell coming from the wound.  The wound appears to be worsening/enlarging  You have a fever greater than 101.5 F      It is ok to continue current wound care treatment/products for the next 2-3 days until new wound care supplies are ordered and arrive. If longer than this please contact our office at [phone].    If you have a 2 layer or 4 layer compression wrap on these are safe to have on for ONLY 7 days. If for some reason you are not able to get the wrap(s) changed (due to illness; lack of supplies, lack of help, lack of transportation) please do the following: unwrap the old 2 or 4 layer compression wrap; avoid using scissors as you could cut your skin and cause wounds; use tubular compression when available. Call to reschedule your home care or clinic visit appointment as soon as possible.    Please NOTE: if you are 15 minutes late to your clinic appointment you will have to be rescheduled. Please call our clinic as soon as possible if you know you will not  be able to get to your appointment at [phone].    If you fail to show up to 3 scheduled clinic appointments you will be dismissed from our clinic.              We want to hear from you!  In the next few weeks, you should receive a call or email to complete a survey about your visit at [facility]. Please help us improve your appointment experience by letting us know how we did today. We strive to make your experience good and value any ways in which we could do better.      We value your input and suggestions.    Thank you for choosing the [facility]!

## 2024-04-01 ENCOUNTER — OFFICE VISIT (OUTPATIENT)
Dept: VASCULAR SURGERY | Facility: CLINIC | Age: 74
End: 2024-04-01
Attending: FAMILY MEDICINE
Payer: COMMERCIAL

## 2024-04-01 VITALS
SYSTOLIC BLOOD PRESSURE: 169 MMHG | RESPIRATION RATE: 18 BRPM | HEART RATE: 79 BPM | OXYGEN SATURATION: 99 % | DIASTOLIC BLOOD PRESSURE: 112 MMHG

## 2024-04-01 DIAGNOSIS — L97.912 SKIN ULCER OF RIGHT LOWER LEG WITH FAT LAYER EXPOSED (H): Primary | ICD-10-CM

## 2024-04-01 PROCEDURE — 99214 OFFICE O/P EST MOD 30 MIN: CPT | Performed by: FAMILY MEDICINE

## 2024-04-01 PROCEDURE — G0463 HOSPITAL OUTPT CLINIC VISIT: HCPCS | Performed by: FAMILY MEDICINE

## 2024-04-01 ASSESSMENT — PAIN SCALES - GENERAL: PAINLEVEL: NO PAIN (0)
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
## 2024-04-22 ENCOUNTER — ALLIED HEALTH/NURSE VISIT (OUTPATIENT)
Dept: FAMILY MEDICINE | Facility: CLINIC | Age: 74
End: 2024-04-22
Payer: COMMERCIAL

## 2024-04-22 DIAGNOSIS — E53.8 VITAMIN B12 DEFICIENCY (NON ANEMIC): Primary | ICD-10-CM

## 2024-04-22 PROCEDURE — 96372 THER/PROPH/DIAG INJ SC/IM: CPT | Performed by: PHARMACIST

## 2024-04-22 PROCEDURE — 99207 PR NO CHARGE NURSE ONLY: CPT

## 2024-04-22 RX ADMIN — CYANOCOBALAMIN 1000 MCG: 1000 INJECTION, SOLUTION INTRAMUSCULAR; SUBCUTANEOUS at 08:16

## 2024-04-22 NOTE — PROGRESS NOTES
Patient presented for her B12 injection. Medication administered and patient was scheduled for her next administration.

## 2024-05-08 ENCOUNTER — OFFICE VISIT (OUTPATIENT)
Dept: FAMILY MEDICINE | Facility: CLINIC | Age: 74
End: 2024-05-08
Payer: COMMERCIAL

## 2024-05-08 VITALS
OXYGEN SATURATION: 100 % | WEIGHT: 117 LBS | BODY MASS INDEX: 22.85 KG/M2 | HEART RATE: 88 BPM | TEMPERATURE: 98.8 F | RESPIRATION RATE: 20 BRPM | DIASTOLIC BLOOD PRESSURE: 104 MMHG | SYSTOLIC BLOOD PRESSURE: 184 MMHG

## 2024-05-08 DIAGNOSIS — R05.3 CHRONIC COUGH: ICD-10-CM

## 2024-05-08 DIAGNOSIS — L98.9 SKIN SORE: Primary | ICD-10-CM

## 2024-05-08 PROCEDURE — 99213 OFFICE O/P EST LOW 20 MIN: CPT | Performed by: FAMILY MEDICINE

## 2024-05-08 RX ORDER — TRIAMCINOLONE ACETONIDE 1 MG/G
CREAM TOPICAL 2 TIMES DAILY
Qty: 30 G | Refills: 1 | Status: SHIPPED | OUTPATIENT
Start: 2024-05-08 | End: 2024-07-26

## 2024-05-08 RX ORDER — BENZONATATE 200 MG/1
200 CAPSULE ORAL 3 TIMES DAILY PRN
Qty: 30 CAPSULE | Refills: 0 | Status: SHIPPED | OUTPATIENT
Start: 2024-05-08

## 2024-05-08 NOTE — PROGRESS NOTES
Assessment:       Skin sore  - triamcinolone (KENALOG) 0.1 % external cream  Dispense: 30 g; Refill: 1    Chronic cough  - benzonatate (TESSALON) 200 MG capsule  Dispense: 30 capsule; Refill: 0       Plan:     Patient with couple month history of dry irritating cough.  Will try Tessalon Perles.  Possibly due to some allergies and will continue Flonase and Claritin although this does not help significantly.  No indication for antibiotics at this time.  Follow-up if symptoms are getting worse or not improving.    Patient with itchy lump on her left foot that has now become mildly painful.  Etiology unclear.  No evidence of infection.  Will try a topical triamcinolone cream to see if this helps.  Follow-up with PCP if symptoms getting worse or not improving in 2 weeks.    MEDICATIONS:   Orders Placed This Encounter   Medications    benzonatate (TESSALON) 200 MG capsule     Sig: Take 1 capsule (200 mg) by mouth 3 times daily as needed for cough     Dispense:  30 capsule     Refill:  0    triamcinolone (KENALOG) 0.1 % external cream     Sig: Apply topically 2 times daily     Dispense:  30 g     Refill:  1       Subjective:       73 year old female presents for evaluation of a couple month history of dry irritating cough.  She is been seen for this previously and an x-ray was done which was normal.  It was thought this was possibly due to some allergies and she has been taking Claritin as well as Flonase which has not really helped.  The cough is continued to be nagging and dry and irritating.  She is wondering what else she can do about this.    Patient also has an itchy lump on the lateral aspect of her left foot that has gotten a bit larger and is now mildly painful.  No drainage or redness.    Patient Active Problem List   Diagnosis    Anemia    Joint Pain, Localized In The Knee    Type 2 diabetes mellitus with diabetic autonomic neuropathy, without long-term current use of insulin (H)    Cholelithiasis    History of  cervical cancer    Urinary incontinence    Hyperlipidemia    Peripheral neuropathy    Gastroesophageal reflux disease without esophagitis    Vitamin B12 deficiency (non anemic)    Age-related osteoporosis without current pathological fracture    Arthritis of carpometacarpal (CMC) joint of left thumb    Closed fracture of multiple ribs of left side, initial encounter    Skin ulcer of right lower leg with fat layer exposed (H)       Past Medical History:   Diagnosis Date    Anemia     Cervical cancer (H)     Cholelithiasis     Diabetes mellitus, type II (H)     Hyperlipidemia     Postoperative ileus (H)     Small bowel obstruction (H)     Vision problems        Past Surgical History:   Procedure Laterality Date    COLECTOMY N/A 6/23/2019    Procedure: WITH OPEN SMALL BOWEL RESECTION;  Surgeon: Ramon Wray MD;  Location: [facility];  Service: General    FLEXIBLE SIGMOIDOSCOPY  2009    HYSTERECTOMY  2007    cervix ca    OOPHORECTOMY  2006    cervix ca    IN BSO, OMENTECTOMY W/SILVIA      Description: Salp-oophorect Bilat W/Omentect, Total Abd Hysterect, Rad Dissect For Malig;  Recorded: 03/28/2007;    IN LAP,DIAGNOSTIC ABDOMEN N/A 6/23/2019    Procedure: LAPAROSCOPY lysis of adhesions,;  Surgeon: Ramon Wray MD;  Location: [facility];  Service: General       Current Outpatient Medications   Medication Sig Dispense Refill    acetaminophen (TYLENOL) 500 MG tablet [ACETAMINOPHEN (TYLENOL) 500 MG TABLET] Take 1-2 tablets (500-1,000 mg total) by mouth every 4 (four) hours as needed.  0    aspirin 81 MG EC tablet Take 81 mg by mouth every evening      benzonatate (TESSALON) 100 MG capsule Take 1 capsule (100 mg) by mouth 3 times daily as needed for cough 20 capsule 0    benzonatate (TESSALON) 200 MG capsule Take 1 capsule (200 mg) by mouth 3 times daily as needed for cough 30 capsule 0    blood glucose (NO BRAND SPECIFIED) lancets standard Use to test blood sugar 1  times daily or as directed. 100  lancet. 3    blood glucose (NO BRAND SPECIFIED) test strip Use to test blood sugar 1 time daily or as directed. To accompany: Blood Glucose Monitor Brands: ACCU-CHEK LOIDA plus meter 100 strip 11    Calcium Carb-Cholecalciferol 500-10 MG-MCG CHEW CHEW 1 TABLET TWICE DAILY 180 tablet 3    Cholecalciferol (D3-1000) 25 MCG (1000 UT) CAPS Take 1 capsule by mouth daily 90 capsule 2    gabapentin (NEURONTIN) 100 MG capsule Take 1 capsule (100 mg) by mouth at bedtime 90 capsule 4    generic lancets (ACCU-CHEK SOFTCLIX LANCETS) [GENERIC LANCETS (ACCU-CHEK SOFTCLIX LANCETS)] TEST TWICE DAILY 200 each 1    glipiZIDE (GLUCOTROL XL) 2.5 MG 24 hr tablet TAKE 1 TABLET(2.5 MG) BY MOUTH DAILY AS NEEDED 90 tablet 4    Lidocaine (LIDOCARE) 4 % Patch Place 1 patch onto the skin every 24 hours To prevent lidocaine toxicity, patient should be patch free for 12 hrs daily. 5 patch 0    losartan (COZAAR) 25 MG tablet Take 1 tablet (25 mg) by mouth daily 90 tablet 4    metFORMIN (GLUCOPHAGE) 1000 MG tablet Take 1 tablet (1,000 mg) by mouth 2 times daily (with meals) 180 tablet 4    naproxen (NAPROSYN) 375 MG tablet Take 1 tablet (375 mg) by mouth 2 times daily (with meals) 20 tablet 0    oxyCODONE (ROXICODONE) 5 MG tablet Take 1 tablet (5 mg) by mouth every 6 hours as needed for severe pain 6 tablet 0    polyethylene glycol (MIRALAX) 17 GM/Dose powder Take 17 g by mouth daily      senna-docusate (SENOKOT-S/PERICOLACE) 8.6-50 MG tablet Take 1 tablet by mouth 2 times daily as needed for constipation      simvastatin (ZOCOR) 40 MG tablet Take 1 tablet (40 mg) by mouth at bedtime 90 tablet 4    triamcinolone (KENALOG) 0.1 % external cream Apply topically 2 times daily 30 g 1     Current Facility-Administered Medications   Medication Dose Route Frequency Provider Last Rate Last Admin    cyanocobalamin injection 1,000 mcg  1,000 mcg Intramuscular Q30 Days Brie Acevedo MD        cyanocobalamin injection 1,000 mcg  1,000 mcg Intramuscular Q30  Days Erasmo Sanders, PharmD   1,000 mcg at 04/22/24 0816       Allergies   Allergen Reactions    Cymbalta [Duloxetine] Unknown     Nausea and vomiting       No family history on file.    Social History     Socioeconomic History    Marital status:    Tobacco Use    Smoking status: Never    Smokeless tobacco: Never   Vaping Use    Vaping status: Never Used   Substance and Sexual Activity    Alcohol use: No    Drug use: No   Social History Narrative    Lives with her family.     Social Determinants of Health     Financial Resource Strain: Low Risk  (11/29/2023)    Financial Resource Strain     Within the past 12 months, have you or your family members you live with been unable to get utilities (heat, electricity) when it was really needed?: No   Food Insecurity: Low Risk  (11/29/2023)    Food Insecurity     Within the past 12 months, did you worry that your food would run out before you got money to buy more?: No     Within the past 12 months, did the food you bought just not last and you didn t have money to get more?: No   Transportation Needs: Low Risk  (11/29/2023)    Transportation Needs     Within the past 12 months, has lack of transportation kept you from medical appointments, getting your medicines, non-medical meetings or appointments, work, or from getting things that you need?: No   Interpersonal Safety: Low Risk  (11/29/2023)    Interpersonal Safety     Do you feel physically and emotionally safe where you currently live?: Yes     Within the past 12 months, have you been hit, slapped, kicked or otherwise physically hurt by someone?: No     Within the past 12 months, have you been humiliated or emotionally abused in other ways by your partner or ex-partner?: No   Housing Stability: Low Risk  (11/29/2023)    Housing Stability     Do you have housing? : Yes     Are you worried about losing your housing?: No         Review of Systems  Pertinent items are noted in HPI.      Objective:                      General Appearance:    BP (!) 184/104 (BP Location: Left arm, Patient Position: Sitting, Cuff Size: Adult Regular)   Pulse 88   Temp 98.8  F (37.1  C) (Oral)   Resp 20   Wt 53.1 kg (117 lb)   SpO2 100%   BMI 22.85 kg/m          Alert, pleasant, cooperative, no distress, appears stated age   Head:    Normocephalic, without obvious abnormality, atraumatic   Eyes:    Conjunctiva/corneas clear   Ears:    Normal TM's without erythema or bulging. Normal external ear canals, both ears   Nose:   Nares normal, septum midline, mucosa normal, no drainage    or sinus tenderness   Throat:   Lips, mucosa, and tongue normal; teeth and gums normal.  No tonsilar hypertrophy or exudate.   Neck:   Supple, symmetrical, trachea midline, no adenopathy    Lungs:     Clear to auscultation bilaterally without wheezes, rales, or rhonchi, respirations unlabored    Heart:    Regular rate and rhythm, S1 and S2 normal, no murmur, rub or gallop       Extremities: Patient with approximately 1 cm raised lump on the skin on the lateral aspect of her left foot.   Skin:   Skin color, texture, turgor normal, no rashes or lesions         This note has been dictated using voice recognition software. Any grammatical or context distortions are unintentional and inherent to the software

## 2024-05-20 ENCOUNTER — ALLIED HEALTH/NURSE VISIT (OUTPATIENT)
Dept: FAMILY MEDICINE | Facility: CLINIC | Age: 74
End: 2024-05-20
Payer: COMMERCIAL

## 2024-05-20 DIAGNOSIS — E53.8 VITAMIN B 12 DEFICIENCY: Primary | ICD-10-CM

## 2024-05-20 PROCEDURE — 99207 PR NO CHARGE NURSE ONLY: CPT

## 2024-05-20 PROCEDURE — 96372 THER/PROPH/DIAG INJ SC/IM: CPT | Performed by: FAMILY MEDICINE

## 2024-05-20 RX ADMIN — CYANOCOBALAMIN 1000 MCG: 1000 INJECTION, SOLUTION INTRAMUSCULAR; SUBCUTANEOUS at 08:16

## 2024-06-24 ENCOUNTER — ALLIED HEALTH/NURSE VISIT (OUTPATIENT)
Dept: FAMILY MEDICINE | Facility: CLINIC | Age: 74
End: 2024-06-24
Payer: COMMERCIAL

## 2024-06-24 DIAGNOSIS — E53.8 VITAMIN B12 DEFICIENCY (NON ANEMIC): Primary | ICD-10-CM

## 2024-06-24 PROCEDURE — 99207 PR NO CHARGE NURSE ONLY: CPT

## 2024-06-24 PROCEDURE — 96372 THER/PROPH/DIAG INJ SC/IM: CPT | Mod: LT | Performed by: PHARMACIST

## 2024-06-24 RX ADMIN — CYANOCOBALAMIN 1000 MCG: 1000 INJECTION, SOLUTION INTRAMUSCULAR; SUBCUTANEOUS at 08:12

## 2024-07-15 NOTE — PATIENT INSTRUCTIONS
Wound care supplies were ordered today through [employer] and if you are not receiving your supplies or have a question on your bill please contact [facility] at [phone]. Please allow 2-5 business days for delivery of supplies. You may get a call from a [phone] if there are additional information [employer] needs. It is important to  or return their call. PLEASE NOTE: If you need to return your supplies, you MUST call customer service within 15 days of delivery date.         Wound Care Instructions    DAILY and as needed, Cleanse your right shin wound(s) with Normal saline.    Pat Dry with non-sterile gauze    Primary Dressing: Apply aquacel AG into/onto the wounds    Secondary dressing: Cover with zetuvit    Compression: tubigrip    It is ok to get your wound wet in the bath or shower    Please wear your compression to each clinic visit.    It is recommended that you elevate your legs throughout the day: approx 2-3 times each day  Elevate them above the level of your heart for 30 min.   Ways to do this:   Lay on the couch or your bed and prop your legs up on pillows   Recline back as far as you can go in your recliner and prop your legs on pillows.     Doing these things will help reduce the edema in your legs.    High Protein Foods  When you have an open ulcer, your bodies protein needs are much higher, so it is recommended eat good sources of protein or take a protein supplement!    Protein Supplements  -Premier Protein  -Ensure  -Boost  -Glucerna, if diabetic    Chicken  -Chicken breast, 3.5oz.-30 grams protein  -Chicken meat, cooked, 4 oz.    Beef  -Hamburger lulú, 4 oz-28 grams protein  -Steak, 6 oz-42 grams  -Most cuts of beef- 7 grams of protein per ounce    Fish  -Most fish fillets or steaks are about 22 grams of protein for 3 1/2 oz(100 grams) of cooked fish, or 6 grams per ounce  -Tuna, 6 oz can-40 grams of protein    Pork  -Pork chop, average-22 grams protein  -Pork loin or tenderloin, 4 oz.-29  grams  -Ham, 3oz serving- 19 grams  -Verde, 1 slice-3 grams    Eggs and Dairy  -Egg, large-7 grams  -Milk, 1 cup-8 grams  -Cottage cheese, 1/2 cup-15 grams  -Greek yogurt, 1 cup-usually 8-12 grams, check label    Beans  -Soy milk, 1 cup-6-10 grams  -Most beans(black, haile, lentils, etc.) about 7-10 grams protein per half cup of cooked beans    Nuts and Seeds  -Peanut butter, 2 Tablespoons- 8 grams protein  -Almonds, 1/4 cup- 8 grams  -Peanuts, 1/4 cup-9 grams  -Sunflower seeds, 1/4 cup- 6 grams        If for some reason you are not able to get your dressing(s) changed as outlined above (due to illness, lack of supplies, lack of help) please do the following: remove old, soiled dressings; wash the wounds with saline; pat dry; apply ABD pad or other absorbant pad and secure with rolled gauze; avoid tape directly on your skin; Call the clinic as soon as possible to let us know what the current issues are in receiving wound care [phone].      SEEK MEDICAL CARE IF:  You have an increase in swelling, pain, or redness around the wound.  You have an increase in the amount of pus coming from the wound.  There is a bad smell coming from the wound.  The wound appears to be worsening/enlarging  You have a fever greater than 101.5 F      It is ok to continue current wound care treatment/products for the next 2-3 days until new wound care supplies are ordered and arrive. If longer than this please contact our office at [phone].    If you have a 2 layer or 4 layer compression wrap on these are safe to have on for ONLY 7 days. If for some reason you are not able to get the wrap(s) changed (due to illness; lack of supplies, lack of help, lack of transportation) please do the following: unwrap the old 2 or 4 layer compression wrap; avoid using scissors as you could cut your skin and cause wounds; use tubular compression when available. Call to reschedule your home care or clinic visit appointment as soon as  possible.    Please NOTE: if you are 15 minutes late to your clinic appointment you will have to be rescheduled. Please call our clinic as soon as possible if you know you will not be able to get to your appointment at [phone].    If you fail to show up to 3 scheduled clinic appointments you will be dismissed from our clinic.              We want to hear from you!  In the next few weeks, you should receive a call or email to complete a survey about your visit at [facility]. Please help us improve your appointment experience by letting us know how we did today. We strive to make your experience good and value any ways in which we could do better.      We value your input and suggestions.    Thank you for choosing the [facility]!

## 2024-07-19 RX ORDER — MELOXICAM 15 MG/1
15 TABLET ORAL DAILY PRN
Qty: 90 TABLET | Refills: 0 | Status: SHIPPED | OUTPATIENT
Start: 2024-07-19

## 2024-07-22 ENCOUNTER — OFFICE VISIT (OUTPATIENT)
Dept: VASCULAR SURGERY | Facility: CLINIC | Age: 74
End: 2024-07-22
Attending: FAMILY MEDICINE
Payer: COMMERCIAL

## 2024-07-22 VITALS
HEART RATE: 80 BPM | TEMPERATURE: 98 F | DIASTOLIC BLOOD PRESSURE: 92 MMHG | SYSTOLIC BLOOD PRESSURE: 180 MMHG | OXYGEN SATURATION: 98 % | RESPIRATION RATE: 12 BRPM

## 2024-07-22 DIAGNOSIS — L97.912 SKIN ULCER OF RIGHT LOWER LEG WITH FAT LAYER EXPOSED (H): Primary | ICD-10-CM

## 2024-07-22 PROCEDURE — G0463 HOSPITAL OUTPT CLINIC VISIT: HCPCS | Performed by: FAMILY MEDICINE

## 2024-07-22 PROCEDURE — 99214 OFFICE O/P EST MOD 30 MIN: CPT | Performed by: FAMILY MEDICINE

## 2024-07-22 ASSESSMENT — PAIN SCALES - GENERAL: PAINLEVEL: EXTREME PAIN (8)
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
## 2024-07-22 NOTE — LETTER
"[facility]  [address] [place] [address] [address]  [place], [place] [address]  [phone]      Fax [phone]    [facility]           Fax: [phone]            Customer Service: [phone]        Account #: [id number]    Wound Dressing Rx and Order Form  Order Status: refill  Verbal: Minerva  Date: 2024     Hai Meade  Gender: female  : 1950  [address]  [place] [address]  [phone] (home)     Medical Record: [id number]  Primary Care Provider: Brie Acevedo      ICD-10-CM    1. Skin ulcer of right lower leg with fat layer exposed (H)  L97.912 Wound care            Insurance Info:  INSURER: Payor: MEDICA / Plan: [employer] [employer] / Product Type: HMO /   Policy ID#:  [id number]  SECONDARY INSURANCE:    Secondary Policy ID#:  N/A        Physician Info:   Name:     KEY AGUIRRE  PHONE,      Dept Address/Phones:   [address] [place] [address], [address]  [facility] [id number] [phone]  Fax: [phone]    Lymphedema circumferential measurements (in cm):       No data to display                  Wound info:  VASC Wound shin (Active)   Pre Size Length 2 24 0805   Pre Size Width 2 24 0805   Pre Size Depth 0.1 24 0805   Pre Total Sq cm 4 24 0805   Description scab 24 0757   Number of days: 133       VASC Wound Rt shin proximal (Active)   Pre Size Length 0.2 24 0800   Pre Size Width 0.2 24 0800   Pre Size Depth 0.1 24 0800   Pre Total Sq cm 0.04 24 0800   Number of days: 133        Drainage: moderate  Thickness:  full  Duration of Need: 30 DAYS  Days Supply: 30 DAYS  Start Date: 2024  Starter Kit, Ancillary Kit (saline, gloves, gauze): Yes   Qualifying wound/Debridement: Yes     NO SUBSTITUTIONS. Call [phone].      Dressing Type Brand Size Frequency of change  Quantity   Primary Aquacel AG  4\"x5\" DAILY and as needed     Zetuvit plus silicone border  4\"x4\" DAILY and " as needed      NO SUBSTITUTIONS. Call [phone] with questions.       OK to forward to covered supplier.    Electronically Signed Physician:     KEY AGUIRRE           Date: July 22, 2024

## 2024-07-24 ENCOUNTER — OFFICE VISIT (OUTPATIENT)
Dept: FAMILY MEDICINE | Facility: CLINIC | Age: 74
End: 2024-07-24
Payer: COMMERCIAL

## 2024-07-24 VITALS
WEIGHT: 115 LBS | DIASTOLIC BLOOD PRESSURE: 91 MMHG | HEART RATE: 78 BPM | BODY MASS INDEX: 22.46 KG/M2 | TEMPERATURE: 99 F | OXYGEN SATURATION: 97 % | SYSTOLIC BLOOD PRESSURE: 178 MMHG | RESPIRATION RATE: 16 BRPM

## 2024-07-24 DIAGNOSIS — R21 RASH: Primary | ICD-10-CM

## 2024-07-24 DIAGNOSIS — R51.9 ACUTE NONINTRACTABLE HEADACHE, UNSPECIFIED HEADACHE TYPE: ICD-10-CM

## 2024-07-24 DIAGNOSIS — R42 LIGHTHEADEDNESS: ICD-10-CM

## 2024-07-24 LAB
DEPRECATED S PYO AG THROAT QL EIA: NEGATIVE
GROUP A STREP BY PCR: NOT DETECTED

## 2024-07-24 PROCEDURE — 87635 SARS-COV-2 COVID-19 AMP PRB: CPT | Performed by: FAMILY MEDICINE

## 2024-07-24 PROCEDURE — 99214 OFFICE O/P EST MOD 30 MIN: CPT | Performed by: FAMILY MEDICINE

## 2024-07-24 PROCEDURE — 87651 STREP A DNA AMP PROBE: CPT | Performed by: FAMILY MEDICINE

## 2024-07-24 RX ORDER — DIPHENHYDRAMINE HCL 25 MG
25 CAPSULE ORAL ONCE
Status: COMPLETED | OUTPATIENT
Start: 2024-07-24 | End: 2024-07-24

## 2024-07-24 RX ORDER — CETIRIZINE HYDROCHLORIDE 10 MG/1
10 TABLET ORAL DAILY
Qty: 30 TABLET | Refills: 0 | Status: SHIPPED | OUTPATIENT
Start: 2024-07-24 | End: 2024-08-26

## 2024-07-24 RX ORDER — DIPHENHYDRAMINE HCL 25 MG
25 TABLET ORAL
Qty: 30 TABLET | Refills: 0 | Status: SHIPPED | OUTPATIENT
Start: 2024-07-24

## 2024-07-24 RX ADMIN — Medication 25 MG: at 15:28

## 2024-07-24 NOTE — PATIENT INSTRUCTIONS
Benadryl at bedtime if needed for itch and rash  Cetirizine 10mg in the morning daily as needed for rash.     May apply cold wash cloth to face when flared up hot and swollen.     Drink plenty of water.     Tylenol as needed for headache     May apply vaseline sparingly to rash as needed.     May use your triamcinolone cream very sparingly to rash on body and ears, do not use on the face.     Follow up with primary care if not improving.

## 2024-07-25 ENCOUNTER — TELEPHONE (OUTPATIENT)
Dept: FAMILY MEDICINE | Facility: CLINIC | Age: 74
End: 2024-07-25
Payer: COMMERCIAL

## 2024-07-25 LAB — SARS-COV-2 RNA RESP QL NAA+PROBE: NEGATIVE

## 2024-07-25 NOTE — PROGRESS NOTES
Assessment/Plan:   1. Rash  - Streptococcus A Rapid Screen w/Reflex to PCR - Clinic Collect  - Symptomatic COVID-19 Virus (Coronavirus) by PCR Nose  - diphenhydrAMINE (BENADRYL) capsule 25 mg  - Group A Streptococcus PCR Throat Swab  - cetirizine (ZYRTEC) 10 MG tablet; Take 1 tablet (10 mg) by mouth daily  Dispense: 30 tablet; Refill: 0  - diphenhydrAMINE (BENADRYL) 25 MG tablet; Take 1 tablet (25 mg) by mouth nightly as needed for itching or other (RASH)  Dispense: 30 tablet; Refill: 0  2. Acute nonintractable headache, unspecified headache type  3. Lightheadedness    Rash on face and head, torso and arms. More confluent on face with mild warmth, swelling, redness and itch.   25mg diphenhydramine was given in the office and she was observed.   There was an improvement in the swelling and redness and itch of her face and ears after about 20 minutes.   Given the headache and lightheadedness, suspect possible allergic reaction or urticaria vs infectious.   RST negative and covid test is pending.   No apparent infection at the wound on leg.   The face is responding to benadryl and with some scattered maculopapular rash on torso, neck and arms it seems less likely facial cellulitis.     Plan:  Benadryl at bedtime if needed for itch and rash  Cetirizine 10mg in the morning daily as needed for itch/rash.     May apply cold wash cloth to face or ice pack when flared up hot and swollen.     Drink plenty of water.     Tylenol as needed for headache     May apply vaseline sparingly to rash as needed.     May use your triamcinolone cream very sparingly to rash on body and ears, do not use on the face.     Follow up with primary care if not improving in a couple days or sooner if worse. May need an oral steroid though this may raise blood sugars.      I discussed red flag symptoms, return precautions to clinic/ER and follow up care with patient/parent.  Expected clinical course, symptomatic cares advised. Questions answered.  Patient/parent amenable with plan.  Time: total time on day of visit 39 minutes spent in chart review, obtaining patient history and physical exam, reviewing labs, medication management, shared decision making and coordination of care.             Subjective:     Hai Meade is a 74 year old female who presents with her son for evaluation of rash on her face. He translates for her when needed.   Onset about 1 week ago. Very itchy all over with rash on face, ears, head mainly also scattered on torso, arms.   Red maculopapular spots - more confluent on face and ears creating just a swollen red warm rash. Very itchy. Itchy on scalp also but no apparent rash.   No new medications.   No new creams or makeup or soaps.  She reports having a headache for 1 week. Deep inside her head.  She feels like she might faint when she stands up. No vertigo. No chest pain or palpitations.   No N/V/D. She does have a bitter taste in her mouth and this has led to decreased appetite.   Has a dry nose.   No ST, runny nose or congestion or cough.   Has not tried anything for the rash.   She was seen two days ago at Vascular surgery for a recurrent wound on her right shin - that started about 6 months ago and was better until a few weeks ago when it opened and started draining again. They did not think it was infected and started some wound care.   She did not mention the face rash to them at that time.     Allergies   Allergen Reactions    Cymbalta [Duloxetine] Unknown     Nausea and vomiting     Current Outpatient Medications   Medication Sig Dispense Refill    acetaminophen (TYLENOL) 500 MG tablet [ACETAMINOPHEN (TYLENOL) 500 MG TABLET] Take 1-2 tablets (500-1,000 mg total) by mouth every 4 (four) hours as needed.  0    aspirin 81 MG EC tablet Take 81 mg by mouth every evening      benzonatate (TESSALON) 100 MG capsule Take 1 capsule (100 mg) by mouth 3 times daily as needed for cough 20 capsule 0    benzonatate (TESSALON) 200 MG capsule  Take 1 capsule (200 mg) by mouth 3 times daily as needed for cough 30 capsule 0    blood glucose (NO BRAND SPECIFIED) lancets standard Use to test blood sugar 1  times daily or as directed. 100 lancet. 3    blood glucose (NO BRAND SPECIFIED) test strip Use to test blood sugar 1 time daily or as directed. To accompany: Blood Glucose Monitor Brands: ACCU-CHEK LOIDA plus meter 100 strip 11    Calcium Carb-Cholecalciferol 500-10 MG-MCG CHEW CHEW 1 TABLET TWICE DAILY 180 tablet 3    cetirizine (ZYRTEC) 10 MG tablet Take 1 tablet (10 mg) by mouth daily 30 tablet 0    Cholecalciferol (D3-1000) 25 MCG (1000 UT) CAPS Take 1 capsule by mouth daily 90 capsule 2    diphenhydrAMINE (BENADRYL) 25 MG tablet Take 1 tablet (25 mg) by mouth nightly as needed for itching or other (RASH) 30 tablet 0    gabapentin (NEURONTIN) 100 MG capsule Take 1 capsule (100 mg) by mouth at bedtime 90 capsule 4    generic lancets (ACCU-CHEK SOFTCLIX LANCETS) [GENERIC LANCETS (ACCU-CHEK SOFTCLIX LANCETS)] TEST TWICE DAILY 200 each 1    glipiZIDE (GLUCOTROL XL) 2.5 MG 24 hr tablet TAKE 1 TABLET(2.5 MG) BY MOUTH DAILY AS NEEDED 90 tablet 4    Lidocaine (LIDOCARE) 4 % Patch Place 1 patch onto the skin every 24 hours To prevent lidocaine toxicity, patient should be patch free for 12 hrs daily. 5 patch 0    losartan (COZAAR) 25 MG tablet Take 1 tablet (25 mg) by mouth daily 90 tablet 4    metFORMIN (GLUCOPHAGE) 1000 MG tablet Take 1 tablet (1,000 mg) by mouth 2 times daily (with meals) 180 tablet 4    naproxen (NAPROSYN) 375 MG tablet Take 1 tablet (375 mg) by mouth 2 times daily (with meals) 20 tablet 0    oxyCODONE (ROXICODONE) 5 MG tablet Take 1 tablet (5 mg) by mouth every 6 hours as needed for severe pain 6 tablet 0    polyethylene glycol (MIRALAX) 17 GM/Dose powder Take 17 g by mouth daily      senna-docusate (SENOKOT-S/PERICOLACE) 8.6-50 MG tablet Take 1 tablet by mouth 2 times daily as needed for constipation      simvastatin (ZOCOR) 40 MG tablet  Take 1 tablet (40 mg) by mouth at bedtime 90 tablet 4    triamcinolone (KENALOG) 0.1 % external cream Apply topically 2 times daily 30 g 1     Current Facility-Administered Medications   Medication Dose Route Frequency Provider Last Rate Last Admin    cyanocobalamin injection 1,000 mcg  1,000 mcg Intramuscular Q30 Days Brie Acevedo MD   1,000 mcg at 05/20/24 0816    cyanocobalamin injection 1,000 mcg  1,000 mcg Intramuscular Q30 Days Erasmo Sanders, Lee   1,000 mcg at 06/24/24 0812     Patient Active Problem List   Diagnosis    Anemia    Joint Pain, Localized In The Knee    Type 2 diabetes mellitus with diabetic autonomic neuropathy, without long-term current use of insulin (H)    Cholelithiasis    History of cervical cancer    Urinary incontinence    Hyperlipidemia    Peripheral neuropathy    Gastroesophageal reflux disease without esophagitis    Vitamin B12 deficiency (non anemic)    Age-related osteoporosis without current pathological fracture    Arthritis of carpometacarpal (CMC) joint of left thumb    Closed fracture of multiple ribs of left side, initial encounter    Skin ulcer of right lower leg with fat layer exposed (H)       Objective:     BP (!) 178/91   Pulse 78   Temp 99  F (37.2  C)   Resp 16   Wt 52.2 kg (115 lb)   SpO2 97%   BMI 22.46 kg/m      Physical    General Appearance: Alert, pleasant, no distress, aVSS but high blood pressure.   Head: Normocephalic, without obvious abnormality, atraumatic  Eyes: Conjunctivae are normal. Mild swelling of the eyelids  Ears: Normal TMs and external ear canals, both ears. The pinnae are red and warm and slightly swollen, nontender, itchy   Nose: No significant congestion.  Throat: Throat is normal.  No exudate.  No vesicular lesions  Neck: No adenopathy  Lungs: Clear to auscultation bilaterally, respirations unlabored  Heart: Regular rate and rhythm  Extremities: trace bilateral lower extremity edema  Skin: face is diffusely red and flushed, a  little swollen. Warm to touch but not very tender, mostly itchy, maculopapular rash that has become confluent on the face, more scattered maculopapular rash on torso and arms.   Wound on right shin with dressing on it was just seen by vascular surgery. No surrounding redness, swelling, tenderness or rash.   Psychiatric: Patient has a normal mood and affect.         Results for orders placed or performed in visit on 07/24/24   Streptococcus A Rapid Screen w/Reflex to PCR - Clinic Collect     Status: Normal    Specimen: Throat; Swab   Result Value Ref Range    Group A Strep antigen Negative Negative   Group A Streptococcus PCR Throat Swab     Status: Normal    Specimen: Throat; Swab   Result Value Ref Range    Group A strep by PCR Not Detected Not Detected    Narrative    The Xpert Xpress Strep A test, performed on the [employer], is a rapid, qualitative in vitro diagnostic test for the detection of Streptococcus pyogenes (Group A ß-hemolytic Streptococcus, Strep A) in throat swab specimens from patients with signs and symptoms of pharyngitis. The Xpert Xpress Strep A test can be used as an aid in the diagnosis of Group A Streptococcal pharyngitis. The assay is not intended to monitor treatment for Group A Streptococcus infections. The Xpert Xpress Strep A test utilizes an automated real-time polymerase chain reaction (PCR) to detect Streptococcus pyogenes DNA.       This note has been dictated in part using voice recognition software.  Any grammatical or context distortions are unintentional and inherent to the software.  Please feel free to contact me directly for clarification if needed.

## 2024-07-25 NOTE — TELEPHONE ENCOUNTER
----- Message from Betsy Hurst sent at 7/25/2024  6:03 PM CDT -----  Please call to let the patient or family know that her covid test was negative.

## 2024-07-26 ENCOUNTER — TELEPHONE (OUTPATIENT)
Dept: VASCULAR SURGERY | Facility: CLINIC | Age: 74
End: 2024-07-26
Payer: COMMERCIAL

## 2024-07-26 ENCOUNTER — NURSE TRIAGE (OUTPATIENT)
Dept: FAMILY MEDICINE | Facility: CLINIC | Age: 74
End: 2024-07-26

## 2024-07-26 ENCOUNTER — OFFICE VISIT (OUTPATIENT)
Dept: FAMILY MEDICINE | Facility: CLINIC | Age: 74
End: 2024-07-26
Payer: COMMERCIAL

## 2024-07-26 VITALS
WEIGHT: 120 LBS | TEMPERATURE: 97.6 F | RESPIRATION RATE: 16 BRPM | OXYGEN SATURATION: 97 % | SYSTOLIC BLOOD PRESSURE: 154 MMHG | HEIGHT: 60 IN | DIASTOLIC BLOOD PRESSURE: 88 MMHG | BODY MASS INDEX: 23.56 KG/M2 | HEART RATE: 86 BPM

## 2024-07-26 DIAGNOSIS — R21 FACIAL RASH: Primary | ICD-10-CM

## 2024-07-26 PROCEDURE — 99214 OFFICE O/P EST MOD 30 MIN: CPT | Performed by: NURSE PRACTITIONER

## 2024-07-26 RX ORDER — TRIAMCINOLONE ACETONIDE 1 MG/G
CREAM TOPICAL 2 TIMES DAILY
Qty: 30 G | Refills: 1 | Status: SHIPPED | OUTPATIENT
Start: 2024-07-26

## 2024-07-26 RX ORDER — CEPHALEXIN 500 MG/1
500 CAPSULE ORAL 4 TIMES DAILY
Qty: 40 CAPSULE | Refills: 0 | Status: SHIPPED | OUTPATIENT
Start: 2024-07-26 | End: 2024-08-05

## 2024-07-26 NOTE — TELEPHONE ENCOUNTER
Nurse Triage SBAR    Is this a 2nd Level Triage? NO    Situation:   Telephone call into the clinic, from the patient's daughter, DEON Madden on file, to report a worsening rash on the patient's face on 7/26/24    Patient was in the background and Shyam translated for the patient, so writer was able to triage the patient    Background:   Hx of DM2 with diabetic neuropathy, GERD, HLD, osteoporosis, and urinary incontinence    See office visit notes from Dr. Alia Diaz, at the [facility], on 7/24/24, regarding the patient's rash    Assessment:   Patient reports that her rash is worse today and her face is red and swollen    Rash is located on her face and all around both cheeks and her lower eyelid is puffy    Rash started over a week ago and is causing severe itching-denies pain associated with the rash-rash feels hot    Reports a headache associated with the rash    Was prescribed both cetirizine and diphenhydramine, which seemed to help some with rash and swelling at first, but now the medications don't seem to be as effective    Denies chest pain, trouble breathing, fever, or trouble swallowing    Protocol Recommended Disposition:   See in Office Within 3 Days    Recommendation:   Gave care advice. Recommended that the patient be evaluated in office in the next three days per disposition.    Patient verbalized understanding and agrees with the plan.    Writer assisted the patient with scheduling an in office visit with Janice Moncada CNP, at the [facility], today, Friday, 7/26/24, at 3:30 pm.    Denies other questions or concerns at this time.    Writer recommended that for any worsening symptoms in the meantime, such as trouble breathing or swallowing, then the patient should be seen immediately at the ED    Patient and Shyam verbalized understanding and agrees with the plan.    Does the patient meet one of the following criteria for ADS visit consideration? 16+ years old, with an  [facility] PCP     TIP  Providers, please consider if this condition is appropriate for management at one of our Acute and Diagnostic Services sites.     If patient is a good candidate, please use dotphrase <dot>triageresponse and select Refer to ADS to document.     Reason for Disposition   Localized rash present > 7 days    Additional Information   Negative: Sounds like a life-threatening emergency to the triager   Negative: Athlete's Foot suspected (i.e., itchy rash between the toes)   Negative: Insect bite(s) suspected   Negative: Jock Itch suspected (i.e., itchy rash on inner thighs near genital area)   Negative: Localized lump (or swelling) without redness or rash   Negative: MPOX SUSPECTED (e.g., direct skin contact such as sex, recent travel to [place] or [place]) and any SYMPTOMS OF MPOX (e.g., rash, fever, muscle aches, or swollen lymph nodes)   Negative: At risk for Mpox (men-who-have-sex-with-men) and possible exposure (e.g., multiple sex partners in past 21 days) and ANY SYMPTOMS OF MPOX (e.g., rash, fever, muscle aches, or swollen lymph nodes)   Negative: Poison ivy, oak, or sumac rash suspected (e.g., itchy rash after contact with poison ivy)   Negative: Rash of female genital area (e.g., labia, vagina, vulva)   Negative: Rash of male genital area (e.g., penis, scrotum)   Negative: Redness of immunization site   Negative: Shingles suspected (i.e., painful rash, multiple small blisters grouped together in one area of body; dermatomal distribution)   Negative: Small spot, skin growth, or mole   Negative: Wound infection suspected (i.e., pain, spreading redness, or pus; in a cut, puncture, scrape or sutured wound)   Negative: Fever and localized purple or blood-colored spots or dots that are not from injury or friction   Negative: Fever and localized rash is very painful   Negative: Patient sounds very sick or weak to the triager   Negative: Looks like a boil, infected sore, deep ulcer, or other infected  "rash (spreading redness, pus)   Negative: Painful rash with multiple small blisters grouped together (i.e., dermatomal distribution or 'band' or 'stripe')   Negative: Localized rash is very painful (no fever)   Negative: Localized purple or blood-colored spots or dots that are not from injury or friction (no fever)   Negative: Lyme disease suspected (e.g., bull's-eye rash or tick bite / exposure)   Negative: Patient wants to be seen   Negative: Tender bumps in armpits   Negative: Pimples (localized) and no improvement after using CARE ADVICE   Negative: SEVERE local itching persists after 2 days of steroid cream   Negative: Medication patch causing local rash or itching   Negative: Applying cream or ointment and it causes severe itch, burning, or pain    Answer Assessment - Initial Assessment Questions  1. APPEARANCE of RASH: \"Describe the rash.\"         Rash on face and all around cheeks, her face is red and she iw swollen and puffy lower eyelid    2. LOCATION: \"Where is the rash located?\"         face    3. NUMBER: \"How many spots are there?\"         No open areas-blotchy    4. SIZE: \"How big are the spots?\" (Inches, centimeters or compare to size of a coin)         Whole entire cheek    5. ONSET: \"When did the rash start?\"         About 1 week ago    6. ITCHING: \"Does the rash itch?\" If Yes, ask: \"How bad is the itch?\"  (Scale 0-10; or none, mild, moderate, severe)        severe    7. PAIN: \"Does the rash hurt?\" If Yes, ask: \"How bad is the pain?\"  (Scale 0-10; or none, mild, moderate, severe)     - NONE (0): no pain     - MILD (1-3): doesn't interfere with normal activities      - MODERATE (4-7): interferes with normal activities or awakens from sleep      - SEVERE (8-10): excruciating pain, unable to do any normal activities        Feels hot, not painful    8. OTHER SYMPTOMS: \"Do you have any other symptoms?\" (e.g., fever)        Denies chest pain, trouble breathing, fever, trouble swallowing    Headache    9. " "PREGNANCY: \"Is there any chance you are pregnant?\" \"When was your last menstrual period?\"    N/A    Protocols used: Rash or Redness - [id number]    Madiha Wagner RN, BSN  [facility]    "

## 2024-07-26 NOTE — TELEPHONE ENCOUNTER
Pt daughter called stating that pt won't review the supplies from [facility] until tomorrow. She was wondering what to cover the wound with. Informed her OK to cover with gauze until pt gets supplies below tomorrow. She agreed with the plan.      Wound Care Instructions     DAILY and as needed, Cleanse your right shin wound(s) with Normal saline.     Pat Dry with non-sterile gauze     Primary Dressing: Apply aquacel AG into/onto the wounds     Secondary dressing: Cover with zetuvit     Compression: tubigrip     It is ok to get your wound wet in the bath or shower

## 2024-07-26 NOTE — PROGRESS NOTES
Assessment & Plan     Facial rash  Unclear cause.  Differential includes rosacea, erysipelas, ACLE, and atopic/contact dermatitis.  The rash is quite swollen and warm.  Will start her on Cephalexin to cover for bacterial infection such as erysipelas.  She can use a short course of topical steroids for the itching she is experiencing.  Recommend not using longer than 10 days.  May continue Zyrtec and Benadryl. Keep appointment with primary team next week in case symptoms are worse.  Discussed symptoms that would warrant urgent follow up over the weekend.  Consider dermatology consult if not improving.   - cephALEXin (KEFLEX) 500 MG capsule  Dispense: 40 capsule; Refill: 0  - triamcinolone (KENALOG) 0.1 % external cream  Dispense: 30 g; Refill: 1        Reynaldo Valles is a 74 year old accompanied by her daughter who presents with facial redness and swelling.  Patient's daughter is helping with interpreting today.  Symptoms started about 1 week ago.  Started out as small pimples on her face.  Symptoms worsened about 4 days ago and her cheeks became very red and swollen.  Patient reports the rash is very itchy also little bit irritated.  She was seen at urgent care 2 days ago and tested for strep and COVID.  Testing was negative.  Patient was prescribed cetirizine and Benadryl.  She feels like the medications were helpful for about 1 day, but no longer benefiting.  She denies any drainage from the lesions.  Cannot recall any new products, foods, or medications.  Denies any swelling to her lips or throat.      Derm Problem (Rash on face ; ears starting to form rash ; was given medication at , but seems to stop working ) and Facial Swelling (Cheeks swollen )      History of Present Illness       Reason for visit:  Rash and swelling face    She eats 2-3 servings of fruits and vegetables daily.She consumes 1 sweetened beverage(s) daily.She exercises with enough effort to increase her heart rate 9 or less minutes per  day.  She exercises with enough effort to increase her heart rate 3 or less days per week.   She is taking medications regularly.         Objective    BP (!) 154/88 (BP Location: Right arm, Patient Position: Sitting, Cuff Size: Adult Regular)   Pulse 86   Temp 97.6  F (36.4  C) (Temporal)   Resp 16   Ht 1.524 m (5')   Wt 54.4 kg (120 lb)   SpO2 97%   BMI 23.44 kg/m    Body mass index is 23.44 kg/m .  Physical Exam   GENERAL: alert and no distress  NECK: no adenopathy, no asymmetry, masses, or scars  SKIN: bilateral facial cheeks are erythematous, warm, and swollen. Erythematous based with some papular lesions. No drainage. See pictures below.        Media Information         Media Information                Signed Electronically by: Janice Moncada NP

## 2024-07-29 ENCOUNTER — ALLIED HEALTH/NURSE VISIT (OUTPATIENT)
Dept: FAMILY MEDICINE | Facility: CLINIC | Age: 74
End: 2024-07-29
Payer: COMMERCIAL

## 2024-07-29 DIAGNOSIS — E53.8 VITAMIN B12 DEFICIENCY (NON ANEMIC): Primary | ICD-10-CM

## 2024-07-29 PROCEDURE — 96372 THER/PROPH/DIAG INJ SC/IM: CPT | Performed by: PHARMACIST

## 2024-07-29 PROCEDURE — 99207 PR NO CHARGE NURSE ONLY: CPT

## 2024-07-29 RX ADMIN — CYANOCOBALAMIN 1000 MCG: 1000 INJECTION, SOLUTION INTRAMUSCULAR; SUBCUTANEOUS at 08:11

## 2024-07-30 ENCOUNTER — OFFICE VISIT (OUTPATIENT)
Dept: FAMILY MEDICINE | Facility: CLINIC | Age: 74
End: 2024-07-30
Payer: COMMERCIAL

## 2024-07-30 VITALS
TEMPERATURE: 97.6 F | WEIGHT: 119.3 LBS | HEART RATE: 82 BPM | SYSTOLIC BLOOD PRESSURE: 139 MMHG | RESPIRATION RATE: 14 BRPM | OXYGEN SATURATION: 98 % | DIASTOLIC BLOOD PRESSURE: 76 MMHG | BODY MASS INDEX: 23.3 KG/M2

## 2024-07-30 DIAGNOSIS — L02.419 CELLULITIS AND ABSCESS OF LEG: ICD-10-CM

## 2024-07-30 DIAGNOSIS — L03.119 CELLULITIS AND ABSCESS OF LEG: ICD-10-CM

## 2024-07-30 DIAGNOSIS — E11.43 TYPE 2 DIABETES MELLITUS WITH DIABETIC AUTONOMIC NEUROPATHY, WITHOUT LONG-TERM CURRENT USE OF INSULIN (H): Primary | ICD-10-CM

## 2024-07-30 LAB
ERYTHROCYTE [DISTWIDTH] IN BLOOD BY AUTOMATED COUNT: 12.1 % (ref 10–15)
ERYTHROCYTE [SEDIMENTATION RATE] IN BLOOD BY WESTERGREN METHOD: 19 MM/HR (ref 0–30)
HCT VFR BLD AUTO: 35.6 % (ref 35–47)
HGB BLD-MCNC: 12 G/DL (ref 11.7–15.7)
MCH RBC QN AUTO: 29.5 PG (ref 26.5–33)
MCHC RBC AUTO-ENTMCNC: 33.7 G/DL (ref 31.5–36.5)
MCV RBC AUTO: 88 FL (ref 78–100)
PLATELET # BLD AUTO: 241 10E3/UL (ref 150–450)
RBC # BLD AUTO: 4.07 10E6/UL (ref 3.8–5.2)
WBC # BLD AUTO: 5.7 10E3/UL (ref 4–11)

## 2024-07-30 PROCEDURE — 36415 COLL VENOUS BLD VENIPUNCTURE: CPT | Performed by: FAMILY MEDICINE

## 2024-07-30 PROCEDURE — 99214 OFFICE O/P EST MOD 30 MIN: CPT | Performed by: FAMILY MEDICINE

## 2024-07-30 PROCEDURE — 87205 SMEAR GRAM STAIN: CPT | Performed by: FAMILY MEDICINE

## 2024-07-30 PROCEDURE — 87040 BLOOD CULTURE FOR BACTERIA: CPT | Performed by: FAMILY MEDICINE

## 2024-07-30 PROCEDURE — 85027 COMPLETE CBC AUTOMATED: CPT | Performed by: FAMILY MEDICINE

## 2024-07-30 PROCEDURE — 85652 RBC SED RATE AUTOMATED: CPT | Performed by: FAMILY MEDICINE

## 2024-07-30 PROCEDURE — 87070 CULTURE OTHR SPECIMN AEROBIC: CPT | Mod: 59 | Performed by: FAMILY MEDICINE

## 2024-07-30 ASSESSMENT — PAIN SCALES - GENERAL: PAINLEVEL: SEVERE PAIN (7)

## 2024-07-30 NOTE — PROGRESS NOTES
Assessment & Plan     Type 2 diabetes mellitus with diabetic autonomic neuropathy, without long-term current use of insulin (H)  Continue metformin    Cellulitis and abscess of leg  Finish course of cephalosporin; discontinue triamcinolone on face use Vaseline workup as follows recheck 2 to 3 days primary care  - ESR: Erythrocyte sedimentation rate; Future  - CBC with platelets; Future  - Blood Culture Peripheral Blood; Future  - Skin Aerobic Bacterial Culture Routine With Gram Stain                Reynaldo Valles is a 74 year old, presenting for the following health issues: Patient developed a red rash with a low-grade temp on malar prominences with periorbital swelling was seen at urgent care placed on triamcinolone cream cephalosporin orally COVID-negative strep culture negative.  Presents here primary care follow-up rash same location less swelling still erythematous patient afebrile.  On exam I noted a cellulitis on the pretibial surface of her right ankle which was oozing and according to daughter who is translating fromHmong at this was present weeks prior to the onset of the facial swelling.  Differential includes MRSA erysipelas.  Plan here finish course of cephalosporin discontinue triamcinolone to face blood culture wound culture hemogram sed rate clinical follow-up 2 to 3 days.  Diary Of temperature at home was recommended to the daughter.  Patient is diabetic on metformin.  Follow Up (Walk in care) and Derm Problem (Rash and swelling, 1 week)      7/30/2024    10:47 AM   Additional Questions   Roomed by Federica   Accompanied by Daughter     History of Present Illness       Reason for visit:  Rash and swelling face    She eats 2-3 servings of fruits and vegetables daily.She consumes 1 sweetened beverage(s) daily.She exercises with enough effort to increase her heart rate 9 or less minutes per day.  She exercises with enough effort to increase her heart rate 3 or less days per week.   She is taking  medications regularly.                 Review of Systems  CONSTITUTIONAL: NEGATIVE for fever, chills, change in weight  INTEGUMENTARY/SKIN: NEGATIVE for worrisome rashes, moles or lesions  EYES: NEGATIVE for vision changes or irritation  ENT/MOUTH: NEGATIVE for ear, mouth and throat problems  RESP: NEGATIVE for significant cough or SOB  BREAST: NEGATIVE for masses, tenderness or discharge  CV: NEGATIVE for chest pain, palpitations or peripheral edema  GI: NEGATIVE for nausea, abdominal pain, heartburn, or change in bowel habits  : NEGATIVE for frequency, dysuria, or hematuria  MUSCULOSKELETAL: NEGATIVE for significant arthralgias or myalgia  NEURO: NEGATIVE for weakness, dizziness or paresthesias  ENDOCRINE: NEGATIVE for temperature intolerance, skin/hair changes  HEME: NEGATIVE for bleeding problems  PSYCHIATRIC: NEGATIVE for changes in mood or affect      Objective    /76 (BP Location: Right arm, Patient Position: Sitting, Cuff Size: Adult Regular)   Pulse 82   Temp 97.6  F (36.4  C) (Temporal)   Resp 14   Wt 54.1 kg (119 lb 4.8 oz)   SpO2 98%   BMI 23.30 kg/m    Body mass index is 23.3 kg/m .  Physical Exam   GENERAL: alert and no distress  NECK: no adenopathy, no asymmetry, masses, or scars  RESP: lungs clear to auscultation - no rales, rhonchi or wheezes  CV: regular rate and rhythm, normal S1 S2, no S3 or S4, no murmur, click or rub, no peripheral edema  ABDOMEN: soft, nontender, no hepatosplenomegaly, no masses and bowel sounds normal  MS: no gross musculoskeletal defects noted, no edema    Skin-erythematous rash both malar prominences measuring 3 x 4 cm ovoid in shape not involving bridge of nose secondary swelling periorbital areas bilaterally oropharynx clear cervical lymph nodes were palpated and found not to be pathologic.  She does have a oozing rash with secondary satellite erythematous macules dorsum right pretibial area.        Signed Electronically by: Prabhjot Canales MD

## 2024-08-01 LAB
BACTERIA SKIN AEROBE CULT: NORMAL
GRAM STAIN RESULT: NORMAL
GRAM STAIN RESULT: NORMAL

## 2024-08-04 LAB — BACTERIA BLD CULT: NO GROWTH

## 2024-08-08 ENCOUNTER — APPOINTMENT (OUTPATIENT)
Dept: INTERPRETER SERVICES | Facility: CLINIC | Age: 74
End: 2024-08-08
Payer: COMMERCIAL

## 2024-08-08 NOTE — PATIENT INSTRUCTIONS
Wound care supplies were not ordered or needed today. You can order more online on amazon if needed        Wound Care Instructions    3 TIMES PER WEEK and as needed, Cleanse your right shin wound(s) with Normal saline.    Pat Dry with non-sterile gauze    Primary Dressing: Apply aquacel into/onto the wounds    Secondary dressing: Cover with mepilex or your preferred top dressing    It is ok to get your wound wet in the bath or shower      If for some reason you are not able to get your dressing(s) changed as outlined above (due to illness, lack of supplies, lack of help) please do the following: remove old, soiled dressings; wash the wounds with saline; pat dry; apply ABD pad or other absorbant pad and secure with rolled gauze; avoid tape directly on your skin; Call the clinic as soon as possible to let us know what the current issues are in receiving wound care [phone].      SEEK MEDICAL CARE IF:  You have an increase in swelling, pain, or redness around the wound.  You have an increase in the amount of pus coming from the wound.  There is a bad smell coming from the wound.  The wound appears to be worsening/enlarging  You have a fever greater than 101.5 F      It is ok to continue current wound care treatment/products for the next 2-3 days until new wound care supplies are ordered and arrive. If longer than this please contact our office at [phone].    If you have a 2 layer or 4 layer compression wrap on these are safe to have on for ONLY 7 days. If for some reason you are not able to get the wrap(s) changed (due to illness; lack of supplies, lack of help, lack of transportation) please do the following: unwrap the old 2 or 4 layer compression wrap; avoid using scissors as you could cut your skin and cause wounds; use tubular compression when available. Call to reschedule your home care or clinic visit appointment as soon as possible.    Please NOTE: if you are 15 minutes late to your clinic appointment  you will have to be rescheduled. Please call our clinic as soon as possible if you know you will not be able to get to your appointment at [phone].    If you fail to show up to 3 scheduled clinic appointments you will be dismissed from our clinic.              We want to hear from you!  In the next few weeks, you should receive a call or email to complete a survey about your visit at [facility]. Please help us improve your appointment experience by letting us know how we did today. We strive to make your experience good and value any ways in which we could do better.      We value your input and suggestions.    Thank you for choosing the [facility]!

## 2024-08-12 ENCOUNTER — OFFICE VISIT (OUTPATIENT)
Dept: VASCULAR SURGERY | Facility: CLINIC | Age: 74
End: 2024-08-12
Attending: FAMILY MEDICINE
Payer: COMMERCIAL

## 2024-08-12 VITALS — OXYGEN SATURATION: 98 % | DIASTOLIC BLOOD PRESSURE: 93 MMHG | SYSTOLIC BLOOD PRESSURE: 165 MMHG | HEART RATE: 87 BPM

## 2024-08-12 DIAGNOSIS — L97.912 SKIN ULCER OF RIGHT LOWER LEG WITH FAT LAYER EXPOSED (H): Primary | ICD-10-CM

## 2024-08-12 PROCEDURE — G0463 HOSPITAL OUTPT CLINIC VISIT: HCPCS | Performed by: FAMILY MEDICINE

## 2024-08-12 PROCEDURE — T1013 SIGN LANG/ORAL INTERPRETER: HCPCS | Mod: U4

## 2024-08-12 PROCEDURE — 99214 OFFICE O/P EST MOD 30 MIN: CPT | Performed by: FAMILY MEDICINE

## 2024-08-12 ASSESSMENT — PAIN SCALES - GENERAL: PAINLEVEL: MILD PAIN (3)
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
## 2024-08-20 ENCOUNTER — APPOINTMENT (OUTPATIENT)
Dept: LAB | Age: 74
End: 2024-08-20

## 2024-08-20 ENCOUNTER — APPOINTMENT (OUTPATIENT)
Dept: GENERAL RADIOLOGY | Age: 74
DRG: 281 | End: 2024-08-20
Attending: EMERGENCY MEDICINE

## 2024-08-20 ENCOUNTER — HOSPITAL ENCOUNTER (INPATIENT)
Age: 74
LOS: 1 days | Discharge: ANOTHER HOSPITAL | DRG: 281 | End: 2024-08-22
Attending: EMERGENCY MEDICINE | Admitting: INTERNAL MEDICINE

## 2024-08-20 DIAGNOSIS — I20.89 EXERTIONAL ANGINA (CMD): ICD-10-CM

## 2024-08-20 DIAGNOSIS — R79.89 ELEVATED TROPONIN: ICD-10-CM

## 2024-08-20 DIAGNOSIS — I21.4 NSTEMI (NON-ST ELEVATED MYOCARDIAL INFARCTION)  (CMD): ICD-10-CM

## 2024-08-20 DIAGNOSIS — R07.9 CHEST PAIN, UNSPECIFIED TYPE: Primary | ICD-10-CM

## 2024-08-20 DIAGNOSIS — E11.36 TYPE 2 DIABETES MELLITUS WITH DIABETIC CATARACT, WITHOUT LONG-TERM CURRENT USE OF INSULIN  (CMD): ICD-10-CM

## 2024-08-20 LAB
ALBUMIN SERPL-MCNC: 4 G/DL (ref 3.6–5.1)
ALBUMIN/GLOB SERPL: 1 {RATIO} (ref 1–2.4)
ALP SERPL-CCNC: 138 UNITS/L (ref 45–117)
ALT SERPL-CCNC: 22 UNITS/L
ANION GAP SERPL CALC-SCNC: 13 MMOL/L (ref 7–19)
APTT PPP: 30 SEC (ref 22–32)
AST SERPL-CCNC: 23 UNITS/L
ATRIAL RATE (BPM): 61
ATRIAL RATE (BPM): 68
BASOPHILS # BLD: 0 K/MCL (ref 0–0.3)
BASOPHILS NFR BLD: 1 %
BILIRUB SERPL-MCNC: 0.5 MG/DL (ref 0.2–1)
BUN SERPL-MCNC: 13 MG/DL (ref 6–20)
BUN/CREAT SERPL: 14 (ref 7–25)
CALCIUM SERPL-MCNC: 10.1 MG/DL (ref 8.4–10.2)
CHLORIDE SERPL-SCNC: 104 MMOL/L (ref 97–110)
CHOLEST SERPL-MCNC: 241 MG/DL
CHOLEST/HDLC SERPL: 5.5 {RATIO}
CO2 SERPL-SCNC: 27 MMOL/L (ref 21–32)
CREAT SERPL-MCNC: 0.91 MG/DL (ref 0.51–0.95)
DEPRECATED RDW RBC: 39 FL (ref 39–50)
DEPRECATED RDW RBC: 39.4 FL (ref 39–50)
DIASTOLIC BLOOD PRESSURE: 95
EGFRCR SERPLBLD CKD-EPI 2021: 65 ML/MIN/{1.73_M2}
EOSINOPHIL # BLD: 0 K/MCL (ref 0–0.5)
EOSINOPHIL NFR BLD: 1 %
ERYTHROCYTE [DISTWIDTH] IN BLOOD: 12.3 % (ref 11–15)
ERYTHROCYTE [DISTWIDTH] IN BLOOD: 12.3 % (ref 11–15)
FASTING DURATION TIME PATIENT: ABNORMAL H
GLOBULIN SER-MCNC: 4.1 G/DL (ref 2–4)
GLUCOSE BLDC GLUCOMTR-MCNC: 109 MG/DL (ref 70–99)
GLUCOSE BLDC GLUCOMTR-MCNC: 211 MG/DL (ref 70–99)
GLUCOSE SERPL-MCNC: 122 MG/DL (ref 70–99)
HBA1C MFR BLD: 6.3 % (ref 4.5–5.6)
HCT VFR BLD CALC: 40.6 % (ref 36–46.5)
HCT VFR BLD CALC: 41.5 % (ref 36–46.5)
HDLC SERPL-MCNC: 44 MG/DL
HGB BLD-MCNC: 13.5 G/DL (ref 12–15.5)
HGB BLD-MCNC: 14.1 G/DL (ref 12–15.5)
IMM GRANULOCYTES # BLD AUTO: 0 K/MCL (ref 0–0.2)
IMM GRANULOCYTES # BLD: 0 %
INR PPP: 1
LDLC SERPL CALC-MCNC: 143 MG/DL
LYMPHOCYTES # BLD: 1.9 K/MCL (ref 1–4)
LYMPHOCYTES NFR BLD: 32 %
MAGNESIUM SERPL-MCNC: 1.8 MG/DL (ref 1.7–2.4)
MCH RBC QN AUTO: 29.1 PG (ref 26–34)
MCH RBC QN AUTO: 29.5 PG (ref 26–34)
MCHC RBC AUTO-ENTMCNC: 33.3 G/DL (ref 32–36.5)
MCHC RBC AUTO-ENTMCNC: 34 G/DL (ref 32–36.5)
MCV RBC AUTO: 86.8 FL (ref 78–100)
MCV RBC AUTO: 87.5 FL (ref 78–100)
MONOCYTES # BLD: 0.4 K/MCL (ref 0.3–0.9)
MONOCYTES NFR BLD: 6 %
NEUTROPHILS # BLD: 3.5 K/MCL (ref 1.8–7.7)
NEUTROPHILS NFR BLD: 60 %
NONHDLC SERPL-MCNC: 197 MG/DL
NRBC BLD MANUAL-RTO: 0 /100 WBC
NRBC BLD MANUAL-RTO: 0 /100 WBC
NT-PROBNP SERPL-MCNC: 133 PG/ML
P AXIS (DEGREES): 56
P AXIS (DEGREES): 65
PLATELET # BLD AUTO: NORMAL 10*3/UL
PLATELET # BLD AUTO: NORMAL 10*3/UL
POTASSIUM SERPL-SCNC: 4 MMOL/L (ref 3.4–5.1)
PR-INTERVAL (MSEC): 198
PR-INTERVAL (MSEC): 202
PROT SERPL-MCNC: 8.1 G/DL (ref 6.4–8.2)
PROTHROMBIN TIME: 10.9 SEC (ref 9.7–11.8)
QRS-INTERVAL (MSEC): 82
QRS-INTERVAL (MSEC): 86
QT-INTERVAL (MSEC): 404
QT-INTERVAL (MSEC): 408
QTC: 411
QTC: 430
R AXIS (DEGREES): -17
R AXIS (DEGREES): -27
RAINBOW EXTRA TUBES HOLD SPECIMEN: NORMAL
RBC # BLD: 4.64 MIL/MCL (ref 4–5.2)
RBC # BLD: 4.78 MIL/MCL (ref 4–5.2)
REPORT TEXT: NORMAL
REPORT TEXT: NORMAL
SODIUM SERPL-SCNC: 140 MMOL/L (ref 135–145)
SYSTOLIC BLOOD PRESSURE: 165
T AXIS (DEGREES): 115
T AXIS (DEGREES): 123
TRIGL SERPL-MCNC: 268 MG/DL
TROPONIN I SERPL DL<=0.01 NG/ML-MCNC: 111 NG/L
TROPONIN I SERPL DL<=0.01 NG/ML-MCNC: 125 NG/L
TROPONIN I SERPL DL<=0.01 NG/ML-MCNC: 144 NG/L
TSH SERPL-ACNC: 3.68 MCUNITS/ML (ref 0.35–5)
VENTRICULAR RATE EKG/MIN (BPM): 61
VENTRICULAR RATE EKG/MIN (BPM): 68
WBC # BLD: 5.8 K/MCL (ref 4.2–11)
WBC # BLD: 6.5 K/MCL (ref 4.2–11)

## 2024-08-20 PROCEDURE — 83735 ASSAY OF MAGNESIUM: CPT | Performed by: EMERGENCY MEDICINE

## 2024-08-20 PROCEDURE — 93005 ELECTROCARDIOGRAM TRACING: CPT | Performed by: EMERGENCY MEDICINE

## 2024-08-20 PROCEDURE — 36415 COLL VENOUS BLD VENIPUNCTURE: CPT | Performed by: INTERNAL MEDICINE

## 2024-08-20 PROCEDURE — 10002800 HB RX 250 W HCPCS: Performed by: INTERNAL MEDICINE

## 2024-08-20 PROCEDURE — 84443 ASSAY THYROID STIM HORMONE: CPT | Performed by: INTERNAL MEDICINE

## 2024-08-20 PROCEDURE — 85027 COMPLETE CBC AUTOMATED: CPT | Performed by: INTERNAL MEDICINE

## 2024-08-20 PROCEDURE — 83880 ASSAY OF NATRIURETIC PEPTIDE: CPT | Performed by: EMERGENCY MEDICINE

## 2024-08-20 PROCEDURE — 71045 X-RAY EXAM CHEST 1 VIEW: CPT | Performed by: RADIOLOGY

## 2024-08-20 PROCEDURE — 80061 LIPID PANEL: CPT | Performed by: INTERNAL MEDICINE

## 2024-08-20 PROCEDURE — 85025 COMPLETE CBC W/AUTO DIFF WBC: CPT | Performed by: EMERGENCY MEDICINE

## 2024-08-20 PROCEDURE — 80053 COMPREHEN METABOLIC PANEL: CPT | Performed by: EMERGENCY MEDICINE

## 2024-08-20 PROCEDURE — 99223 1ST HOSP IP/OBS HIGH 75: CPT | Performed by: INTERNAL MEDICINE

## 2024-08-20 PROCEDURE — 85730 THROMBOPLASTIN TIME PARTIAL: CPT | Performed by: EMERGENCY MEDICINE

## 2024-08-20 PROCEDURE — 71045 X-RAY EXAM CHEST 1 VIEW: CPT

## 2024-08-20 PROCEDURE — 93010 ELECTROCARDIOGRAM REPORT: CPT | Performed by: EMERGENCY MEDICINE

## 2024-08-20 PROCEDURE — 99285 EMERGENCY DEPT VISIT HI MDM: CPT

## 2024-08-20 PROCEDURE — 36415 COLL VENOUS BLD VENIPUNCTURE: CPT

## 2024-08-20 PROCEDURE — 84484 ASSAY OF TROPONIN QUANT: CPT | Performed by: INTERNAL MEDICINE

## 2024-08-20 PROCEDURE — 99285 EMERGENCY DEPT VISIT HI MDM: CPT | Performed by: EMERGENCY MEDICINE

## 2024-08-20 PROCEDURE — 85610 PROTHROMBIN TIME: CPT | Performed by: EMERGENCY MEDICINE

## 2024-08-20 PROCEDURE — 10004651 HB RX, NO CHARGE ITEM: Performed by: INTERNAL MEDICINE

## 2024-08-20 PROCEDURE — 83036 HEMOGLOBIN GLYCOSYLATED A1C: CPT | Performed by: INTERNAL MEDICINE

## 2024-08-20 PROCEDURE — 10002803 HB RX 637: Performed by: INTERNAL MEDICINE

## 2024-08-20 PROCEDURE — 10004651 HB RX, NO CHARGE ITEM: Performed by: EMERGENCY MEDICINE

## 2024-08-20 PROCEDURE — 10002803 HB RX 637: Performed by: EMERGENCY MEDICINE

## 2024-08-20 PROCEDURE — G0378 HOSPITAL OBSERVATION PER HR: HCPCS

## 2024-08-20 PROCEDURE — 82962 GLUCOSE BLOOD TEST: CPT

## 2024-08-20 PROCEDURE — 84484 ASSAY OF TROPONIN QUANT: CPT | Performed by: EMERGENCY MEDICINE

## 2024-08-20 RX ORDER — ASPIRIN 81 MG/1
243 TABLET, CHEWABLE ORAL ONCE
Status: COMPLETED | OUTPATIENT
Start: 2024-08-20 | End: 2024-08-20

## 2024-08-20 RX ORDER — ONDANSETRON 4 MG/1
4 TABLET, ORALLY DISINTEGRATING ORAL EVERY 12 HOURS PRN
Status: DISCONTINUED | OUTPATIENT
Start: 2024-08-20 | End: 2024-08-22 | Stop reason: HOSPADM

## 2024-08-20 RX ORDER — NITROGLYCERIN 0.4 MG/1
0.4 TABLET SUBLINGUAL ONCE
Status: COMPLETED | OUTPATIENT
Start: 2024-08-20 | End: 2024-08-20

## 2024-08-20 RX ORDER — AMOXICILLIN 250 MG
2 CAPSULE ORAL DAILY PRN
Status: DISCONTINUED | OUTPATIENT
Start: 2024-08-20 | End: 2024-08-22 | Stop reason: HOSPADM

## 2024-08-20 RX ORDER — ATORVASTATIN CALCIUM 40 MG/1
80 TABLET, FILM COATED ORAL DAILY
Status: DISCONTINUED | OUTPATIENT
Start: 2024-08-21 | End: 2024-08-22 | Stop reason: HOSPADM

## 2024-08-20 RX ORDER — CALCIUM CARBONATE 500 MG/1
500 TABLET, CHEWABLE ORAL EVERY 4 HOURS PRN
Status: DISCONTINUED | OUTPATIENT
Start: 2024-08-20 | End: 2024-08-22 | Stop reason: HOSPADM

## 2024-08-20 RX ORDER — HYDRALAZINE HYDROCHLORIDE 20 MG/ML
10 INJECTION INTRAMUSCULAR; INTRAVENOUS EVERY 6 HOURS PRN
Status: DISCONTINUED | OUTPATIENT
Start: 2024-08-20 | End: 2024-08-22 | Stop reason: HOSPADM

## 2024-08-20 RX ORDER — ACETAMINOPHEN 650 MG/1
650 SUPPOSITORY RECTAL EVERY 4 HOURS PRN
Status: DISCONTINUED | OUTPATIENT
Start: 2024-08-20 | End: 2024-08-22 | Stop reason: HOSPADM

## 2024-08-20 RX ORDER — HEPARIN SODIUM 10000 [USP'U]/100ML
1-30 INJECTION, SOLUTION INTRAVENOUS CONTINUOUS
Status: DISCONTINUED | OUTPATIENT
Start: 2024-08-20 | End: 2024-08-22

## 2024-08-20 RX ORDER — ASPIRIN 81 MG/1
81 TABLET, CHEWABLE ORAL ONCE
Status: COMPLETED | OUTPATIENT
Start: 2024-08-20 | End: 2024-08-20

## 2024-08-20 RX ORDER — 0.9 % SODIUM CHLORIDE 0.9 %
2 VIAL (ML) INJECTION EVERY 12 HOURS SCHEDULED
Status: DISCONTINUED | OUTPATIENT
Start: 2024-08-20 | End: 2024-08-22

## 2024-08-20 RX ORDER — DEXTROSE MONOHYDRATE 25 G/50ML
25 INJECTION, SOLUTION INTRAVENOUS PRN
Status: DISCONTINUED | OUTPATIENT
Start: 2024-08-20 | End: 2024-08-22 | Stop reason: HOSPADM

## 2024-08-20 RX ORDER — ONDANSETRON 2 MG/ML
4 INJECTION INTRAMUSCULAR; INTRAVENOUS EVERY 12 HOURS PRN
Status: DISCONTINUED | OUTPATIENT
Start: 2024-08-20 | End: 2024-08-22 | Stop reason: HOSPADM

## 2024-08-20 RX ORDER — NICOTINE POLACRILEX 4 MG
30 LOZENGE BUCCAL PRN
Status: DISCONTINUED | OUTPATIENT
Start: 2024-08-20 | End: 2024-08-22 | Stop reason: HOSPADM

## 2024-08-20 RX ORDER — ACETAMINOPHEN 325 MG/1
650 TABLET ORAL EVERY 4 HOURS PRN
Status: DISCONTINUED | OUTPATIENT
Start: 2024-08-20 | End: 2024-08-22 | Stop reason: HOSPADM

## 2024-08-20 RX ORDER — LOSARTAN POTASSIUM 50 MG/1
50 TABLET ORAL 2 TIMES DAILY
Status: DISCONTINUED | OUTPATIENT
Start: 2024-08-20 | End: 2024-08-22 | Stop reason: HOSPADM

## 2024-08-20 RX ORDER — NICOTINE POLACRILEX 4 MG
15 LOZENGE BUCCAL PRN
Status: DISCONTINUED | OUTPATIENT
Start: 2024-08-20 | End: 2024-08-22 | Stop reason: HOSPADM

## 2024-08-20 RX ORDER — AMLODIPINE BESYLATE 10 MG/1
10 TABLET ORAL DAILY
Status: DISCONTINUED | OUTPATIENT
Start: 2024-08-20 | End: 2024-08-22 | Stop reason: HOSPADM

## 2024-08-20 RX ORDER — DEXTROSE MONOHYDRATE 25 G/50ML
12.5 INJECTION, SOLUTION INTRAVENOUS PRN
Status: DISCONTINUED | OUTPATIENT
Start: 2024-08-20 | End: 2024-08-22 | Stop reason: HOSPADM

## 2024-08-20 RX ORDER — ACETAMINOPHEN 325 MG/1
650 TABLET ORAL EVERY 4 HOURS PRN
Status: DISCONTINUED | OUTPATIENT
Start: 2024-08-20 | End: 2024-08-20 | Stop reason: SDUPTHER

## 2024-08-20 RX ORDER — ASPIRIN 81 MG/1
81 TABLET ORAL DAILY
Status: DISCONTINUED | OUTPATIENT
Start: 2024-08-21 | End: 2024-08-22 | Stop reason: HOSPADM

## 2024-08-20 RX ORDER — LANOLIN ALCOHOL/MO/W.PET/CERES
3 CREAM (GRAM) TOPICAL NIGHTLY PRN
Status: DISCONTINUED | OUTPATIENT
Start: 2024-08-20 | End: 2024-08-22 | Stop reason: HOSPADM

## 2024-08-20 RX ORDER — AMLODIPINE BESYLATE 10 MG/1
10 TABLET ORAL DAILY
Status: DISCONTINUED | OUTPATIENT
Start: 2024-08-20 | End: 2024-08-20

## 2024-08-20 RX ADMIN — LOSARTAN POTASSIUM 50 MG: 50 TABLET, FILM COATED ORAL at 21:44

## 2024-08-20 RX ADMIN — HEPARIN SODIUM 4000 UNITS: 1000 INJECTION INTRAVENOUS; SUBCUTANEOUS at 18:35

## 2024-08-20 RX ADMIN — ASPIRIN 81 MG 81 MG: 81 TABLET ORAL at 16:41

## 2024-08-20 RX ADMIN — HEPARIN SODIUM 12 UNITS/KG/HR: 10000 INJECTION, SOLUTION INTRAVENOUS at 18:36

## 2024-08-20 RX ADMIN — AMLODIPINE BESYLATE 10 MG: 10 TABLET ORAL at 18:37

## 2024-08-20 RX ADMIN — ASPIRIN 81 MG 243 MG: 81 TABLET ORAL at 18:37

## 2024-08-20 RX ADMIN — NITROGLYCERIN 0.4 MG: 0.4 TABLET SUBLINGUAL at 14:22

## 2024-08-20 RX ADMIN — SODIUM CHLORIDE, PRESERVATIVE FREE 2 ML: 5 INJECTION INTRAVENOUS at 18:13

## 2024-08-20 RX ADMIN — NITROGLYCERIN 0.4 MG: 0.4 TABLET SUBLINGUAL at 13:19

## 2024-08-20 SDOH — SOCIAL STABILITY: SOCIAL NETWORK
HOW OFTEN DO YOU SEE OR TALK TO PEOPLE THAT YOU CARE ABOUT AND FEEL CLOSE TO? (FOR EXAMPLE: TALKING TO FRIENDS ON THE PHONE, VISITING FRIENDS OR FAMILY, GOING TO CHURCH OR CLUB MEETINGS): 5 OR MORE TIMES A WEEK

## 2024-08-20 SDOH — ECONOMIC STABILITY: HOUSING INSECURITY: WHAT IS YOUR LIVING SITUATION TODAY?: ADULT CHILDREN;FAMILY MEMBERS

## 2024-08-20 SDOH — ECONOMIC STABILITY: FOOD INSECURITY: WITHIN THE PAST 12 MONTHS, THE FOOD YOU BOUGHT JUST DIDN'T LAST AND YOU DIDN'T HAVE MONEY TO GET MORE.: NEVER TRUE

## 2024-08-20 SDOH — SOCIAL STABILITY: SOCIAL INSECURITY: HOW OFTEN DOES ANYONE, INCLUDING FAMILY AND FRIENDS, SCREAM OR CURSE AT YOU?: NEVER

## 2024-08-20 SDOH — HEALTH STABILITY: PHYSICAL HEALTH: DO YOU HAVE DIFFICULTY DRESSING OR BATHING?: NO

## 2024-08-20 SDOH — ECONOMIC STABILITY: INCOME INSECURITY: IN THE PAST 12 MONTHS, HAS THE ELECTRIC, GAS, OIL, OR WATER COMPANY THREATENED TO SHUT OFF SERVICE IN YOUR HOME?: NO

## 2024-08-20 SDOH — ECONOMIC STABILITY: TRANSPORTATION INSECURITY
IN THE PAST 12 MONTHS, HAS LACK OF RELIABLE TRANSPORTATION KEPT YOU FROM MEDICAL APPOINTMENTS, MEETINGS, WORK OR FROM GETTING THINGS NEEDED FOR DAILY LIVING?: NO

## 2024-08-20 SDOH — ECONOMIC STABILITY: HOUSING INSECURITY: WHAT IS YOUR LIVING SITUATION TODAY?: HOUSE

## 2024-08-20 SDOH — SOCIAL STABILITY: SOCIAL NETWORK

## 2024-08-20 SDOH — SOCIAL STABILITY: SOCIAL INSECURITY: HOW OFTEN DOES ANYONE, INCLUDING FAMILY AND FRIENDS, INSULT OR TALK DOWN TO YOU?: NEVER

## 2024-08-20 SDOH — HEALTH STABILITY: GENERAL: BECAUSE OF A PHYSICAL, MENTAL, OR EMOTIONAL CONDITION, DO YOU HAVE DIFFICULTY DOING ERRANDS ALONE?: NO

## 2024-08-20 SDOH — ECONOMIC STABILITY: GENERAL: WOULD YOU LIKE HELP WITH ANY OF THE FOLLOWING NEEDS?: I DON'T WANT HELP WITH ANY OF THESE

## 2024-08-20 SDOH — HEALTH STABILITY: GENERAL
BECAUSE OF A PHYSICAL, MENTAL, OR EMOTIONAL CONDITION, DO YOU HAVE SERIOUS DIFFICULTY CONCENTRATING, REMEMBERING OR MAKING DECISIONS?: NO

## 2024-08-20 SDOH — SOCIAL STABILITY: SOCIAL INSECURITY: HOW OFTEN DOES ANYONE, INCLUDING FAMILY AND FRIENDS, PHYSICALLY HURT YOU?: NEVER

## 2024-08-20 SDOH — ECONOMIC STABILITY: HOUSING INSECURITY: WHAT IS YOUR LIVING SITUATION TODAY?: I HAVE A STEADY PLACE TO LIVE

## 2024-08-20 SDOH — ECONOMIC STABILITY: HOUSING INSECURITY: DO YOU HAVE PROBLEMS WITH ANY OF THE FOLLOWING?: NONE OF THE ABOVE

## 2024-08-20 SDOH — ECONOMIC STABILITY: GENERAL

## 2024-08-20 SDOH — SOCIAL STABILITY: SOCIAL INSECURITY: HOW OFTEN DOES ANYONE, INCLUDING FAMILY AND FRIENDS, THREATEN YOU WITH HARM?: NEVER

## 2024-08-20 SDOH — HEALTH STABILITY: PHYSICAL HEALTH: DO YOU HAVE SERIOUS DIFFICULTY WALKING OR CLIMBING STAIRS?: NO

## 2024-08-20 ASSESSMENT — ACTIVITIES OF DAILY LIVING (ADL)
ADL_BEFORE_ADMISSION: INDEPENDENT
ADL_SHORT_OF_BREATH: YES
ADL_SCORE: 12
RECENT_DECLINE_ADL: NO

## 2024-08-20 ASSESSMENT — COLUMBIA-SUICIDE SEVERITY RATING SCALE - C-SSRS
2. HAVE YOU ACTUALLY HAD ANY THOUGHTS OF KILLING YOURSELF?: NO
1. WITHIN THE PAST MONTH, HAVE YOU WISHED YOU WERE DEAD OR WISHED YOU COULD GO TO SLEEP AND NOT WAKE UP?: NO
IS THE PATIENT ABLE TO COMPLETE C-SSRS: YES

## 2024-08-20 ASSESSMENT — PATIENT HEALTH QUESTIONNAIRE - PHQ9
SUM OF ALL RESPONSES TO PHQ9 QUESTIONS 1 AND 2: 0
SUM OF ALL RESPONSES TO PHQ9 QUESTIONS 1 AND 2: 0
IS PATIENT ABLE TO COMPLETE PHQ2 OR PHQ9: YES
1. LITTLE INTEREST OR PLEASURE IN DOING THINGS: NOT AT ALL
CLINICAL INTERPRETATION OF PHQ2 SCORE: NO FURTHER SCREENING NEEDED
2. FEELING DOWN, DEPRESSED OR HOPELESS: NOT AT ALL

## 2024-08-20 ASSESSMENT — PAIN SCALES - GENERAL
PAINLEVEL_OUTOF10: 4
PAINLEVEL_OUTOF10: 0

## 2024-08-20 ASSESSMENT — LIFESTYLE VARIABLES
ALCOHOL_USE_STATUS: NO OR LOW RISK WITH VALIDATED TOOL
HOW OFTEN DO YOU HAVE 6 OR MORE DRINKS ON ONE OCCASION: NEVER
AUDIT-C TOTAL SCORE: 0
HOW MANY STANDARD DRINKS CONTAINING ALCOHOL DO YOU HAVE ON A TYPICAL DAY: 0,1 OR 2
HOW OFTEN DO YOU HAVE A DRINK CONTAINING ALCOHOL: NEVER

## 2024-08-20 ASSESSMENT — ORIENTATION MEMORY CONCENTRATION TEST (OMCT)
SAY THE MONTHS IN REVERSE ORDER STARTING WITH LAST MONTH: 1 ERROR
WHAT TIME IS IT (NO WATCH OR CLOCK): CORRECT
OMCT INTERPRETATION: 7-10: MILD COGNITIVE IMPAIRMENT
REPEAT THE NAME AND ADDRESS I ASKED YOU TO REMEMBER: 1 ERROR
WHAT MONTH IS IT NOW: CORRECT
COUNT BACKWARDS FROM 20 TO 1: 2 OR MORE ERRORS
OMCT SCORE: 8
WHAT YEAR IS IT NOW (MUST BE EXACT): CORRECT

## 2024-08-20 ASSESSMENT — PAIN DESCRIPTION - PAIN TYPE
TYPE: CHEST PAIN

## 2024-08-21 ENCOUNTER — APPOINTMENT (OUTPATIENT)
Dept: NUCLEAR MEDICINE | Age: 74
DRG: 281 | End: 2024-08-21
Attending: PHYSICIAN ASSISTANT

## 2024-08-21 ENCOUNTER — APPOINTMENT (OUTPATIENT)
Dept: CV DIAGNOSTICS | Age: 74
DRG: 281 | End: 2024-08-21
Attending: INTERNAL MEDICINE

## 2024-08-21 ENCOUNTER — APPOINTMENT (OUTPATIENT)
Dept: CV DIAGNOSTICS | Age: 74
DRG: 281 | End: 2024-08-21
Attending: PHYSICIAN ASSISTANT

## 2024-08-21 LAB
ALBUMIN SERPL-MCNC: 3.4 G/DL (ref 3.6–5.1)
ALBUMIN/GLOB SERPL: 0.9 {RATIO} (ref 1–2.4)
ALP SERPL-CCNC: 126 UNITS/L (ref 45–117)
ALT SERPL-CCNC: 17 UNITS/L
ANION GAP SERPL CALC-SCNC: 13 MMOL/L (ref 7–19)
AORTIC VALVE AREA: 2.59 CM²
APTT PPP: 110 SEC (ref 22–32)
APTT PPP: 29 SEC (ref 22–32)
APTT PPP: 48 SEC (ref 22–32)
APTT PPP: 89 SEC (ref 22–32)
AST SERPL-CCNC: 15 UNITS/L
AV MEAN GRADIENT (AVMG): 5.23 MMHG
AV PEAK GRADIENT (AVPG): 7.81 MMHG
AV PEAK VELOCITY (AVPV): 1.3 M/S
AVI LVOT PEAK GRADIENT (LVOTMG): 5.78 MMHG
BASOPHILS # BLD: 0 K/MCL (ref 0–0.3)
BASOPHILS NFR BLD: 1 %
BILIRUB SERPL-MCNC: 0.7 MG/DL (ref 0.2–1)
BUN SERPL-MCNC: 14 MG/DL (ref 6–20)
BUN/CREAT SERPL: 16 (ref 7–25)
CALCIUM SERPL-MCNC: 9.8 MG/DL (ref 8.4–10.2)
CHLORIDE SERPL-SCNC: 103 MMOL/L (ref 97–110)
CO2 SERPL-SCNC: 26 MMOL/L (ref 21–32)
CREAT SERPL-MCNC: 0.87 MG/DL (ref 0.51–0.95)
DEPRECATED RDW RBC: 39.6 FL (ref 39–50)
DOP CALC LVOT PEAK VEL (LVOTPV): 1.5 M/S
E WAVE DECELARATION TIME (MDT): 0.34 S
EGFRCR SERPLBLD CKD-EPI 2021: 69 ML/MIN/{1.73_M2}
EOSINOPHIL # BLD: 0.1 K/MCL (ref 0–0.5)
EOSINOPHIL NFR BLD: 1 %
ERYTHROCYTE [DISTWIDTH] IN BLOOD: 12.3 % (ref 11–15)
EST RIGHT VENT SYSTOLIC PRESSURE BY TRICUSPID REGURGITATION JET (RVSP): 9.87 MMHG
FASTING DURATION TIME PATIENT: ABNORMAL H
GLOBULIN SER-MCNC: 3.6 G/DL (ref 2–4)
GLUCOSE BLDC GLUCOMTR-MCNC: 135 MG/DL (ref 70–99)
GLUCOSE BLDC GLUCOMTR-MCNC: 135 MG/DL (ref 70–99)
GLUCOSE BLDC GLUCOMTR-MCNC: 144 MG/DL (ref 70–99)
GLUCOSE BLDC GLUCOMTR-MCNC: 260 MG/DL (ref 70–99)
GLUCOSE SERPL-MCNC: 217 MG/DL (ref 70–99)
HCT VFR BLD CALC: 37.6 % (ref 36–46.5)
HGB BLD-MCNC: 12.5 G/DL (ref 12–15.5)
IMM GRANULOCYTES # BLD AUTO: 0 K/MCL (ref 0–0.2)
IMM GRANULOCYTES # BLD: 0 %
INTERVENTRICULAR SEPTUM IN END DIASTOLE (IVSD): 0.91 CM
LEFT INTERNAL DIMENSION IN SYSTOLE (LVSD): 1.99 CM
LEFT VENTRICULAR INTERNAL DIMENSION IN DIASTOLE (LVDD): 4.21 CM
LEFT VENTRICULAR POSTERIOR WALL IN END DIASTOLE (LVPW): 0.73 CM
LV EF: 68 %
LV END SYSTOLIC LONGITUDINAL STRAIN GLOBAL (LVGS): -11.6 %
LVOT 2D (LVOTD): 1.76 CM
LVOT VTI (LVOTVTI): 31.28 CM
LYMPHOCYTES # BLD: 1.6 K/MCL (ref 1–4)
LYMPHOCYTES NFR BLD: 29 %
MCH RBC QN AUTO: 28.9 PG (ref 26–34)
MCHC RBC AUTO-ENTMCNC: 33.2 G/DL (ref 32–36.5)
MCV RBC AUTO: 87 FL (ref 78–100)
MONOCYTES # BLD: 0.4 K/MCL (ref 0.3–0.9)
MONOCYTES NFR BLD: 7 %
MV E TISSUE VEL LAT (MELV): 0 M/S
MV E TISSUE VEL MED (MESV): 0 M/S
MV E WAVE VEL/E TISSUE VEL MED(MSR): 18.83 UNITLESS
MV PEAK A VELOCITY (MVPAV): 1.1 M/S
MV PEAK E VELOCITY (MVPEV): 0.5 M/S
NEUTROPHILS # BLD: 3.4 K/MCL (ref 1.8–7.7)
NEUTROPHILS NFR BLD: 62 %
NRBC BLD MANUAL-RTO: 0 /100 WBC
PLATELET # BLD AUTO: NORMAL 10*3/UL
POTASSIUM SERPL-SCNC: 4 MMOL/L (ref 3.4–5.1)
PROT SERPL-MCNC: 7 G/DL (ref 6.4–8.2)
RAINBOW EXTRA TUBES HOLD SPECIMEN: NORMAL
RAINBOW EXTRA TUBES HOLD SPECIMEN: NORMAL
RBC # BLD: 4.32 MIL/MCL (ref 4–5.2)
RIGHT VENTRICULAR AREA CHANGE (APICAL 4-CHAMBER VIEW) (RVFAC): 37 %
SODIUM SERPL-SCNC: 138 MMOL/L (ref 135–145)
TRICUSPID ANNULAR PLANE SYSTOLIC EXCURSION (TAPSE): 2 CM
TRICUSPID VALVE PEAK REGURGITATION VELOCITY (TRPV): 1.3 M/S
TV ESTIMATED RIGHT ARTERIAL PRESSURE (RAP): 3 MMHG
WBC # BLD: 5.5 K/MCL (ref 4.2–11)

## 2024-08-21 PROCEDURE — 82962 GLUCOSE BLOOD TEST: CPT

## 2024-08-21 PROCEDURE — 93356 MYOCRD STRAIN IMG SPCKL TRCK: CPT | Performed by: INTERNAL MEDICINE

## 2024-08-21 PROCEDURE — 10002803 HB RX 637

## 2024-08-21 PROCEDURE — A9502 TC99M TETROFOSMIN: HCPCS | Performed by: PHYSICIAN ASSISTANT

## 2024-08-21 PROCEDURE — 10004651 HB RX, NO CHARGE ITEM: Performed by: EMERGENCY MEDICINE

## 2024-08-21 PROCEDURE — 85730 THROMBOPLASTIN TIME PARTIAL: CPT | Performed by: FAMILY MEDICINE

## 2024-08-21 PROCEDURE — 99233 SBSQ HOSP IP/OBS HIGH 50: CPT | Performed by: FAMILY MEDICINE

## 2024-08-21 PROCEDURE — 99223 1ST HOSP IP/OBS HIGH 75: CPT | Performed by: INTERNAL MEDICINE

## 2024-08-21 PROCEDURE — 85730 THROMBOPLASTIN TIME PARTIAL: CPT | Performed by: INTERNAL MEDICINE

## 2024-08-21 PROCEDURE — 93306 TTE W/DOPPLER COMPLETE: CPT | Performed by: INTERNAL MEDICINE

## 2024-08-21 PROCEDURE — 78452 HT MUSCLE IMAGE SPECT MULT: CPT | Performed by: INTERNAL MEDICINE

## 2024-08-21 PROCEDURE — 85025 COMPLETE CBC W/AUTO DIFF WBC: CPT | Performed by: INTERNAL MEDICINE

## 2024-08-21 PROCEDURE — 93016 CV STRESS TEST SUPVJ ONLY: CPT | Performed by: INTERNAL MEDICINE

## 2024-08-21 PROCEDURE — 80053 COMPREHEN METABOLIC PANEL: CPT | Performed by: INTERNAL MEDICINE

## 2024-08-21 PROCEDURE — 36415 COLL VENOUS BLD VENIPUNCTURE: CPT | Performed by: INTERNAL MEDICINE

## 2024-08-21 PROCEDURE — 96372 THER/PROPH/DIAG INJ SC/IM: CPT | Performed by: INTERNAL MEDICINE

## 2024-08-21 PROCEDURE — 10002800 HB RX 250 W HCPCS: Performed by: PHYSICIAN ASSISTANT

## 2024-08-21 PROCEDURE — G0378 HOSPITAL OBSERVATION PER HR: HCPCS

## 2024-08-21 PROCEDURE — 10000002 HB ROOM CHARGE MED SURG

## 2024-08-21 PROCEDURE — 10002803 HB RX 637: Performed by: PHYSICIAN ASSISTANT

## 2024-08-21 PROCEDURE — 10004651 HB RX, NO CHARGE ITEM: Performed by: INTERNAL MEDICINE

## 2024-08-21 PROCEDURE — 93018 CV STRESS TEST I&R ONLY: CPT | Performed by: INTERNAL MEDICINE

## 2024-08-21 PROCEDURE — 10002803 HB RX 637: Performed by: INTERNAL MEDICINE

## 2024-08-21 PROCEDURE — 93017 CV STRESS TEST TRACING ONLY: CPT

## 2024-08-21 PROCEDURE — 10006150 HB RX 343: Performed by: PHYSICIAN ASSISTANT

## 2024-08-21 PROCEDURE — 10002800 HB RX 250 W HCPCS: Performed by: INTERNAL MEDICINE

## 2024-08-21 PROCEDURE — 93306 TTE W/DOPPLER COMPLETE: CPT

## 2024-08-21 RX ORDER — LANCETS 28 GAUGE
EACH MISCELLANEOUS
Qty: 100 EACH | Refills: 0 | Status: SHIPPED | OUTPATIENT
Start: 2024-08-21 | End: 2024-08-21

## 2024-08-21 RX ORDER — NITROGLYCERIN 400 UG/1
0.4 SPRAY ORAL EVERY 5 MIN PRN
Status: DISCONTINUED | OUTPATIENT
Start: 2024-08-21 | End: 2024-08-22 | Stop reason: HOSPADM

## 2024-08-21 RX ORDER — LANCING DEVICE
EACH MISCELLANEOUS
Qty: 100 EACH | Refills: 1 | Status: CANCELLED | OUTPATIENT
Start: 2024-08-21

## 2024-08-21 RX ORDER — LANCETS 28 GAUGE
EACH MISCELLANEOUS
Qty: 100 EACH | Refills: 1 | Status: SHIPPED | OUTPATIENT
Start: 2024-08-21

## 2024-08-21 RX ORDER — REGADENOSON 0.08 MG/ML
0.4 INJECTION, SOLUTION INTRAVENOUS ONCE
Status: COMPLETED | OUTPATIENT
Start: 2024-08-21 | End: 2024-08-21

## 2024-08-21 RX ORDER — LANCING DEVICE
EACH MISCELLANEOUS
Qty: 1 KIT | Refills: 0 | Status: CANCELLED | OUTPATIENT
Start: 2024-08-21

## 2024-08-21 RX ADMIN — NITROGLYCERIN 0.4 MG: 400 SPRAY ORAL at 13:53

## 2024-08-21 RX ADMIN — INSULIN LISPRO 6 UNITS: 100 INJECTION, SOLUTION INTRAVENOUS; SUBCUTANEOUS at 21:44

## 2024-08-21 RX ADMIN — TETROFOSMIN 32 MILLICURIE: 1.38 INJECTION, POWDER, LYOPHILIZED, FOR SOLUTION INTRAVENOUS at 14:02

## 2024-08-21 RX ADMIN — LOSARTAN POTASSIUM 50 MG: 50 TABLET, FILM COATED ORAL at 21:44

## 2024-08-21 RX ADMIN — TETROFOSMIN 10.7 MILLICURIE: 1.38 INJECTION, POWDER, LYOPHILIZED, FOR SOLUTION INTRAVENOUS at 12:12

## 2024-08-21 RX ADMIN — LOSARTAN POTASSIUM 50 MG: 50 TABLET, FILM COATED ORAL at 10:50

## 2024-08-21 RX ADMIN — AMLODIPINE BESYLATE 10 MG: 10 TABLET ORAL at 10:50

## 2024-08-21 RX ADMIN — HEPARIN SODIUM 4000 UNITS: 1000 INJECTION INTRAVENOUS; SUBCUTANEOUS at 16:22

## 2024-08-21 RX ADMIN — REGADENOSON 0.4 MG: 0.08 INJECTION, SOLUTION INTRAVENOUS at 14:02

## 2024-08-21 RX ADMIN — NITROGLYCERIN 0.5 INCH: 20 OINTMENT TOPICAL at 23:42

## 2024-08-21 RX ADMIN — ASPIRIN 81 MG: 81 TABLET, COATED ORAL at 10:51

## 2024-08-21 RX ADMIN — NITROGLYCERIN 0.5 INCH: 20 OINTMENT TOPICAL at 18:10

## 2024-08-21 RX ADMIN — SODIUM CHLORIDE, PRESERVATIVE FREE 2 ML: 5 INJECTION INTRAVENOUS at 21:44

## 2024-08-21 RX ADMIN — ATORVASTATIN CALCIUM 80 MG: 40 TABLET, FILM COATED ORAL at 10:51

## 2024-08-21 SDOH — ECONOMIC STABILITY: HOUSING INSECURITY: DO YOU HAVE PROBLEMS WITH ANY OF THE FOLLOWING?: NONE OF THE ABOVE

## 2024-08-21 SDOH — ECONOMIC STABILITY: HOUSING INSECURITY: WHAT IS YOUR LIVING SITUATION TODAY?: I HAVE A STEADY PLACE TO LIVE

## 2024-08-21 ASSESSMENT — PAIN SCALES - GENERAL
PAINLEVEL_OUTOF10: 0
PAINLEVEL_OUTOF10: 5

## 2024-08-21 ASSESSMENT — COGNITIVE AND FUNCTIONAL STATUS - GENERAL
DO YOU HAVE DIFFICULTY DRESSING OR BATHING: NO
DO YOU HAVE SERIOUS DIFFICULTY WALKING OR CLIMBING STAIRS: NO

## 2024-08-22 ENCOUNTER — OFFICE VISIT (OUTPATIENT)
Dept: FAMILY MEDICINE | Facility: CLINIC | Age: 74
End: 2024-08-22
Payer: COMMERCIAL

## 2024-08-22 ENCOUNTER — HOSPITAL ENCOUNTER (INPATIENT)
Age: 74
DRG: 235 | End: 2024-08-22
Attending: HOSPITALIST | Admitting: INTERNAL MEDICINE

## 2024-08-22 ENCOUNTER — APPOINTMENT (OUTPATIENT)
Dept: ULTRASOUND IMAGING | Age: 74
DRG: 235 | End: 2024-08-22
Attending: NURSE PRACTITIONER

## 2024-08-22 VITALS
DIASTOLIC BLOOD PRESSURE: 92 MMHG | TEMPERATURE: 98 F | HEART RATE: 89 BPM | SYSTOLIC BLOOD PRESSURE: 157 MMHG | OXYGEN SATURATION: 96 % | RESPIRATION RATE: 18 BRPM

## 2024-08-22 VITALS
HEIGHT: 59 IN | BODY MASS INDEX: 31.51 KG/M2 | RESPIRATION RATE: 20 BRPM | TEMPERATURE: 97.8 F | HEART RATE: 61 BPM | OXYGEN SATURATION: 94 % | DIASTOLIC BLOOD PRESSURE: 68 MMHG | SYSTOLIC BLOOD PRESSURE: 128 MMHG | WEIGHT: 156.31 LBS

## 2024-08-22 DIAGNOSIS — Z51.89 ENCOUNTER FOR THERAPEUTIC PROCEDURE: ICD-10-CM

## 2024-08-22 DIAGNOSIS — E11.42 TYPE 2 DIABETES MELLITUS WITH DIABETIC POLYNEUROPATHY, WITHOUT LONG-TERM CURRENT USE OF INSULIN  (CMD): ICD-10-CM

## 2024-08-22 DIAGNOSIS — T30.0 BURN: Primary | ICD-10-CM

## 2024-08-22 DIAGNOSIS — Z95.1 S/P CABG X 3: Primary | ICD-10-CM

## 2024-08-22 DIAGNOSIS — R21 RASH AND NONSPECIFIC SKIN ERUPTION: ICD-10-CM

## 2024-08-22 DIAGNOSIS — Z98.890 S/P LEFT ATRIAL APPENDAGE LIGATION: ICD-10-CM

## 2024-08-22 DIAGNOSIS — I25.10 CORONARY ARTERY DISEASE INVOLVING NATIVE CORONARY ARTERY OF NATIVE HEART WITHOUT ANGINA PECTORIS: ICD-10-CM

## 2024-08-22 PROBLEM — R07.9 CHEST PAIN: Status: ACTIVE | Noted: 2024-08-22

## 2024-08-22 LAB
ALBUMIN SERPL-MCNC: 3.3 G/DL (ref 3.6–5.1)
ALBUMIN/GLOB SERPL: 0.9 {RATIO} (ref 1–2.4)
ALP SERPL-CCNC: 122 UNITS/L (ref 45–117)
ALT SERPL-CCNC: 18 UNITS/L
ANION GAP SERPL CALC-SCNC: 15 MMOL/L (ref 7–19)
APTT PPP: 29 SEC (ref 22–32)
APTT PPP: 46 SEC (ref 22–32)
AST SERPL-CCNC: 17 UNITS/L
BASOPHILS # BLD AUTO: 0.1 10E3/UL (ref 0–0.2)
BASOPHILS # BLD: 0 K/MCL (ref 0–0.3)
BASOPHILS NFR BLD AUTO: 1 %
BASOPHILS NFR BLD: 0 %
BILIRUB SERPL-MCNC: 0.6 MG/DL (ref 0.2–1)
BLOOD EXPIRATION DATE: NORMAL
BLOOD EXPIRATION DATE: NORMAL
BUN SERPL-MCNC: 14 MG/DL (ref 6–20)
BUN/CREAT SERPL: 16 (ref 7–25)
CALCIUM SERPL-MCNC: 9.9 MG/DL (ref 8.4–10.2)
CHLORIDE SERPL-SCNC: 105 MMOL/L (ref 97–110)
CO2 SERPL-SCNC: 24 MMOL/L (ref 21–32)
CREAT SERPL-MCNC: 0.89 MG/DL (ref 0.51–0.95)
CROSSMATCH RESULT: NORMAL
CROSSMATCH RESULT: NORMAL
DEPRECATED RDW RBC: 39.7 FL (ref 39–50)
DISPENSE STATUS: NORMAL
DISPENSE STATUS: NORMAL
EGFRCR SERPLBLD CKD-EPI 2021: 67 ML/MIN/{1.73_M2}
EOSINOPHIL # BLD AUTO: 0.3 10E3/UL (ref 0–0.7)
EOSINOPHIL # BLD: 0.1 K/MCL (ref 0–0.5)
EOSINOPHIL NFR BLD AUTO: 5 %
EOSINOPHIL NFR BLD: 1 %
ERYTHROCYTE [DISTWIDTH] IN BLOOD BY AUTOMATED COUNT: 12.1 % (ref 10–15)
ERYTHROCYTE [DISTWIDTH] IN BLOOD: 12.6 % (ref 11–15)
ERYTHROCYTE [SEDIMENTATION RATE] IN BLOOD BY WESTERGREN METHOD: 12 MM/HR (ref 0–30)
FASTING DURATION TIME PATIENT: ABNORMAL H
GLOBULIN SER-MCNC: 3.7 G/DL (ref 2–4)
GLUCOSE BLDC GLUCOMTR-MCNC: 133 MG/DL (ref 70–99)
GLUCOSE BLDC GLUCOMTR-MCNC: 134 MG/DL (ref 70–99)
GLUCOSE BLDC GLUCOMTR-MCNC: 194 MG/DL (ref 70–99)
GLUCOSE BLDC GLUCOMTR-MCNC: 256 MG/DL (ref 70–99)
GLUCOSE SERPL-MCNC: 136 MG/DL (ref 70–99)
HCT VFR BLD AUTO: 35.6 % (ref 35–47)
HCT VFR BLD CALC: 38.7 % (ref 36–46.5)
HGB BLD-MCNC: 11.9 G/DL (ref 11.7–15.7)
HGB BLD-MCNC: 13.2 G/DL (ref 12–15.5)
IMM GRANULOCYTES # BLD AUTO: 0 K/MCL (ref 0–0.2)
IMM GRANULOCYTES # BLD: 0 %
IMM GRANULOCYTES # BLD: 0 10E3/UL
IMM GRANULOCYTES NFR BLD: 0 %
ISBT BLOOD TYPE: 7300
ISBT BLOOD TYPE: 7300
ISSUE DATE/TIME: NORMAL
ISSUE DATE/TIME: NORMAL
LYMPHOCYTES # BLD AUTO: 1.2 10E3/UL (ref 0.8–5.3)
LYMPHOCYTES # BLD: 2.5 K/MCL (ref 1–4)
LYMPHOCYTES NFR BLD AUTO: 20 %
LYMPHOCYTES NFR BLD: 38 %
MCH RBC QN AUTO: 29.4 PG (ref 26.5–33)
MCH RBC QN AUTO: 29.7 PG (ref 26–34)
MCHC RBC AUTO-ENTMCNC: 33.4 G/DL (ref 31.5–36.5)
MCHC RBC AUTO-ENTMCNC: 34.1 G/DL (ref 32–36.5)
MCV RBC AUTO: 87 FL (ref 78–100)
MCV RBC AUTO: 88 FL (ref 78–100)
MONOCYTES # BLD AUTO: 0.5 10E3/UL (ref 0–1.3)
MONOCYTES # BLD: 0.5 K/MCL (ref 0.3–0.9)
MONOCYTES NFR BLD AUTO: 8 %
MONOCYTES NFR BLD: 7 %
NEUTROPHILS # BLD AUTO: 4 10E3/UL (ref 1.6–8.3)
NEUTROPHILS # BLD: 3.6 K/MCL (ref 1.8–7.7)
NEUTROPHILS NFR BLD AUTO: 66 %
NEUTROPHILS NFR BLD: 54 %
NRBC BLD MANUAL-RTO: 0 /100 WBC
PLATELET # BLD AUTO: 264 10E3/UL (ref 150–450)
PLATELET # BLD AUTO: NORMAL 10*3/UL
POTASSIUM SERPL-SCNC: 3.6 MMOL/L (ref 3.4–5.1)
PRODUCT CODE: NORMAL
PRODUCT CODE: NORMAL
PRODUCT DESCRIPTION: NORMAL
PRODUCT DESCRIPTION: NORMAL
PRODUCT ID: NORMAL
PRODUCT ID: NORMAL
PROT SERPL-MCNC: 7 G/DL (ref 6.4–8.2)
RBC # BLD AUTO: 4.05 10E6/UL (ref 3.8–5.2)
RBC # BLD: 4.45 MIL/MCL (ref 4–5.2)
SODIUM SERPL-SCNC: 140 MMOL/L (ref 135–145)
UNIT BLOOD TYPE: NORMAL
UNIT BLOOD TYPE: NORMAL
UNIT NUMBER: NORMAL
UNIT NUMBER: NORMAL
WBC # BLD AUTO: 6.1 10E3/UL (ref 4–11)
WBC # BLD: 6.7 K/MCL (ref 4.2–11)

## 2024-08-22 PROCEDURE — 10004651 HB RX, NO CHARGE ITEM: Performed by: EMERGENCY MEDICINE

## 2024-08-22 PROCEDURE — 10002803 HB RX 637: Performed by: FAMILY MEDICINE

## 2024-08-22 PROCEDURE — 10002805 HB CONTRAST AGENT: Performed by: INTERNAL MEDICINE

## 2024-08-22 PROCEDURE — 87186 SC STD MICRODIL/AGAR DIL: CPT | Mod: 59 | Performed by: FAMILY MEDICINE

## 2024-08-22 PROCEDURE — 85730 THROMBOPLASTIN TIME PARTIAL: CPT | Performed by: FAMILY MEDICINE

## 2024-08-22 PROCEDURE — 10004651 HB RX, NO CHARGE ITEM: Performed by: FAMILY MEDICINE

## 2024-08-22 PROCEDURE — 99214 OFFICE O/P EST MOD 30 MIN: CPT | Performed by: FAMILY MEDICINE

## 2024-08-22 PROCEDURE — 10002807 HB RX 258: Performed by: INTERNAL MEDICINE

## 2024-08-22 PROCEDURE — 80053 COMPREHEN METABOLIC PANEL: CPT | Performed by: INTERNAL MEDICINE

## 2024-08-22 PROCEDURE — 99232 SBSQ HOSP IP/OBS MODERATE 35: CPT | Performed by: INTERNAL MEDICINE

## 2024-08-22 PROCEDURE — 87070 CULTURE OTHR SPECIMN AEROBIC: CPT | Performed by: FAMILY MEDICINE

## 2024-08-22 PROCEDURE — 85730 THROMBOPLASTIN TIME PARTIAL: CPT | Performed by: INTERNAL MEDICINE

## 2024-08-22 PROCEDURE — 10004370 HB CARDIAC CATH: Performed by: INTERNAL MEDICINE

## 2024-08-22 PROCEDURE — 87077 CULTURE AEROBIC IDENTIFY: CPT | Performed by: FAMILY MEDICINE

## 2024-08-22 PROCEDURE — 93458 L HRT ARTERY/VENTRICLE ANGIO: CPT | Performed by: INTERNAL MEDICINE

## 2024-08-22 PROCEDURE — 85652 RBC SED RATE AUTOMATED: CPT | Performed by: FAMILY MEDICINE

## 2024-08-22 PROCEDURE — 10002801 HB RX 250 W/O HCPCS: Performed by: INTERNAL MEDICINE

## 2024-08-22 PROCEDURE — 36415 COLL VENOUS BLD VENIPUNCTURE: CPT | Performed by: FAMILY MEDICINE

## 2024-08-22 PROCEDURE — 93880 EXTRACRANIAL BILAT STUDY: CPT | Performed by: RADIOLOGY

## 2024-08-22 PROCEDURE — 10002800 HB RX 250 W HCPCS: Performed by: INTERNAL MEDICINE

## 2024-08-22 PROCEDURE — 87641 MR-STAPH DNA AMP PROBE: CPT | Performed by: NURSE PRACTITIONER

## 2024-08-22 PROCEDURE — 10002807 HB RX 258: Performed by: FAMILY MEDICINE

## 2024-08-22 PROCEDURE — 87147 CULTURE TYPE IMMUNOLOGIC: CPT | Performed by: FAMILY MEDICINE

## 2024-08-22 PROCEDURE — 10004651 HB RX, NO CHARGE ITEM: Performed by: INTERNAL MEDICINE

## 2024-08-22 PROCEDURE — 85025 COMPLETE CBC W/AUTO DIFF WBC: CPT | Performed by: INTERNAL MEDICINE

## 2024-08-22 PROCEDURE — C1769 GUIDE WIRE: HCPCS | Performed by: INTERNAL MEDICINE

## 2024-08-22 PROCEDURE — 10006023 HB SUPPLY 272: Performed by: INTERNAL MEDICINE

## 2024-08-22 PROCEDURE — 82962 GLUCOSE BLOOD TEST: CPT

## 2024-08-22 PROCEDURE — 96372 THER/PROPH/DIAG INJ SC/IM: CPT | Performed by: FAMILY MEDICINE

## 2024-08-22 PROCEDURE — 10002807 HB RX 258: Performed by: PHYSICIAN ASSISTANT

## 2024-08-22 PROCEDURE — 93880 EXTRACRANIAL BILAT STUDY: CPT

## 2024-08-22 PROCEDURE — 10004351 HB COUNTER-CATH LAB CASE: Performed by: INTERNAL MEDICINE

## 2024-08-22 PROCEDURE — 10000002 HB ROOM CHARGE MED SURG

## 2024-08-22 PROCEDURE — 10002803 HB RX 637: Performed by: INTERNAL MEDICINE

## 2024-08-22 PROCEDURE — 85025 COMPLETE CBC W/AUTO DIFF WBC: CPT | Performed by: FAMILY MEDICINE

## 2024-08-22 PROCEDURE — B2111ZZ FLUOROSCOPY OF MULTIPLE CORONARY ARTERIES USING LOW OSMOLAR CONTRAST: ICD-10-PCS | Performed by: INTERNAL MEDICINE

## 2024-08-22 PROCEDURE — B2151ZZ FLUOROSCOPY OF LEFT HEART USING LOW OSMOLAR CONTRAST: ICD-10-PCS | Performed by: INTERNAL MEDICINE

## 2024-08-22 PROCEDURE — 99239 HOSP IP/OBS DSCHRG MGMT >30: CPT | Performed by: FAMILY MEDICINE

## 2024-08-22 PROCEDURE — 10002800 HB RX 250 W HCPCS: Performed by: FAMILY MEDICINE

## 2024-08-22 PROCEDURE — 10002803 HB RX 637: Performed by: PHYSICIAN ASSISTANT

## 2024-08-22 PROCEDURE — 99152 MOD SED SAME PHYS/QHP 5/>YRS: CPT | Performed by: INTERNAL MEDICINE

## 2024-08-22 PROCEDURE — C1894 INTRO/SHEATH, NON-LASER: HCPCS | Performed by: INTERNAL MEDICINE

## 2024-08-22 PROCEDURE — 36415 COLL VENOUS BLD VENIPUNCTURE: CPT | Performed by: INTERNAL MEDICINE

## 2024-08-22 RX ORDER — NITROGLYCERIN 400 UG/1
0.4 SPRAY ORAL EVERY 5 MIN PRN
Status: CANCELLED | OUTPATIENT
Start: 2024-08-22

## 2024-08-22 RX ORDER — 0.9 % SODIUM CHLORIDE 0.9 %
2 VIAL (ML) INJECTION EVERY 12 HOURS SCHEDULED
Status: DISCONTINUED | OUTPATIENT
Start: 2024-08-22 | End: 2024-08-26

## 2024-08-22 RX ORDER — ASPIRIN 81 MG/1
81 TABLET ORAL DAILY
Status: CANCELLED | OUTPATIENT
Start: 2024-08-23

## 2024-08-22 RX ORDER — CALCIUM CARBONATE 500 MG/1
500 TABLET, CHEWABLE ORAL EVERY 4 HOURS PRN
Status: DISCONTINUED | OUTPATIENT
Start: 2024-08-22 | End: 2024-08-26

## 2024-08-22 RX ORDER — ATORVASTATIN CALCIUM 80 MG/1
80 TABLET, FILM COATED ORAL DAILY
Status: DISCONTINUED | OUTPATIENT
Start: 2024-08-23 | End: 2024-08-31 | Stop reason: HOSPADM

## 2024-08-22 RX ORDER — HYDRALAZINE HYDROCHLORIDE 20 MG/ML
10 INJECTION INTRAMUSCULAR; INTRAVENOUS EVERY 4 HOURS PRN
Status: ACTIVE | OUTPATIENT
Start: 2024-08-22 | End: 2024-08-23

## 2024-08-22 RX ORDER — NICOTINE POLACRILEX 4 MG
15 LOZENGE BUCCAL PRN
Status: DISCONTINUED | OUTPATIENT
Start: 2024-08-22 | End: 2024-08-26

## 2024-08-22 RX ORDER — CALCIUM CARBONATE 500 MG/1
500 TABLET, CHEWABLE ORAL EVERY 4 HOURS PRN
Status: CANCELLED | OUTPATIENT
Start: 2024-08-22

## 2024-08-22 RX ORDER — AMOXICILLIN 250 MG
2 CAPSULE ORAL DAILY PRN
Status: CANCELLED | OUTPATIENT
Start: 2024-08-22

## 2024-08-22 RX ORDER — CLONIDINE HYDROCHLORIDE 0.1 MG/1
0.1 TABLET ORAL EVERY 4 HOURS PRN
Status: DISCONTINUED | OUTPATIENT
Start: 2024-08-22 | End: 2024-08-22 | Stop reason: HOSPADM

## 2024-08-22 RX ORDER — NICOTINE POLACRILEX 4 MG
30 LOZENGE BUCCAL PRN
Status: CANCELLED | OUTPATIENT
Start: 2024-08-22

## 2024-08-22 RX ORDER — MIDAZOLAM HYDROCHLORIDE 1 MG/ML
1 INJECTION, SOLUTION INTRAMUSCULAR; INTRAVENOUS PRN
Status: CANCELLED | OUTPATIENT
Start: 2024-08-22 | End: 2024-08-23

## 2024-08-22 RX ORDER — HEPARIN SODIUM 10000 [USP'U]/100ML
1-30 INJECTION, SOLUTION INTRAVENOUS CONTINUOUS
Status: CANCELLED | OUTPATIENT
Start: 2024-08-22

## 2024-08-22 RX ORDER — AMLODIPINE BESYLATE 10 MG/1
10 TABLET ORAL DAILY
Status: DISCONTINUED | OUTPATIENT
Start: 2024-08-23 | End: 2024-08-26

## 2024-08-22 RX ORDER — NICOTINE POLACRILEX 4 MG
30 LOZENGE BUCCAL PRN
Status: DISCONTINUED | OUTPATIENT
Start: 2024-08-22 | End: 2024-08-26

## 2024-08-22 RX ORDER — HYDRALAZINE HYDROCHLORIDE 20 MG/ML
10 INJECTION INTRAMUSCULAR; INTRAVENOUS EVERY 6 HOURS PRN
Status: DISCONTINUED | OUTPATIENT
Start: 2024-08-22 | End: 2024-08-26

## 2024-08-22 RX ORDER — ASPIRIN 81 MG/1
81 TABLET ORAL DAILY
Status: DISCONTINUED | OUTPATIENT
Start: 2024-08-23 | End: 2024-08-26

## 2024-08-22 RX ORDER — SULFAMETHOXAZOLE/TRIMETHOPRIM 800-160 MG
1 TABLET ORAL 2 TIMES DAILY
Qty: 14 TABLET | Refills: 0 | Status: SHIPPED | OUTPATIENT
Start: 2024-08-22 | End: 2024-08-26

## 2024-08-22 RX ORDER — DEXTROSE MONOHYDRATE 25 G/50ML
12.5 INJECTION, SOLUTION INTRAVENOUS PRN
Status: CANCELLED | OUTPATIENT
Start: 2024-08-22

## 2024-08-22 RX ORDER — HYDRALAZINE HYDROCHLORIDE 20 MG/ML
10 INJECTION INTRAMUSCULAR; INTRAVENOUS EVERY 6 HOURS PRN
Status: CANCELLED | OUTPATIENT
Start: 2024-08-22

## 2024-08-22 RX ORDER — LIDOCAINE HYDROCHLORIDE 10 MG/ML
1 INJECTION, SOLUTION INFILTRATION; PERINEURAL PRN
Status: ACTIVE | OUTPATIENT
Start: 2024-08-22 | End: 2024-08-23

## 2024-08-22 RX ORDER — LANOLIN ALCOHOL/MO/W.PET/CERES
3 CREAM (GRAM) TOPICAL NIGHTLY PRN
Status: CANCELLED | OUTPATIENT
Start: 2024-08-22

## 2024-08-22 RX ORDER — IODIXANOL 320 MG/ML
INJECTION, SOLUTION INTRAVASCULAR PRN
Status: DISCONTINUED | OUTPATIENT
Start: 2024-08-22 | End: 2024-08-22 | Stop reason: HOSPADM

## 2024-08-22 RX ORDER — LOSARTAN POTASSIUM 50 MG/1
50 TABLET ORAL 2 TIMES DAILY
Status: DISCONTINUED | OUTPATIENT
Start: 2024-08-22 | End: 2024-08-26

## 2024-08-22 RX ORDER — ONDANSETRON 2 MG/ML
4 INJECTION INTRAMUSCULAR; INTRAVENOUS EVERY 12 HOURS PRN
Status: CANCELLED | OUTPATIENT
Start: 2024-08-22

## 2024-08-22 RX ORDER — ACETAMINOPHEN 325 MG/1
650 TABLET ORAL EVERY 4 HOURS PRN
Status: DISCONTINUED | OUTPATIENT
Start: 2024-08-22 | End: 2024-08-26

## 2024-08-22 RX ORDER — DIPHENHYDRAMINE HCL 25 MG
50 CAPSULE ORAL ONCE
Status: COMPLETED | OUTPATIENT
Start: 2024-08-22 | End: 2024-08-22

## 2024-08-22 RX ORDER — CHLORHEXIDINE GLUCONATE ORAL RINSE 1.2 MG/ML
15 SOLUTION DENTAL
Status: COMPLETED | OUTPATIENT
Start: 2024-08-26 | End: 2024-08-26

## 2024-08-22 RX ORDER — CLONIDINE HYDROCHLORIDE 0.1 MG/1
0.1 TABLET ORAL EVERY 4 HOURS PRN
Status: DISCONTINUED | OUTPATIENT
Start: 2024-08-22 | End: 2024-08-22

## 2024-08-22 RX ORDER — LIDOCAINE HYDROCHLORIDE 20 MG/ML
INJECTION, SOLUTION EPIDURAL; INFILTRATION; INTRACAUDAL; PERINEURAL PRN
Status: DISCONTINUED | OUTPATIENT
Start: 2024-08-22 | End: 2024-08-22 | Stop reason: HOSPADM

## 2024-08-22 RX ORDER — NICOTINE POLACRILEX 4 MG
15 LOZENGE BUCCAL PRN
Status: CANCELLED | OUTPATIENT
Start: 2024-08-22

## 2024-08-22 RX ORDER — HYDRALAZINE HYDROCHLORIDE 20 MG/ML
10 INJECTION INTRAMUSCULAR; INTRAVENOUS EVERY 4 HOURS PRN
Status: DISCONTINUED | OUTPATIENT
Start: 2024-08-22 | End: 2024-08-22 | Stop reason: HOSPADM

## 2024-08-22 RX ORDER — LANOLIN ALCOHOL/MO/W.PET/CERES
3 CREAM (GRAM) TOPICAL NIGHTLY PRN
Status: DISCONTINUED | OUTPATIENT
Start: 2024-08-22 | End: 2024-08-26

## 2024-08-22 RX ORDER — ONDANSETRON 2 MG/ML
4 INJECTION INTRAMUSCULAR; INTRAVENOUS EVERY 12 HOURS PRN
Status: DISCONTINUED | OUTPATIENT
Start: 2024-08-22 | End: 2024-08-26

## 2024-08-22 RX ORDER — NITROGLYCERIN 400 UG/1
0.4 SPRAY ORAL EVERY 5 MIN PRN
Status: DISCONTINUED | OUTPATIENT
Start: 2024-08-22 | End: 2024-08-26

## 2024-08-22 RX ORDER — ACETAMINOPHEN 325 MG/1
650 TABLET ORAL EVERY 4 HOURS PRN
Status: CANCELLED | OUTPATIENT
Start: 2024-08-22

## 2024-08-22 RX ORDER — MIDAZOLAM HYDROCHLORIDE 1 MG/ML
INJECTION, SOLUTION INTRAMUSCULAR; INTRAVENOUS PRN
Status: DISCONTINUED | OUTPATIENT
Start: 2024-08-22 | End: 2024-08-22 | Stop reason: HOSPADM

## 2024-08-22 RX ORDER — VERAPAMIL HYDROCHLORIDE 2.5 MG/ML
INJECTION, SOLUTION INTRAVENOUS PRN
Status: DISCONTINUED | OUTPATIENT
Start: 2024-08-22 | End: 2024-08-22 | Stop reason: HOSPADM

## 2024-08-22 RX ORDER — SODIUM CHLORIDE 9 MG/ML
INJECTION, SOLUTION INTRAVENOUS CONTINUOUS
Status: ACTIVE | OUTPATIENT
Start: 2024-08-22 | End: 2024-08-22

## 2024-08-22 RX ORDER — MIDAZOLAM HYDROCHLORIDE 1 MG/ML
1 INJECTION, SOLUTION INTRAMUSCULAR; INTRAVENOUS PRN
Status: DISCONTINUED | OUTPATIENT
Start: 2024-08-22 | End: 2024-08-22 | Stop reason: HOSPADM

## 2024-08-22 RX ORDER — DEXTROSE MONOHYDRATE 25 G/50ML
25 INJECTION, SOLUTION INTRAVENOUS PRN
Status: CANCELLED | OUTPATIENT
Start: 2024-08-22

## 2024-08-22 RX ORDER — NITROGLYCERIN 0.4 MG/1
0.4 TABLET SUBLINGUAL EVERY 5 MIN PRN
Status: CANCELLED | OUTPATIENT
Start: 2024-08-22 | End: 2024-08-23

## 2024-08-22 RX ORDER — HYDRALAZINE HYDROCHLORIDE 20 MG/ML
10 INJECTION INTRAMUSCULAR; INTRAVENOUS EVERY 4 HOURS PRN
Status: CANCELLED | OUTPATIENT
Start: 2024-08-22 | End: 2024-08-23

## 2024-08-22 RX ORDER — ONDANSETRON 4 MG/1
4 TABLET, ORALLY DISINTEGRATING ORAL EVERY 12 HOURS PRN
Status: DISCONTINUED | OUTPATIENT
Start: 2024-08-22 | End: 2024-08-26

## 2024-08-22 RX ORDER — METOPROLOL TARTRATE 25 MG/1
25 TABLET, FILM COATED ORAL
Status: DISCONTINUED | OUTPATIENT
Start: 2024-08-26 | End: 2024-08-25

## 2024-08-22 RX ORDER — LIDOCAINE HYDROCHLORIDE 10 MG/ML
1 INJECTION, SOLUTION INFILTRATION; PERINEURAL PRN
Status: DISCONTINUED | OUTPATIENT
Start: 2024-08-22 | End: 2024-08-22 | Stop reason: HOSPADM

## 2024-08-22 RX ORDER — HEPARIN SODIUM 200 [USP'U]/100ML
INJECTION, SOLUTION INTRAVENOUS PRN
Status: DISCONTINUED | OUTPATIENT
Start: 2024-08-22 | End: 2024-08-22 | Stop reason: HOSPADM

## 2024-08-22 RX ORDER — NITROGLYCERIN 0.4 MG/1
0.4 TABLET SUBLINGUAL EVERY 5 MIN PRN
Status: DISPENSED | OUTPATIENT
Start: 2024-08-22 | End: 2024-08-23

## 2024-08-22 RX ORDER — LOSARTAN POTASSIUM 50 MG/1
50 TABLET ORAL 2 TIMES DAILY
Status: CANCELLED | OUTPATIENT
Start: 2024-08-22

## 2024-08-22 RX ORDER — SODIUM CHLORIDE 9 MG/ML
INJECTION, SOLUTION INTRAVENOUS CONTINUOUS
Status: ACTIVE | OUTPATIENT
Start: 2024-08-22 | End: 2024-08-23

## 2024-08-22 RX ORDER — 0.9 % SODIUM CHLORIDE 0.9 %
2 VIAL (ML) INJECTION EVERY 12 HOURS SCHEDULED
Status: CANCELLED | OUTPATIENT
Start: 2024-08-22

## 2024-08-22 RX ORDER — ACETAMINOPHEN 650 MG/1
650 SUPPOSITORY RECTAL EVERY 4 HOURS PRN
Status: DISCONTINUED | OUTPATIENT
Start: 2024-08-22 | End: 2024-08-22

## 2024-08-22 RX ORDER — AMOXICILLIN 250 MG
2 CAPSULE ORAL DAILY PRN
Status: DISCONTINUED | OUTPATIENT
Start: 2024-08-22 | End: 2024-08-26

## 2024-08-22 RX ORDER — POTASSIUM CHLORIDE 1500 MG/1
40 TABLET, EXTENDED RELEASE ORAL ONCE
Status: COMPLETED | OUTPATIENT
Start: 2024-08-22 | End: 2024-08-22

## 2024-08-22 RX ORDER — HEPARIN SODIUM 10000 [USP'U]/100ML
1-30 INJECTION, SOLUTION INTRAVENOUS CONTINUOUS
Status: DISCONTINUED | OUTPATIENT
Start: 2024-08-22 | End: 2024-08-26

## 2024-08-22 RX ORDER — ATORVASTATIN CALCIUM 40 MG/1
80 TABLET, FILM COATED ORAL DAILY
Status: CANCELLED | OUTPATIENT
Start: 2024-08-23

## 2024-08-22 RX ORDER — ACETAMINOPHEN 650 MG/1
650 SUPPOSITORY RECTAL EVERY 4 HOURS PRN
Status: CANCELLED | OUTPATIENT
Start: 2024-08-22

## 2024-08-22 RX ORDER — ACETAMINOPHEN 650 MG/1
650 SUPPOSITORY RECTAL EVERY 4 HOURS PRN
Status: DISCONTINUED | OUTPATIENT
Start: 2024-08-22 | End: 2024-08-26

## 2024-08-22 RX ORDER — SODIUM CHLORIDE 9 MG/ML
INJECTION, SOLUTION INTRAVENOUS CONTINUOUS
Status: DISCONTINUED | OUTPATIENT
Start: 2024-08-22 | End: 2024-08-22

## 2024-08-22 RX ORDER — 0.9 % SODIUM CHLORIDE 0.9 %
2 VIAL (ML) INJECTION EVERY 12 HOURS SCHEDULED
Status: DISCONTINUED | OUTPATIENT
Start: 2024-08-22 | End: 2024-08-22 | Stop reason: HOSPADM

## 2024-08-22 RX ORDER — NITROGLYCERIN 0.4 MG/1
0.4 TABLET SUBLINGUAL EVERY 5 MIN PRN
Status: DISCONTINUED | OUTPATIENT
Start: 2024-08-22 | End: 2024-08-22

## 2024-08-22 RX ORDER — DEXTROSE MONOHYDRATE 25 G/50ML
12.5 INJECTION, SOLUTION INTRAVENOUS PRN
Status: DISCONTINUED | OUTPATIENT
Start: 2024-08-22 | End: 2024-08-26

## 2024-08-22 RX ORDER — ONDANSETRON 4 MG/1
4 TABLET, ORALLY DISINTEGRATING ORAL EVERY 12 HOURS PRN
Status: CANCELLED | OUTPATIENT
Start: 2024-08-22

## 2024-08-22 RX ORDER — SODIUM CHLORIDE 9 MG/ML
INJECTION, SOLUTION INTRAVENOUS CONTINUOUS
Status: CANCELLED | OUTPATIENT
Start: 2024-08-22 | End: 2024-08-22

## 2024-08-22 RX ORDER — HEPARIN SODIUM 1000 [USP'U]/ML
INJECTION, SOLUTION INTRAVENOUS; SUBCUTANEOUS PRN
Status: DISCONTINUED | OUTPATIENT
Start: 2024-08-22 | End: 2024-08-22 | Stop reason: HOSPADM

## 2024-08-22 RX ORDER — ACETAMINOPHEN 325 MG/1
650 TABLET ORAL EVERY 4 HOURS PRN
Status: DISCONTINUED | OUTPATIENT
Start: 2024-08-22 | End: 2024-08-22

## 2024-08-22 RX ORDER — AMLODIPINE BESYLATE 10 MG/1
10 TABLET ORAL DAILY
Status: CANCELLED | OUTPATIENT
Start: 2024-08-23

## 2024-08-22 RX ORDER — MIDAZOLAM HYDROCHLORIDE 1 MG/ML
1 INJECTION, SOLUTION INTRAMUSCULAR; INTRAVENOUS PRN
Status: ACTIVE | OUTPATIENT
Start: 2024-08-22 | End: 2024-08-23

## 2024-08-22 RX ORDER — DEXTROSE MONOHYDRATE 25 G/50ML
25 INJECTION, SOLUTION INTRAVENOUS PRN
Status: DISCONTINUED | OUTPATIENT
Start: 2024-08-22 | End: 2024-08-26

## 2024-08-22 RX ORDER — SODIUM CHLORIDE 9 MG/ML
INJECTION, SOLUTION INTRAVENOUS CONTINUOUS
Status: DISCONTINUED | OUTPATIENT
Start: 2024-08-22 | End: 2024-08-22 | Stop reason: HOSPADM

## 2024-08-22 RX ORDER — LIDOCAINE HYDROCHLORIDE 10 MG/ML
1 INJECTION, SOLUTION INFILTRATION; PERINEURAL PRN
Status: CANCELLED | OUTPATIENT
Start: 2024-08-22 | End: 2024-08-23

## 2024-08-22 RX ORDER — NITROGLYCERIN 0.4 MG/1
0.4 TABLET SUBLINGUAL EVERY 5 MIN PRN
Status: DISCONTINUED | OUTPATIENT
Start: 2024-08-22 | End: 2024-08-22 | Stop reason: HOSPADM

## 2024-08-22 RX ADMIN — SODIUM CHLORIDE: 9 INJECTION, SOLUTION INTRAVENOUS at 12:58

## 2024-08-22 RX ADMIN — NITROGLYCERIN 0.5 INCH: 20 OINTMENT TOPICAL at 12:47

## 2024-08-22 RX ADMIN — SODIUM CHLORIDE, PRESERVATIVE FREE 2 ML: 5 INJECTION INTRAVENOUS at 20:00

## 2024-08-22 RX ADMIN — LOSARTAN POTASSIUM 50 MG: 50 TABLET, FILM COATED ORAL at 09:00

## 2024-08-22 RX ADMIN — HEPARIN SODIUM 12 UNITS/KG/HR: 10000 INJECTION, SOLUTION INTRAVENOUS at 17:42

## 2024-08-22 RX ADMIN — INSULIN LISPRO 6 UNITS: 100 INJECTION, SOLUTION INTRAVENOUS; SUBCUTANEOUS at 20:40

## 2024-08-22 RX ADMIN — SODIUM CHLORIDE: 9 INJECTION, SOLUTION INTRAVENOUS at 09:12

## 2024-08-22 RX ADMIN — NITROGLYCERIN 0.5 INCH: 20 OINTMENT TOPICAL at 17:46

## 2024-08-22 RX ADMIN — DIPHENHYDRAMINE HYDROCHLORIDE 50 MG: 25 CAPSULE ORAL at 10:47

## 2024-08-22 RX ADMIN — ASPIRIN 81 MG: 81 TABLET, COATED ORAL at 09:00

## 2024-08-22 RX ADMIN — SODIUM CHLORIDE, PRESERVATIVE FREE 2 ML: 5 INJECTION INTRAVENOUS at 12:48

## 2024-08-22 RX ADMIN — SODIUM CHLORIDE, PRESERVATIVE FREE 2 ML: 5 INJECTION INTRAVENOUS at 09:11

## 2024-08-22 RX ADMIN — ATORVASTATIN CALCIUM 80 MG: 40 TABLET, FILM COATED ORAL at 09:00

## 2024-08-22 RX ADMIN — AMLODIPINE BESYLATE 10 MG: 10 TABLET ORAL at 09:00

## 2024-08-22 RX ADMIN — SODIUM CHLORIDE: 9 INJECTION, SOLUTION INTRAVENOUS at 19:30

## 2024-08-22 RX ADMIN — LOSARTAN POTASSIUM 50 MG: 50 TABLET, FILM COATED ORAL at 20:40

## 2024-08-22 RX ADMIN — POTASSIUM CHLORIDE 40 MEQ: 1500 TABLET, EXTENDED RELEASE ORAL at 12:47

## 2024-08-22 RX ADMIN — NITROGLYCERIN 0.5 INCH: 20 OINTMENT TOPICAL at 05:17

## 2024-08-22 SDOH — HEALTH STABILITY: PHYSICAL HEALTH: DO YOU HAVE DIFFICULTY DRESSING OR BATHING?: YES

## 2024-08-22 SDOH — ECONOMIC STABILITY: HOUSING INSECURITY: WHAT IS YOUR LIVING SITUATION TODAY?: I HAVE A STEADY PLACE TO LIVE

## 2024-08-22 SDOH — ECONOMIC STABILITY: GENERAL: WOULD YOU LIKE HELP WITH ANY OF THE FOLLOWING NEEDS?: I DON'T WANT HELP WITH ANY OF THESE

## 2024-08-22 SDOH — ECONOMIC STABILITY: INCOME INSECURITY: IN THE PAST 12 MONTHS, HAS THE ELECTRIC, GAS, OIL, OR WATER COMPANY THREATENED TO SHUT OFF SERVICE IN YOUR HOME?: NO

## 2024-08-22 SDOH — HEALTH STABILITY: PHYSICAL HEALTH: DO YOU HAVE SERIOUS DIFFICULTY WALKING OR CLIMBING STAIRS?: YES

## 2024-08-22 SDOH — ECONOMIC STABILITY: HOUSING INSECURITY: DO YOU HAVE PROBLEMS WITH ANY OF THE FOLLOWING?: NONE OF THE ABOVE

## 2024-08-22 SDOH — HEALTH STABILITY: GENERAL: BECAUSE OF A PHYSICAL, MENTAL, OR EMOTIONAL CONDITION, DO YOU HAVE DIFFICULTY DOING ERRANDS ALONE?: NO

## 2024-08-22 SDOH — ECONOMIC STABILITY: HOUSING INSECURITY: WHAT IS YOUR LIVING SITUATION TODAY?: HOUSE

## 2024-08-22 SDOH — ECONOMIC STABILITY: FOOD INSECURITY: WITHIN THE PAST 12 MONTHS, THE FOOD YOU BOUGHT JUST DIDN'T LAST AND YOU DIDN'T HAVE MONEY TO GET MORE.: NEVER TRUE

## 2024-08-22 SDOH — SOCIAL STABILITY: SOCIAL NETWORK: SUPPORT SYSTEMS: CHILDREN

## 2024-08-22 SDOH — ECONOMIC STABILITY: GENERAL

## 2024-08-22 SDOH — ECONOMIC STABILITY: HOUSING INSECURITY: WHAT IS YOUR LIVING SITUATION TODAY?: ADULT CHILDREN

## 2024-08-22 SDOH — SOCIAL STABILITY: SOCIAL INSECURITY: HOW OFTEN DOES ANYONE, INCLUDING FAMILY AND FRIENDS, THREATEN YOU WITH HARM?: NEVER

## 2024-08-22 SDOH — SOCIAL STABILITY: SOCIAL NETWORK: SUPPORT SYSTEMS: FAMILY MEMBERS

## 2024-08-22 SDOH — SOCIAL STABILITY: SOCIAL INSECURITY: HOW OFTEN DOES ANYONE, INCLUDING FAMILY AND FRIENDS, SCREAM OR CURSE AT YOU?: NEVER

## 2024-08-22 SDOH — SOCIAL STABILITY: SOCIAL INSECURITY: HOW OFTEN DOES ANYONE, INCLUDING FAMILY AND FRIENDS, PHYSICALLY HURT YOU?: NEVER

## 2024-08-22 SDOH — ECONOMIC STABILITY: GENERAL
IN THE PAST YEAR, HAVE YOU OR ANY FAMILY MEMBERS YOU LIVE WITH BEEN UNABLE TO GET ANY OF THE FOLLOWING WHEN IT WAS REALLY NEEDED? CHECK ALL THAT APPLY.: PATIENT DECLINED

## 2024-08-22 SDOH — SOCIAL STABILITY: SOCIAL NETWORK
HOW OFTEN DO YOU SEE OR TALK TO PEOPLE THAT YOU CARE ABOUT AND FEEL CLOSE TO? (FOR EXAMPLE: TALKING TO FRIENDS ON THE PHONE, VISITING FRIENDS OR FAMILY, GOING TO CHURCH OR CLUB MEETINGS): 3 TO 5 TIMES A WEEK

## 2024-08-22 SDOH — SOCIAL STABILITY: SOCIAL INSECURITY: HOW OFTEN DOES ANYONE, INCLUDING FAMILY AND FRIENDS, INSULT OR TALK DOWN TO YOU?: NEVER

## 2024-08-22 ASSESSMENT — ACTIVITIES OF DAILY LIVING (ADL)
ADL_SCORE: 6
ADL_BEFORE_ADMISSION: NEEDS/REQUIRES ASSISTANCE
RECENT_DECLINE_ADL: YES, DECLINE IN BATHING/DRESSING/FEEDING, COLLABORATE WITH PROVIDER (T);YES, DECLINE IN AMBULATION/TRANSFERRING, COLLABORATE WITH PROVIDER (T)
RECENT_DECLINE_ADL: YES, DECLINE IN BATHING/DRESSING/FEEDING, COLLABORATE WITH PROVIDER (T)
ADL_BEFORE_ADMISSION: NEEDS/REQUIRES ASSISTANCE
ADL_SHORT_OF_BREATH: YES
BATHING: NEEDS ASSISTANCE
FEEDING: NEEDS ASSISTANCE
BATHING: NEEDS ASSISTANCE
DRESSING: NEEDS ASSISTANCE
ADL_SHORT_OF_BREATH: YES
DRESSING: NEEDS ASSISTANCE
FEEDING: NEEDS ASSISTANCE
TOILETING: NEEDS ASSISTANCE
ADL_SCORE: 6
TOILETING: NEEDS ASSISTANCE

## 2024-08-22 ASSESSMENT — LIFESTYLE VARIABLES
AUDIT-C TOTAL SCORE: 0
HOW MANY STANDARD DRINKS CONTAINING ALCOHOL DO YOU HAVE ON A TYPICAL DAY: 0,1 OR 2
ALCOHOL_USE_STATUS: NO OR LOW RISK WITH VALIDATED TOOL
AUDIT-C TOTAL SCORE: 0
ALCOHOL_USE_STATUS: NO OR LOW RISK WITH VALIDATED TOOL
HOW MANY STANDARD DRINKS CONTAINING ALCOHOL DO YOU HAVE ON A TYPICAL DAY: 0,1 OR 2
HOW OFTEN DO YOU HAVE 6 OR MORE DRINKS ON ONE OCCASION: NEVER
HOW OFTEN DO YOU HAVE 6 OR MORE DRINKS ON ONE OCCASION: NEVER
HOW OFTEN DO YOU HAVE A DRINK CONTAINING ALCOHOL: NEVER
SMOKING_HISTORY: NO
HOW OFTEN DO YOU HAVE A DRINK CONTAINING ALCOHOL: NEVER

## 2024-08-22 ASSESSMENT — PAIN SCALES - GENERAL
PAINLEVEL_OUTOF10: 0
PAINLEVEL_OUTOF10: 0
PAINLEVEL_OUTOF10: 5
PAINLEVEL_OUTOF10: 0

## 2024-08-22 ASSESSMENT — PULMONARY FUNCTION TESTS
FEV1_PREDICTED: 80
FEV1/FVC_PERCENT_PREDICTED: 68
FEV1: 80
FEV1: 1.13
FEV1_PREDICTED: 65
FEV1: 80
FEV1_PREDICTED: 42
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
## 2024-08-23 ENCOUNTER — PREP FOR CASE (OUTPATIENT)
Dept: CARDIOLOGY | Age: 74
End: 2024-08-23

## 2024-08-23 ENCOUNTER — APPOINTMENT (OUTPATIENT)
Dept: GENERAL RADIOLOGY | Age: 74
DRG: 235 | End: 2024-08-23
Attending: NURSE PRACTITIONER

## 2024-08-23 LAB
ABO + RH BLD: NORMAL
ALBUMIN SERPL-MCNC: 3.1 G/DL (ref 3.6–5.1)
ALBUMIN/GLOB SERPL: 0.9 {RATIO} (ref 1–2.4)
ALP SERPL-CCNC: 113 UNITS/L (ref 45–117)
ALT SERPL-CCNC: 20 UNITS/L
ANION GAP SERPL CALC-SCNC: 13 MMOL/L (ref 7–19)
APPEARANCE UR: CLEAR
APTT PPP: 47 SEC (ref 22–32)
APTT PPP: 53 SEC (ref 22–32)
APTT PPP: 65 SEC (ref 22–32)
APTT PPP: 72 SEC (ref 22–32)
AST SERPL-CCNC: 21 UNITS/L
BACTERIA #/AREA URNS HPF: ABNORMAL /HPF
BASOPHILS # BLD: 0 K/MCL (ref 0–0.3)
BASOPHILS NFR BLD: 1 %
BILIRUB SERPL-MCNC: 0.6 MG/DL (ref 0.2–1)
BILIRUB UR QL STRIP: NEGATIVE
BLD GP AB SCN SERPL QL GEL: NEGATIVE
BUN SERPL-MCNC: 13 MG/DL (ref 6–20)
BUN/CREAT SERPL: 12 (ref 7–25)
CALCIUM SERPL-MCNC: 9.9 MG/DL (ref 8.4–10.2)
CHLORIDE SERPL-SCNC: 110 MMOL/L (ref 97–110)
CO2 SERPL-SCNC: 25 MMOL/L (ref 21–32)
COLOR UR: ABNORMAL
CREAT SERPL-MCNC: 1.05 MG/DL (ref 0.51–0.95)
DEPRECATED RDW RBC: 40.6 FL (ref 39–50)
EGFRCR SERPLBLD CKD-EPI 2021: 55 ML/MIN/{1.73_M2}
EOSINOPHIL # BLD: 0.1 K/MCL (ref 0–0.5)
EOSINOPHIL NFR BLD: 2 %
ERYTHROCYTE [DISTWIDTH] IN BLOOD: 12.5 % (ref 11–15)
FASTING DURATION TIME PATIENT: ABNORMAL H
GLOBULIN SER-MCNC: 3.5 G/DL (ref 2–4)
GLUCOSE BLDC GLUCOMTR-MCNC: 117 MG/DL (ref 70–99)
GLUCOSE BLDC GLUCOMTR-MCNC: 131 MG/DL (ref 70–99)
GLUCOSE BLDC GLUCOMTR-MCNC: 170 MG/DL (ref 70–99)
GLUCOSE BLDC GLUCOMTR-MCNC: 203 MG/DL (ref 70–99)
GLUCOSE SERPL-MCNC: 108 MG/DL (ref 70–99)
GLUCOSE UR STRIP-MCNC: NEGATIVE MG/DL
HCT VFR BLD CALC: 38 % (ref 36–46.5)
HGB BLD-MCNC: 12.4 G/DL (ref 12–15.5)
HGB UR QL STRIP: NEGATIVE
HYALINE CASTS #/AREA URNS LPF: ABNORMAL /LPF
IMM GRANULOCYTES # BLD AUTO: 0 K/MCL (ref 0–0.2)
IMM GRANULOCYTES # BLD: 0 %
INR PPP: 1.1
KETONES UR STRIP-MCNC: NEGATIVE MG/DL
LEUKOCYTE ESTERASE UR QL STRIP: ABNORMAL
LYMPHOCYTES # BLD: 2.2 K/MCL (ref 1–4)
LYMPHOCYTES NFR BLD: 36 %
MCH RBC QN AUTO: 28.8 PG (ref 26–34)
MCHC RBC AUTO-ENTMCNC: 32.6 G/DL (ref 32–36.5)
MCV RBC AUTO: 88.2 FL (ref 78–100)
MONOCYTES # BLD: 0.4 K/MCL (ref 0.3–0.9)
MONOCYTES NFR BLD: 7 %
NEUTROPHILS # BLD: 3.4 K/MCL (ref 1.8–7.7)
NEUTROPHILS NFR BLD: 54 %
NITRITE UR QL STRIP: NEGATIVE
NRBC BLD MANUAL-RTO: 0 /100 WBC
PH UR STRIP: 5.5 [PH] (ref 5–7)
PLATELET # BLD AUTO: NORMAL 10*3/UL
POTASSIUM SERPL-SCNC: 4.4 MMOL/L (ref 3.4–5.1)
PROT SERPL-MCNC: 6.6 G/DL (ref 6.4–8.2)
PROT UR STRIP-MCNC: NEGATIVE MG/DL
PROTHROMBIN TIME: 11.4 SEC (ref 9.7–11.8)
RBC # BLD: 4.31 MIL/MCL (ref 4–5.2)
RBC #/AREA URNS HPF: ABNORMAL /HPF
SODIUM SERPL-SCNC: 144 MMOL/L (ref 135–145)
SP GR UR STRIP: 1.03 (ref 1–1.03)
SQUAMOUS #/AREA URNS HPF: ABNORMAL /HPF
TRANS CELLS #/AREA URNS HPF: ABNORMAL /HPF
TYPE AND SCREEN EXPIRATION DATE: NORMAL
UROBILINOGEN UR STRIP-MCNC: 0.2 MG/DL
WBC # BLD: 6.2 K/MCL (ref 4.2–11)
WBC #/AREA URNS HPF: ABNORMAL /HPF

## 2024-08-23 PROCEDURE — 10002803 HB RX 637: Performed by: FAMILY MEDICINE

## 2024-08-23 PROCEDURE — 80053 COMPREHEN METABOLIC PANEL: CPT | Performed by: FAMILY MEDICINE

## 2024-08-23 PROCEDURE — 82962 GLUCOSE BLOOD TEST: CPT

## 2024-08-23 PROCEDURE — 71046 X-RAY EXAM CHEST 2 VIEWS: CPT | Performed by: RADIOLOGY

## 2024-08-23 PROCEDURE — 96372 THER/PROPH/DIAG INJ SC/IM: CPT | Performed by: FAMILY MEDICINE

## 2024-08-23 PROCEDURE — 10000002 HB ROOM CHARGE MED SURG

## 2024-08-23 PROCEDURE — 86850 RBC ANTIBODY SCREEN: CPT | Performed by: NURSE PRACTITIONER

## 2024-08-23 PROCEDURE — 81001 URINALYSIS AUTO W/SCOPE: CPT | Performed by: NURSE PRACTITIONER

## 2024-08-23 PROCEDURE — 71046 X-RAY EXAM CHEST 2 VIEWS: CPT

## 2024-08-23 PROCEDURE — 99223 1ST HOSP IP/OBS HIGH 75: CPT | Performed by: THORACIC SURGERY (CARDIOTHORACIC VASCULAR SURGERY)

## 2024-08-23 PROCEDURE — 85025 COMPLETE CBC W/AUTO DIFF WBC: CPT | Performed by: INTERNAL MEDICINE

## 2024-08-23 PROCEDURE — 85730 THROMBOPLASTIN TIME PARTIAL: CPT | Performed by: INTERNAL MEDICINE

## 2024-08-23 PROCEDURE — 36415 COLL VENOUS BLD VENIPUNCTURE: CPT | Performed by: NURSE PRACTITIONER

## 2024-08-23 PROCEDURE — 87086 URINE CULTURE/COLONY COUNT: CPT | Performed by: NURSE PRACTITIONER

## 2024-08-23 PROCEDURE — 10004651 HB RX, NO CHARGE ITEM: Performed by: FAMILY MEDICINE

## 2024-08-23 PROCEDURE — 10002800 HB RX 250 W HCPCS: Performed by: FAMILY MEDICINE

## 2024-08-23 PROCEDURE — 85610 PROTHROMBIN TIME: CPT | Performed by: NURSE PRACTITIONER

## 2024-08-23 RX ADMIN — NITROGLYCERIN 0.5 INCH: 20 OINTMENT TOPICAL at 00:23

## 2024-08-23 RX ADMIN — NITROGLYCERIN 0.5 INCH: 20 OINTMENT TOPICAL at 11:15

## 2024-08-23 RX ADMIN — ANTACID TABLETS 500 MG: 500 TABLET, CHEWABLE ORAL at 23:50

## 2024-08-23 RX ADMIN — SENNOSIDES AND DOCUSATE SODIUM 2 TABLET: 50; 8.6 TABLET ORAL at 17:02

## 2024-08-23 RX ADMIN — AMLODIPINE BESYLATE 10 MG: 10 TABLET ORAL at 08:08

## 2024-08-23 RX ADMIN — NITROGLYCERIN 0.5 INCH: 20 OINTMENT TOPICAL at 23:48

## 2024-08-23 RX ADMIN — NITROGLYCERIN 0.5 INCH: 20 OINTMENT TOPICAL at 06:05

## 2024-08-23 RX ADMIN — SENNOSIDES AND DOCUSATE SODIUM 2 TABLET: 50; 8.6 TABLET ORAL at 10:28

## 2024-08-23 RX ADMIN — ANTACID TABLETS 500 MG: 500 TABLET, CHEWABLE ORAL at 08:08

## 2024-08-23 RX ADMIN — INSULIN LISPRO 2 UNITS: 100 INJECTION, SOLUTION INTRAVENOUS; SUBCUTANEOUS at 11:31

## 2024-08-23 RX ADMIN — ATORVASTATIN CALCIUM 80 MG: 80 TABLET, FILM COATED ORAL at 08:08

## 2024-08-23 RX ADMIN — LOSARTAN POTASSIUM 50 MG: 50 TABLET, FILM COATED ORAL at 20:49

## 2024-08-23 RX ADMIN — NITROGLYCERIN 0.5 INCH: 20 OINTMENT TOPICAL at 17:02

## 2024-08-23 RX ADMIN — HEPARIN SODIUM 10 UNITS/KG/HR: 10000 INJECTION, SOLUTION INTRAVENOUS at 23:23

## 2024-08-23 RX ADMIN — INSULIN LISPRO 4 UNITS: 100 INJECTION, SOLUTION INTRAVENOUS; SUBCUTANEOUS at 20:49

## 2024-08-23 RX ADMIN — ASPIRIN 81 MG: 81 TABLET, COATED ORAL at 08:08

## 2024-08-23 RX ADMIN — LOSARTAN POTASSIUM 50 MG: 50 TABLET, FILM COATED ORAL at 08:08

## 2024-08-23 SDOH — ECONOMIC STABILITY: HOUSING INSECURITY: WHAT IS YOUR LIVING SITUATION TODAY?: I HAVE A STEADY PLACE TO LIVE

## 2024-08-23 ASSESSMENT — PATIENT HEALTH QUESTIONNAIRE - PHQ9
SUM OF ALL RESPONSES TO PHQ9 QUESTIONS 1 AND 2: 0
CLINICAL INTERPRETATION OF PHQ2 SCORE: NO FURTHER SCREENING NEEDED

## 2024-08-23 ASSESSMENT — PAIN SCALES - GENERAL
PAINLEVEL_OUTOF10: 5
PAINLEVEL_OUTOF10: 0

## 2024-08-23 ASSESSMENT — ORIENTATION MEMORY CONCENTRATION TEST (OMCT)
WHAT TIME IS IT (NO WATCH OR CLOCK): CORRECT
OMCT INTERPRETATION: 0-6: NO SIGNIFICANT IMPAIRMENT
WHAT MONTH IS IT NOW: CORRECT
SAY THE MONTHS IN REVERSE ORDER STARTING WITH LAST MONTH: 1 ERROR
WHAT YEAR IS IT NOW (MUST BE EXACT): CORRECT
COUNT BACKWARDS FROM 20 TO 1: CORRECT
OMCT SCORE: 4
REPEAT THE NAME AND ADDRESS I ASKED YOU TO REMEMBER: 1 ERROR

## 2024-08-23 ASSESSMENT — COGNITIVE AND FUNCTIONAL STATUS - GENERAL
DO YOU HAVE SERIOUS DIFFICULTY WALKING OR CLIMBING STAIRS: YES
DO YOU HAVE DIFFICULTY DRESSING OR BATHING: YES

## 2024-08-24 VITALS
SYSTOLIC BLOOD PRESSURE: 153 MMHG | BODY MASS INDEX: 32.04 KG/M2 | WEIGHT: 158.95 LBS | DIASTOLIC BLOOD PRESSURE: 80 MMHG | HEART RATE: 55 BPM | RESPIRATION RATE: 20 BRPM | TEMPERATURE: 97.8 F | HEIGHT: 59 IN | OXYGEN SATURATION: 98 %

## 2024-08-24 LAB
ALBUMIN SERPL-MCNC: 3.2 G/DL (ref 3.6–5.1)
ALBUMIN/GLOB SERPL: 0.9 {RATIO} (ref 1–2.4)
ALP SERPL-CCNC: 115 UNITS/L (ref 45–117)
ALT SERPL-CCNC: 24 UNITS/L
ANION GAP SERPL CALC-SCNC: 14 MMOL/L (ref 7–19)
APTT PPP: 57 SEC (ref 22–32)
AST SERPL-CCNC: 19 UNITS/L
BACTERIA UR CULT: NORMAL
BASOPHILS # BLD: 0 K/MCL (ref 0–0.3)
BASOPHILS NFR BLD: 0 %
BILIRUB SERPL-MCNC: 0.4 MG/DL (ref 0.2–1)
BUN SERPL-MCNC: 14 MG/DL (ref 6–20)
BUN/CREAT SERPL: 15 (ref 7–25)
CALCIUM SERPL-MCNC: 10.4 MG/DL (ref 8.4–10.2)
CHLORIDE SERPL-SCNC: 107 MMOL/L (ref 97–110)
CO2 SERPL-SCNC: 24 MMOL/L (ref 21–32)
CREAT SERPL-MCNC: 0.94 MG/DL (ref 0.51–0.95)
DEPRECATED RDW RBC: 40.9 FL (ref 39–50)
EGFRCR SERPLBLD CKD-EPI 2021: 63 ML/MIN/{1.73_M2}
EOSINOPHIL # BLD: 0.1 K/MCL (ref 0–0.5)
EOSINOPHIL NFR BLD: 2 %
ERYTHROCYTE [DISTWIDTH] IN BLOOD: 12.6 % (ref 11–15)
FASTING DURATION TIME PATIENT: ABNORMAL H
GLOBULIN SER-MCNC: 3.6 G/DL (ref 2–4)
GLUCOSE BLDC GLUCOMTR-MCNC: 135 MG/DL (ref 70–99)
GLUCOSE BLDC GLUCOMTR-MCNC: 163 MG/DL (ref 70–99)
GLUCOSE BLDC GLUCOMTR-MCNC: 307 MG/DL (ref 70–99)
GLUCOSE BLDC GLUCOMTR-MCNC: 328 MG/DL (ref 70–99)
GLUCOSE SERPL-MCNC: 140 MG/DL (ref 70–99)
HCT VFR BLD CALC: 37.7 % (ref 36–46.5)
HGB BLD-MCNC: 12.2 G/DL (ref 12–15.5)
IMM GRANULOCYTES # BLD AUTO: 0 K/MCL (ref 0–0.2)
IMM GRANULOCYTES # BLD: 0 %
LYMPHOCYTES # BLD: 2 K/MCL (ref 1–4)
LYMPHOCYTES NFR BLD: 28 %
MCH RBC QN AUTO: 28.8 PG (ref 26–34)
MCHC RBC AUTO-ENTMCNC: 32.4 G/DL (ref 32–36.5)
MCV RBC AUTO: 88.9 FL (ref 78–100)
MONOCYTES # BLD: 0.5 K/MCL (ref 0.3–0.9)
MONOCYTES NFR BLD: 7 %
MRSA DNA SPEC QL NAA+PROBE: NOT DETECTED
NEUTROPHILS # BLD: 4.5 K/MCL (ref 1.8–7.7)
NEUTROPHILS NFR BLD: 63 %
NRBC BLD MANUAL-RTO: 0 /100 WBC
PLATELET # BLD AUTO: 126 K/MCL (ref 140–450)
POTASSIUM SERPL-SCNC: 3.8 MMOL/L (ref 3.4–5.1)
PROT SERPL-MCNC: 6.8 G/DL (ref 6.4–8.2)
RBC # BLD: 4.24 MIL/MCL (ref 4–5.2)
S AUREUS DNA SPEC QL NAA+PROBE: NOT DETECTED
SODIUM SERPL-SCNC: 141 MMOL/L (ref 135–145)
WBC # BLD: 7.2 K/MCL (ref 4.2–11)

## 2024-08-24 PROCEDURE — 10000002 HB ROOM CHARGE MED SURG

## 2024-08-24 PROCEDURE — 10002803 HB RX 637: Performed by: FAMILY MEDICINE

## 2024-08-24 PROCEDURE — 10004651 HB RX, NO CHARGE ITEM: Performed by: FAMILY MEDICINE

## 2024-08-24 PROCEDURE — 82962 GLUCOSE BLOOD TEST: CPT

## 2024-08-24 PROCEDURE — 10002800 HB RX 250 W HCPCS: Performed by: FAMILY MEDICINE

## 2024-08-24 PROCEDURE — 85730 THROMBOPLASTIN TIME PARTIAL: CPT | Performed by: INTERNAL MEDICINE

## 2024-08-24 PROCEDURE — 85025 COMPLETE CBC W/AUTO DIFF WBC: CPT | Performed by: INTERNAL MEDICINE

## 2024-08-24 PROCEDURE — 80053 COMPREHEN METABOLIC PANEL: CPT | Performed by: FAMILY MEDICINE

## 2024-08-24 PROCEDURE — 36415 COLL VENOUS BLD VENIPUNCTURE: CPT | Performed by: FAMILY MEDICINE

## 2024-08-24 PROCEDURE — 10003445 HB TELEMETRY PER DAY

## 2024-08-24 PROCEDURE — 96372 THER/PROPH/DIAG INJ SC/IM: CPT | Performed by: FAMILY MEDICINE

## 2024-08-24 RX ADMIN — LOSARTAN POTASSIUM 50 MG: 50 TABLET, FILM COATED ORAL at 09:28

## 2024-08-24 RX ADMIN — AMLODIPINE BESYLATE 10 MG: 10 TABLET ORAL at 09:29

## 2024-08-24 RX ADMIN — NITROGLYCERIN 0.5 INCH: 20 OINTMENT TOPICAL at 13:35

## 2024-08-24 RX ADMIN — LOSARTAN POTASSIUM 50 MG: 50 TABLET, FILM COATED ORAL at 21:06

## 2024-08-24 RX ADMIN — ATORVASTATIN CALCIUM 80 MG: 80 TABLET, FILM COATED ORAL at 09:28

## 2024-08-24 RX ADMIN — ASPIRIN 81 MG: 81 TABLET, COATED ORAL at 09:28

## 2024-08-24 RX ADMIN — INSULIN LISPRO 8 UNITS: 100 INJECTION, SOLUTION INTRAVENOUS; SUBCUTANEOUS at 18:23

## 2024-08-24 RX ADMIN — NITROGLYCERIN 0.5 INCH: 20 OINTMENT TOPICAL at 18:23

## 2024-08-24 RX ADMIN — INSULIN LISPRO 2 UNITS: 100 INJECTION, SOLUTION INTRAVENOUS; SUBCUTANEOUS at 13:34

## 2024-08-24 RX ADMIN — SODIUM CHLORIDE, PRESERVATIVE FREE 2 ML: 5 INJECTION INTRAVENOUS at 21:06

## 2024-08-24 RX ADMIN — ANTACID TABLETS 500 MG: 500 TABLET, CHEWABLE ORAL at 09:28

## 2024-08-24 RX ADMIN — NITROGLYCERIN 0.5 INCH: 20 OINTMENT TOPICAL at 06:24

## 2024-08-24 ASSESSMENT — PAIN SCALES - GENERAL
PAINLEVEL_OUTOF10: 0

## 2024-08-25 ENCOUNTER — ANESTHESIA EVENT (OUTPATIENT)
Dept: SURGERY | Age: 74
End: 2024-08-25

## 2024-08-25 LAB
ALBUMIN SERPL-MCNC: 3 G/DL (ref 3.6–5.1)
ALBUMIN/GLOB SERPL: 0.8 {RATIO} (ref 1–2.4)
ALP SERPL-CCNC: 108 UNITS/L (ref 45–117)
ALT SERPL-CCNC: 30 UNITS/L
ANION GAP SERPL CALC-SCNC: 12 MMOL/L (ref 7–19)
APTT PPP: 58 SEC (ref 22–32)
AST SERPL-CCNC: 27 UNITS/L
BASOPHILS # BLD: 0 K/MCL (ref 0–0.3)
BASOPHILS NFR BLD: 1 %
BILIRUB SERPL-MCNC: 0.3 MG/DL (ref 0.2–1)
BUN SERPL-MCNC: 15 MG/DL (ref 6–20)
BUN/CREAT SERPL: 17 (ref 7–25)
CALCIUM SERPL-MCNC: 10.5 MG/DL (ref 8.4–10.2)
CHLORIDE SERPL-SCNC: 108 MMOL/L (ref 97–110)
CO2 SERPL-SCNC: 24 MMOL/L (ref 21–32)
CREAT SERPL-MCNC: 0.86 MG/DL (ref 0.51–0.95)
DEPRECATED RDW RBC: 39.8 FL (ref 39–50)
EGFRCR SERPLBLD CKD-EPI 2021: 70 ML/MIN/{1.73_M2}
EOSINOPHIL # BLD: 0.1 K/MCL (ref 0–0.5)
EOSINOPHIL NFR BLD: 2 %
ERYTHROCYTE [DISTWIDTH] IN BLOOD: 12.4 % (ref 11–15)
FASTING DURATION TIME PATIENT: ABNORMAL H
GLOBULIN SER-MCNC: 3.8 G/DL (ref 2–4)
GLUCOSE BLDC GLUCOMTR-MCNC: 123 MG/DL (ref 70–99)
GLUCOSE BLDC GLUCOMTR-MCNC: 143 MG/DL (ref 70–99)
GLUCOSE BLDC GLUCOMTR-MCNC: 201 MG/DL (ref 70–99)
GLUCOSE BLDC GLUCOMTR-MCNC: 218 MG/DL (ref 70–99)
GLUCOSE BLDC GLUCOMTR-MCNC: 287 MG/DL (ref 70–99)
GLUCOSE BLDC GLUCOMTR-MCNC: 313 MG/DL (ref 70–99)
GLUCOSE SERPL-MCNC: 129 MG/DL (ref 70–99)
HCT VFR BLD CALC: 37.9 % (ref 36–46.5)
HGB BLD-MCNC: 12.4 G/DL (ref 12–15.5)
IMM GRANULOCYTES # BLD AUTO: 0 K/MCL (ref 0–0.2)
IMM GRANULOCYTES # BLD: 0 %
LYMPHOCYTES # BLD: 1.7 K/MCL (ref 1–4)
LYMPHOCYTES NFR BLD: 25 %
MCH RBC QN AUTO: 28.7 PG (ref 26–34)
MCHC RBC AUTO-ENTMCNC: 32.7 G/DL (ref 32–36.5)
MCV RBC AUTO: 87.7 FL (ref 78–100)
MONOCYTES # BLD: 0.6 K/MCL (ref 0.3–0.9)
MONOCYTES NFR BLD: 8 %
NEUTROPHILS # BLD: 4.3 K/MCL (ref 1.8–7.7)
NEUTROPHILS NFR BLD: 64 %
NRBC BLD MANUAL-RTO: 0 /100 WBC
PLATELET # BLD AUTO: 125 K/MCL (ref 140–450)
POTASSIUM SERPL-SCNC: 3.9 MMOL/L (ref 3.4–5.1)
PROT SERPL-MCNC: 6.8 G/DL (ref 6.4–8.2)
RAINBOW EXTRA TUBES HOLD SPECIMEN: NORMAL
RBC # BLD: 4.32 MIL/MCL (ref 4–5.2)
SODIUM SERPL-SCNC: 140 MMOL/L (ref 135–145)
WBC # BLD: 6.6 K/MCL (ref 4.2–11)

## 2024-08-25 PROCEDURE — 93005 ELECTROCARDIOGRAM TRACING: CPT | Performed by: FAMILY MEDICINE

## 2024-08-25 PROCEDURE — 82962 GLUCOSE BLOOD TEST: CPT

## 2024-08-25 PROCEDURE — 10000002 HB ROOM CHARGE MED SURG

## 2024-08-25 PROCEDURE — 10002803 HB RX 637: Performed by: NURSE PRACTITIONER

## 2024-08-25 PROCEDURE — 85730 THROMBOPLASTIN TIME PARTIAL: CPT | Performed by: INTERNAL MEDICINE

## 2024-08-25 PROCEDURE — 10003445 HB TELEMETRY PER DAY

## 2024-08-25 PROCEDURE — 96372 THER/PROPH/DIAG INJ SC/IM: CPT | Performed by: FAMILY MEDICINE

## 2024-08-25 PROCEDURE — 10004651 HB RX, NO CHARGE ITEM: Performed by: FAMILY MEDICINE

## 2024-08-25 PROCEDURE — 85025 COMPLETE CBC W/AUTO DIFF WBC: CPT | Performed by: INTERNAL MEDICINE

## 2024-08-25 PROCEDURE — 10002803 HB RX 637: Performed by: FAMILY MEDICINE

## 2024-08-25 PROCEDURE — 10002800 HB RX 250 W HCPCS: Performed by: FAMILY MEDICINE

## 2024-08-25 PROCEDURE — 93010 ELECTROCARDIOGRAM REPORT: CPT | Performed by: INTERNAL MEDICINE

## 2024-08-25 PROCEDURE — 80053 COMPREHEN METABOLIC PANEL: CPT | Performed by: FAMILY MEDICINE

## 2024-08-25 PROCEDURE — 93005 ELECTROCARDIOGRAM TRACING: CPT | Performed by: NURSE PRACTITIONER

## 2024-08-25 PROCEDURE — 36415 COLL VENOUS BLD VENIPUNCTURE: CPT | Performed by: FAMILY MEDICINE

## 2024-08-25 RX ORDER — METOPROLOL TARTRATE 25 MG/1
12.5 TABLET, FILM COATED ORAL
Status: DISCONTINUED | OUTPATIENT
Start: 2024-08-26 | End: 2024-08-26 | Stop reason: HOSPADM

## 2024-08-25 RX ADMIN — AMLODIPINE BESYLATE 10 MG: 10 TABLET ORAL at 09:28

## 2024-08-25 RX ADMIN — ANTACID TABLETS 500 MG: 500 TABLET, CHEWABLE ORAL at 11:43

## 2024-08-25 RX ADMIN — NITROGLYCERIN 0.5 INCH: 20 OINTMENT TOPICAL at 17:03

## 2024-08-25 RX ADMIN — HEPARIN SODIUM 10 UNITS/KG/HR: 10000 INJECTION, SOLUTION INTRAVENOUS at 13:38

## 2024-08-25 RX ADMIN — INSULIN LISPRO 8 UNITS: 100 INJECTION, SOLUTION INTRAVENOUS; SUBCUTANEOUS at 21:53

## 2024-08-25 RX ADMIN — INSULIN LISPRO 4 UNITS: 100 INJECTION, SOLUTION INTRAVENOUS; SUBCUTANEOUS at 17:03

## 2024-08-25 RX ADMIN — INSULIN LISPRO 6 UNITS: 100 INJECTION, SOLUTION INTRAVENOUS; SUBCUTANEOUS at 11:43

## 2024-08-25 RX ADMIN — LOSARTAN POTASSIUM 50 MG: 50 TABLET, FILM COATED ORAL at 09:28

## 2024-08-25 RX ADMIN — NITROGLYCERIN 0.5 INCH: 20 OINTMENT TOPICAL at 00:03

## 2024-08-25 RX ADMIN — NITROGLYCERIN 0.5 INCH: 20 OINTMENT TOPICAL at 23:48

## 2024-08-25 RX ADMIN — NITROGLYCERIN 0.5 INCH: 20 OINTMENT TOPICAL at 05:45

## 2024-08-25 RX ADMIN — ATORVASTATIN CALCIUM 80 MG: 80 TABLET, FILM COATED ORAL at 09:28

## 2024-08-25 RX ADMIN — SODIUM CHLORIDE, PRESERVATIVE FREE 2 ML: 5 INJECTION INTRAVENOUS at 20:35

## 2024-08-25 RX ADMIN — NITROGLYCERIN 0.5 INCH: 20 OINTMENT TOPICAL at 11:44

## 2024-08-25 RX ADMIN — INSULIN LISPRO 4 UNITS: 100 INJECTION, SOLUTION INTRAVENOUS; SUBCUTANEOUS at 00:17

## 2024-08-25 RX ADMIN — ASPIRIN 81 MG: 81 TABLET, COATED ORAL at 09:28

## 2024-08-25 ASSESSMENT — PAIN SCALES - GENERAL
PAINLEVEL_OUTOF10: 0

## 2024-08-26 ENCOUNTER — APPOINTMENT (OUTPATIENT)
Dept: GENERAL RADIOLOGY | Age: 74
DRG: 235 | End: 2024-08-26
Attending: THORACIC SURGERY (CARDIOTHORACIC VASCULAR SURGERY)

## 2024-08-26 ENCOUNTER — OFFICE VISIT (OUTPATIENT)
Dept: FAMILY MEDICINE | Facility: CLINIC | Age: 74
End: 2024-08-26
Payer: COMMERCIAL

## 2024-08-26 ENCOUNTER — TELEPHONE (OUTPATIENT)
Dept: DERMATOLOGY | Facility: CLINIC | Age: 74
End: 2024-08-26

## 2024-08-26 ENCOUNTER — ANESTHESIA (OUTPATIENT)
Dept: SURGERY | Age: 74
End: 2024-08-26

## 2024-08-26 VITALS
DIASTOLIC BLOOD PRESSURE: 70 MMHG | TEMPERATURE: 97.2 F | SYSTOLIC BLOOD PRESSURE: 126 MMHG | OXYGEN SATURATION: 98 % | HEART RATE: 94 BPM | BODY MASS INDEX: 22.97 KG/M2 | WEIGHT: 117 LBS | HEIGHT: 60 IN | RESPIRATION RATE: 18 BRPM

## 2024-08-26 DIAGNOSIS — R21 RASH AND NONSPECIFIC SKIN ERUPTION: ICD-10-CM

## 2024-08-26 DIAGNOSIS — R21 RASH: ICD-10-CM

## 2024-08-26 DIAGNOSIS — E11.43 TYPE 2 DIABETES MELLITUS WITH DIABETIC AUTONOMIC NEUROPATHY, WITHOUT LONG-TERM CURRENT USE OF INSULIN (H): Primary | ICD-10-CM

## 2024-08-26 DIAGNOSIS — T30.0 BURN: ICD-10-CM

## 2024-08-26 DIAGNOSIS — G62.9 PERIPHERAL POLYNEUROPATHY: ICD-10-CM

## 2024-08-26 PROBLEM — Z98.890 S/P LEFT ATRIAL APPENDAGE LIGATION: Status: ACTIVE | Noted: 2024-08-26

## 2024-08-26 PROBLEM — Z95.1 S/P CABG X 3: Status: ACTIVE | Noted: 2024-08-26

## 2024-08-26 LAB
ABO + RH BLD: NORMAL
ACT BLD: 108 SEC
ACT BLD: 495 SEC
ACT BLD: 680 SEC
ACT BLD: 91 SEC
ALBUMIN SERPL BCG-MCNC: 4.4 G/DL (ref 3.5–5.2)
ALBUMIN SERPL-MCNC: 3.1 G/DL (ref 3.6–5.1)
ALBUMIN/GLOB SERPL: 0.8 {RATIO} (ref 1–2.4)
ALP SERPL-CCNC: 115 UNITS/L (ref 45–117)
ALP SERPL-CCNC: 62 U/L (ref 40–150)
ALT SERPL W P-5'-P-CCNC: 14 U/L (ref 0–50)
ALT SERPL-CCNC: 40 UNITS/L
ANION GAP SERPL CALC-SCNC: 11 MMOL/L (ref 7–19)
ANION GAP SERPL CALCULATED.3IONS-SCNC: 10 MMOL/L (ref 7–15)
APTT PPP: 45 SEC (ref 22–32)
APTT PPP: 59 SEC (ref 22–32)
AST SERPL W P-5'-P-CCNC: 22 U/L (ref 0–45)
AST SERPL-CCNC: 27 UNITS/L
ATRIAL RATE (BPM): 61
BACTERIA WND CULT: ABNORMAL
BASE EXCESS BLDA CALC-SCNC: -2 MMOL/L (ref -2–3)
BASE EXCESS BLDA CALC-SCNC: -3 MMOL/L (ref -2–3)
BASE EXCESS BLDA CALC-SCNC: -4 MMOL/L (ref -2–3)
BASE EXCESS BLDA CALC-SCNC: -4 MMOL/L (ref -2–3)
BASE EXCESS BLDA CALC-SCNC: -5 MMOL/L (ref -2–3)
BASE EXCESS BLDA CALC-SCNC: -6 MMOL/L (ref -2–3)
BASE EXCESS BLDA CALC-SCNC: -8 MMOL/L (ref -2–3)
BASE EXCESS BLDA CALC-SCNC: 0 MMOL/L (ref -2–3)
BASOPHILS # BLD: 0 K/MCL (ref 0–0.3)
BASOPHILS # BLD: 0 K/MCL (ref 0–0.3)
BASOPHILS NFR BLD: 0 %
BASOPHILS NFR BLD: 0 %
BILIRUB SERPL-MCNC: 0.4 MG/DL (ref 0.2–1)
BILIRUB SERPL-MCNC: 0.8 MG/DL
BLD GP AB SCN SERPL QL GEL: NEGATIVE
BUN SERPL-MCNC: 17 MG/DL (ref 6–20)
BUN SERPL-MCNC: 7.4 MG/DL (ref 8–23)
BUN/CREAT SERPL: 20 (ref 7–25)
CA-I BLD-SCNC: 1.13 MMOL/L (ref 1.15–1.29)
CA-I BLD-SCNC: 1.3 MMOL/L (ref 1.15–1.29)
CA-I BLD-SCNC: 1.31 MMOL/L (ref 1.15–1.29)
CA-I BLD-SCNC: 1.36 MMOL/L (ref 1.15–1.29)
CA-I BLD-SCNC: 1.37 MMOL/L (ref 1.15–1.29)
CA-I BLD-SCNC: 1.37 MMOL/L (ref 1.15–1.29)
CA-I BLD-SCNC: 1.41 MMOL/L (ref 1.15–1.29)
CA-I BLD-SCNC: 1.42 MMOL/L (ref 1.15–1.29)
CA-I BLD-SCNC: 1.43 MMOL/L (ref 1.15–1.29)
CA-I BLD-SCNC: 1.44 MMOL/L (ref 1.15–1.29)
CA-I BLD-SCNC: 1.45 MMOL/L (ref 1.15–1.29)
CA-I BLD-SCNC: 1.46 MMOL/L (ref 1.15–1.29)
CA-I BLD-SCNC: 1.46 MMOL/L (ref 1.15–1.29)
CALCIUM SERPL-MCNC: 10.1 MG/DL (ref 8.4–10.2)
CALCIUM SERPL-MCNC: 9.5 MG/DL (ref 8.8–10.4)
CHLORIDE SERPL-SCNC: 106 MMOL/L (ref 97–110)
CHLORIDE SERPL-SCNC: 96 MMOL/L (ref 98–107)
CO2 BLD-SCNC: 20 MMOL/L (ref 19–24)
CO2 BLD-SCNC: 22 MMOL/L (ref 19–24)
CO2 BLD-SCNC: 23 MMOL/L (ref 19–24)
CO2 BLD-SCNC: 23 MMOL/L (ref 19–24)
CO2 BLD-SCNC: 24 MMOL/L (ref 19–24)
CO2 BLD-SCNC: 25 MMOL/L (ref 19–24)
CO2 BLD-SCNC: 27 MMOL/L (ref 19–24)
CO2 SERPL-SCNC: 27 MMOL/L (ref 21–32)
CREAT SERPL-MCNC: 0.74 MG/DL (ref 0.51–0.95)
CREAT SERPL-MCNC: 0.85 MG/DL (ref 0.51–0.95)
CREAT UR-MCNC: 75.7 MG/DL
DEPRECATED RDW RBC: 38.5 FL (ref 39–50)
DEPRECATED RDW RBC: 40.4 FL (ref 39–50)
EGFRCR SERPLBLD CKD-EPI 2021: 71 ML/MIN/{1.73_M2}
EGFRCR SERPLBLD CKD-EPI 2021: 83 ML/MIN/1.73M2
EOSINOPHIL # BLD: 0 K/MCL (ref 0–0.5)
EOSINOPHIL # BLD: 0.1 K/MCL (ref 0–0.5)
EOSINOPHIL NFR BLD: 0 %
EOSINOPHIL NFR BLD: 1 %
ERYTHROCYTE [DISTWIDTH] IN BLOOD: 12.3 % (ref 11–15)
ERYTHROCYTE [DISTWIDTH] IN BLOOD: 12.6 % (ref 11–15)
FASTING DURATION TIME PATIENT: ABNORMAL H
FIBRINOGEN PPP-MCNC: 244 MG/DL (ref 190–425)
GLOBULIN SER-MCNC: 3.7 G/DL (ref 2–4)
GLUCOSE BLD-MCNC: 126 MG/DL (ref 70–99)
GLUCOSE BLD-MCNC: 131 MG/DL (ref 70–99)
GLUCOSE BLD-MCNC: 136 MG/DL (ref 70–99)
GLUCOSE BLD-MCNC: 140 MG/DL (ref 70–99)
GLUCOSE BLD-MCNC: 142 MG/DL (ref 70–99)
GLUCOSE BLD-MCNC: 144 MG/DL (ref 70–99)
GLUCOSE BLD-MCNC: 152 MG/DL (ref 70–99)
GLUCOSE BLD-MCNC: 153 MG/DL (ref 70–99)
GLUCOSE BLD-MCNC: 157 MG/DL (ref 70–99)
GLUCOSE BLD-MCNC: 160 MG/DL (ref 70–99)
GLUCOSE BLD-MCNC: 160 MG/DL (ref 70–99)
GLUCOSE BLD-MCNC: 172 MG/DL (ref 70–99)
GLUCOSE BLD-MCNC: 174 MG/DL (ref 70–99)
GLUCOSE BLDC GLUCOMTR-MCNC: 125 MG/DL (ref 70–99)
GLUCOSE BLDC GLUCOMTR-MCNC: 127 MG/DL (ref 70–99)
GLUCOSE BLDC GLUCOMTR-MCNC: 128 MG/DL (ref 70–99)
GLUCOSE BLDC GLUCOMTR-MCNC: 129 MG/DL (ref 70–99)
GLUCOSE SERPL-MCNC: 119 MG/DL (ref 70–99)
GLUCOSE SERPL-MCNC: 153 MG/DL (ref 70–99)
HBA1C MFR BLD: 7.6 % (ref 0–5.6)
HCO3 BLDA-SCNC: 19 MMOL/L (ref 22–28)
HCO3 BLDA-SCNC: 20 MMOL/L (ref 22–28)
HCO3 BLDA-SCNC: 21 MMOL/L (ref 22–28)
HCO3 BLDA-SCNC: 22 MMOL/L (ref 22–28)
HCO3 BLDA-SCNC: 22 MMOL/L (ref 22–28)
HCO3 BLDA-SCNC: 23 MMOL/L (ref 22–28)
HCO3 BLDA-SCNC: 25 MMOL/L (ref 22–28)
HCO3 SERPL-SCNC: 23 MMOL/L (ref 22–29)
HCT VFR BLD CALC: 25 % (ref 36–46.5)
HCT VFR BLD CALC: 25 % (ref 36–46.5)
HCT VFR BLD CALC: 26 % (ref 36–46.5)
HCT VFR BLD CALC: 27 % (ref 36–46.5)
HCT VFR BLD CALC: 28 % (ref 36–46.5)
HCT VFR BLD CALC: 28.6 % (ref 36–46.5)
HCT VFR BLD CALC: 29 % (ref 36–46.5)
HCT VFR BLD CALC: 30 % (ref 36–46.5)
HCT VFR BLD CALC: 31 % (ref 36–46.5)
HCT VFR BLD CALC: 36.2 % (ref 36–46.5)
HCT VFR BLD CALC: 37 % (ref 36–46.5)
HGB BLD CALC-MCNC: 10.2 G/DL (ref 12–15.5)
HGB BLD CALC-MCNC: 10.5 G/DL (ref 12–15.5)
HGB BLD CALC-MCNC: 12.6 G/DL (ref 12–15.5)
HGB BLD CALC-MCNC: 8.5 G/DL (ref 12–15.5)
HGB BLD CALC-MCNC: 8.5 G/DL (ref 12–15.5)
HGB BLD CALC-MCNC: 8.8 G/DL (ref 12–15.5)
HGB BLD CALC-MCNC: 9.2 G/DL (ref 12–15.5)
HGB BLD CALC-MCNC: 9.5 G/DL (ref 12–15.5)
HGB BLD CALC-MCNC: 9.9 G/DL (ref 12–15.5)
HGB BLD-MCNC: 12.1 G/DL (ref 12–15.5)
HGB BLD-MCNC: 9.5 G/DL (ref 12–15.5)
HOLD SPECIMEN: NORMAL
IMM GRANULOCYTES # BLD AUTO: 0 K/MCL (ref 0–0.2)
IMM GRANULOCYTES # BLD AUTO: 0.1 K/MCL (ref 0–0.2)
IMM GRANULOCYTES # BLD: 0 %
IMM GRANULOCYTES # BLD: 1 %
INR PPP: 1.1
LYMPHOCYTES # BLD: 0.7 K/MCL (ref 1–4)
LYMPHOCYTES # BLD: 2.1 K/MCL (ref 1–4)
LYMPHOCYTES NFR BLD: 30 %
LYMPHOCYTES NFR BLD: 7 %
MAGNESIUM SERPL-MCNC: 1.9 MG/DL (ref 1.7–2.4)
MCH RBC QN AUTO: 28.8 PG (ref 26–34)
MCH RBC QN AUTO: 29.2 PG (ref 26–34)
MCHC RBC AUTO-ENTMCNC: 33.2 G/DL (ref 32–36.5)
MCHC RBC AUTO-ENTMCNC: 33.4 G/DL (ref 32–36.5)
MCV RBC AUTO: 86.2 FL (ref 78–100)
MCV RBC AUTO: 88 FL (ref 78–100)
MICROALBUMIN UR-MCNC: 31.9 MG/L
MICROALBUMIN/CREAT UR: 42.14 MG/G CR (ref 0–25)
MONOCYTES # BLD: 0.2 K/MCL (ref 0.3–0.9)
MONOCYTES # BLD: 0.6 K/MCL (ref 0.3–0.9)
MONOCYTES NFR BLD: 2 %
MONOCYTES NFR BLD: 8 %
NEUTROPHILS # BLD: 4.2 K/MCL (ref 1.8–7.7)
NEUTROPHILS # BLD: 8.7 K/MCL (ref 1.8–7.7)
NEUTROPHILS NFR BLD: 61 %
NEUTROPHILS NFR BLD: 90 %
NRBC BLD MANUAL-RTO: 0 /100 WBC
NRBC BLD MANUAL-RTO: 0 /100 WBC
OXYHGB MFR BLDV: 71 % (ref 60–80)
P AXIS (DEGREES): 55
PCO2 BLDA: 37 MM HG (ref 32–45)
PCO2 BLDA: 39 MM HG (ref 32–45)
PCO2 BLDA: 40 MM HG (ref 32–45)
PCO2 BLDA: 40 MM HG (ref 32–45)
PCO2 BLDA: 41 MM HG (ref 32–45)
PCO2 BLDA: 43 MM HG (ref 32–45)
PCO2 BLDA: 44 MM HG (ref 32–45)
PCO2 BLDA: 44 MM HG (ref 32–45)
PCO2 BLDA: 45 MM HG (ref 32–45)
PCO2 BLDA: 45 MM HG (ref 32–45)
PCO2 BLDA: 55 MM HG (ref 32–45)
PH BLDA: 7.22 UNITS (ref 7.35–7.45)
PH BLDA: 7.25 UNITS (ref 7.35–7.45)
PH BLDA: 7.29 UNITS (ref 7.35–7.45)
PH BLDA: 7.29 UNITS (ref 7.35–7.45)
PH BLDA: 7.3 UNITS (ref 7.35–7.45)
PH BLDA: 7.31 UNITS (ref 7.35–7.45)
PH BLDA: 7.33 UNITS (ref 7.35–7.45)
PH BLDA: 7.35 UNITS (ref 7.35–7.45)
PH BLDA: 7.36 UNITS (ref 7.35–7.45)
PLATELET # BLD AUTO: 113 K/MCL (ref 140–450)
PLATELET # BLD AUTO: 81 K/MCL (ref 140–450)
PO2 BLDA: 103 MM HG (ref 83–108)
PO2 BLDA: 129 MM HG (ref 83–108)
PO2 BLDA: 131 MM HG (ref 83–108)
PO2 BLDA: 133 MM HG (ref 83–108)
PO2 BLDA: 138 MM HG (ref 83–108)
PO2 BLDA: 186 MM HG (ref 83–108)
PO2 BLDA: 62 MM HG (ref 83–108)
PO2 BLDA: 66 MM HG (ref 83–108)
PO2 BLDA: 72 MM HG (ref 83–108)
PO2 BLDA: 74 MM HG (ref 83–108)
PO2 BLDA: 77 MM HG (ref 83–108)
PO2 BLDA: 87 MM HG (ref 83–108)
PO2 BLDA: >400 MM HG (ref 83–108)
POTASSIUM BLD-SCNC: 3.3 MMOL/L (ref 3.4–5.1)
POTASSIUM BLD-SCNC: 3.5 MMOL/L (ref 3.4–5.1)
POTASSIUM BLD-SCNC: 3.6 MMOL/L (ref 3.4–5.1)
POTASSIUM BLD-SCNC: 3.7 MMOL/L (ref 3.4–5.1)
POTASSIUM BLD-SCNC: 3.8 MMOL/L (ref 3.4–5.1)
POTASSIUM BLD-SCNC: 3.8 MMOL/L (ref 3.4–5.1)
POTASSIUM BLD-SCNC: 3.9 MMOL/L (ref 3.4–5.1)
POTASSIUM BLD-SCNC: 4 MMOL/L (ref 3.4–5.1)
POTASSIUM BLD-SCNC: 4 MMOL/L (ref 3.4–5.1)
POTASSIUM BLD-SCNC: 4.1 MMOL/L (ref 3.4–5.1)
POTASSIUM BLD-SCNC: 4.1 MMOL/L (ref 3.4–5.1)
POTASSIUM BLD-SCNC: 4.6 MMOL/L (ref 3.4–5.1)
POTASSIUM BLD-SCNC: 5.4 MMOL/L (ref 3.4–5.1)
POTASSIUM SERPL-SCNC: 3.6 MMOL/L (ref 3.4–5.1)
POTASSIUM SERPL-SCNC: 4.2 MMOL/L (ref 3.4–5.1)
POTASSIUM SERPL-SCNC: 4.4 MMOL/L (ref 3.4–5.3)
PR-INTERVAL (MSEC): 204
PROT SERPL-MCNC: 6.8 G/DL (ref 6.4–8.2)
PROT SERPL-MCNC: 7.3 G/DL (ref 6.4–8.3)
PROTHROMBIN TIME: 11.7 SEC (ref 9.7–11.8)
QRS-INTERVAL (MSEC): 96
QT-INTERVAL (MSEC): 464
QTC: 467
R AXIS (DEGREES): -28
RBC # BLD: 3.25 MIL/MCL (ref 4–5.2)
RBC # BLD: 4.2 MIL/MCL (ref 4–5.2)
REPORT TEXT: NORMAL
SAO2 % BLDA: 100 % (ref 95–99)
SAO2 % BLDA: 90 % (ref 95–99)
SAO2 % BLDA: 91 % (ref 95–99)
SAO2 % BLDA: 93 % (ref 95–99)
SAO2 % BLDA: 93 % (ref 95–99)
SAO2 % BLDA: 94 % (ref 95–99)
SAO2 % BLDA: 95 % (ref 95–99)
SAO2 % BLDA: 97 % (ref 95–99)
SAO2 % BLDA: 98 % (ref 95–99)
SAO2 % BLDA: 99 % (ref 95–99)
SAO2 % BLDA: ABNORMAL %
SAO2 % BLDV: 72 % (ref 60–80)
SODIUM BLD-SCNC: 140 MMOL/L (ref 135–145)
SODIUM BLD-SCNC: 140 MMOL/L (ref 135–145)
SODIUM BLD-SCNC: 141 MMOL/L (ref 135–145)
SODIUM BLD-SCNC: 142 MMOL/L (ref 135–145)
SODIUM BLD-SCNC: 143 MMOL/L (ref 135–145)
SODIUM BLD-SCNC: 144 MMOL/L (ref 135–145)
SODIUM BLD-SCNC: 146 MMOL/L (ref 135–145)
SODIUM BLD-SCNC: 147 MMOL/L (ref 135–145)
SODIUM BLD-SCNC: 147 MMOL/L (ref 135–145)
SODIUM BLD-SCNC: 148 MMOL/L (ref 135–145)
SODIUM SERPL-SCNC: 129 MMOL/L (ref 135–145)
SODIUM SERPL-SCNC: 140 MMOL/L (ref 135–145)
T AXIS (DEGREES): 78
TYPE AND SCREEN EXPIRATION DATE: NORMAL
VENTRICULAR RATE EKG/MIN (BPM): 61
WBC # BLD: 6.9 K/MCL (ref 4.2–11)
WBC # BLD: 9.7 K/MCL (ref 4.2–11)

## 2024-08-26 PROCEDURE — 10002801 HB RX 250 W/O HCPCS: Performed by: ANESTHESIOLOGY

## 2024-08-26 PROCEDURE — 10002359 HB ANESTH GENERAL ADD'L 1/2 HR: Performed by: THORACIC SURGERY (CARDIOTHORACIC VASCULAR SURGERY)

## 2024-08-26 PROCEDURE — 80053 COMPREHEN METABOLIC PANEL: CPT | Performed by: FAMILY MEDICINE

## 2024-08-26 PROCEDURE — 82962 GLUCOSE BLOOD TEST: CPT

## 2024-08-26 PROCEDURE — 85347 COAGULATION TIME ACTIVATED: CPT

## 2024-08-26 PROCEDURE — 85610 PROTHROMBIN TIME: CPT | Performed by: NURSE PRACTITIONER

## 2024-08-26 PROCEDURE — 10002117 HB ADDITIONAL SURGERY TIME/30 MIN: Performed by: THORACIC SURGERY (CARDIOTHORACIC VASCULAR SURGERY)

## 2024-08-26 PROCEDURE — 10002807 HB RX 258: Performed by: NURSE PRACTITIONER

## 2024-08-26 PROCEDURE — 10000008 HB ROOM CHARGE ICU OR CCU

## 2024-08-26 PROCEDURE — 10002807 HB RX 258: Performed by: THORACIC SURGERY (CARDIOTHORACIC VASCULAR SURGERY)

## 2024-08-26 PROCEDURE — 10002801 HB RX 250 W/O HCPCS

## 2024-08-26 PROCEDURE — 33518 CABG ARTERY-VEIN TWO: CPT | Performed by: THORACIC SURGERY (CARDIOTHORACIC VASCULAR SURGERY)

## 2024-08-26 PROCEDURE — 10002800 HB RX 250 W HCPCS: Performed by: NURSE PRACTITIONER

## 2024-08-26 PROCEDURE — P9045 ALBUMIN (HUMAN), 5%, 250 ML: HCPCS | Performed by: THORACIC SURGERY (CARDIOTHORACIC VASCULAR SURGERY)

## 2024-08-26 PROCEDURE — 86850 RBC ANTIBODY SCREEN: CPT | Performed by: NURSE PRACTITIONER

## 2024-08-26 PROCEDURE — 10002800 HB RX 250 W HCPCS: Performed by: ANESTHESIOLOGY

## 2024-08-26 PROCEDURE — 83735 ASSAY OF MAGNESIUM: CPT | Performed by: NURSE PRACTITIONER

## 2024-08-26 PROCEDURE — 82043 UR ALBUMIN QUANTITATIVE: CPT | Performed by: FAMILY MEDICINE

## 2024-08-26 PROCEDURE — P9047 ALBUMIN (HUMAN), 25%, 50ML: HCPCS | Performed by: THORACIC SURGERY (CARDIOTHORACIC VASCULAR SURGERY)

## 2024-08-26 PROCEDURE — 82810 BLOOD GASES O2 SAT ONLY: CPT | Performed by: NURSE PRACTITIONER

## 2024-08-26 PROCEDURE — 10002803 HB RX 637: Performed by: NURSE PRACTITIONER

## 2024-08-26 PROCEDURE — 06BP4ZZ EXCISION OF RIGHT SAPHENOUS VEIN, PERCUTANEOUS ENDOSCOPIC APPROACH: ICD-10-PCS | Performed by: THORACIC SURGERY (CARDIOTHORACIC VASCULAR SURGERY)

## 2024-08-26 PROCEDURE — 85025 COMPLETE CBC W/AUTO DIFF WBC: CPT | Performed by: INTERNAL MEDICINE

## 2024-08-26 PROCEDURE — C1760 CLOSURE DEV, VASC: HCPCS | Performed by: THORACIC SURGERY (CARDIOTHORACIC VASCULAR SURGERY)

## 2024-08-26 PROCEDURE — 82803 BLOOD GASES ANY COMBINATION: CPT

## 2024-08-26 PROCEDURE — 33268 EXCL LAA OPN OTH PX ANY METH: CPT | Performed by: THORACIC SURGERY (CARDIOTHORACIC VASCULAR SURGERY)

## 2024-08-26 PROCEDURE — 86923 COMPATIBILITY TEST ELECTRIC: CPT

## 2024-08-26 PROCEDURE — 85384 FIBRINOGEN ACTIVITY: CPT | Performed by: NURSE PRACTITIONER

## 2024-08-26 PROCEDURE — P9045 ALBUMIN (HUMAN), 5%, 250 ML: HCPCS | Performed by: ANESTHESIOLOGY

## 2024-08-26 PROCEDURE — 33518 CABG ARTERY-VEIN TWO: CPT | Performed by: PHYSICIAN ASSISTANT

## 2024-08-26 PROCEDURE — 85025 COMPLETE CBC W/AUTO DIFF WBC: CPT | Performed by: NURSE PRACTITIONER

## 2024-08-26 PROCEDURE — C1894 INTRO/SHEATH, NON-LASER: HCPCS | Performed by: THORACIC SURGERY (CARDIOTHORACIC VASCULAR SURGERY)

## 2024-08-26 PROCEDURE — 10002800 HB RX 250 W HCPCS: Performed by: THORACIC SURGERY (CARDIOTHORACIC VASCULAR SURGERY)

## 2024-08-26 PROCEDURE — 10002801 HB RX 250 W/O HCPCS: Performed by: THORACIC SURGERY (CARDIOTHORACIC VASCULAR SURGERY)

## 2024-08-26 PROCEDURE — 5A1221Z PERFORMANCE OF CARDIAC OUTPUT, CONTINUOUS: ICD-10-PCS | Performed by: THORACIC SURGERY (CARDIOTHORACIC VASCULAR SURGERY)

## 2024-08-26 PROCEDURE — 84132 ASSAY OF SERUM POTASSIUM: CPT

## 2024-08-26 PROCEDURE — 33533 CABG ARTERIAL SINGLE: CPT | Performed by: THORACIC SURGERY (CARDIOTHORACIC VASCULAR SURGERY)

## 2024-08-26 PROCEDURE — 36415 COLL VENOUS BLD VENIPUNCTURE: CPT | Performed by: NURSE PRACTITIONER

## 2024-08-26 PROCEDURE — 85730 THROMBOPLASTIN TIME PARTIAL: CPT | Performed by: NURSE PRACTITIONER

## 2024-08-26 PROCEDURE — 02100Z9 BYPASS CORONARY ARTERY, ONE ARTERY FROM LEFT INTERNAL MAMMARY, OPEN APPROACH: ICD-10-PCS | Performed by: THORACIC SURGERY (CARDIOTHORACIC VASCULAR SURGERY)

## 2024-08-26 PROCEDURE — 86891 AUTOLOGOUS BLOOD OP SALVAGE: CPT | Performed by: THORACIC SURGERY (CARDIOTHORACIC VASCULAR SURGERY)

## 2024-08-26 PROCEDURE — 10006023 HB SUPPLY 272: Performed by: THORACIC SURGERY (CARDIOTHORACIC VASCULAR SURGERY)

## 2024-08-26 PROCEDURE — 10002019 HB COUNTER RESP ASSESSMENT

## 2024-08-26 PROCEDURE — 84132 ASSAY OF SERUM POTASSIUM: CPT | Performed by: NURSE PRACTITIONER

## 2024-08-26 PROCEDURE — 71045 X-RAY EXAM CHEST 1 VIEW: CPT

## 2024-08-26 PROCEDURE — 33508 ENDOSCOPIC VEIN HARVEST: CPT | Performed by: THORACIC SURGERY (CARDIOTHORACIC VASCULAR SURGERY)

## 2024-08-26 PROCEDURE — 82570 ASSAY OF URINE CREATININE: CPT | Performed by: FAMILY MEDICINE

## 2024-08-26 PROCEDURE — 10002358 HB ANESTH GENERAL 1ST 1/2 HR: Performed by: THORACIC SURGERY (CARDIOTHORACIC VASCULAR SURGERY)

## 2024-08-26 PROCEDURE — P9045 ALBUMIN (HUMAN), 5%, 250 ML: HCPCS | Performed by: NURSE PRACTITIONER

## 2024-08-26 PROCEDURE — 10002801 HB RX 250 W/O HCPCS: Performed by: INTERNAL MEDICINE

## 2024-08-26 PROCEDURE — 82947 ASSAY GLUCOSE BLOOD QUANT: CPT

## 2024-08-26 PROCEDURE — 94002 VENT MGMT INPAT INIT DAY: CPT

## 2024-08-26 PROCEDURE — 33533 CABG ARTERIAL SINGLE: CPT | Performed by: PHYSICIAN ASSISTANT

## 2024-08-26 PROCEDURE — 10002356 HB ANESTH FM W/SWAN 1ST 1/2 HR

## 2024-08-26 PROCEDURE — 10004651 HB RX, NO CHARGE ITEM: Performed by: NURSE PRACTITIONER

## 2024-08-26 PROCEDURE — 021109W BYPASS CORONARY ARTERY, TWO ARTERIES FROM AORTA WITH AUTOLOGOUS VENOUS TISSUE, OPEN APPROACH: ICD-10-PCS | Performed by: THORACIC SURGERY (CARDIOTHORACIC VASCULAR SURGERY)

## 2024-08-26 PROCEDURE — 85730 THROMBOPLASTIN TIME PARTIAL: CPT | Performed by: INTERNAL MEDICINE

## 2024-08-26 PROCEDURE — 10004180 HB COUNTER-TRANSPORT

## 2024-08-26 PROCEDURE — 02L70CK OCCLUSION OF LEFT ATRIAL APPENDAGE WITH EXTRALUMINAL DEVICE, OPEN APPROACH: ICD-10-PCS | Performed by: THORACIC SURGERY (CARDIOTHORACIC VASCULAR SURGERY)

## 2024-08-26 PROCEDURE — 94150 VITAL CAPACITY TEST: CPT

## 2024-08-26 PROCEDURE — 84295 ASSAY OF SERUM SODIUM: CPT

## 2024-08-26 PROCEDURE — 10002807 HB RX 258: Performed by: ANESTHESIOLOGY

## 2024-08-26 PROCEDURE — 10002801 HB RX 250 W/O HCPCS: Performed by: NURSE PRACTITIONER

## 2024-08-26 PROCEDURE — 36620 INSERTION CATHETER ARTERY: CPT

## 2024-08-26 PROCEDURE — 36415 COLL VENOUS BLD VENIPUNCTURE: CPT | Performed by: FAMILY MEDICINE

## 2024-08-26 PROCEDURE — 83036 HEMOGLOBIN GLYCOSYLATED A1C: CPT | Performed by: FAMILY MEDICINE

## 2024-08-26 PROCEDURE — 10006027 HB SUPPLY 278: Performed by: THORACIC SURGERY (CARDIOTHORACIC VASCULAR SURGERY)

## 2024-08-26 PROCEDURE — 99291 CRITICAL CARE FIRST HOUR: CPT | Performed by: INTERNAL MEDICINE

## 2024-08-26 PROCEDURE — C1768 GRAFT, VASCULAR: HCPCS | Performed by: THORACIC SURGERY (CARDIOTHORACIC VASCULAR SURGERY)

## 2024-08-26 PROCEDURE — 10002016 HB COUNTER INCENTIVE SPIROMETRY

## 2024-08-26 PROCEDURE — C1751 CATH, INF, PER/CENT/MIDLINE: HCPCS | Performed by: THORACIC SURGERY (CARDIOTHORACIC VASCULAR SURGERY)

## 2024-08-26 PROCEDURE — 71045 X-RAY EXAM CHEST 1 VIEW: CPT | Performed by: RADIOLOGY

## 2024-08-26 PROCEDURE — 33508 ENDOSCOPIC VEIN HARVEST: CPT | Performed by: PHYSICIAN ASSISTANT

## 2024-08-26 PROCEDURE — 10006396 HB CARDIO-THORACIC MAJOR COMPLEX: Performed by: THORACIC SURGERY (CARDIOTHORACIC VASCULAR SURGERY)

## 2024-08-26 PROCEDURE — 82330 ASSAY OF CALCIUM: CPT

## 2024-08-26 PROCEDURE — 99214 OFFICE O/P EST MOD 30 MIN: CPT | Performed by: FAMILY MEDICINE

## 2024-08-26 PROCEDURE — 85014 HEMATOCRIT: CPT

## 2024-08-26 DEVICE — FLOSEAL WITH RECOTHROM - 10ML.
Type: IMPLANTABLE DEVICE | Site: CHEST | Status: FUNCTIONAL
Brand: FLOSEAL HEMOSTATIC MATRIX

## 2024-08-26 DEVICE — THE HEMOSTASIS BONESEAL BONE HEMOSTAT STOPS BONE BLEEDING BY ESTABLISHING A PHYSICAL BARRIER ALONG THE EDGES OF BONES THAT HAVE BEEN DAMAGED BY TRAUMA OR CUT DURING A SURGICAL PROCEDURE. WHEN APPLIED AS DIRECTED, BONESEAL FORMS A MECHANICAL BARRIER THAT OCCLUDES THE VASCULAR OPENINGS IN THE DAMAGED BONE. THIS BARRIER PREVENTS FURTHER BLEEDING DURING THE SURGICAL PROCEDURE.BONESEAL IS BASED UPON KNOWN BIODEGRADABLE POLYMERIC CHEMISTRY THAT FORMS A READY-TO-USE HEMOSTATIC AGENT. BONESEAL IS AVAILABLE AS A BONE HEMOSTAT INGOT OF VARIOUS MASSES. THE BONE HEMOSTAT INGOT CAN BE MOLDED AND FORMED BY THE SURGEON TO FIT THE DAMAGED BONE AND APPLIED TO THE BLEEDING AREA.
Type: IMPLANTABLE DEVICE | Site: CHEST | Status: FUNCTIONAL
Brand: BONESEAL 3.5 GRAM INGOT

## 2024-08-26 DEVICE — BARD® PTFE FELT, 2.5 CM X 2.5 CM
Type: IMPLANTABLE DEVICE | Site: CHEST | Status: FUNCTIONAL
Brand: BARD® PTFE FELT

## 2024-08-26 DEVICE — LAA EXCLUSION SYSTEM, ACHV50
Type: IMPLANTABLE DEVICE | Site: ATRIUM | Status: FUNCTIONAL
Brand: ATRICLIP® FLEX-V® LAA EXCLUSION SYSTEM

## 2024-08-26 DEVICE — CLIP INTERNAL MED CHEVRON LIGATE TRIANGULATE XSECT HRT WIRE: Type: IMPLANTABLE DEVICE | Site: CHEST | Status: FUNCTIONAL

## 2024-08-26 DEVICE — IMPLANTABLE DEVICE
Type: IMPLANTABLE DEVICE | Site: CHEST | Status: FUNCTIONAL
Brand: SURGIFOAM® ABSORBABLE GELATIN SPONGE, U.S.P.

## 2024-08-26 DEVICE — HORIZON TI LG 6 CLIPS/CART
Type: IMPLANTABLE DEVICE | Site: CHEST | Status: FUNCTIONAL
Brand: WECK

## 2024-08-26 RX ORDER — POTASSIUM CHLORIDE 29.8 MG/ML
INJECTION INTRAVENOUS PRN
Status: DISCONTINUED | OUTPATIENT
Start: 2024-08-26 | End: 2024-08-26

## 2024-08-26 RX ORDER — POLYETHYLENE GLYCOL 3350 17 G/17G
17 POWDER, FOR SOLUTION ORAL 2 TIMES DAILY
Status: DISCONTINUED | OUTPATIENT
Start: 2024-08-30 | End: 2024-08-31 | Stop reason: HOSPADM

## 2024-08-26 RX ORDER — AMOXICILLIN 250 MG
2 CAPSULE ORAL 2 TIMES DAILY PRN
Status: DISCONTINUED | OUTPATIENT
Start: 2024-08-26 | End: 2024-08-31 | Stop reason: HOSPADM

## 2024-08-26 RX ORDER — ACETAMINOPHEN 650 MG/1
650 SUPPOSITORY RECTAL EVERY 4 HOURS PRN
Status: DISCONTINUED | OUTPATIENT
Start: 2024-08-26 | End: 2024-08-31 | Stop reason: HOSPADM

## 2024-08-26 RX ORDER — PAPAVERINE HYDROCHLORIDE 30 MG/ML
INJECTION, SOLUTION INTRAVENOUS PRN
Status: DISCONTINUED | OUTPATIENT
Start: 2024-08-26 | End: 2024-08-26 | Stop reason: HOSPADM

## 2024-08-26 RX ORDER — 0.9 % SODIUM CHLORIDE 0.9 %
10 VIAL (ML) INJECTION EVERY 12 HOURS SCHEDULED
Status: DISCONTINUED | OUTPATIENT
Start: 2024-08-26 | End: 2024-08-31 | Stop reason: HOSPADM

## 2024-08-26 RX ORDER — ALBUMIN (HUMAN) 12.5 G/50ML
SOLUTION INTRAVENOUS PRN
Status: DISCONTINUED | OUTPATIENT
Start: 2024-08-26 | End: 2024-08-26

## 2024-08-26 RX ORDER — MIDAZOLAM HYDROCHLORIDE 1 MG/ML
INJECTION, SOLUTION INTRAMUSCULAR; INTRAVENOUS PRN
Status: DISCONTINUED | OUTPATIENT
Start: 2024-08-26 | End: 2024-08-26

## 2024-08-26 RX ORDER — ACETAMINOPHEN 10 MG/ML
1000 INJECTION, SOLUTION INTRAVENOUS ONCE
Status: COMPLETED | OUTPATIENT
Start: 2024-08-26 | End: 2024-08-26

## 2024-08-26 RX ORDER — FAMOTIDINE 10 MG/ML
20 INJECTION, SOLUTION INTRAVENOUS DAILY
Status: DISCONTINUED | OUTPATIENT
Start: 2024-08-26 | End: 2024-08-27 | Stop reason: SDUPTHER

## 2024-08-26 RX ORDER — POTASSIUM CHLORIDE 29.8 MG/ML
40 INJECTION INTRAVENOUS PRN
Status: DISCONTINUED | OUTPATIENT
Start: 2024-08-26 | End: 2024-08-31 | Stop reason: HOSPADM

## 2024-08-26 RX ORDER — INSULIN LISPRO 100 [IU]/ML
4 INJECTION, SOLUTION INTRAVENOUS; SUBCUTANEOUS
Status: DISCONTINUED | OUTPATIENT
Start: 2024-08-26 | End: 2024-08-28

## 2024-08-26 RX ORDER — OXYCODONE HYDROCHLORIDE 5 MG/1
5 TABLET ORAL EVERY 4 HOURS PRN
Status: DISCONTINUED | OUTPATIENT
Start: 2024-08-26 | End: 2024-08-31 | Stop reason: HOSPADM

## 2024-08-26 RX ORDER — ONDANSETRON 4 MG/1
4 TABLET, ORALLY DISINTEGRATING ORAL EVERY 12 HOURS PRN
Status: DISCONTINUED | OUTPATIENT
Start: 2024-08-26 | End: 2024-08-31 | Stop reason: HOSPADM

## 2024-08-26 RX ORDER — CALCIUM CHLORIDE 100 MG/ML
INJECTION INTRAVENOUS; INTRAVENTRICULAR PRN
Status: DISCONTINUED | OUTPATIENT
Start: 2024-08-26 | End: 2024-08-26

## 2024-08-26 RX ORDER — HEPARIN SODIUM 5000 [USP'U]/ML
INJECTION, SOLUTION INTRAVENOUS; SUBCUTANEOUS PRN
Status: DISCONTINUED | OUTPATIENT
Start: 2024-08-26 | End: 2024-08-26

## 2024-08-26 RX ORDER — BISACODYL 10 MG
10 SUPPOSITORY, RECTAL RECTAL ONCE
Status: DISCONTINUED | OUTPATIENT
Start: 2024-08-28 | End: 2024-08-31 | Stop reason: HOSPADM

## 2024-08-26 RX ORDER — ALBUMIN, HUMAN INJ 5% 5 %
12.5 SOLUTION INTRAVENOUS PRN
Status: COMPLETED | OUTPATIENT
Start: 2024-08-26 | End: 2024-08-27

## 2024-08-26 RX ORDER — DEXTROSE MONOHYDRATE AND SODIUM CHLORIDE 5; .45 G/100ML; G/100ML
INJECTION, SOLUTION INTRAVENOUS CONTINUOUS PRN
Status: DISCONTINUED | OUTPATIENT
Start: 2024-08-26 | End: 2024-08-28

## 2024-08-26 RX ORDER — MAGNESIUM SULFATE 1 G/100ML
1 INJECTION INTRAVENOUS PRN
Status: DISCONTINUED | OUTPATIENT
Start: 2024-08-26 | End: 2024-08-31 | Stop reason: HOSPADM

## 2024-08-26 RX ORDER — LANOLIN ALCOHOL/MO/W.PET/CERES
6 CREAM (GRAM) TOPICAL NIGHTLY PRN
Status: DISCONTINUED | OUTPATIENT
Start: 2024-08-26 | End: 2024-08-31 | Stop reason: HOSPADM

## 2024-08-26 RX ORDER — ALBUMIN, HUMAN INJ 5% 5 %
SOLUTION INTRAVENOUS CONTINUOUS PRN
Status: DISCONTINUED | OUTPATIENT
Start: 2024-08-26 | End: 2024-08-26

## 2024-08-26 RX ORDER — DEXTROSE MONOHYDRATE 25 G/50ML
25 INJECTION, SOLUTION INTRAVENOUS PRN
Status: DISCONTINUED | OUTPATIENT
Start: 2024-08-26 | End: 2024-08-28

## 2024-08-26 RX ORDER — SULFAMETHOXAZOLE/TRIMETHOPRIM 800-160 MG
1 TABLET ORAL 2 TIMES DAILY
Qty: 14 TABLET | Refills: 0 | Status: SHIPPED | OUTPATIENT
Start: 2024-08-26

## 2024-08-26 RX ORDER — NOREPINEPHRINE BITARTRATE/D5W 8 MG/250ML
0-10 PLASTIC BAG, INJECTION (ML) INTRAVENOUS CONTINUOUS
Status: DISCONTINUED | OUTPATIENT
Start: 2024-08-26 | End: 2024-08-28 | Stop reason: ALTCHOICE

## 2024-08-26 RX ORDER — DEXAMETHASONE SODIUM PHOSPHATE 4 MG/ML
4 INJECTION, SOLUTION INTRA-ARTICULAR; INTRALESIONAL; INTRAMUSCULAR; INTRAVENOUS; SOFT TISSUE
Status: DISCONTINUED | OUTPATIENT
Start: 2024-08-26 | End: 2024-08-27

## 2024-08-26 RX ORDER — ASPIRIN 81 MG/1
81 TABLET, CHEWABLE ORAL DAILY
Status: DISCONTINUED | OUTPATIENT
Start: 2024-08-27 | End: 2024-08-30

## 2024-08-26 RX ORDER — DEXTROSE MONOHYDRATE 25 G/50ML
12.5 INJECTION, SOLUTION INTRAVENOUS PRN
Status: DISCONTINUED | OUTPATIENT
Start: 2024-08-26 | End: 2024-08-28

## 2024-08-26 RX ORDER — SODIUM CHLORIDE 9 MG/ML
INJECTION, SOLUTION INTRAVENOUS CONTINUOUS PRN
Status: DISCONTINUED | OUTPATIENT
Start: 2024-08-26 | End: 2024-08-28

## 2024-08-26 RX ORDER — PROCHLORPERAZINE EDISYLATE 5 MG/ML
5 INJECTION INTRAMUSCULAR; INTRAVENOUS EVERY 4 HOURS PRN
Status: DISCONTINUED | OUTPATIENT
Start: 2024-08-26 | End: 2024-08-31 | Stop reason: HOSPADM

## 2024-08-26 RX ORDER — POTASSIUM CHLORIDE 29.8 MG/ML
20 INJECTION INTRAVENOUS PRN
Status: DISCONTINUED | OUTPATIENT
Start: 2024-08-26 | End: 2024-08-31 | Stop reason: HOSPADM

## 2024-08-26 RX ORDER — ADENOSINE 3 MG/ML
INJECTION, SOLUTION INTRAVENOUS PRN
Status: DISCONTINUED | OUTPATIENT
Start: 2024-08-26 | End: 2024-08-26

## 2024-08-26 RX ORDER — AMOXICILLIN 250 MG
2 CAPSULE ORAL DAILY
Status: DISCONTINUED | OUTPATIENT
Start: 2024-08-27 | End: 2024-08-31 | Stop reason: HOSPADM

## 2024-08-26 RX ORDER — ONDANSETRON 2 MG/ML
4 INJECTION INTRAMUSCULAR; INTRAVENOUS EVERY 12 HOURS PRN
Status: DISCONTINUED | OUTPATIENT
Start: 2024-08-26 | End: 2024-08-31 | Stop reason: HOSPADM

## 2024-08-26 RX ORDER — SODIUM CHLORIDE 9 MG/ML
INJECTION, SOLUTION INTRAVENOUS CONTINUOUS PRN
Status: DISCONTINUED | OUTPATIENT
Start: 2024-08-26 | End: 2024-08-27

## 2024-08-26 RX ORDER — 0.9 % SODIUM CHLORIDE 0.9 %
20 VIAL (ML) INJECTION PRN
Status: DISCONTINUED | OUTPATIENT
Start: 2024-08-26 | End: 2024-08-31 | Stop reason: HOSPADM

## 2024-08-26 RX ORDER — DEXMEDETOMIDINE HYDROCHLORIDE 4 UG/ML
0-1.5 INJECTION, SOLUTION INTRAVENOUS CONTINUOUS
Status: DISCONTINUED | OUTPATIENT
Start: 2024-08-26 | End: 2024-08-27

## 2024-08-26 RX ORDER — PROTAMINE SULFATE 10 MG/ML
INJECTION, SOLUTION INTRAVENOUS PRN
Status: DISCONTINUED | OUTPATIENT
Start: 2024-08-26 | End: 2024-08-26

## 2024-08-26 RX ORDER — 0.9 % SODIUM CHLORIDE 0.9 %
10 VIAL (ML) INJECTION PRN
Status: DISCONTINUED | OUTPATIENT
Start: 2024-08-26 | End: 2024-08-31 | Stop reason: HOSPADM

## 2024-08-26 RX ORDER — BISACODYL 10 MG
10 SUPPOSITORY, RECTAL RECTAL DAILY PRN
Status: DISCONTINUED | OUTPATIENT
Start: 2024-08-26 | End: 2024-08-31 | Stop reason: HOSPADM

## 2024-08-26 RX ORDER — NICOTINE POLACRILEX 4 MG
30 LOZENGE BUCCAL PRN
Status: DISCONTINUED | OUTPATIENT
Start: 2024-08-26 | End: 2024-08-28

## 2024-08-26 RX ORDER — PROCHLORPERAZINE MALEATE 10 MG
5 TABLET ORAL EVERY 4 HOURS PRN
Status: DISCONTINUED | OUTPATIENT
Start: 2024-08-26 | End: 2024-08-31 | Stop reason: HOSPADM

## 2024-08-26 RX ORDER — ACETAMINOPHEN 325 MG/1
650 TABLET ORAL EVERY 4 HOURS PRN
Status: DISCONTINUED | OUTPATIENT
Start: 2024-08-26 | End: 2024-08-31 | Stop reason: HOSPADM

## 2024-08-26 RX ORDER — NALOXONE HCL 0.4 MG/ML
0.4 VIAL (ML) INJECTION PRN
Status: DISCONTINUED | OUTPATIENT
Start: 2024-08-26 | End: 2024-08-31 | Stop reason: HOSPADM

## 2024-08-26 RX ORDER — NICOTINE POLACRILEX 4 MG
15 LOZENGE BUCCAL PRN
Status: DISCONTINUED | OUTPATIENT
Start: 2024-08-26 | End: 2024-08-28

## 2024-08-26 RX ORDER — ONDANSETRON 2 MG/ML
4 INJECTION INTRAMUSCULAR; INTRAVENOUS
Status: DISCONTINUED | OUTPATIENT
Start: 2024-08-26 | End: 2024-08-27

## 2024-08-26 RX ORDER — SODIUM CHLORIDE 9 MG/ML
INJECTION, SOLUTION INTRAVENOUS CONTINUOUS PRN
Status: DISCONTINUED | OUTPATIENT
Start: 2024-08-26 | End: 2024-08-26

## 2024-08-26 RX ORDER — POLYETHYLENE GLYCOL 3350 17 G/17G
17 POWDER, FOR SOLUTION ORAL DAILY PRN
Status: DISCONTINUED | OUTPATIENT
Start: 2024-08-26 | End: 2024-08-31 | Stop reason: HOSPADM

## 2024-08-26 RX ORDER — VASOPRESSIN IN DEXTROSE 5 % 20/100 ML
0-.1 PLASTIC BAG, INJECTION (ML) INTRAVENOUS CONTINUOUS
Status: DISCONTINUED | OUTPATIENT
Start: 2024-08-26 | End: 2024-08-28 | Stop reason: ALTCHOICE

## 2024-08-26 RX ORDER — PROCHLORPERAZINE EDISYLATE 5 MG/ML
5 INJECTION INTRAMUSCULAR; INTRAVENOUS
Status: COMPLETED | OUTPATIENT
Start: 2024-08-26 | End: 2024-08-27

## 2024-08-26 RX ORDER — DEXTROSE MONOHYDRATE, SODIUM CHLORIDE, AND POTASSIUM CHLORIDE 50; 2.98; 4.5 G/1000ML; G/1000ML; G/1000ML
INJECTION, SOLUTION INTRAVENOUS CONTINUOUS PRN
Status: DISCONTINUED | OUTPATIENT
Start: 2024-08-26 | End: 2024-08-28

## 2024-08-26 RX ORDER — PROPOFOL 10 MG/ML
INJECTION, EMULSION INTRAVENOUS PRN
Status: DISCONTINUED | OUTPATIENT
Start: 2024-08-26 | End: 2024-08-26

## 2024-08-26 RX ORDER — HYDROCODONE BITARTRATE AND ACETAMINOPHEN 5; 325 MG/1; MG/1
2 TABLET ORAL EVERY 4 HOURS PRN
Status: DISCONTINUED | OUTPATIENT
Start: 2024-08-26 | End: 2024-08-31 | Stop reason: HOSPADM

## 2024-08-26 RX ORDER — POTASSIUM CHLORIDE 1500 MG/1
40 TABLET, EXTENDED RELEASE ORAL PRN
Status: DISCONTINUED | OUTPATIENT
Start: 2024-08-26 | End: 2024-08-31 | Stop reason: HOSPADM

## 2024-08-26 RX ORDER — POTASSIUM CHLORIDE 1500 MG/1
20 TABLET, EXTENDED RELEASE ORAL PRN
Status: DISCONTINUED | OUTPATIENT
Start: 2024-08-26 | End: 2024-08-31 | Stop reason: HOSPADM

## 2024-08-26 RX ORDER — SODIUM CHLORIDE, SODIUM GLUCONATE, SODIUM ACETATE, POTASSIUM CHLORIDE AND MAGNESIUM CHLORIDE 526; 502; 368; 37; 30 MG/100ML; MG/100ML; MG/100ML; MG/100ML; MG/100ML
INJECTION, SOLUTION INTRAVENOUS CONTINUOUS PRN
Status: DISCONTINUED | OUTPATIENT
Start: 2024-08-26 | End: 2024-08-26

## 2024-08-26 RX ORDER — CETIRIZINE HYDROCHLORIDE 10 MG/1
10 TABLET ORAL DAILY
Qty: 90 TABLET | Refills: 3 | Status: SHIPPED | OUTPATIENT
Start: 2024-08-26

## 2024-08-26 RX ORDER — ROCURONIUM BROMIDE 10 MG/ML
INJECTION, SOLUTION INTRAVENOUS PRN
Status: DISCONTINUED | OUTPATIENT
Start: 2024-08-26 | End: 2024-08-26

## 2024-08-26 RX ORDER — CHLORHEXIDINE GLUCONATE ORAL RINSE 1.2 MG/ML
15 SOLUTION DENTAL EVERY 12 HOURS SCHEDULED
Status: DISCONTINUED | OUTPATIENT
Start: 2024-08-26 | End: 2024-08-26

## 2024-08-26 RX ORDER — CHLORHEXIDINE GLUCONATE ORAL RINSE 1.2 MG/ML
15 SOLUTION DENTAL PRN
Status: DISCONTINUED | OUTPATIENT
Start: 2024-08-26 | End: 2024-08-27

## 2024-08-26 RX ORDER — PREDNISONE 20 MG/1
TABLET ORAL
Qty: 12 TABLET | Refills: 0 | Status: SHIPPED | OUTPATIENT
Start: 2024-08-26

## 2024-08-26 RX ORDER — NITROGLYCERIN 20 MG/100ML
INJECTION INTRAVENOUS CONTINUOUS PRN
Status: DISCONTINUED | OUTPATIENT
Start: 2024-08-26 | End: 2024-08-26

## 2024-08-26 RX ORDER — NITROGLYCERIN 20 MG/100ML
0-200 INJECTION INTRAVENOUS CONTINUOUS PRN
Status: DISCONTINUED | OUTPATIENT
Start: 2024-08-26 | End: 2024-08-27

## 2024-08-26 RX ORDER — HYDROCODONE BITARTRATE AND ACETAMINOPHEN 5; 325 MG/1; MG/1
1 TABLET ORAL EVERY 4 HOURS PRN
Status: DISCONTINUED | OUTPATIENT
Start: 2024-08-26 | End: 2024-08-31 | Stop reason: HOSPADM

## 2024-08-26 RX ORDER — METOCLOPRAMIDE HYDROCHLORIDE 5 MG/ML
5 INJECTION INTRAMUSCULAR; INTRAVENOUS
Status: DISCONTINUED | OUTPATIENT
Start: 2024-08-26 | End: 2024-08-27

## 2024-08-26 RX ORDER — DEXAMETHASONE SODIUM PHOSPHATE 4 MG/ML
INJECTION, SOLUTION INTRA-ARTICULAR; INTRALESIONAL; INTRAMUSCULAR; INTRAVENOUS; SOFT TISSUE PRN
Status: DISCONTINUED | OUTPATIENT
Start: 2024-08-26 | End: 2024-08-26

## 2024-08-26 RX ADMIN — ALBUMIN HUMAN 12.5 G: 0.05 INJECTION, SOLUTION INTRAVENOUS at 14:49

## 2024-08-26 RX ADMIN — ALBUMIN HUMAN 12.5 G: 0.05 INJECTION, SOLUTION INTRAVENOUS at 14:04

## 2024-08-26 RX ADMIN — SODIUM BICARBONATE 50 MEQ: 84 INJECTION INTRAVENOUS at 20:22

## 2024-08-26 RX ADMIN — MIDAZOLAM HYDROCHLORIDE 2 MG: 1 INJECTION, SOLUTION INTRAMUSCULAR; INTRAVENOUS at 07:05

## 2024-08-26 RX ADMIN — CEFAZOLIN SODIUM 2000 MG: 300 INJECTION, POWDER, LYOPHILIZED, FOR SOLUTION INTRAVENOUS at 08:04

## 2024-08-26 RX ADMIN — FAMOTIDINE 20 MG: 10 INJECTION INTRAVENOUS at 11:54

## 2024-08-26 RX ADMIN — SODIUM CHLORIDE, PRESERVATIVE FREE 10 ML: 5 INJECTION INTRAVENOUS at 20:24

## 2024-08-26 RX ADMIN — POTASSIUM CHLORIDE 20 MEQ: 29.8 INJECTION, SOLUTION INTRAVENOUS at 13:51

## 2024-08-26 RX ADMIN — DEXAMETHASONE SODIUM PHOSPHATE 4 MG: 4 INJECTION INTRA-ARTICULAR; INTRALESIONAL; INTRAMUSCULAR; INTRAVENOUS; SOFT TISSUE at 07:29

## 2024-08-26 RX ADMIN — FENTANYL CITRATE 12.5 MCG: 50 INJECTION INTRAMUSCULAR; INTRAVENOUS at 19:03

## 2024-08-26 RX ADMIN — PHENYLEPHRINE HYDROCHLORIDE 1.4 MG: 10 INJECTION INTRAMUSCULAR; INTRAVENOUS; SUBCUTANEOUS at 10:03

## 2024-08-26 RX ADMIN — CEFAZOLIN 2000 MG: 10 INJECTION, POWDER, FOR SOLUTION INTRAVENOUS at 21:33

## 2024-08-26 RX ADMIN — ALBUMIN HUMAN: 0.05 INJECTION, SOLUTION INTRAVENOUS at 10:03

## 2024-08-26 RX ADMIN — FENTANYL CITRATE 250 MCG: 50 INJECTION INTRAMUSCULAR; INTRAVENOUS at 07:29

## 2024-08-26 RX ADMIN — ONDANSETRON 4 MG: 2 INJECTION INTRAMUSCULAR; INTRAVENOUS at 21:46

## 2024-08-26 RX ADMIN — MAGNESIUM SULFATE HEPTAHYDRATE 2 G: 40 INJECTION, SOLUTION INTRAVENOUS at 20:24

## 2024-08-26 RX ADMIN — ACETAMINOPHEN 1000 MG: 10 INJECTION, SOLUTION INTRAVENOUS at 21:37

## 2024-08-26 RX ADMIN — AMINOCAPROIC ACID 10 G: 250 INJECTION, SOLUTION INTRAVENOUS at 08:04

## 2024-08-26 RX ADMIN — ROCURONIUM BROMIDE 50 MG: 10 INJECTION INTRAVENOUS at 09:16

## 2024-08-26 RX ADMIN — FENTANYL CITRATE 250 MCG: 50 INJECTION INTRAMUSCULAR; INTRAVENOUS at 08:20

## 2024-08-26 RX ADMIN — PROPOFOL 100 MG: 10 INJECTION, EMULSION INTRAVENOUS at 10:08

## 2024-08-26 RX ADMIN — DEXTROSE AND SODIUM CHLORIDE: 5; 450 INJECTION, SOLUTION INTRAVENOUS at 12:30

## 2024-08-26 RX ADMIN — SODIUM BICARBONATE 100 MEQ: 84 INJECTION INTRAVENOUS at 17:33

## 2024-08-26 RX ADMIN — PROPOFOL 50 MG: 10 INJECTION, EMULSION INTRAVENOUS at 10:06

## 2024-08-26 RX ADMIN — ALBUMIN HUMAN: 0.05 INJECTION, SOLUTION INTRAVENOUS at 08:15

## 2024-08-26 RX ADMIN — INSULIN HUMAN 2 UNITS/HR: 1 INJECTION, SOLUTION INTRAVENOUS at 09:42

## 2024-08-26 RX ADMIN — ALBUMIN HUMAN: 0.05 INJECTION, SOLUTION INTRAVENOUS at 09:53

## 2024-08-26 RX ADMIN — POTASSIUM CHLORIDE 26 MEQ: 2 INJECTION, SOLUTION, CONCENTRATE INTRAVENOUS at 10:04

## 2024-08-26 RX ADMIN — ROCURONIUM BROMIDE 50 MG: 10 INJECTION INTRAVENOUS at 07:29

## 2024-08-26 RX ADMIN — CALCIUM CHLORIDE 1 G: 100 INJECTION INTRAVENOUS; INTRAVENTRICULAR at 09:56

## 2024-08-26 RX ADMIN — Medication 100 MEQ: at 17:33

## 2024-08-26 RX ADMIN — NOREPINEPHRINE BITARTRATE 2 MCG/MIN: 8 INJECTION, SOLUTION INTRAVENOUS at 15:00

## 2024-08-26 RX ADMIN — AMINOCAPROIC ACID 2 G/HR: 250 INJECTION, SOLUTION INTRAVENOUS at 08:04

## 2024-08-26 RX ADMIN — SODIUM CHLORIDE: 9 INJECTION, SOLUTION INTRAVENOUS at 07:18

## 2024-08-26 RX ADMIN — CYANOCOBALAMIN 1000 MCG: 1000 INJECTION, SOLUTION INTRAMUSCULAR; SUBCUTANEOUS at 09:50

## 2024-08-26 RX ADMIN — ALBUMIN HUMAN 50 ML: 0.25 SOLUTION INTRAVENOUS at 07:23

## 2024-08-26 RX ADMIN — FENTANYL CITRATE 250 MCG: 50 INJECTION INTRAMUSCULAR; INTRAVENOUS at 08:25

## 2024-08-26 RX ADMIN — CEFAZOLIN SODIUM 2000 MG: 300 INJECTION, POWDER, LYOPHILIZED, FOR SOLUTION INTRAVENOUS at 09:10

## 2024-08-26 RX ADMIN — NITROGLYCERIN 10 MCG/MIN: 20 INJECTION INTRAVENOUS at 08:34

## 2024-08-26 RX ADMIN — CEFAZOLIN 2000 MG: 10 INJECTION, POWDER, FOR SOLUTION INTRAVENOUS at 14:05

## 2024-08-26 RX ADMIN — PROPOFOL INJECTABLE EMULSION 25 MCG/KG/MIN: 10 INJECTION, EMULSION INTRAVENOUS at 10:05

## 2024-08-26 RX ADMIN — NITROGLYCERIN 10 MCG/MIN: 20 INJECTION INTRAVENOUS at 12:00

## 2024-08-26 RX ADMIN — SODIUM BICARBONATE 50 MEQ: 84 INJECTION, SOLUTION INTRAVENOUS at 07:23

## 2024-08-26 RX ADMIN — ALBUMIN HUMAN 12.5 G: 0.05 INJECTION, SOLUTION INTRAVENOUS at 11:52

## 2024-08-26 RX ADMIN — SODIUM CHLORIDE, SODIUM GLUCONATE, SODIUM ACETATE, POTASSIUM CHLORIDE AND MAGNESIUM CHLORIDE: 526; 502; 368; 37; 30 INJECTION, SOLUTION INTRAVENOUS at 07:22

## 2024-08-26 RX ADMIN — HEPARIN SODIUM 5000 UNITS: 1000 INJECTION INTRAVENOUS; SUBCUTANEOUS at 09:53

## 2024-08-26 RX ADMIN — SODIUM CHLORIDE: 9 INJECTION, SOLUTION INTRAVENOUS at 07:54

## 2024-08-26 RX ADMIN — MAGNESIUM SULFATE HEPTAHYDRATE 1.23 G: 500 INJECTION, SOLUTION INTRAMUSCULAR; INTRAVENOUS at 10:04

## 2024-08-26 RX ADMIN — LIDOCAINE HYDROCHLORIDE 100 MG: 20 INJECTION, SOLUTION INTRAVENOUS at 09:53

## 2024-08-26 RX ADMIN — MIDAZOLAM HYDROCHLORIDE 3 MG: 1 INJECTION, SOLUTION INTRAMUSCULAR; INTRAVENOUS at 07:29

## 2024-08-26 RX ADMIN — POTASSIUM CHLORIDE, DEXTROSE MONOHYDRATE AND SODIUM CHLORIDE: 300; 5; 450 INJECTION, SOLUTION INTRAVENOUS at 11:24

## 2024-08-26 RX ADMIN — CHLORHEXIDINE GLUCONATE 15 ML: 1.2 RINSE ORAL at 06:44

## 2024-08-26 RX ADMIN — MAGNESIUM SULFATE IN DEXTROSE 1 G: 10 INJECTION, SOLUTION INTRAVENOUS at 12:15

## 2024-08-26 RX ADMIN — FENTANYL CITRATE 250 MCG: 50 INJECTION INTRAMUSCULAR; INTRAVENOUS at 08:32

## 2024-08-26 RX ADMIN — ALBUMIN HUMAN 12.5 G: 0.05 INJECTION, SOLUTION INTRAVENOUS at 13:01

## 2024-08-26 RX ADMIN — PROTAMINE SULFATE 250 MG: 10 INJECTION, SOLUTION INTRAVENOUS at 10:01

## 2024-08-26 RX ADMIN — PROPOFOL 50 MG: 10 INJECTION, EMULSION INTRAVENOUS at 10:07

## 2024-08-26 RX ADMIN — POTASSIUM CHLORIDE 40 MEQ: 29.8 INJECTION, SOLUTION INTRAVENOUS at 17:32

## 2024-08-26 RX ADMIN — ADENOSINE 2.47 MG: 3 INJECTION INTRAVENOUS at 10:04

## 2024-08-26 RX ADMIN — HEPARIN SODIUM 40000 UNITS: 5000 INJECTION INTRAVENOUS; SUBCUTANEOUS at 08:52

## 2024-08-26 ASSESSMENT — PULMONARY FUNCTION TESTS
FEV1/FVC_PERCENT_PREDICTED: 73
FEV1_PREDICTED: 34
FEV1: 0.48
FEV1: 34
FEV1_PREDICTED: 28
FEV1: 34
FEV1/FVC: UNABLE TO OBTAIN, OR GREATER THAN 70%
FEV1_PREDICTED: 18

## 2024-08-26 ASSESSMENT — PAIN DESCRIPTION - PAIN TYPE: TYPE: ACUTE PAIN

## 2024-08-26 ASSESSMENT — PAIN SCALES - BEHAVIORAL PAIN SCALE (BPS)
BPS_SCORE: 3
BPS_SCORE: 3

## 2024-08-26 ASSESSMENT — PAIN SCALES - GENERAL
PAINLEVEL_OUTOF10: 10
PAINLEVEL: NO PAIN (0)

## 2024-08-26 NOTE — PATIENT INSTRUCTIONS
For face rash: prednisone 2 tabs daily for 4 days, then 1 tab daily for 4 days. This is a steroid and should help with inflammation.  Continue cetirizine 10mg daily.  You can use over-the-counter hydrocortisone as needed for itching.    For right leg wound: continue sulfa antibiotic for 7 additional days.  Apply triamcinolone cream to the area around the wound (not on wound) twice daily for up to 14 days.  Continue wound cares as recommended by Dr. Smith.    Prednisone can increase your blood sugar--- so limit sweets while taking prednisone.    Keep pressure off bump and outside of left foot.  You may benefit from seeing a podiatrist in the future to assess this and to consider custom-made orthotics or shoes.  Let me know when you are ready for this referral.

## 2024-08-26 NOTE — TELEPHONE ENCOUNTER
This encounter is being sent to inform the clinic that this patient has a referral from Brie Acevedo MD in [facility] MEDICINE/OB for the diagnoses of Rash and nonspecific skin eruption [R21]   and has requested that this patient be seen within Urgent: 3-5 Days .Based on the availability of our provider(s), we are unable to accommodate this request.    Were all sites offered this patient?  Yes    Does scheduling algorithm request to schedule next available?  Patient has been scheduled for the first available opening with Alice Lu MD on 4/9/25.  We have informed the patient that the clinic will review their referral and reach out if a sooner appointment is medically necessary.
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
## 2024-08-27 ENCOUNTER — APPOINTMENT (OUTPATIENT)
Dept: GENERAL RADIOLOGY | Age: 74
DRG: 235 | End: 2024-08-27
Attending: NURSE PRACTITIONER

## 2024-08-27 ENCOUNTER — APPOINTMENT (OUTPATIENT)
Dept: FAMILY MEDICINE | Age: 74
End: 2024-08-27

## 2024-08-27 LAB
ANION GAP SERPL CALC-SCNC: 11 MMOL/L (ref 7–19)
ATRIAL RATE (BPM): 63
BASE DEFICIT BLDA-SCNC: 4 MMOL/L (ref -2–3)
BASOPHILS # BLD: 0 K/MCL (ref 0–0.3)
BASOPHILS NFR BLD: 0 %
CA-I ADJ PH7.4 SERPL-SCNC: 1.34 MMOL/L (ref 1.15–1.29)
CA-I SERPL ISE-SCNC: 1.31 MMOL/L (ref 1.15–1.29)
CHLORIDE SERPL-SCNC: 110 MMOL/L (ref 97–110)
CO2 SERPL-SCNC: 28 MMOL/L (ref 21–32)
DEPRECATED RDW RBC: 40.6 FL (ref 39–50)
DIASTOLIC BLOOD PRESSURE: 49
EOSINOPHIL # BLD: 0 K/MCL (ref 0–0.5)
EOSINOPHIL NFR BLD: 0 %
ERYTHROCYTE [DISTWIDTH] IN BLOOD: 12.7 % (ref 11–15)
FIBRINOGEN PPP-MCNC: 266 MG/DL (ref 190–425)
GLUCOSE BLDC GLUCOMTR-MCNC: 104 MG/DL (ref 70–99)
GLUCOSE BLDC GLUCOMTR-MCNC: 107 MG/DL (ref 70–99)
GLUCOSE BLDC GLUCOMTR-MCNC: 115 MG/DL (ref 70–99)
GLUCOSE BLDC GLUCOMTR-MCNC: 117 MG/DL (ref 70–99)
GLUCOSE BLDC GLUCOMTR-MCNC: 118 MG/DL (ref 70–99)
GLUCOSE BLDC GLUCOMTR-MCNC: 119 MG/DL (ref 70–99)
GLUCOSE BLDC GLUCOMTR-MCNC: 121 MG/DL (ref 70–99)
GLUCOSE BLDC GLUCOMTR-MCNC: 121 MG/DL (ref 70–99)
GLUCOSE BLDC GLUCOMTR-MCNC: 123 MG/DL (ref 70–99)
GLUCOSE BLDC GLUCOMTR-MCNC: 124 MG/DL (ref 70–99)
GLUCOSE BLDC GLUCOMTR-MCNC: 134 MG/DL (ref 70–99)
GLUCOSE BLDC GLUCOMTR-MCNC: 134 MG/DL (ref 70–99)
GLUCOSE BLDC GLUCOMTR-MCNC: 140 MG/DL (ref 70–99)
GLUCOSE BLDC GLUCOMTR-MCNC: 144 MG/DL (ref 70–99)
GLUCOSE BLDC GLUCOMTR-MCNC: 152 MG/DL (ref 70–99)
GLUCOSE BLDC GLUCOMTR-MCNC: 84 MG/DL (ref 70–99)
HCO3 BLDA-SCNC: 28 MMOL/L (ref 22–28)
HCT VFR BLD CALC: 21.8 % (ref 36–46.5)
HGB BLD-MCNC: 7.3 G/DL (ref 12–15.5)
HGB BLDA-MCNC: 7.7 G/DL (ref 12–15.5)
IMM GRANULOCYTES # BLD AUTO: 0.1 K/MCL (ref 0–0.2)
IMM GRANULOCYTES # BLD: 0 %
INR PPP: 1.1
LYMPHOCYTES # BLD: 0.9 K/MCL (ref 1–4)
LYMPHOCYTES NFR BLD: 7 %
MAGNESIUM SERPL-MCNC: 1.9 MG/DL (ref 1.7–2.4)
MAGNESIUM SERPL-MCNC: 2.3 MG/DL (ref 1.7–2.4)
MCH RBC QN AUTO: 29.3 PG (ref 26–34)
MCHC RBC AUTO-ENTMCNC: 33.5 G/DL (ref 32–36.5)
MCV RBC AUTO: 87.6 FL (ref 78–100)
MONOCYTES # BLD: 1.1 K/MCL (ref 0.3–0.9)
MONOCYTES NFR BLD: 8 %
NEUTROPHILS # BLD: 11.5 K/MCL (ref 1.8–7.7)
NEUTROPHILS NFR BLD: 85 %
NRBC BLD MANUAL-RTO: 0 /100 WBC
OXYHGB MFR BLDA: 92 % (ref 94–98)
OXYHGB MFR BLDV: 60 % (ref 60–80)
P AXIS (DEGREES): 72
PCO2 BLDA: 38 MM HG (ref 32–45)
PH BLDA: 7.47 UNITS (ref 7.35–7.45)
PLATELET # BLD AUTO: 66 K/MCL (ref 140–450)
PO2 BLDA: 61 MM HG (ref 83–108)
POTASSIUM SERPL-SCNC: 3.8 MMOL/L (ref 3.4–5.1)
POTASSIUM SERPL-SCNC: 4 MMOL/L (ref 3.4–5.1)
PR-INTERVAL (MSEC): 224
PROTHROMBIN TIME: 11.6 SEC (ref 9.7–11.8)
QRS-INTERVAL (MSEC): 94
QT-INTERVAL (MSEC): 466
QTC: 477
R AXIS (DEGREES): 15
RBC # BLD: 2.49 MIL/MCL (ref 4–5.2)
REPORT TEXT: NORMAL
SAO2 % BLDA: 95 % (ref 95–99)
SAO2 % BLDV: 63 % (ref 60–80)
SODIUM SERPL-SCNC: 145 MMOL/L (ref 135–145)
SYSTOLIC BLOOD PRESSURE: 138
T AXIS (DEGREES): 36
VENTRICULAR RATE EKG/MIN (BPM): 63
WBC # BLD: 13.6 K/MCL (ref 4.2–11)

## 2024-08-27 PROCEDURE — 10002803 HB RX 637: Performed by: FAMILY MEDICINE

## 2024-08-27 PROCEDURE — 97116 GAIT TRAINING THERAPY: CPT

## 2024-08-27 PROCEDURE — 10004172 HB COUNTER-THERAPY VISIT OT

## 2024-08-27 PROCEDURE — 10002800 HB RX 250 W HCPCS: Performed by: ANESTHESIOLOGY

## 2024-08-27 PROCEDURE — 97166 OT EVAL MOD COMPLEX 45 MIN: CPT

## 2024-08-27 PROCEDURE — 97530 THERAPEUTIC ACTIVITIES: CPT

## 2024-08-27 PROCEDURE — 10004651 HB RX, NO CHARGE ITEM: Performed by: NURSE PRACTITIONER

## 2024-08-27 PROCEDURE — 84132 ASSAY OF SERUM POTASSIUM: CPT | Performed by: THORACIC SURGERY (CARDIOTHORACIC VASCULAR SURGERY)

## 2024-08-27 PROCEDURE — 10000008 HB ROOM CHARGE ICU OR CCU

## 2024-08-27 PROCEDURE — 97162 PT EVAL MOD COMPLEX 30 MIN: CPT

## 2024-08-27 PROCEDURE — 93005 ELECTROCARDIOGRAM TRACING: CPT | Performed by: NURSE PRACTITIONER

## 2024-08-27 PROCEDURE — 93010 ELECTROCARDIOGRAM REPORT: CPT | Performed by: INTERNAL MEDICINE

## 2024-08-27 PROCEDURE — 80051 ELECTROLYTE PANEL: CPT | Performed by: NURSE PRACTITIONER

## 2024-08-27 PROCEDURE — 85384 FIBRINOGEN ACTIVITY: CPT | Performed by: NURSE PRACTITIONER

## 2024-08-27 PROCEDURE — 83735 ASSAY OF MAGNESIUM: CPT | Performed by: THORACIC SURGERY (CARDIOTHORACIC VASCULAR SURGERY)

## 2024-08-27 PROCEDURE — 10002016 HB COUNTER INCENTIVE SPIROMETRY

## 2024-08-27 PROCEDURE — 10002803 HB RX 637: Performed by: NURSE PRACTITIONER

## 2024-08-27 PROCEDURE — 82962 GLUCOSE BLOOD TEST: CPT

## 2024-08-27 PROCEDURE — 10004173 HB COUNTER-THERAPY VISIT PT

## 2024-08-27 PROCEDURE — 10002800 HB RX 250 W HCPCS: Performed by: NURSE PRACTITIONER

## 2024-08-27 PROCEDURE — 85610 PROTHROMBIN TIME: CPT | Performed by: NURSE PRACTITIONER

## 2024-08-27 PROCEDURE — 82810 BLOOD GASES O2 SAT ONLY: CPT | Performed by: NURSE PRACTITIONER

## 2024-08-27 PROCEDURE — 71045 X-RAY EXAM CHEST 1 VIEW: CPT | Performed by: RADIOLOGY

## 2024-08-27 PROCEDURE — 10002807 HB RX 258: Performed by: NURSE PRACTITIONER

## 2024-08-27 PROCEDURE — 96372 THER/PROPH/DIAG INJ SC/IM: CPT | Performed by: NURSE PRACTITIONER

## 2024-08-27 PROCEDURE — 83735 ASSAY OF MAGNESIUM: CPT | Performed by: NURSE PRACTITIONER

## 2024-08-27 PROCEDURE — 82330 ASSAY OF CALCIUM: CPT | Performed by: NURSE PRACTITIONER

## 2024-08-27 PROCEDURE — 82805 BLOOD GASES W/O2 SATURATION: CPT | Performed by: NURSE PRACTITIONER

## 2024-08-27 PROCEDURE — 10003441 HB CARDIAC REHAB PHASE 1

## 2024-08-27 PROCEDURE — 85025 COMPLETE CBC W/AUTO DIFF WBC: CPT | Performed by: NURSE PRACTITIONER

## 2024-08-27 PROCEDURE — 71045 X-RAY EXAM CHEST 1 VIEW: CPT

## 2024-08-27 PROCEDURE — P9045 ALBUMIN (HUMAN), 5%, 250 ML: HCPCS | Performed by: NURSE PRACTITIONER

## 2024-08-27 RX ORDER — LIDOCAINE 4 G/G
2 PATCH TOPICAL DAILY
Status: DISCONTINUED | OUTPATIENT
Start: 2024-08-27 | End: 2024-08-31 | Stop reason: HOSPADM

## 2024-08-27 RX ORDER — HYDRALAZINE HYDROCHLORIDE 20 MG/ML
15 INJECTION INTRAMUSCULAR; INTRAVENOUS EVERY 6 HOURS PRN
Status: DISCONTINUED | OUTPATIENT
Start: 2024-08-27 | End: 2024-08-31 | Stop reason: HOSPADM

## 2024-08-27 RX ORDER — INSULIN GLARGINE 100 [IU]/ML
10 INJECTION, SOLUTION SUBCUTANEOUS NIGHTLY
Status: DISCONTINUED | OUTPATIENT
Start: 2024-08-27 | End: 2024-08-28

## 2024-08-27 RX ORDER — GUAIFENESIN 600 MG/1
1200 TABLET, EXTENDED RELEASE ORAL EVERY 12 HOURS SCHEDULED
Status: DISCONTINUED | OUTPATIENT
Start: 2024-08-27 | End: 2024-08-31 | Stop reason: HOSPADM

## 2024-08-27 RX ORDER — FAMOTIDINE 10 MG/ML
20 INJECTION, SOLUTION INTRAVENOUS DAILY
Status: DISCONTINUED | OUTPATIENT
Start: 2024-08-27 | End: 2024-08-27

## 2024-08-27 RX ORDER — FAMOTIDINE 20 MG/1
20 TABLET, FILM COATED ORAL DAILY
Status: DISCONTINUED | OUTPATIENT
Start: 2024-08-28 | End: 2024-08-31 | Stop reason: HOSPADM

## 2024-08-27 RX ORDER — FUROSEMIDE 10 MG/ML
40 INJECTION INTRAMUSCULAR; INTRAVENOUS ONCE
Status: COMPLETED | OUTPATIENT
Start: 2024-08-27 | End: 2024-08-27

## 2024-08-27 RX ORDER — NITROGLYCERIN 20 MG/100ML
0-200 INJECTION INTRAVENOUS CONTINUOUS PRN
Status: DISCONTINUED | OUTPATIENT
Start: 2024-08-27 | End: 2024-08-28

## 2024-08-27 RX ORDER — METOPROLOL TARTRATE 25 MG/1
12.5 TABLET, FILM COATED ORAL EVERY 12 HOURS SCHEDULED
Status: DISCONTINUED | OUTPATIENT
Start: 2024-08-27 | End: 2024-08-28

## 2024-08-27 RX ORDER — FAMOTIDINE 20 MG/1
20 TABLET, FILM COATED ORAL DAILY
Status: DISCONTINUED | OUTPATIENT
Start: 2024-08-27 | End: 2024-08-27 | Stop reason: SDUPTHER

## 2024-08-27 RX ADMIN — INSULIN HUMAN 3.8 UNITS/HR: 1 INJECTION, SOLUTION INTRAVENOUS at 02:20

## 2024-08-27 RX ADMIN — ALBUMIN HUMAN 12.5 G: 0.05 INJECTION, SOLUTION INTRAVENOUS at 00:59

## 2024-08-27 RX ADMIN — NITROGLYCERIN 120 MCG/MIN: 20 INJECTION INTRAVENOUS at 08:13

## 2024-08-27 RX ADMIN — POTASSIUM CHLORIDE, DEXTROSE MONOHYDRATE AND SODIUM CHLORIDE: 300; 5; 450 INJECTION, SOLUTION INTRAVENOUS at 05:46

## 2024-08-27 RX ADMIN — FUROSEMIDE 40 MG: 10 INJECTION, SOLUTION INTRAMUSCULAR; INTRAVENOUS at 12:00

## 2024-08-27 RX ADMIN — METOPROLOL TARTRATE 12.5 MG: 25 TABLET, FILM COATED ORAL at 20:18

## 2024-08-27 RX ADMIN — MAGNESIUM SULFATE IN DEXTROSE 1 G: 10 INJECTION, SOLUTION INTRAVENOUS at 16:56

## 2024-08-27 RX ADMIN — HYDRALAZINE HYDROCHLORIDE 15 MG: 20 INJECTION INTRAMUSCULAR; INTRAVENOUS at 16:29

## 2024-08-27 RX ADMIN — GUAIFENESIN 1200 MG: 600 TABLET, EXTENDED RELEASE ORAL at 20:18

## 2024-08-27 RX ADMIN — POTASSIUM CHLORIDE 20 MEQ: 29.8 INJECTION, SOLUTION INTRAVENOUS at 18:45

## 2024-08-27 RX ADMIN — LIDOCAINE 2 PATCH: 4 PATCH TOPICAL at 13:00

## 2024-08-27 RX ADMIN — ONDANSETRON 4 MG: 2 INJECTION INTRAMUSCULAR; INTRAVENOUS at 13:27

## 2024-08-27 RX ADMIN — POTASSIUM CHLORIDE 20 MEQ: 29.8 INJECTION, SOLUTION INTRAVENOUS at 04:40

## 2024-08-27 RX ADMIN — METOPROLOL TARTRATE 12.5 MG: 25 TABLET, FILM COATED ORAL at 12:00

## 2024-08-27 RX ADMIN — GUAIFENESIN 1200 MG: 600 TABLET, EXTENDED RELEASE ORAL at 13:00

## 2024-08-27 RX ADMIN — ATORVASTATIN CALCIUM 80 MG: 80 TABLET, FILM COATED ORAL at 10:51

## 2024-08-27 RX ADMIN — ACETAMINOPHEN 650 MG: 325 TABLET ORAL at 20:18

## 2024-08-27 RX ADMIN — SODIUM CHLORIDE, PRESERVATIVE FREE 10 ML: 5 INJECTION INTRAVENOUS at 20:19

## 2024-08-27 RX ADMIN — SENNOSIDES AND DOCUSATE SODIUM 2 TABLET: 50; 8.6 TABLET ORAL at 10:40

## 2024-08-27 RX ADMIN — PROCHLORPERAZINE EDISYLATE 5 MG: 5 INJECTION INTRAMUSCULAR; INTRAVENOUS at 03:20

## 2024-08-27 RX ADMIN — INSULIN GLARGINE 10 UNITS: 100 INJECTION, SOLUTION SUBCUTANEOUS at 21:58

## 2024-08-27 RX ADMIN — ACETAMINOPHEN 650 MG: 325 TABLET ORAL at 02:20

## 2024-08-27 RX ADMIN — ALBUMIN HUMAN 12.5 G: 0.05 INJECTION, SOLUTION INTRAVENOUS at 02:14

## 2024-08-27 RX ADMIN — HYDROCODONE BITARTRATE AND ACETAMINOPHEN 1 TABLET: 5; 325 TABLET ORAL at 09:11

## 2024-08-27 ASSESSMENT — ACTIVITIES OF DAILY LIVING (ADL)
GROOMING: SUPERVISION (SUPV)
PRIOR_ADL_BATHING: MINIMAL ASSIST (MIN)
PRIOR_ADL: SUPERVISION (SUPV)
PRIOR_ADL_TOILETING: MINIMAL ASSIST (MIN)
EATING: MODIFIED INDEPENDENT

## 2024-08-27 ASSESSMENT — COGNITIVE AND FUNCTIONAL STATUS - GENERAL
HELP NEEDED DRESSING REGULAR UPPER BODY CLOTHING: A LITTLE
HELP NEEDED FOR PERSONAL GROOMING: A LITTLE
DAILY_ACTIVITY_CONVERTED_SCORE: 34.69
HELP NEEDED FOR TOILETING: A LOT
HELP NEEDED FOR BATHING: A LOT
HELP NEEDED DRESSING REGULAR LOWER BODY CLOTHING: A LOT
DAILY_ACTIVITY_RAW_SCORE: 15
BASIC_MOBILITY_RAW_SCORE: 14
BASIC_MOBILITY_CONVERTED_SCORE: 35.55

## 2024-08-27 ASSESSMENT — PAIN DESCRIPTION - PAIN TYPE
TYPE: ACUTE PAIN

## 2024-08-27 ASSESSMENT — PAIN SCALES - GENERAL
PAINLEVEL_OUTOF10: 7
PAINLEVEL_OUTOF10: 6
PAINLEVEL_OUTOF10: 4
PAINLEVEL_OUTOF10: 3
PAINLEVEL_OUTOF10: 4
PAINLEVEL_OUTOF10: 4

## 2024-08-27 ASSESSMENT — ENCOUNTER SYMPTOMS: PAIN SEVERITY NOW: 5

## 2024-08-28 ENCOUNTER — APPOINTMENT (OUTPATIENT)
Dept: GENERAL RADIOLOGY | Age: 74
DRG: 235 | End: 2024-08-28
Attending: NURSE PRACTITIONER

## 2024-08-28 LAB
ANION GAP SERPL CALC-SCNC: 10 MMOL/L (ref 7–19)
BASOPHILS # BLD: 0 K/MCL (ref 0–0.3)
BASOPHILS NFR BLD: 0 %
BUN SERPL-MCNC: 18 MG/DL (ref 6–20)
BUN/CREAT SERPL: 23 (ref 7–25)
CALCIUM SERPL-MCNC: 10 MG/DL (ref 8.4–10.2)
CHLORIDE SERPL-SCNC: 106 MMOL/L (ref 97–110)
CO2 SERPL-SCNC: 28 MMOL/L (ref 21–32)
CREAT SERPL-MCNC: 0.8 MG/DL (ref 0.51–0.95)
DEPRECATED RDW RBC: 42.5 FL (ref 39–50)
EGFRCR SERPLBLD CKD-EPI 2021: 76 ML/MIN/{1.73_M2}
EOSINOPHIL # BLD: 0 K/MCL (ref 0–0.5)
EOSINOPHIL NFR BLD: 0 %
ERYTHROCYTE [DISTWIDTH] IN BLOOD: 13.1 % (ref 11–15)
FASTING DURATION TIME PATIENT: ABNORMAL H
GLUCOSE BLDC GLUCOMTR-MCNC: 117 MG/DL (ref 70–99)
GLUCOSE BLDC GLUCOMTR-MCNC: 125 MG/DL (ref 70–99)
GLUCOSE BLDC GLUCOMTR-MCNC: 133 MG/DL (ref 70–99)
GLUCOSE BLDC GLUCOMTR-MCNC: 135 MG/DL (ref 70–99)
GLUCOSE BLDC GLUCOMTR-MCNC: 140 MG/DL (ref 70–99)
GLUCOSE BLDC GLUCOMTR-MCNC: 146 MG/DL (ref 70–99)
GLUCOSE BLDC GLUCOMTR-MCNC: 147 MG/DL (ref 70–99)
GLUCOSE BLDC GLUCOMTR-MCNC: 149 MG/DL (ref 70–99)
GLUCOSE BLDC GLUCOMTR-MCNC: 158 MG/DL (ref 70–99)
GLUCOSE BLDC GLUCOMTR-MCNC: 165 MG/DL (ref 70–99)
GLUCOSE BLDC GLUCOMTR-MCNC: 167 MG/DL (ref 70–99)
GLUCOSE BLDC GLUCOMTR-MCNC: 172 MG/DL (ref 70–99)
GLUCOSE SERPL-MCNC: 138 MG/DL (ref 70–99)
HCT VFR BLD CALC: 24.6 % (ref 36–46.5)
HGB BLD-MCNC: 8.1 G/DL (ref 12–15.5)
IMM GRANULOCYTES # BLD AUTO: 0.1 K/MCL (ref 0–0.2)
IMM GRANULOCYTES # BLD: 1 %
LYMPHOCYTES # BLD: 1.3 K/MCL (ref 1–4)
LYMPHOCYTES NFR BLD: 9 %
MAGNESIUM SERPL-MCNC: 1.7 MG/DL (ref 1.7–2.4)
MAGNESIUM SERPL-MCNC: 1.9 MG/DL (ref 1.7–2.4)
MAGNESIUM SERPL-MCNC: 2 MG/DL (ref 1.7–2.4)
MAGNESIUM SERPL-MCNC: 2.2 MG/DL (ref 1.7–2.4)
MCH RBC QN AUTO: 29.2 PG (ref 26–34)
MCHC RBC AUTO-ENTMCNC: 32.9 G/DL (ref 32–36.5)
MCV RBC AUTO: 88.8 FL (ref 78–100)
MONOCYTES # BLD: 1.1 K/MCL (ref 0.3–0.9)
MONOCYTES NFR BLD: 8 %
NEUTROPHILS # BLD: 11.8 K/MCL (ref 1.8–7.7)
NEUTROPHILS NFR BLD: 82 %
NRBC BLD MANUAL-RTO: 0 /100 WBC
PLATELET # BLD AUTO: 68 K/MCL (ref 140–450)
POTASSIUM SERPL-SCNC: 3.7 MMOL/L (ref 3.4–5.1)
POTASSIUM SERPL-SCNC: 3.8 MMOL/L (ref 3.4–5.1)
POTASSIUM SERPL-SCNC: 4.1 MMOL/L (ref 3.4–5.1)
POTASSIUM SERPL-SCNC: 4.2 MMOL/L (ref 3.4–5.1)
RBC # BLD: 2.77 MIL/MCL (ref 4–5.2)
SODIUM SERPL-SCNC: 140 MMOL/L (ref 135–145)
WBC # BLD: 14.4 K/MCL (ref 4.2–11)

## 2024-08-28 PROCEDURE — 96372 THER/PROPH/DIAG INJ SC/IM: CPT | Performed by: NURSE PRACTITIONER

## 2024-08-28 PROCEDURE — 10002019 HB COUNTER RESP ASSESSMENT

## 2024-08-28 PROCEDURE — 10002803 HB RX 637: Performed by: NURSE PRACTITIONER

## 2024-08-28 PROCEDURE — 71045 X-RAY EXAM CHEST 1 VIEW: CPT

## 2024-08-28 PROCEDURE — 10003441 HB CARDIAC REHAB PHASE 1

## 2024-08-28 PROCEDURE — 10002803 HB RX 637: Performed by: FAMILY MEDICINE

## 2024-08-28 PROCEDURE — 36415 COLL VENOUS BLD VENIPUNCTURE: CPT | Performed by: NURSE PRACTITIONER

## 2024-08-28 PROCEDURE — 10004651 HB RX, NO CHARGE ITEM: Performed by: NURSE PRACTITIONER

## 2024-08-28 PROCEDURE — 83735 ASSAY OF MAGNESIUM: CPT | Performed by: NURSE PRACTITIONER

## 2024-08-28 PROCEDURE — 80048 BASIC METABOLIC PNL TOTAL CA: CPT | Performed by: NURSE PRACTITIONER

## 2024-08-28 PROCEDURE — 82962 GLUCOSE BLOOD TEST: CPT

## 2024-08-28 PROCEDURE — 84132 ASSAY OF SERUM POTASSIUM: CPT | Performed by: NURSE PRACTITIONER

## 2024-08-28 PROCEDURE — 10002800 HB RX 250 W HCPCS: Performed by: NURSE PRACTITIONER

## 2024-08-28 PROCEDURE — 10004180 HB COUNTER-TRANSPORT

## 2024-08-28 PROCEDURE — 71045 X-RAY EXAM CHEST 1 VIEW: CPT | Performed by: RADIOLOGY

## 2024-08-28 PROCEDURE — 10002801 HB RX 250 W/O HCPCS: Performed by: NURSE PRACTITIONER

## 2024-08-28 PROCEDURE — 10004172 HB COUNTER-THERAPY VISIT OT

## 2024-08-28 PROCEDURE — 10003585 HB ROOM CHARGE INTERMEDIATE CARE

## 2024-08-28 PROCEDURE — 97535 SELF CARE MNGMENT TRAINING: CPT

## 2024-08-28 PROCEDURE — 85025 COMPLETE CBC W/AUTO DIFF WBC: CPT | Performed by: NURSE PRACTITIONER

## 2024-08-28 PROCEDURE — 97116 GAIT TRAINING THERAPY: CPT

## 2024-08-28 PROCEDURE — 97530 THERAPEUTIC ACTIVITIES: CPT

## 2024-08-28 PROCEDURE — 10002807 HB RX 258: Performed by: NURSE PRACTITIONER

## 2024-08-28 PROCEDURE — 10004173 HB COUNTER-THERAPY VISIT PT

## 2024-08-28 PROCEDURE — 94150 VITAL CAPACITY TEST: CPT

## 2024-08-28 RX ORDER — INSULIN GLARGINE 100 [IU]/ML
20 INJECTION, SOLUTION SUBCUTANEOUS NIGHTLY
Status: DISCONTINUED | OUTPATIENT
Start: 2024-08-28 | End: 2024-08-31 | Stop reason: HOSPADM

## 2024-08-28 RX ORDER — NICOTINE POLACRILEX 4 MG
15 LOZENGE BUCCAL PRN
Status: DISCONTINUED | OUTPATIENT
Start: 2024-08-28 | End: 2024-08-31 | Stop reason: HOSPADM

## 2024-08-28 RX ORDER — 0.9 % SODIUM CHLORIDE 0.9 %
2 VIAL (ML) INJECTION EVERY 12 HOURS SCHEDULED
Status: DISCONTINUED | OUTPATIENT
Start: 2024-08-28 | End: 2024-08-31 | Stop reason: HOSPADM

## 2024-08-28 RX ORDER — FUROSEMIDE 10 MG/ML
40 INJECTION INTRAMUSCULAR; INTRAVENOUS ONCE
Status: COMPLETED | OUTPATIENT
Start: 2024-08-28 | End: 2024-08-28

## 2024-08-28 RX ORDER — DEXTROSE MONOHYDRATE 25 G/50ML
12.5 INJECTION, SOLUTION INTRAVENOUS PRN
Status: DISCONTINUED | OUTPATIENT
Start: 2024-08-28 | End: 2024-08-31 | Stop reason: HOSPADM

## 2024-08-28 RX ORDER — DEXTROSE MONOHYDRATE 25 G/50ML
25 INJECTION, SOLUTION INTRAVENOUS PRN
Status: DISCONTINUED | OUTPATIENT
Start: 2024-08-28 | End: 2024-08-31 | Stop reason: HOSPADM

## 2024-08-28 RX ORDER — METOPROLOL TARTRATE 25 MG/1
25 TABLET, FILM COATED ORAL EVERY 12 HOURS SCHEDULED
Status: DISCONTINUED | OUTPATIENT
Start: 2024-08-28 | End: 2024-08-31 | Stop reason: HOSPADM

## 2024-08-28 RX ORDER — NICOTINE POLACRILEX 4 MG
30 LOZENGE BUCCAL PRN
Status: DISCONTINUED | OUTPATIENT
Start: 2024-08-28 | End: 2024-08-31 | Stop reason: HOSPADM

## 2024-08-28 RX ADMIN — ACETAMINOPHEN 650 MG: 325 TABLET ORAL at 19:01

## 2024-08-28 RX ADMIN — POTASSIUM CHLORIDE 20 MEQ: 1500 TABLET, EXTENDED RELEASE ORAL at 20:40

## 2024-08-28 RX ADMIN — ATORVASTATIN CALCIUM 80 MG: 80 TABLET, FILM COATED ORAL at 09:20

## 2024-08-28 RX ADMIN — METOPROLOL TARTRATE 25 MG: 25 TABLET, FILM COATED ORAL at 20:40

## 2024-08-28 RX ADMIN — LIDOCAINE 2 PATCH: 4 PATCH TOPICAL at 09:03

## 2024-08-28 RX ADMIN — INSULIN GLARGINE 20 UNITS: 100 INJECTION, SOLUTION SUBCUTANEOUS at 20:54

## 2024-08-28 RX ADMIN — POTASSIUM CHLORIDE 20 MEQ: 1500 TABLET, EXTENDED RELEASE ORAL at 16:03

## 2024-08-28 RX ADMIN — GUAIFENESIN 1200 MG: 600 TABLET, EXTENDED RELEASE ORAL at 20:40

## 2024-08-28 RX ADMIN — SODIUM CHLORIDE, PRESERVATIVE FREE 10 ML: 5 INJECTION INTRAVENOUS at 09:36

## 2024-08-28 RX ADMIN — FAMOTIDINE 20 MG: 20 TABLET ORAL at 09:25

## 2024-08-28 RX ADMIN — DEXTROSE AND SODIUM CHLORIDE: 5; 450 INJECTION, SOLUTION INTRAVENOUS at 05:03

## 2024-08-28 RX ADMIN — ACETAMINOPHEN 650 MG: 325 TABLET ORAL at 01:22

## 2024-08-28 RX ADMIN — MAGNESIUM SULFATE IN DEXTROSE 1 G: 10 INJECTION, SOLUTION INTRAVENOUS at 20:40

## 2024-08-28 RX ADMIN — SODIUM CHLORIDE, PRESERVATIVE FREE 2 ML: 5 INJECTION INTRAVENOUS at 20:50

## 2024-08-28 RX ADMIN — FUROSEMIDE 40 MG: 10 INJECTION, SOLUTION INTRAMUSCULAR; INTRAVENOUS at 09:33

## 2024-08-28 RX ADMIN — GUAIFENESIN 1200 MG: 600 TABLET, EXTENDED RELEASE ORAL at 09:20

## 2024-08-28 RX ADMIN — SODIUM CHLORIDE, PRESERVATIVE FREE 10 ML: 5 INJECTION INTRAVENOUS at 20:50

## 2024-08-28 RX ADMIN — MAGNESIUM SULFATE IN DEXTROSE 1 G: 10 INJECTION, SOLUTION INTRAVENOUS at 01:06

## 2024-08-28 RX ADMIN — METOPROLOL TARTRATE 25 MG: 25 TABLET, FILM COATED ORAL at 09:20

## 2024-08-28 RX ADMIN — HYDRALAZINE HYDROCHLORIDE 15 MG: 20 INJECTION INTRAMUSCULAR; INTRAVENOUS at 07:29

## 2024-08-28 RX ADMIN — ACETAMINOPHEN 650 MG: 325 TABLET ORAL at 09:04

## 2024-08-28 RX ADMIN — MAGNESIUM SULFATE IN DEXTROSE 1 G: 10 INJECTION, SOLUTION INTRAVENOUS at 16:02

## 2024-08-28 RX ADMIN — SENNOSIDES AND DOCUSATE SODIUM 2 TABLET: 50; 8.6 TABLET ORAL at 09:20

## 2024-08-28 ASSESSMENT — COGNITIVE AND FUNCTIONAL STATUS - GENERAL
HELP NEEDED FOR BATHING: A LOT
HELP NEEDED DRESSING REGULAR UPPER BODY CLOTHING: A LITTLE
HELP NEEDED FOR PERSONAL GROOMING: A LITTLE
DAILY_ACTIVITY_CONVERTED_SCORE: 34.69
DAILY_ACTIVITY_RAW_SCORE: 15
BASIC_MOBILITY_RAW_SCORE: 14
BASIC_MOBILITY_CONVERTED_SCORE: 35.55
HELP NEEDED DRESSING REGULAR LOWER BODY CLOTHING: A LOT
HELP NEEDED FOR TOILETING: A LOT

## 2024-08-28 ASSESSMENT — PAIN SCALES - GENERAL
PAINLEVEL_OUTOF10: 3
PAINLEVEL_OUTOF10: 2
PAINLEVEL_OUTOF10: 5
PAINLEVEL_OUTOF10: 2
PAINLEVEL_OUTOF10: 0
PAINLEVEL_OUTOF10: 2
PAINLEVEL_OUTOF10: 3

## 2024-08-28 ASSESSMENT — PULMONARY FUNCTION TESTS
FEV1_PREDICTED: 33
FEV1_PREDICTED: 22
FEV1: 41
FEV1: 0.58
FEV1/FVC: UNABLE TO OBTAIN, OR GREATER THAN 70%
FEV1/FVC_PERCENT_PREDICTED: 81
FEV1_PREDICTED: 41
FEV1: 41

## 2024-08-28 ASSESSMENT — PAIN DESCRIPTION - PAIN TYPE
TYPE: ACUTE PAIN

## 2024-08-28 ASSESSMENT — ACTIVITIES OF DAILY LIVING (ADL): HOME_MANAGEMENT_TIME_ENTRY: 10

## 2024-08-29 ENCOUNTER — APPOINTMENT (OUTPATIENT)
Dept: GENERAL RADIOLOGY | Age: 74
DRG: 235 | End: 2024-08-29
Attending: NURSE PRACTITIONER

## 2024-08-29 LAB
ANION GAP SERPL CALC-SCNC: 9 MMOL/L (ref 7–19)
BASOPHILS # BLD: 0 K/MCL (ref 0–0.3)
BASOPHILS NFR BLD: 0 %
BUN SERPL-MCNC: 19 MG/DL (ref 6–20)
BUN/CREAT SERPL: 25 (ref 7–25)
CALCIUM SERPL-MCNC: 9.8 MG/DL (ref 8.4–10.2)
CHLORIDE SERPL-SCNC: 103 MMOL/L (ref 97–110)
CO2 SERPL-SCNC: 29 MMOL/L (ref 21–32)
CREAT SERPL-MCNC: 0.77 MG/DL (ref 0.51–0.95)
DEPRECATED RDW RBC: 42.7 FL (ref 39–50)
EGFRCR SERPLBLD CKD-EPI 2021: 80 ML/MIN/{1.73_M2}
EOSINOPHIL # BLD: 0 K/MCL (ref 0–0.5)
EOSINOPHIL NFR BLD: 0 %
ERYTHROCYTE [DISTWIDTH] IN BLOOD: 13.2 % (ref 11–15)
FASTING DURATION TIME PATIENT: ABNORMAL H
GLUCOSE BLDC GLUCOMTR-MCNC: 129 MG/DL (ref 70–99)
GLUCOSE BLDC GLUCOMTR-MCNC: 141 MG/DL (ref 70–99)
GLUCOSE BLDC GLUCOMTR-MCNC: 151 MG/DL (ref 70–99)
GLUCOSE BLDC GLUCOMTR-MCNC: 166 MG/DL (ref 70–99)
GLUCOSE SERPL-MCNC: 147 MG/DL (ref 70–99)
HCT VFR BLD CALC: 25.1 % (ref 36–46.5)
HGB BLD-MCNC: 8.4 G/DL (ref 12–15.5)
IMM GRANULOCYTES # BLD AUTO: 0.1 K/MCL (ref 0–0.2)
IMM GRANULOCYTES # BLD: 1 %
LYMPHOCYTES # BLD: 1.4 K/MCL (ref 1–4)
LYMPHOCYTES NFR BLD: 13 %
MAGNESIUM SERPL-MCNC: 1.9 MG/DL (ref 1.7–2.4)
MAGNESIUM SERPL-MCNC: 1.9 MG/DL (ref 1.7–2.4)
MCH RBC QN AUTO: 29.9 PG (ref 26–34)
MCHC RBC AUTO-ENTMCNC: 33.5 G/DL (ref 32–36.5)
MCV RBC AUTO: 89.3 FL (ref 78–100)
MONOCYTES # BLD: 0.8 K/MCL (ref 0.3–0.9)
MONOCYTES NFR BLD: 8 %
NEUTROPHILS # BLD: 8.6 K/MCL (ref 1.8–7.7)
NEUTROPHILS NFR BLD: 78 %
NRBC BLD MANUAL-RTO: 0 /100 WBC
PLATELET # BLD AUTO: 45 K/MCL (ref 140–450)
POTASSIUM SERPL-SCNC: 3.7 MMOL/L (ref 3.4–5.1)
POTASSIUM SERPL-SCNC: 3.9 MMOL/L (ref 3.4–5.1)
RBC # BLD: 2.81 MIL/MCL (ref 4–5.2)
SODIUM SERPL-SCNC: 137 MMOL/L (ref 135–145)
WBC # BLD: 10.9 K/MCL (ref 4.2–11)

## 2024-08-29 PROCEDURE — 83735 ASSAY OF MAGNESIUM: CPT | Performed by: THORACIC SURGERY (CARDIOTHORACIC VASCULAR SURGERY)

## 2024-08-29 PROCEDURE — 10003441 HB CARDIAC REHAB PHASE 1

## 2024-08-29 PROCEDURE — 10002803 HB RX 637: Performed by: NURSE PRACTITIONER

## 2024-08-29 PROCEDURE — 10002803 HB RX 637: Performed by: FAMILY MEDICINE

## 2024-08-29 PROCEDURE — 10004172 HB COUNTER-THERAPY VISIT OT

## 2024-08-29 PROCEDURE — 10002800 HB RX 250 W HCPCS: Performed by: NURSE PRACTITIONER

## 2024-08-29 PROCEDURE — 10004651 HB RX, NO CHARGE ITEM: Performed by: NURSE PRACTITIONER

## 2024-08-29 PROCEDURE — 36592 COLLECT BLOOD FROM PICC: CPT | Performed by: NURSE PRACTITIONER

## 2024-08-29 PROCEDURE — 71046 X-RAY EXAM CHEST 2 VIEWS: CPT

## 2024-08-29 PROCEDURE — 80048 BASIC METABOLIC PNL TOTAL CA: CPT | Performed by: NURSE PRACTITIONER

## 2024-08-29 PROCEDURE — 97535 SELF CARE MNGMENT TRAINING: CPT

## 2024-08-29 PROCEDURE — 10002807 HB RX 258: Performed by: NURSE PRACTITIONER

## 2024-08-29 PROCEDURE — 97530 THERAPEUTIC ACTIVITIES: CPT

## 2024-08-29 PROCEDURE — 84132 ASSAY OF SERUM POTASSIUM: CPT | Performed by: THORACIC SURGERY (CARDIOTHORACIC VASCULAR SURGERY)

## 2024-08-29 PROCEDURE — 85025 COMPLETE CBC W/AUTO DIFF WBC: CPT | Performed by: NURSE PRACTITIONER

## 2024-08-29 PROCEDURE — 83735 ASSAY OF MAGNESIUM: CPT | Performed by: NURSE PRACTITIONER

## 2024-08-29 PROCEDURE — 10003585 HB ROOM CHARGE INTERMEDIATE CARE

## 2024-08-29 PROCEDURE — 82962 GLUCOSE BLOOD TEST: CPT

## 2024-08-29 PROCEDURE — 96372 THER/PROPH/DIAG INJ SC/IM: CPT | Performed by: NURSE PRACTITIONER

## 2024-08-29 PROCEDURE — 86022 PLATELET ANTIBODIES: CPT | Performed by: NURSE PRACTITIONER

## 2024-08-29 PROCEDURE — 36415 COLL VENOUS BLD VENIPUNCTURE: CPT | Performed by: NURSE PRACTITIONER

## 2024-08-29 RX ORDER — FUROSEMIDE 40 MG
40 TABLET ORAL DAILY
Status: DISCONTINUED | OUTPATIENT
Start: 2024-08-29 | End: 2024-08-31 | Stop reason: HOSPADM

## 2024-08-29 RX ADMIN — GUAIFENESIN 1200 MG: 600 TABLET, EXTENDED RELEASE ORAL at 20:17

## 2024-08-29 RX ADMIN — SODIUM CHLORIDE, PRESERVATIVE FREE 10 ML: 5 INJECTION INTRAVENOUS at 08:56

## 2024-08-29 RX ADMIN — LIDOCAINE 2 PATCH: 4 PATCH TOPICAL at 08:56

## 2024-08-29 RX ADMIN — SODIUM CHLORIDE, PRESERVATIVE FREE 2 ML: 5 INJECTION INTRAVENOUS at 20:18

## 2024-08-29 RX ADMIN — MAGNESIUM SULFATE IN DEXTROSE 1 G: 10 INJECTION, SOLUTION INTRAVENOUS at 07:00

## 2024-08-29 RX ADMIN — FAMOTIDINE 20 MG: 20 TABLET ORAL at 08:55

## 2024-08-29 RX ADMIN — INSULIN GLARGINE 20 UNITS: 100 INJECTION, SOLUTION SUBCUTANEOUS at 21:17

## 2024-08-29 RX ADMIN — FUROSEMIDE 40 MG: 40 TABLET ORAL at 12:28

## 2024-08-29 RX ADMIN — ATORVASTATIN CALCIUM 80 MG: 80 TABLET, FILM COATED ORAL at 08:55

## 2024-08-29 RX ADMIN — POTASSIUM CHLORIDE 20 MEQ: 1500 TABLET, EXTENDED RELEASE ORAL at 07:00

## 2024-08-29 RX ADMIN — SENNOSIDES AND DOCUSATE SODIUM 2 TABLET: 50; 8.6 TABLET ORAL at 08:55

## 2024-08-29 RX ADMIN — GUAIFENESIN 1200 MG: 600 TABLET, EXTENDED RELEASE ORAL at 08:55

## 2024-08-29 RX ADMIN — METOPROLOL TARTRATE 25 MG: 25 TABLET, FILM COATED ORAL at 20:17

## 2024-08-29 RX ADMIN — ACETAMINOPHEN 650 MG: 325 TABLET ORAL at 05:00

## 2024-08-29 RX ADMIN — INSULIN LISPRO 2 UNITS: 100 INJECTION, SOLUTION INTRAVENOUS; SUBCUTANEOUS at 11:09

## 2024-08-29 RX ADMIN — POTASSIUM CHLORIDE 20 MEQ: 1500 TABLET, EXTENDED RELEASE ORAL at 17:51

## 2024-08-29 RX ADMIN — ACETAMINOPHEN 650 MG: 325 TABLET ORAL at 17:37

## 2024-08-29 RX ADMIN — BISACODYL 10 MG: 10 SUPPOSITORY RECTAL at 09:25

## 2024-08-29 RX ADMIN — SODIUM CHLORIDE, PRESERVATIVE FREE 2 ML: 5 INJECTION INTRAVENOUS at 09:10

## 2024-08-29 RX ADMIN — MAGNESIUM SULFATE IN DEXTROSE 1 G: 10 INJECTION, SOLUTION INTRAVENOUS at 17:52

## 2024-08-29 RX ADMIN — METOPROLOL TARTRATE 25 MG: 25 TABLET, FILM COATED ORAL at 08:55

## 2024-08-29 RX ADMIN — SODIUM CHLORIDE, PRESERVATIVE FREE 10 ML: 5 INJECTION INTRAVENOUS at 20:18

## 2024-08-29 ASSESSMENT — PAIN SCALES - GENERAL
PAINLEVEL_OUTOF10: 2
PAINLEVEL_OUTOF10: 2
PAINLEVEL_OUTOF10: 0
PAINLEVEL_OUTOF10: 0
PAINLEVEL_OUTOF10: 2
PAINLEVEL_OUTOF10: 0
PAINLEVEL_OUTOF10: 5
PAINLEVEL_OUTOF10: 4
PAINLEVEL_OUTOF10: 2

## 2024-08-29 ASSESSMENT — COGNITIVE AND FUNCTIONAL STATUS - GENERAL
DAILY_ACTIVITY_RAW_SCORE: 16
DAILY_ACTIVITY_CONVERTED_SCORE: 35.96
HELP NEEDED DRESSING REGULAR UPPER BODY CLOTHING: A LITTLE
HELP NEEDED FOR PERSONAL GROOMING: A LITTLE
HELP NEEDED FOR TOILETING: A LOT
HELP NEEDED DRESSING REGULAR LOWER BODY CLOTHING: A LOT
HELP NEEDED FOR BATHING: A LOT

## 2024-08-29 ASSESSMENT — ACTIVITIES OF DAILY LIVING (ADL): HOME_MANAGEMENT_TIME_ENTRY: 10

## 2024-08-30 LAB
ANION GAP SERPL CALC-SCNC: 12 MMOL/L (ref 7–19)
BASOPHILS # BLD: 0 K/MCL (ref 0–0.3)
BASOPHILS NFR BLD: 0 %
BUN SERPL-MCNC: 17 MG/DL (ref 6–20)
BUN/CREAT SERPL: 21 (ref 7–25)
CALCIUM SERPL-MCNC: 9.6 MG/DL (ref 8.4–10.2)
CHLORIDE SERPL-SCNC: 103 MMOL/L (ref 97–110)
CO2 SERPL-SCNC: 28 MMOL/L (ref 21–32)
CREAT SERPL-MCNC: 0.82 MG/DL (ref 0.51–0.95)
DEPRECATED RDW RBC: 41.9 FL (ref 39–50)
EGFRCR SERPLBLD CKD-EPI 2021: 74 ML/MIN/{1.73_M2}
EOSINOPHIL # BLD: 0 K/MCL (ref 0–0.5)
EOSINOPHIL NFR BLD: 0 %
ERYTHROCYTE [DISTWIDTH] IN BLOOD: 12.8 % (ref 11–15)
FASTING DURATION TIME PATIENT: ABNORMAL H
GLUCOSE BLDC GLUCOMTR-MCNC: 100 MG/DL (ref 70–99)
GLUCOSE BLDC GLUCOMTR-MCNC: 104 MG/DL (ref 70–99)
GLUCOSE BLDC GLUCOMTR-MCNC: 159 MG/DL (ref 70–99)
GLUCOSE BLDC GLUCOMTR-MCNC: 291 MG/DL (ref 70–99)
GLUCOSE SERPL-MCNC: 111 MG/DL (ref 70–99)
HCT VFR BLD CALC: 28.5 % (ref 36–46.5)
HGB BLD-MCNC: 9.2 G/DL (ref 12–15.5)
IMM GRANULOCYTES # BLD AUTO: 0 K/MCL (ref 0–0.2)
IMM GRANULOCYTES # BLD: 1 %
LYMPHOCYTES # BLD: 1.7 K/MCL (ref 1–4)
LYMPHOCYTES NFR BLD: 19 %
MAGNESIUM SERPL-MCNC: 1.9 MG/DL (ref 1.7–2.4)
MAGNESIUM SERPL-MCNC: 1.9 MG/DL (ref 1.7–2.4)
MCH RBC QN AUTO: 28.8 PG (ref 26–34)
MCHC RBC AUTO-ENTMCNC: 32.3 G/DL (ref 32–36.5)
MCV RBC AUTO: 89.3 FL (ref 78–100)
MONOCYTES # BLD: 0.7 K/MCL (ref 0.3–0.9)
MONOCYTES NFR BLD: 8 %
NEUTROPHILS # BLD: 6.2 K/MCL (ref 1.8–7.7)
NEUTROPHILS NFR BLD: 72 %
NRBC BLD MANUAL-RTO: 0 /100 WBC
PF4 HEPARIN CMPLX IGG SERPL IA: 0.06
PF4 HEPARIN CMPLX IGG SERPL QL IA: NEGATIVE
PLATELET # BLD AUTO: 130 K/MCL (ref 140–450)
POTASSIUM SERPL-SCNC: 3.5 MMOL/L (ref 3.4–5.1)
POTASSIUM SERPL-SCNC: 3.7 MMOL/L (ref 3.4–5.1)
RBC # BLD: 3.19 MIL/MCL (ref 4–5.2)
SODIUM SERPL-SCNC: 139 MMOL/L (ref 135–145)
WBC # BLD: 8.7 K/MCL (ref 4.2–11)

## 2024-08-30 PROCEDURE — 36415 COLL VENOUS BLD VENIPUNCTURE: CPT | Performed by: THORACIC SURGERY (CARDIOTHORACIC VASCULAR SURGERY)

## 2024-08-30 PROCEDURE — 82962 GLUCOSE BLOOD TEST: CPT

## 2024-08-30 PROCEDURE — 83735 ASSAY OF MAGNESIUM: CPT | Performed by: THORACIC SURGERY (CARDIOTHORACIC VASCULAR SURGERY)

## 2024-08-30 PROCEDURE — 10002803 HB RX 637: Performed by: FAMILY MEDICINE

## 2024-08-30 PROCEDURE — 10004651 HB RX, NO CHARGE ITEM: Performed by: NURSE PRACTITIONER

## 2024-08-30 PROCEDURE — 10004173 HB COUNTER-THERAPY VISIT PT

## 2024-08-30 PROCEDURE — 10002800 HB RX 250 W HCPCS: Performed by: NURSE PRACTITIONER

## 2024-08-30 PROCEDURE — 84132 ASSAY OF SERUM POTASSIUM: CPT | Performed by: THORACIC SURGERY (CARDIOTHORACIC VASCULAR SURGERY)

## 2024-08-30 PROCEDURE — 85025 COMPLETE CBC W/AUTO DIFF WBC: CPT | Performed by: NURSE PRACTITIONER

## 2024-08-30 PROCEDURE — 10002807 HB RX 258: Performed by: NURSE PRACTITIONER

## 2024-08-30 PROCEDURE — 97116 GAIT TRAINING THERAPY: CPT

## 2024-08-30 PROCEDURE — 80048 BASIC METABOLIC PNL TOTAL CA: CPT | Performed by: NURSE PRACTITIONER

## 2024-08-30 PROCEDURE — 10003585 HB ROOM CHARGE INTERMEDIATE CARE

## 2024-08-30 PROCEDURE — 97530 THERAPEUTIC ACTIVITIES: CPT

## 2024-08-30 PROCEDURE — 10002016 HB COUNTER INCENTIVE SPIROMETRY

## 2024-08-30 PROCEDURE — 36592 COLLECT BLOOD FROM PICC: CPT | Performed by: NURSE PRACTITIONER

## 2024-08-30 PROCEDURE — 10003441 HB CARDIAC REHAB PHASE 1

## 2024-08-30 PROCEDURE — 10002019 HB COUNTER RESP ASSESSMENT

## 2024-08-30 PROCEDURE — 10004180 HB COUNTER-TRANSPORT

## 2024-08-30 PROCEDURE — 10002803 HB RX 637: Performed by: NURSE PRACTITIONER

## 2024-08-30 PROCEDURE — 96372 THER/PROPH/DIAG INJ SC/IM: CPT | Performed by: NURSE PRACTITIONER

## 2024-08-30 PROCEDURE — 94150 VITAL CAPACITY TEST: CPT

## 2024-08-30 PROCEDURE — 83735 ASSAY OF MAGNESIUM: CPT | Performed by: NURSE PRACTITIONER

## 2024-08-30 RX ORDER — ASPIRIN 81 MG/1
81 TABLET, CHEWABLE ORAL DAILY
Status: DISCONTINUED | OUTPATIENT
Start: 2024-08-30 | End: 2024-08-31 | Stop reason: HOSPADM

## 2024-08-30 RX ADMIN — METOPROLOL TARTRATE 25 MG: 25 TABLET, FILM COATED ORAL at 08:33

## 2024-08-30 RX ADMIN — LIDOCAINE 2 PATCH: 4 PATCH TOPICAL at 08:40

## 2024-08-30 RX ADMIN — MAGNESIUM SULFATE IN DEXTROSE 1 G: 10 INJECTION, SOLUTION INTRAVENOUS at 08:32

## 2024-08-30 RX ADMIN — SODIUM CHLORIDE, PRESERVATIVE FREE 2 ML: 5 INJECTION INTRAVENOUS at 08:40

## 2024-08-30 RX ADMIN — ATORVASTATIN CALCIUM 80 MG: 80 TABLET, FILM COATED ORAL at 08:36

## 2024-08-30 RX ADMIN — SODIUM CHLORIDE, PRESERVATIVE FREE 10 ML: 5 INJECTION INTRAVENOUS at 08:33

## 2024-08-30 RX ADMIN — MAGNESIUM SULFATE IN DEXTROSE 1 G: 10 INJECTION, SOLUTION INTRAVENOUS at 17:44

## 2024-08-30 RX ADMIN — POTASSIUM CHLORIDE 20 MEQ: 1500 TABLET, EXTENDED RELEASE ORAL at 18:40

## 2024-08-30 RX ADMIN — GUAIFENESIN 1200 MG: 600 TABLET, EXTENDED RELEASE ORAL at 08:36

## 2024-08-30 RX ADMIN — ACETAMINOPHEN 650 MG: 325 TABLET ORAL at 08:28

## 2024-08-30 RX ADMIN — INSULIN LISPRO 2 UNITS: 100 INJECTION, SOLUTION INTRAVENOUS; SUBCUTANEOUS at 10:22

## 2024-08-30 RX ADMIN — INSULIN GLARGINE 20 UNITS: 100 INJECTION, SOLUTION SUBCUTANEOUS at 21:52

## 2024-08-30 RX ADMIN — ACETAMINOPHEN 650 MG: 325 TABLET ORAL at 15:04

## 2024-08-30 RX ADMIN — GUAIFENESIN 1200 MG: 600 TABLET, EXTENDED RELEASE ORAL at 20:09

## 2024-08-30 RX ADMIN — ASPIRIN 81 MG 81 MG: 81 TABLET ORAL at 13:45

## 2024-08-30 RX ADMIN — POTASSIUM CHLORIDE 20 MEQ: 1500 TABLET, EXTENDED RELEASE ORAL at 08:34

## 2024-08-30 RX ADMIN — INSULIN LISPRO 2 UNITS: 100 INJECTION, SOLUTION INTRAVENOUS; SUBCUTANEOUS at 20:30

## 2024-08-30 RX ADMIN — SODIUM CHLORIDE, PRESERVATIVE FREE 10 ML: 5 INJECTION INTRAVENOUS at 20:26

## 2024-08-30 RX ADMIN — FUROSEMIDE 40 MG: 40 TABLET ORAL at 08:33

## 2024-08-30 RX ADMIN — FAMOTIDINE 20 MG: 20 TABLET ORAL at 08:34

## 2024-08-30 RX ADMIN — METOPROLOL TARTRATE 25 MG: 25 TABLET, FILM COATED ORAL at 20:09

## 2024-08-30 ASSESSMENT — PULMONARY FUNCTION TESTS
FEV1: 0.88
FEV1_PREDICTED: 62
FEV1_PREDICTED: 51
FEV1: 62
FEV1/FVC: 50-69%
FEV1: 62
FEV1/FVC_PERCENT_PREDICTED: 50-69%
FEV1_PREDICTED: 33
FEV1/FVC_PERCENT_PREDICTED: 58

## 2024-08-30 ASSESSMENT — PAIN SCALES - GENERAL
PAINLEVEL_OUTOF10: 2
PAINLEVEL_OUTOF10: 4
PAINLEVEL_OUTOF10: 0
PAINLEVEL_OUTOF10: 2
PAINLEVEL_OUTOF10: 4
PAINLEVEL_OUTOF10: 2
PAINLEVEL_OUTOF10: 7

## 2024-08-30 ASSESSMENT — COGNITIVE AND FUNCTIONAL STATUS - GENERAL
BASIC_MOBILITY_RAW_SCORE: 15
BASIC_MOBILITY_CONVERTED_SCORE: 36.97

## 2024-08-31 VITALS
HEIGHT: 59 IN | SYSTOLIC BLOOD PRESSURE: 120 MMHG | BODY MASS INDEX: 31.29 KG/M2 | RESPIRATION RATE: 16 BRPM | TEMPERATURE: 98.6 F | OXYGEN SATURATION: 96 % | DIASTOLIC BLOOD PRESSURE: 67 MMHG | WEIGHT: 155.2 LBS | HEART RATE: 85 BPM

## 2024-08-31 LAB
ANION GAP SERPL CALC-SCNC: 13 MMOL/L (ref 7–19)
BASOPHILS # BLD: 0 K/MCL (ref 0–0.3)
BASOPHILS NFR BLD: 0 %
BUN SERPL-MCNC: 15 MG/DL (ref 6–20)
BUN/CREAT SERPL: 19 (ref 7–25)
CALCIUM SERPL-MCNC: 9.5 MG/DL (ref 8.4–10.2)
CHLORIDE SERPL-SCNC: 104 MMOL/L (ref 97–110)
CO2 SERPL-SCNC: 28 MMOL/L (ref 21–32)
CREAT SERPL-MCNC: 0.79 MG/DL (ref 0.51–0.95)
DEPRECATED RDW RBC: 42.3 FL (ref 39–50)
EGFRCR SERPLBLD CKD-EPI 2021: 77 ML/MIN/{1.73_M2}
EOSINOPHIL # BLD: 0.1 K/MCL (ref 0–0.5)
EOSINOPHIL NFR BLD: 1 %
ERYTHROCYTE [DISTWIDTH] IN BLOOD: 13 % (ref 11–15)
FASTING DURATION TIME PATIENT: ABNORMAL H
GLUCOSE BLDC GLUCOMTR-MCNC: 108 MG/DL (ref 70–99)
GLUCOSE SERPL-MCNC: 107 MG/DL (ref 70–99)
HCT VFR BLD CALC: 28.9 % (ref 36–46.5)
HGB BLD-MCNC: 9.6 G/DL (ref 12–15.5)
IMM GRANULOCYTES # BLD AUTO: 0 K/MCL (ref 0–0.2)
IMM GRANULOCYTES # BLD: 0 %
LYMPHOCYTES # BLD: 1.7 K/MCL (ref 1–4)
LYMPHOCYTES NFR BLD: 20 %
MAGNESIUM SERPL-MCNC: 2 MG/DL (ref 1.7–2.4)
MCH RBC QN AUTO: 29.5 PG (ref 26–34)
MCHC RBC AUTO-ENTMCNC: 33.2 G/DL (ref 32–36.5)
MCV RBC AUTO: 88.9 FL (ref 78–100)
MONOCYTES # BLD: 0.9 K/MCL (ref 0.3–0.9)
MONOCYTES NFR BLD: 10 %
NEUTROPHILS # BLD: 5.7 K/MCL (ref 1.8–7.7)
NEUTROPHILS NFR BLD: 69 %
NRBC BLD MANUAL-RTO: 0 /100 WBC
PLATELET # BLD AUTO: 134 K/MCL (ref 140–450)
POTASSIUM SERPL-SCNC: 3.9 MMOL/L (ref 3.4–5.1)
RBC # BLD: 3.25 MIL/MCL (ref 4–5.2)
SODIUM SERPL-SCNC: 141 MMOL/L (ref 135–145)
WBC # BLD: 8.4 K/MCL (ref 4.2–11)

## 2024-08-31 PROCEDURE — 10004651 HB RX, NO CHARGE ITEM: Performed by: NURSE PRACTITIONER

## 2024-08-31 PROCEDURE — 10004173 HB COUNTER-THERAPY VISIT PT: Performed by: PHYSICAL THERAPIST

## 2024-08-31 PROCEDURE — 10002803 HB RX 637: Performed by: FAMILY MEDICINE

## 2024-08-31 PROCEDURE — 82962 GLUCOSE BLOOD TEST: CPT

## 2024-08-31 PROCEDURE — 97116 GAIT TRAINING THERAPY: CPT | Performed by: PHYSICAL THERAPIST

## 2024-08-31 PROCEDURE — 10002803 HB RX 637: Performed by: NURSE PRACTITIONER

## 2024-08-31 PROCEDURE — 36415 COLL VENOUS BLD VENIPUNCTURE: CPT | Performed by: NURSE PRACTITIONER

## 2024-08-31 PROCEDURE — 10002807 HB RX 258: Performed by: NURSE PRACTITIONER

## 2024-08-31 PROCEDURE — 85025 COMPLETE CBC W/AUTO DIFF WBC: CPT | Performed by: NURSE PRACTITIONER

## 2024-08-31 PROCEDURE — 80048 BASIC METABOLIC PNL TOTAL CA: CPT | Performed by: NURSE PRACTITIONER

## 2024-08-31 PROCEDURE — 97530 THERAPEUTIC ACTIVITIES: CPT | Performed by: PHYSICAL THERAPIST

## 2024-08-31 PROCEDURE — 83735 ASSAY OF MAGNESIUM: CPT | Performed by: NURSE PRACTITIONER

## 2024-08-31 RX ORDER — CLOPIDOGREL BISULFATE 75 MG/1
75 TABLET ORAL DAILY
Qty: 90 TABLET | Refills: 1 | Status: SHIPPED | OUTPATIENT
Start: 2024-08-31

## 2024-08-31 RX ORDER — ATORVASTATIN CALCIUM 80 MG/1
80 TABLET, FILM COATED ORAL DAILY
Qty: 90 TABLET | Refills: 1 | Status: SHIPPED | OUTPATIENT
Start: 2024-08-31

## 2024-08-31 RX ORDER — FUROSEMIDE 20 MG
20 TABLET ORAL DAILY
Qty: 7 TABLET | Refills: 0 | Status: SHIPPED | OUTPATIENT
Start: 2024-09-01 | End: 2024-09-08

## 2024-08-31 RX ORDER — METFORMIN HCL 500 MG
2000 TABLET, EXTENDED RELEASE 24 HR ORAL
Status: SHIPPED | COMMUNITY
Start: 2024-09-01

## 2024-08-31 RX ORDER — ACETAMINOPHEN 325 MG/1
650 TABLET ORAL EVERY 4 HOURS PRN
Status: SHIPPED | COMMUNITY
Start: 2024-08-31

## 2024-08-31 RX ORDER — LOSARTAN POTASSIUM 50 MG/1
50 TABLET ORAL DAILY
Qty: 180 TABLET | Refills: 0 | Status: SHIPPED | OUTPATIENT
Start: 2024-08-31

## 2024-08-31 RX ORDER — ASPIRIN 81 MG/1
81 TABLET, CHEWABLE ORAL DAILY
Qty: 90 TABLET | Refills: 1 | Status: SHIPPED | OUTPATIENT
Start: 2024-09-01

## 2024-08-31 RX ORDER — LOSARTAN POTASSIUM 50 MG/1
50 TABLET ORAL DAILY
Status: DISCONTINUED | OUTPATIENT
Start: 2024-08-31 | End: 2024-08-31 | Stop reason: HOSPADM

## 2024-08-31 RX ORDER — METOPROLOL TARTRATE 25 MG/1
25 TABLET, FILM COATED ORAL EVERY 12 HOURS SCHEDULED
Qty: 180 TABLET | Refills: 1 | Status: SHIPPED | OUTPATIENT
Start: 2024-08-31

## 2024-08-31 RX ADMIN — FAMOTIDINE 20 MG: 20 TABLET ORAL at 08:04

## 2024-08-31 RX ADMIN — SODIUM CHLORIDE, PRESERVATIVE FREE 10 ML: 5 INJECTION INTRAVENOUS at 08:08

## 2024-08-31 RX ADMIN — MAGNESIUM SULFATE IN DEXTROSE 1 G: 10 INJECTION, SOLUTION INTRAVENOUS at 08:01

## 2024-08-31 RX ADMIN — FUROSEMIDE 40 MG: 40 TABLET ORAL at 08:03

## 2024-08-31 RX ADMIN — ASPIRIN 81 MG 81 MG: 81 TABLET ORAL at 08:03

## 2024-08-31 RX ADMIN — SENNOSIDES AND DOCUSATE SODIUM 2 TABLET: 50; 8.6 TABLET ORAL at 08:04

## 2024-08-31 RX ADMIN — LOSARTAN POTASSIUM 50 MG: 50 TABLET, FILM COATED ORAL at 11:25

## 2024-08-31 RX ADMIN — GUAIFENESIN 1200 MG: 600 TABLET, EXTENDED RELEASE ORAL at 08:03

## 2024-08-31 RX ADMIN — SODIUM CHLORIDE, PRESERVATIVE FREE 2 ML: 5 INJECTION INTRAVENOUS at 08:02

## 2024-08-31 RX ADMIN — METOPROLOL TARTRATE 25 MG: 25 TABLET, FILM COATED ORAL at 08:04

## 2024-08-31 RX ADMIN — POTASSIUM CHLORIDE 20 MEQ: 1500 TABLET, EXTENDED RELEASE ORAL at 08:03

## 2024-08-31 RX ADMIN — ATORVASTATIN CALCIUM 80 MG: 80 TABLET, FILM COATED ORAL at 08:03

## 2024-08-31 RX ADMIN — ACETAMINOPHEN 650 MG: 325 TABLET ORAL at 08:15

## 2024-08-31 RX ADMIN — LIDOCAINE 2 PATCH: 4 PATCH TOPICAL at 08:02

## 2024-08-31 ASSESSMENT — COGNITIVE AND FUNCTIONAL STATUS - GENERAL
BASIC_MOBILITY_CONVERTED_SCORE: 39.67
BASIC_MOBILITY_RAW_SCORE: 17

## 2024-08-31 ASSESSMENT — PAIN SCALES - GENERAL
PAINLEVEL_OUTOF10: 0
PAINLEVEL_OUTOF10: 3
PAINLEVEL_OUTOF10: 3
PAINLEVEL_OUTOF10: 2

## 2024-09-03 ENCOUNTER — TELEPHONE (OUTPATIENT)
Dept: CARE COORDINATION | Age: 74
End: 2024-09-03

## 2024-09-04 ENCOUNTER — TELEPHONE (OUTPATIENT)
Dept: CARE COORDINATION | Age: 74
End: 2024-09-04

## 2024-09-09 ENCOUNTER — CASE MANAGEMENT (OUTPATIENT)
Dept: CARE COORDINATION | Age: 74
End: 2024-09-09

## 2024-09-11 ENCOUNTER — CASE MANAGEMENT (OUTPATIENT)
Dept: CARE COORDINATION | Age: 74
End: 2024-09-11

## 2024-09-12 ENCOUNTER — TELEPHONE (OUTPATIENT)
Dept: CARDIOLOGY | Age: 74
End: 2024-09-12

## 2024-09-12 DIAGNOSIS — Z95.1 S/P CABG X 3: Primary | ICD-10-CM

## 2024-09-12 RX ORDER — FUROSEMIDE 20 MG
20 TABLET ORAL DAILY
Qty: 7 TABLET | Refills: 0 | Status: SHIPPED | OUTPATIENT
Start: 2024-09-12 | End: 2024-09-19

## 2024-09-17 ENCOUNTER — CASE MANAGEMENT (OUTPATIENT)
Dept: CARE COORDINATION | Age: 74
End: 2024-09-17

## 2024-09-23 ENCOUNTER — ALLIED HEALTH/NURSE VISIT (OUTPATIENT)
Dept: FAMILY MEDICINE | Facility: CLINIC | Age: 74
End: 2024-09-23
Payer: COMMERCIAL

## 2024-09-23 ENCOUNTER — OFFICE VISIT (OUTPATIENT)
Dept: CARDIOLOGY | Age: 74
End: 2024-09-23

## 2024-09-23 ENCOUNTER — CASE MANAGEMENT (OUTPATIENT)
Dept: CARE COORDINATION | Age: 74
End: 2024-09-23

## 2024-09-23 VITALS
HEART RATE: 62 BPM | BODY MASS INDEX: 29.82 KG/M2 | WEIGHT: 147.9 LBS | SYSTOLIC BLOOD PRESSURE: 139 MMHG | OXYGEN SATURATION: 98 % | DIASTOLIC BLOOD PRESSURE: 79 MMHG | HEIGHT: 59 IN

## 2024-09-23 DIAGNOSIS — I25.10 CORONARY ARTERY DISEASE INVOLVING NATIVE CORONARY ARTERY OF NATIVE HEART WITHOUT ANGINA PECTORIS: ICD-10-CM

## 2024-09-23 DIAGNOSIS — Z95.1 S/P CABG X 3: Primary | ICD-10-CM

## 2024-09-23 DIAGNOSIS — E53.8 VITAMIN B12 DEFICIENCY (NON ANEMIC): Primary | ICD-10-CM

## 2024-09-23 DIAGNOSIS — Z98.890 S/P LEFT ATRIAL APPENDAGE LIGATION: ICD-10-CM

## 2024-09-23 DIAGNOSIS — I25.2 HISTORY OF MYOCARDIAL INFARCTION: ICD-10-CM

## 2024-09-23 PROCEDURE — 99207 PR NO CHARGE NURSE ONLY: CPT

## 2024-09-23 PROCEDURE — 99024 POSTOP FOLLOW-UP VISIT: CPT | Performed by: NURSE PRACTITIONER

## 2024-09-23 PROCEDURE — 96372 THER/PROPH/DIAG INJ SC/IM: CPT | Performed by: FAMILY MEDICINE

## 2024-09-23 RX ADMIN — CYANOCOBALAMIN 1000 MCG: 1000 INJECTION, SOLUTION INTRAMUSCULAR; SUBCUTANEOUS at 08:03

## 2024-09-25 ENCOUNTER — CASE MANAGEMENT (OUTPATIENT)
Dept: CARE COORDINATION | Age: 74
End: 2024-09-25

## 2024-09-30 ENCOUNTER — CASE MANAGEMENT (OUTPATIENT)
Dept: CARE COORDINATION | Age: 74
End: 2024-09-30

## 2024-10-04 ENCOUNTER — CASE MANAGEMENT (OUTPATIENT)
Dept: CARE COORDINATION | Age: 74
End: 2024-10-04

## 2024-10-15 ENCOUNTER — TELEPHONE (OUTPATIENT)
Dept: CARDIAC REHAB | Age: 74
End: 2024-10-15

## 2024-10-21 ENCOUNTER — ALLIED HEALTH/NURSE VISIT (OUTPATIENT)
Dept: FAMILY MEDICINE | Facility: CLINIC | Age: 74
End: 2024-10-21
Payer: COMMERCIAL

## 2024-10-21 DIAGNOSIS — Z23 ENCOUNTER FOR IMMUNIZATION: Primary | ICD-10-CM

## 2024-10-21 PROCEDURE — 96372 THER/PROPH/DIAG INJ SC/IM: CPT | Performed by: FAMILY MEDICINE

## 2024-10-21 PROCEDURE — 99207 PR NO CHARGE NURSE ONLY: CPT

## 2024-10-21 RX ADMIN — CYANOCOBALAMIN 1000 MCG: 1000 INJECTION, SOLUTION INTRAMUSCULAR; SUBCUTANEOUS at 08:15

## 2024-10-21 NOTE — PROGRESS NOTES
Prior to immunization administration, verified patients identity using patient s name and date of birth. Please see Immunization Activity for additional information.     Screening Questionnaire for Adult Immunization    Are you sick today?   No   Do you have allergies to medications, food, a vaccine component or latex?   No   Have you ever had a serious reaction after receiving a vaccination?   No   Do you have a long-term health problem with heart, lung, kidney, or metabolic disease (e.g., diabetes), asthma, a blood disorder, no spleen, complement component deficiency, a cochlear implant, or a spinal fluid leak?  Are you on long-term aspirin therapy?   No   Do you have cancer, leukemia, HIV/AIDS, or any other immune system problem?   No   Do you have a parent, brother, or sister with an immune system problem?   No   In the past 3 months, have you taken medications that affect  your immune system, such as prednisone, other steroids, or anticancer drugs; drugs for the treatment of rheumatoid arthritis, Crohn s disease, or psoriasis; or have you had radiation treatments?   No   Have you had a seizure, or a brain or other nervous system problem?   No   During the past year, have you received a transfusion of blood or blood    products, or been given immune (gamma) globulin or antiviral drug?   No   For women: Are you pregnant or is there a chance you could become       pregnant during the next month?   No   Have you received any vaccinations in the past 4 weeks?   No     Immunization questionnaire answers were all negative.    I have reviewed the following standing orders: Not Applicable; Order were already placed prior to ancillary visit- B-12 Injection     Patient instructed to remain in clinic for 15 minutes afterwards, and to report any adverse reactions.     Screening performed by Minerva Khalil MA on 10/21/2024 at 8:19 AM.

## 2024-10-24 ENCOUNTER — APPOINTMENT (OUTPATIENT)
Dept: FAMILY MEDICINE | Age: 74
End: 2024-10-24

## 2024-10-25 ENCOUNTER — APPOINTMENT (OUTPATIENT)
Dept: CARDIOLOGY | Age: 74
End: 2024-10-25

## 2024-11-05 ENCOUNTER — APPOINTMENT (OUTPATIENT)
Dept: CARDIAC REHAB | Age: 74
End: 2024-11-05
Attending: THORACIC SURGERY (CARDIOTHORACIC VASCULAR SURGERY)

## 2024-11-06 ENCOUNTER — HOSPITAL ENCOUNTER (OUTPATIENT)
Dept: CARDIAC REHAB | Age: 74
Discharge: STILL ADMITTED OR ADMINISTRATIVE | End: 2024-11-06
Attending: THORACIC SURGERY (CARDIOTHORACIC VASCULAR SURGERY)

## 2024-11-06 VITALS — BODY MASS INDEX: 29.87 KG/M2 | HEIGHT: 59 IN

## 2024-11-06 PROCEDURE — 93798 PHYS/QHP OP CAR RHAB W/ECG: CPT

## 2024-11-06 PROCEDURE — 10004107 HB COUNTER-CARDIAC REHAB INITIAL PHASE 2

## 2024-11-06 ASSESSMENT — PATIENT HEALTH QUESTIONNAIRE - PHQ9
8. MOVING OR SPEAKING SO SLOWLY THAT OTHER PEOPLE COULD HAVE NOTICED. OR THE OPPOSITE, BEING SO FIGETY OR RESTLESS THAT YOU HAVE BEEN MOVING AROUND A LOT MORE THAN USUAL: NOT AT ALL
10. IF YOU CHECKED OFF ANY PROBLEMS, HOW DIFFICULT HAVE THESE PROBLEMS MADE IT FOR YOU TO DO YOUR WORK, TAKE CARE OF THINGS AT HOME, OR GET ALONG WITH OTHER PEOPLE: NOT DIFFICULT AT ALL
6. FEELING BAD ABOUT YOURSELF - OR THAT YOU ARE A FAILURE OR HAVE LET YOURSELF OR YOUR FAMILY DOWN: NOT AT ALL
5. POOR APPETITE OR OVEREATING: NOT AT ALL
3. TROUBLE FALLING OR STAYING ASLEEP OR SLEEPING TOO MUCH: NOT AT ALL
4. FEELING TIRED OR HAVING LITTLE ENERGY: NOT AT ALL
2. FEELING DOWN, DEPRESSED OR HOPELESS: NOT AT ALL
9. THOUGHTS THAT YOU WOULD BE BETTER OFF DEAD, OR OF HURTING YOURSELF: NOT AT ALL
7. TROUBLE CONCENTRATING ON THINGS, SUCH AS READING THE NEWSPAPER OR WATCHING TELEVISION: NOT AT ALL
SUM OF ALL RESPONSES TO PHQ QUESTIONS 1-9: 0
1. LITTLE INTEREST OR PLEASURE IN DOING THINGS: NOT AT ALL

## 2024-11-06 ASSESSMENT — LIFESTYLE VARIABLES
TOBACCO_STATUS: NEVER USED
E-CIGARETTE_USE: NO E-CIGARETTES USE IN THE LAST 30 DAYS

## 2024-11-06 ASSESSMENT — EJECTION FRACTION: LVEF_VALUE: 68

## 2024-11-16 ENCOUNTER — HEALTH MAINTENANCE LETTER (OUTPATIENT)
Age: 74
End: 2024-11-16

## 2024-11-25 ENCOUNTER — ALLIED HEALTH/NURSE VISIT (OUTPATIENT)
Dept: FAMILY MEDICINE | Facility: CLINIC | Age: 74
End: 2024-11-25
Payer: COMMERCIAL

## 2024-11-25 DIAGNOSIS — E53.8 VITAMIN B 12 DEFICIENCY: Primary | ICD-10-CM

## 2024-11-25 PROCEDURE — 99207 PR NO CHARGE NURSE ONLY: CPT

## 2024-11-25 RX ADMIN — CYANOCOBALAMIN 1000 MCG: 1000 INJECTION, SOLUTION INTRAMUSCULAR; SUBCUTANEOUS at 08:20

## 2024-12-26 ENCOUNTER — OFFICE VISIT (OUTPATIENT)
Dept: URGENT CARE | Facility: URGENT CARE | Age: 74
End: 2024-12-26
Payer: COMMERCIAL

## 2024-12-26 VITALS
RESPIRATION RATE: 16 BRPM | TEMPERATURE: 99.5 F | OXYGEN SATURATION: 98 % | DIASTOLIC BLOOD PRESSURE: 91 MMHG | SYSTOLIC BLOOD PRESSURE: 186 MMHG | HEART RATE: 96 BPM

## 2024-12-26 DIAGNOSIS — R09.81 NASAL CONGESTION: ICD-10-CM

## 2024-12-26 DIAGNOSIS — Z91.81 PERSONAL HISTORY OF FALL: ICD-10-CM

## 2024-12-26 DIAGNOSIS — R60.0 UNILATERAL EDEMA OF LOWER EXTREMITY: ICD-10-CM

## 2024-12-26 DIAGNOSIS — M25.572 ACUTE LEFT ANKLE PAIN: Primary | ICD-10-CM

## 2024-12-26 RX ORDER — FLUTICASONE PROPIONATE 50 MCG
1 SPRAY, SUSPENSION (ML) NASAL DAILY
Qty: 11.1 ML | Refills: 1 | Status: SHIPPED | OUTPATIENT
Start: 2024-12-26

## 2024-12-26 NOTE — PATIENT INSTRUCTIONS
[facility] [address] [place] [address]  Phone: [phone]    [facility] [address], [place] [address]  Phone: [phone]    Orthopedics Same day urgent care - call and make sure they have same day walk in

## 2024-12-26 NOTE — PROGRESS NOTES
Assessment & Plan     Acute left ankle pain  Personal history of fall  Unilateral edema of lower extremity  - Ankle injury with swelling and pain due to fall on the 23rd. [place] ankle rules indicate imaging due to pain along the lateral malleolus as well as pain immediately with walking.  X-ray confirms significant break in fibula with detachment. Refer patient to orthopedic specialist for further evaluation and management. Provide list of options for same-day walk-in care, including [facility] and [facility]. Ensure chosen facility is in-network with patient's [employer] insurance.  Opted not to do ultrasound of left lower extremity for she has profound fibular fracture.  There is no popliteal tenderness in either leg and I believe that the lower extremity edema is due to this fracture as well as possibly sprain of the underlying tendons.  Patient agreeable to the plan with no additional questions or concerns.  Patient's pulses are intact bilaterally on her lower extremities  - XR Ankle Left G/E 3 Views  - US Lower Extremity Venous Duplex Left    Nasal congestion  Patient has history of nasal congestion and has used fluticasone daily and is asking for refill of this.  Refill sent with instructions to follow-up with primary doctor.  - fluticasone (FLONASE) 50 MCG/ACT nasal spray  Dispense: 11.1 mL; Refill: 1     No follow-ups on file.  Patient recommended to follow-up with orthopedics same-day urgent care today.  Patient states that she would rather go tomorrow.  They are understanding that I would recommend them be seen today.    Keegan Ambriz MD  [facility] SID Valles is a 74 year old female who presents to clinic today for the following health issues:  Chief Complaint   Patient presents with    Foot Pain     Had a fall either Sunday or Monday and now Lt leg having pain, pain goes from ankle up to calf, redness and warm to the touch          12/26/2024     2:42 PM   Additional Questions   Roomed by Caroline MCMAHON   Accompanied by Daughter     HPI    Chief complaint of a swollen and painful ankle following a fall on the 23rd. The patient twisted her ankle during the fall and experienced immediate pain. The swelling increased the next day, and the ankle now feels hot with a purple discoloration. There is a persistent bump that has not resolved despite using cream.    The patient has a history of peripheral neuropathy, which affects her walking. She denies any pain in the back of the knee or shortness of breath. The patient describes the pain as pulsating and widespread. She also reports occasional leg swelling due to her neuropathy.      Objective    BP (!) 186/91   Pulse 96   Temp 99.5  F (37.5  C) (Oral)   Resp 16   SpO2 98%   Physical Exam   Left lower extremity tender to palpation along the lateral aspect of her calf.  Pain is present throughout the ankle.  Patient does not have any pain at the base of the metatarsals on the left side.  There is tenderness along the lateral malleolus at the anterior and posterior aspect.  Profound lower extremity edema throughout left leg.  Bilateral lower extremity pulses intact

## 2024-12-30 ENCOUNTER — ALLIED HEALTH/NURSE VISIT (OUTPATIENT)
Dept: FAMILY MEDICINE | Facility: CLINIC | Age: 74
End: 2024-12-30
Payer: COMMERCIAL

## 2024-12-30 ENCOUNTER — TELEPHONE (OUTPATIENT)
Dept: FAMILY MEDICINE | Facility: CLINIC | Age: 74
End: 2024-12-30

## 2024-12-30 ENCOUNTER — TRANSFERRED RECORDS (OUTPATIENT)
Dept: HEALTH INFORMATION MANAGEMENT | Facility: CLINIC | Age: 74
End: 2024-12-30

## 2024-12-30 DIAGNOSIS — E53.8 VITAMIN B12 DEFICIENCY (NON ANEMIC): Primary | ICD-10-CM

## 2024-12-30 PROCEDURE — 96372 THER/PROPH/DIAG INJ SC/IM: CPT | Performed by: FAMILY MEDICINE

## 2024-12-30 PROCEDURE — 99207 PR NO CHARGE NURSE ONLY: CPT

## 2024-12-30 RX ORDER — IBUPROFEN 600 MG/1
TABLET, FILM COATED ORAL
COMMUNITY
Start: 2024-12-27

## 2024-12-30 RX ORDER — CAPSAICIN 0.025 %
CREAM (GRAM) TOPICAL
COMMUNITY
Start: 2024-12-23

## 2024-12-30 RX ADMIN — CYANOCOBALAMIN 1000 MCG: 1000 INJECTION, SOLUTION INTRAMUSCULAR; SUBCUTANEOUS at 08:30

## 2024-12-30 NOTE — TELEPHONE ENCOUNTER
Pt was in today for an MA visit with her son Ramo Whaley and are requesting an order for a wheelchair for her to be able to get around due to her left leg broken bone and asked to send request to PCP.    Aaliyah Candelario MA

## 2024-12-31 NOTE — CONFIDENTIAL NOTE
I attempted to contact the patient's EC. No answer. Please relay message to daughter when she calls back.
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
## 2025-01-02 ENCOUNTER — TELEPHONE (OUTPATIENT)
Dept: FAMILY MEDICINE | Facility: CLINIC | Age: 75
End: 2025-01-02
Payer: COMMERCIAL

## 2025-01-02 NOTE — TELEPHONE ENCOUNTER
Patient notified. Patient states that she already has a letter from orthopedic and sent it to the medical supply. Patient is now waiting for approval from supply company to  wheel chair when ready. No further questions and concerns.       Beatriz Whaley MA

## 2025-01-02 NOTE — TELEPHONE ENCOUNTER
Forms/Letter Request    Type of form/letter: OTHER: [employer]        Do we have the form/letter: Yes: On the provider's desk to be completed.    Who is the form from? [employer]     Where did/will the form come from? form was faxed in    When is form/letter needed by: When it is completed by the provider.    How would you like the form/letter returned: Fax : [phone]    Patient Notified form requests are processed in 5-7 business days:Yes    Order details/form description:   Adult size Pull-up on medium 54.000 each per month.  Disposable liner/shield/pad 56.000 each per 1 month

## 2025-01-06 ENCOUNTER — TRANSFERRED RECORDS (OUTPATIENT)
Dept: HEALTH INFORMATION MANAGEMENT | Facility: CLINIC | Age: 75
End: 2025-01-06
Payer: COMMERCIAL

## 2025-01-28 ENCOUNTER — TRANSFERRED RECORDS (OUTPATIENT)
Dept: HEALTH INFORMATION MANAGEMENT | Facility: CLINIC | Age: 75
End: 2025-01-28
Payer: COMMERCIAL

## 2025-01-29 ENCOUNTER — ALLIED HEALTH/NURSE VISIT (OUTPATIENT)
Dept: FAMILY MEDICINE | Facility: CLINIC | Age: 75
End: 2025-01-29
Payer: COMMERCIAL

## 2025-01-29 DIAGNOSIS — E53.8 VITAMIN B12 DEFICIENCY (NON ANEMIC): Primary | ICD-10-CM

## 2025-01-29 PROCEDURE — 99207 PR NO CHARGE NURSE ONLY: CPT

## 2025-01-29 PROCEDURE — 96372 THER/PROPH/DIAG INJ SC/IM: CPT | Performed by: FAMILY MEDICINE

## 2025-01-29 RX ADMIN — CYANOCOBALAMIN 1000 MCG: 1000 INJECTION, SOLUTION INTRAMUSCULAR; SUBCUTANEOUS at 08:52

## 2025-02-21 PROBLEM — S22.42XA CLOSED FRACTURE OF MULTIPLE RIBS OF LEFT SIDE, INITIAL ENCOUNTER: Status: RESOLVED | Noted: 2023-11-20 | Resolved: 2025-02-21

## 2025-02-24 ENCOUNTER — OFFICE VISIT (OUTPATIENT)
Dept: FAMILY MEDICINE | Facility: CLINIC | Age: 75
End: 2025-02-24
Payer: COMMERCIAL

## 2025-02-24 VITALS
HEIGHT: 60 IN | DIASTOLIC BLOOD PRESSURE: 87 MMHG | HEART RATE: 85 BPM | TEMPERATURE: 98.8 F | BODY MASS INDEX: 22.97 KG/M2 | OXYGEN SATURATION: 98 % | WEIGHT: 117 LBS | SYSTOLIC BLOOD PRESSURE: 160 MMHG | RESPIRATION RATE: 13 BRPM

## 2025-02-24 DIAGNOSIS — F33.1 MODERATE RECURRENT MAJOR DEPRESSION (H): ICD-10-CM

## 2025-02-24 DIAGNOSIS — E11.43 TYPE 2 DIABETES MELLITUS WITH DIABETIC AUTONOMIC NEUROPATHY, WITHOUT LONG-TERM CURRENT USE OF INSULIN (H): Primary | ICD-10-CM

## 2025-02-24 DIAGNOSIS — M81.0 AGE-RELATED OSTEOPOROSIS WITHOUT CURRENT PATHOLOGICAL FRACTURE: ICD-10-CM

## 2025-02-24 DIAGNOSIS — G62.9 PERIPHERAL POLYNEUROPATHY: ICD-10-CM

## 2025-02-24 DIAGNOSIS — E53.8 VITAMIN B 12 DEFICIENCY: ICD-10-CM

## 2025-02-24 DIAGNOSIS — I10 BENIGN ESSENTIAL HYPERTENSION: ICD-10-CM

## 2025-02-24 DIAGNOSIS — E78.5 HYPERLIPIDEMIA, UNSPECIFIED HYPERLIPIDEMIA TYPE: ICD-10-CM

## 2025-02-24 DIAGNOSIS — R22.42 MASS OF LEFT FOOT: ICD-10-CM

## 2025-02-24 DIAGNOSIS — M81.0 OSTEOPOROSIS WITHOUT CURRENT PATHOLOGICAL FRACTURE, UNSPECIFIED OSTEOPOROSIS TYPE: ICD-10-CM

## 2025-02-24 PROBLEM — L97.912 SKIN ULCER OF RIGHT LOWER LEG WITH FAT LAYER EXPOSED (H): Status: RESOLVED | Noted: 2024-03-11 | Resolved: 2025-02-24

## 2025-02-24 LAB
EST. AVERAGE GLUCOSE BLD GHB EST-MCNC: 189 MG/DL
HBA1C MFR BLD: 8.2 % (ref 0–5.6)
HOLD SPECIMEN: NORMAL

## 2025-02-24 PROCEDURE — 80061 LIPID PANEL: CPT | Performed by: FAMILY MEDICINE

## 2025-02-24 PROCEDURE — 36415 COLL VENOUS BLD VENIPUNCTURE: CPT | Performed by: FAMILY MEDICINE

## 2025-02-24 PROCEDURE — 82306 VITAMIN D 25 HYDROXY: CPT | Performed by: FAMILY MEDICINE

## 2025-02-24 PROCEDURE — 82607 VITAMIN B-12: CPT | Performed by: FAMILY MEDICINE

## 2025-02-24 PROCEDURE — 83036 HEMOGLOBIN GLYCOSYLATED A1C: CPT | Performed by: FAMILY MEDICINE

## 2025-02-24 PROCEDURE — 80053 COMPREHEN METABOLIC PANEL: CPT | Performed by: FAMILY MEDICINE

## 2025-02-24 RX ORDER — CYANOCOBALAMIN 1000 UG/ML
1000 INJECTION, SOLUTION INTRAMUSCULAR; SUBCUTANEOUS
Status: ACTIVE | OUTPATIENT
Start: 2025-02-24 | End: 2026-02-19

## 2025-02-24 RX ORDER — LANCETS
EACH MISCELLANEOUS
Qty: 100 EACH | Refills: 6 | Status: SHIPPED | OUTPATIENT
Start: 2025-02-24

## 2025-02-24 RX ADMIN — CYANOCOBALAMIN 1000 MCG: 1000 INJECTION, SOLUTION INTRAMUSCULAR; SUBCUTANEOUS at 09:47

## 2025-02-24 ASSESSMENT — PAIN SCALES - GENERAL: PAINLEVEL_OUTOF10: MODERATE PAIN (6)

## 2025-02-24 NOTE — PATIENT INSTRUCTIONS
Stop glipizide.  Do not take this any longer even if your sugar is high.    Continue metformin.  Start empagliflozin 10 mg once daily in the morning.  It can sometimes make you urinate a little more often.  If you are having any urinary discomfort or vaginal itching, let me know.  Schedule a follow-up visit with me in 1 month to recheck your diabetes and your kidney function after starting this medicine.    Reduce your intake of orange juice.  Drink only sugar-free orange juice.  Work on reintroducing regular exercise as needed.    Schedule an eye exam.    I placed a referral to Dr. Sauceda of podiatry in [place], their clinic will contact you to schedule to look at the bump on your left foot.    You can try discontinuing cetirizine.    Follow-up with me in 1 month, be sure to bring all of your home medications with you to that visit so we can ensure your medication list is accurate.

## 2025-02-25 ENCOUNTER — PATIENT OUTREACH (OUTPATIENT)
Dept: CARE COORDINATION | Facility: CLINIC | Age: 75
End: 2025-02-25
Payer: COMMERCIAL

## 2025-02-25 LAB
ALBUMIN SERPL BCG-MCNC: 4.2 G/DL (ref 3.5–5.2)
ALP SERPL-CCNC: 88 U/L (ref 40–150)
ALT SERPL W P-5'-P-CCNC: 26 U/L (ref 0–50)
ANION GAP SERPL CALCULATED.3IONS-SCNC: 11 MMOL/L (ref 7–15)
AST SERPL W P-5'-P-CCNC: 26 U/L (ref 0–45)
BILIRUB SERPL-MCNC: 0.4 MG/DL
BUN SERPL-MCNC: 7.6 MG/DL (ref 8–23)
CALCIUM SERPL-MCNC: 9.3 MG/DL (ref 8.8–10.4)
CHLORIDE SERPL-SCNC: 94 MMOL/L (ref 98–107)
CHOLEST SERPL-MCNC: 115 MG/DL
CREAT SERPL-MCNC: 0.59 MG/DL (ref 0.51–0.95)
EGFRCR SERPLBLD CKD-EPI 2021: >90 ML/MIN/1.73M2
GLUCOSE SERPL-MCNC: 170 MG/DL (ref 70–99)
HCO3 SERPL-SCNC: 23 MMOL/L (ref 22–29)
HDLC SERPL-MCNC: 46 MG/DL
LDLC SERPL CALC-MCNC: 34 MG/DL
NONHDLC SERPL-MCNC: 69 MG/DL
POTASSIUM SERPL-SCNC: 4.3 MMOL/L (ref 3.4–5.3)
PROT SERPL-MCNC: 7 G/DL (ref 6.4–8.3)
SODIUM SERPL-SCNC: 128 MMOL/L (ref 135–145)
TRIGL SERPL-MCNC: 177 MG/DL
VIT B12 SERPL-MCNC: 793 PG/ML (ref 232–1245)
VIT D+METAB SERPL-MCNC: 46 NG/ML (ref 20–50)

## 2025-02-27 ENCOUNTER — PATIENT OUTREACH (OUTPATIENT)
Dept: CARE COORDINATION | Facility: CLINIC | Age: 75
End: 2025-02-27
Payer: COMMERCIAL

## 2025-02-27 ENCOUNTER — TELEPHONE (OUTPATIENT)
Dept: FAMILY MEDICINE | Facility: CLINIC | Age: 75
End: 2025-02-27
Payer: COMMERCIAL

## 2025-02-27 NOTE — TELEPHONE ENCOUNTER
Forms/Letter Request    Type of form/letter: Statement of Certifying Physician For Therapeutic Shoes      Do we have the form/letter: Yes: Placed in Dr. Acevedo in box    Who is the form from?  [facility]    Where did/will the form come from? form was faxed in    When is form/letter needed by: when done    How would you like the form/letter returned: Fax : [phone]

## 2025-03-06 ENCOUNTER — TELEPHONE (OUTPATIENT)
Dept: FAMILY MEDICINE | Age: 75
End: 2025-03-06

## 2025-03-31 ENCOUNTER — TRANSFERRED RECORDS (OUTPATIENT)
Dept: HEALTH INFORMATION MANAGEMENT | Facility: CLINIC | Age: 75
End: 2025-03-31
Payer: COMMERCIAL

## 2025-04-01 ENCOUNTER — OFFICE VISIT (OUTPATIENT)
Dept: FAMILY MEDICINE | Facility: CLINIC | Age: 75
End: 2025-04-01
Payer: COMMERCIAL

## 2025-04-01 VITALS
WEIGHT: 118 LBS | RESPIRATION RATE: 16 BRPM | BODY MASS INDEX: 23.16 KG/M2 | TEMPERATURE: 97.8 F | OXYGEN SATURATION: 98 % | SYSTOLIC BLOOD PRESSURE: 139 MMHG | DIASTOLIC BLOOD PRESSURE: 78 MMHG | HEIGHT: 60 IN | HEART RATE: 69 BPM

## 2025-04-01 DIAGNOSIS — F33.1 MODERATE RECURRENT MAJOR DEPRESSION (H): ICD-10-CM

## 2025-04-01 DIAGNOSIS — R21 RASH: ICD-10-CM

## 2025-04-01 DIAGNOSIS — M81.0 AGE-RELATED OSTEOPOROSIS WITHOUT CURRENT PATHOLOGICAL FRACTURE: ICD-10-CM

## 2025-04-01 DIAGNOSIS — E11.43 TYPE 2 DIABETES MELLITUS WITH DIABETIC AUTONOMIC NEUROPATHY, WITHOUT LONG-TERM CURRENT USE OF INSULIN (H): Primary | ICD-10-CM

## 2025-04-01 LAB
ANION GAP SERPL CALCULATED.3IONS-SCNC: 10 MMOL/L (ref 7–15)
BUN SERPL-MCNC: 10.5 MG/DL (ref 8–23)
CALCIUM SERPL-MCNC: 9.6 MG/DL (ref 8.8–10.4)
CHLORIDE SERPL-SCNC: 97 MMOL/L (ref 98–107)
CREAT SERPL-MCNC: 0.61 MG/DL (ref 0.51–0.95)
EGFRCR SERPLBLD CKD-EPI 2021: >90 ML/MIN/1.73M2
GLUCOSE SERPL-MCNC: 131 MG/DL (ref 70–99)
HCO3 SERPL-SCNC: 24 MMOL/L (ref 22–29)
POTASSIUM SERPL-SCNC: 4.1 MMOL/L (ref 3.4–5.3)
SODIUM SERPL-SCNC: 131 MMOL/L (ref 135–145)

## 2025-04-01 PROCEDURE — 96127 BRIEF EMOTIONAL/BEHAV ASSMT: CPT | Performed by: FAMILY MEDICINE

## 2025-04-01 PROCEDURE — 99214 OFFICE O/P EST MOD 30 MIN: CPT | Mod: 25 | Performed by: FAMILY MEDICINE

## 2025-04-01 PROCEDURE — 3075F SYST BP GE 130 - 139MM HG: CPT | Performed by: FAMILY MEDICINE

## 2025-04-01 PROCEDURE — 80048 BASIC METABOLIC PNL TOTAL CA: CPT | Performed by: FAMILY MEDICINE

## 2025-04-01 PROCEDURE — 96372 THER/PROPH/DIAG INJ SC/IM: CPT | Performed by: PHARMACIST

## 2025-04-01 PROCEDURE — 1126F AMNT PAIN NOTED NONE PRSNT: CPT | Performed by: FAMILY MEDICINE

## 2025-04-01 PROCEDURE — 36415 COLL VENOUS BLD VENIPUNCTURE: CPT | Performed by: FAMILY MEDICINE

## 2025-04-01 PROCEDURE — 3078F DIAST BP <80 MM HG: CPT | Performed by: FAMILY MEDICINE

## 2025-04-01 RX ORDER — TRIAMCINOLONE ACETONIDE 1 MG/G
CREAM TOPICAL 2 TIMES DAILY
Qty: 30 G | Refills: 1 | Status: SHIPPED | OUTPATIENT
Start: 2025-04-01

## 2025-04-01 RX ADMIN — CYANOCOBALAMIN 1000 MCG: 1000 INJECTION, SOLUTION INTRAMUSCULAR; SUBCUTANEOUS at 09:07

## 2025-04-01 ASSESSMENT — PATIENT HEALTH QUESTIONNAIRE - PHQ9: SUM OF ALL RESPONSES TO PHQ QUESTIONS 1-9: 10

## 2025-04-01 ASSESSMENT — PAIN SCALES - GENERAL: PAINLEVEL_OUTOF10: NO PAIN (0)

## 2025-04-01 NOTE — PROGRESS NOTES
{PROVIDER CHARTING PREFERENCE:547785}    Reynaldo Valles is a 74 year old, presenting for the following health issues:  Follow Up      4/1/2025     8:23 AM   Additional Questions   Roomed by Federica   Accompanied by Daughter     History of Present Illness       Diabetes:   She presents for follow up of diabetes.  She is checking home blood glucose one time daily.   She checks blood glucose before meals.  Blood glucose is never over 200 and never under 70. She is aware of hypoglycemia symptoms including shakiness.    She has no concerns regarding her diabetes at this time.  She is having numbness in feet, burning in feet and redness, sores, or blisters on feet.  The patient has not had a diabetic eye exam in the last 12 months.          She eats 2-3 servings of fruits and vegetables daily.She consumes 0 sweetened beverage(s) daily.She exercises with enough effort to increase her heart rate 9 or less minutes per day.  She exercises with enough effort to increase her heart rate 3 or less days per week.   She is taking medications regularly.      {ALERT  Recent PHQ-9/EPDS score indicates suicidal ideations, document follow-up within the A/P section of the note :905986}{Provider Documentation  Link to [facility]-SS[facility] ([facility]) Flowsheet :889106}  {MA/LPN/RN Pre-Provider Visit Orders- hCG/UA/Strep (Optional):589178}  {SUPERLIST (Optional):596492}  {additonal problems for provider to add (Optional):179776}    {ROS Picklists (Optional):950409}      Objective    /78 (BP Location: Left arm, Patient Position: Sitting, Cuff Size: Adult Regular)   Pulse 69   Temp 97.8  F (36.6  C) (Temporal)   Resp 16   Ht 1.524 m (5')   Wt 53.5 kg (118 lb)   SpO2 98%   BMI 23.05 kg/m    Body mass index is 23.05 kg/m .  Physical Exam   {Exam List (Optional):885841}    {Diagnostic Test Results (Optional):082916}        Signed Electronically by: Brie Acevedo MD  {Email feedback regarding this note to  [email or web address]   :729144}

## 2025-04-01 NOTE — PROGRESS NOTES
[facility]  [address]  [facility] [address]  Phone: [phone]  Fax: [phone]    Patient:  [place], Date of birth 1950  Date of Visit:  04/01/2025  Referring Provider No ref. provider found  Reason for visit: Follow-up after starting Jardiance       Assessment & Plan     Type 2 diabetes mellitus with diabetic autonomic neuropathy, without long-term current use of insulin (H)  Tolerating Jardiance well without significant side effects.  Will check basic metabolic panel today and plan to increase from 10 mg to 25 mg if kidney function is stable.  Remain off glipizide.  Encourage continued efforts at healthy lifestyle habits.  - Basic metabolic panel; Future  - Basic metabolic panel    Moderate recurrent major depression (H)  Endorsed passive suicidal ideation noted, denies suicidal intent or thoughts of self-harm.  Describes primarily frustration related to impaired mobility.  Encouraged continued efforts to improve mobility, anticipate further recovery.    Age-related osteoporosis without current pathological fracture  Order placed for follow-up bone density scan.    Rash  Refill provided of triamcinolone cream to apply to upper buttocks region.  Notify me with inadequate effect.  - triamcinolone (KENALOG) 0.1 % external cream; Apply topically 2 times daily. To affected areas.  Do not use longer than 10 days.      She will continue to follow-up with orthopedics, discussed change in gait with them and with physical therapy, encouraged continued efforts at home exercises.    The longitudinal plan of care for the diagnosis(es)/condition(s) as documented were addressed during this visit. Due to the added complexity in care, I will continue to support Hai in the subsequent management and with ongoing continuity of care.                     Reynaldo Valles is a 74 year old female who presents for Follow Up  History of Present Illness    - Medication Adjustment: Hai  [place], 74-year-old female, recently started on Jardiance (gliflozin) and reports it is more effective and gentler on blood sugar levels compared to previous medication, glipizide. No significant side effects noted from Jardiance; possible decrease in urination frequency at night.    - Skin Condition: Occasionally experiences itching that leads to small bumps, previously treated with triamcinolone cream.    - Respiratory Issue: Reports a persistent cough that improves with allergy medication (cetirizine).    - Leg Fracture: Healing from a leg fracture; X-ray indicates progress, but not fully healed. Difficulty with left foot not laying flat when walking, a condition present before the fracture but worsened post-injury. Has attended two physical therapy sessions for leg issues, but feels there is little improvement.         Pertinent history obtain from: patient and patient's child(mark)    Objective     Vital signs:  /78 (BP Location: Left arm, Patient Position: Sitting, Cuff Size: Adult Regular)   Pulse 69   Temp 97.8  F (36.6  C) (Temporal)   Resp 16   Ht 1.524 m (5')   Wt 53.5 kg (118 lb)   SpO2 98%   BMI 23.05 kg/m    Blood pressure 139/78, pulse 69, temperature 97.8  F (36.6  C), temperature source Temporal, resp. rate 16, height 1.524 m (5'), weight 53.5 kg (118 lb), SpO2 98%, not currently breastfeeding.  Physical Exam    Alert and pleasant.  Appropriate affect.  Mucous membranes moist.  Heart with regular rate rhythm.  Lungs clear.  AFO of lower extremities bilaterally.  No edema.            Laboratory data and imaging listed below was reviewed prior to this encounter.             Consent was obtained from the patient to use an AI documentation tool in the creation of  this note          Answers submitted by the patient for this visit:  Patient Health Questionnaire (Submitted on 4/1/2025)  PHQ9 TOTAL SCORE: Incomplete  Diabetes Visit (Submitted on 4/1/2025)  Chief Complaint: Chronic problems  general questions HPI Form  Frequency of checking blood sugars:: one time daily  What time of day are you checking your blood sugars : before meals  Have you had any blood sugars above 200?: No  Have you had any blood sugars below 70?: No  Hypoglycemia symptoms:: shakiness  Diabetic concerns:: none  Paraesthesia present:: numbness in feet, burning in feet, redness, sores, or blisters on feet  Have you had a diabetic eye exam within the last year?: No  General Questionnaire (Submitted on 4/1/2025)  Chief Complaint: Chronic problems general questions HPI Form  How many servings of fruits and vegetables do you eat daily?: 2-3  On average, how many sweetened beverages do you drink each day (Examples: soda, juice, sweet tea, etc.  Do NOT count diet or artificially sweetened beverages)?: 0  How many minutes a day do you exercise enough to make your heart beat faster?: 9 or less  How many days a week do you exercise enough to make your heart beat faster?: 3 or less  How many days per week do you miss taking your medication?: 0  Questionnaire about: Chronic problems general questions HPI Form (Submitted on 4/1/2025)  Chief Complaint: Chronic problems general questions HPI Form

## 2025-04-01 NOTE — PATIENT INSTRUCTIONS
We will check kidney function and electrolytes today.  If all looks good, we will plan to increase empagliflozin to the full dose of 25 mg daily.  We will call you with the results.    Continue to work with orthopedics as you heal from your fracture.  Be sure to continue physical therapy, and talk to them about the change in your foot position with walking.    I sent a refill of triamcinolone cream to help with your itching of your backside to use as needed.  Do not use it on your face.    Your blood pressure looks great today!    I recommend following up with me in 2, but reach out to me if you have any questions or concerns in the meantime.

## 2025-04-04 ENCOUNTER — TELEPHONE (OUTPATIENT)
Dept: FAMILY MEDICINE | Age: 75
End: 2025-04-04

## 2025-04-08 ENCOUNTER — TRANSFERRED RECORDS (OUTPATIENT)
Dept: HEALTH INFORMATION MANAGEMENT | Facility: CLINIC | Age: 75
End: 2025-04-08
Payer: COMMERCIAL

## 2025-04-09 DIAGNOSIS — E78.5 DYSLIPIDEMIA ASSOCIATED WITH TYPE 2 DIABETES MELLITUS  (CMD): ICD-10-CM

## 2025-04-09 DIAGNOSIS — N18.30 HYPERTENSIVE HEART AND KIDNEY DISEASE WITH HEART FAILURE AND WITH CHRONIC KIDNEY DISEASE STAGE III  (CMD): ICD-10-CM

## 2025-04-09 DIAGNOSIS — N18.30 BENIGN HYPERTENSION WITH CKD (CHRONIC KIDNEY DISEASE) STAGE III  (CMD): ICD-10-CM

## 2025-04-09 DIAGNOSIS — E03.9 ACQUIRED HYPOTHYROIDISM: ICD-10-CM

## 2025-04-09 DIAGNOSIS — I12.9 BENIGN HYPERTENSION WITH CKD (CHRONIC KIDNEY DISEASE) STAGE III  (CMD): ICD-10-CM

## 2025-04-09 DIAGNOSIS — I13.0 HYPERTENSIVE HEART AND KIDNEY DISEASE WITH HEART FAILURE AND WITH CHRONIC KIDNEY DISEASE STAGE III  (CMD): ICD-10-CM

## 2025-04-09 DIAGNOSIS — E11.42 TYPE 2 DIABETES MELLITUS WITH DIABETIC POLYNEUROPATHY, WITHOUT LONG-TERM CURRENT USE OF INSULIN (CMD): ICD-10-CM

## 2025-04-09 DIAGNOSIS — D64.9 ANEMIA, UNSPECIFIED TYPE: ICD-10-CM

## 2025-04-09 DIAGNOSIS — I25.10 CORONARY ARTERY DISEASE INVOLVING NATIVE CORONARY ARTERY OF NATIVE HEART WITHOUT ANGINA PECTORIS: ICD-10-CM

## 2025-04-09 DIAGNOSIS — E11.69 DYSLIPIDEMIA ASSOCIATED WITH TYPE 2 DIABETES MELLITUS  (CMD): ICD-10-CM

## 2025-04-09 DIAGNOSIS — I25.2 HISTORY OF NON-ST ELEVATION MYOCARDIAL INFARCTION (NSTEMI): Primary | ICD-10-CM

## 2025-04-09 RX ORDER — ASPIRIN 81 MG/1
81 TABLET, CHEWABLE ORAL DAILY
Qty: 90 TABLET | Refills: 1 | Status: SHIPPED | OUTPATIENT
Start: 2025-04-09

## 2025-04-09 RX ORDER — CLOPIDOGREL BISULFATE 75 MG/1
75 TABLET ORAL DAILY
Qty: 90 TABLET | Refills: 1 | Status: SHIPPED | OUTPATIENT
Start: 2025-04-09

## 2025-04-09 RX ORDER — METOPROLOL TARTRATE 25 MG/1
25 TABLET, FILM COATED ORAL EVERY 12 HOURS SCHEDULED
Qty: 180 TABLET | Refills: 1 | Status: SHIPPED | OUTPATIENT
Start: 2025-04-09

## 2025-04-09 RX ORDER — LOSARTAN POTASSIUM 50 MG/1
50 TABLET ORAL DAILY
Qty: 180 TABLET | Refills: 1 | Status: SHIPPED | OUTPATIENT
Start: 2025-04-09

## 2025-04-09 RX ORDER — METFORMIN HYDROCHLORIDE 500 MG/1
2000 TABLET, EXTENDED RELEASE ORAL
Qty: 180 TABLET | Refills: 1 | Status: SHIPPED | OUTPATIENT
Start: 2025-04-09

## 2025-04-09 RX ORDER — LEVOTHYROXINE SODIUM 25 UG/1
25 TABLET ORAL
Qty: 90 TABLET | Refills: 1 | Status: SHIPPED | OUTPATIENT
Start: 2025-04-09

## 2025-04-09 RX ORDER — ATORVASTATIN CALCIUM 80 MG/1
80 TABLET, FILM COATED ORAL DAILY
Qty: 90 TABLET | Refills: 1 | Status: SHIPPED | OUTPATIENT
Start: 2025-04-09

## 2025-05-01 DIAGNOSIS — E11.43 TYPE 2 DIABETES MELLITUS WITH DIABETIC AUTONOMIC NEUROPATHY, WITHOUT LONG-TERM CURRENT USE OF INSULIN (H): ICD-10-CM

## 2025-05-01 RX ORDER — EMPAGLIFLOZIN 10 MG/1
10 TABLET, FILM COATED ORAL DAILY
Qty: 90 TABLET | Refills: 0 | Status: SHIPPED | OUTPATIENT
Start: 2025-05-01

## 2025-05-02 RX ORDER — LANCETS
EACH MISCELLANEOUS
COMMUNITY
Start: 2025-04-22

## 2025-05-05 ENCOUNTER — ALLIED HEALTH/NURSE VISIT (OUTPATIENT)
Dept: FAMILY MEDICINE | Facility: CLINIC | Age: 75
End: 2025-05-05
Payer: COMMERCIAL

## 2025-05-05 DIAGNOSIS — E53.8 VITAMIN B12 DEFICIENCY (NON ANEMIC): Primary | ICD-10-CM

## 2025-05-05 PROCEDURE — 96372 THER/PROPH/DIAG INJ SC/IM: CPT | Performed by: FAMILY MEDICINE

## 2025-05-05 PROCEDURE — 99207 PR NO CHARGE NURSE ONLY: CPT

## 2025-05-05 RX ADMIN — CYANOCOBALAMIN 1000 MCG: 1000 INJECTION, SOLUTION INTRAMUSCULAR; SUBCUTANEOUS at 08:01

## 2025-05-06 ENCOUNTER — ANCILLARY PROCEDURE (OUTPATIENT)
Dept: GENERAL RADIOLOGY | Facility: CLINIC | Age: 75
End: 2025-05-06
Attending: PODIATRIST
Payer: COMMERCIAL

## 2025-05-06 ENCOUNTER — OFFICE VISIT (OUTPATIENT)
Dept: URGENT CARE | Facility: URGENT CARE | Age: 75
End: 2025-05-06
Payer: COMMERCIAL

## 2025-05-06 ENCOUNTER — OFFICE VISIT (OUTPATIENT)
Dept: PODIATRY | Facility: CLINIC | Age: 75
End: 2025-05-06
Attending: FAMILY MEDICINE
Payer: COMMERCIAL

## 2025-05-06 VITALS
BODY MASS INDEX: 23.16 KG/M2 | HEIGHT: 60 IN | OXYGEN SATURATION: 98 % | SYSTOLIC BLOOD PRESSURE: 136 MMHG | DIASTOLIC BLOOD PRESSURE: 87 MMHG | RESPIRATION RATE: 16 BRPM | TEMPERATURE: 98.9 F | WEIGHT: 118 LBS | HEART RATE: 86 BPM

## 2025-05-06 DIAGNOSIS — M21.372 FOOT DROP, LEFT: ICD-10-CM

## 2025-05-06 DIAGNOSIS — M67.472 GANGLION CYST OF LEFT FOOT: ICD-10-CM

## 2025-05-06 DIAGNOSIS — M21.372 FOOT DROP, LEFT: Primary | ICD-10-CM

## 2025-05-06 DIAGNOSIS — N30.01 ACUTE CYSTITIS WITH HEMATURIA: Primary | ICD-10-CM

## 2025-05-06 DIAGNOSIS — S82.892A ANKLE FRACTURE, LEFT, CLOSED, INITIAL ENCOUNTER: ICD-10-CM

## 2025-05-06 LAB
ALBUMIN UR-MCNC: NEGATIVE MG/DL
APPEARANCE UR: ABNORMAL
BACTERIA #/AREA URNS HPF: ABNORMAL /HPF
BILIRUB UR QL STRIP: NEGATIVE
COLOR UR AUTO: YELLOW
GLUCOSE UR STRIP-MCNC: 500 MG/DL
HGB UR QL STRIP: ABNORMAL
KETONES UR STRIP-MCNC: NEGATIVE MG/DL
LEUKOCYTE ESTERASE UR QL STRIP: ABNORMAL
NITRATE UR QL: NEGATIVE
PH UR STRIP: 7 [PH] (ref 5–8)
RBC #/AREA URNS AUTO: ABNORMAL /HPF
SP GR UR STRIP: 1.01 (ref 1–1.03)
SQUAMOUS #/AREA URNS AUTO: ABNORMAL /LPF
UROBILINOGEN UR STRIP-ACNC: 0.2 E.U./DL
WBC #/AREA URNS AUTO: ABNORMAL /HPF

## 2025-05-06 PROCEDURE — 73590 X-RAY EXAM OF LOWER LEG: CPT | Mod: TC | Performed by: RADIOLOGY

## 2025-05-06 PROCEDURE — 3075F SYST BP GE 130 - 139MM HG: CPT | Performed by: PHYSICIAN ASSISTANT

## 2025-05-06 PROCEDURE — 3079F DIAST BP 80-89 MM HG: CPT | Performed by: PHYSICIAN ASSISTANT

## 2025-05-06 PROCEDURE — 99213 OFFICE O/P EST LOW 20 MIN: CPT | Performed by: PHYSICIAN ASSISTANT

## 2025-05-06 PROCEDURE — 87086 URINE CULTURE/COLONY COUNT: CPT | Performed by: PHYSICIAN ASSISTANT

## 2025-05-06 PROCEDURE — 73610 X-RAY EXAM OF ANKLE: CPT | Mod: LT | Performed by: PODIATRIST

## 2025-05-06 PROCEDURE — 73630 X-RAY EXAM OF FOOT: CPT | Mod: LT | Performed by: PODIATRIST

## 2025-05-06 PROCEDURE — 81001 URINALYSIS AUTO W/SCOPE: CPT | Performed by: PHYSICIAN ASSISTANT

## 2025-05-06 RX ORDER — CEPHALEXIN 500 MG/1
500 CAPSULE ORAL 2 TIMES DAILY
Qty: 14 CAPSULE | Refills: 0 | Status: SHIPPED | OUTPATIENT
Start: 2025-05-06 | End: 2025-05-13

## 2025-05-06 NOTE — PROGRESS NOTES
Urgent Care Clinic Visit    Chief Complaint   Patient presents with    UTI     Has burning urinating

## 2025-05-06 NOTE — LETTER
5/6/2025      Hai [place]  [address]  [place] [facility] [address]      Dear Colleague,    Thank you for referring your patient, [place]ai [place], to the [facility]. Please see a copy of my visit note below.          FOOT AND ANKLE SURGERY/PODIATRY CONSULT NOTE         ASSESSMENT:   Ganglion cyst left foot  Ankle fracture left   Foot drop bilaterally        TREATMENT:  - I discussed with the patient and her son today that she does have a semimobile soft tissue mass along the lateral base of the fifth metatarsal on the left foot consistent with a ganglion cyst.  We discussed treatment options including needle aspiration and surgical excision.    -I was able to review previous left tib/fib x-rays from December which indicate a proximal fibular fracture consistent with a Maisonneuve fracture.  I have referred her for updated tibia/fib x-rays.    -No pain to palpation along lateral malleolus on exam today.  Patient does complain of discomfort with ambulation at this location.  Referred for left ankle x-rays and foot x-rays.    -I have also referred the patient for MRI of the left foot for further evaluation of possible ganglion cyst lateral fifth metatarsal base.    -I will contact the patient with the MRI and x-ray reports when available and we will be guided by the results.     -Patient's questions invited and answered. She was encouraged to call my office with any further questions or concerns.     -I recommend patient schedule with urgent care immediately following her visit to address UTI symptoms.    30 minutes spent on the day of encounter doing chart review, history and exam, documentation, and further activities as noted.     Luis Enrique Sauceda DPM  [facility] Podiatry/Foot & Ankle Surgery      HPI: I was asked to see Hai [place] today for left lower extremity concerns.  Her son is present today and acting as an .  Patient developed foot drop over the past 2 to 3 years  and has been using an AFO.  She has noticed what appears to be a soft tissue mass along the lateral left foot.  She also developed an ankle fracture several weeks ago and was seen at [facility] and no surgical intervention recommended.  She does have mild pain with ambulation along the distal left fibula.  Past medical history significant for type 2 diabetes.  Patient also complaining of UTI symptoms.    Past Medical History:   Diagnosis Date     Anemia      Cervical cancer (H)      Cholelithiasis      Diabetes mellitus, type II (H)      Hyperlipidemia      Postoperative ileus (H)      Small bowel obstruction (H)      Vision problems          Social History     Socioeconomic History     Marital status:      Spouse name: Not on file     Number of children: Not on file     Years of education: Not on file     Highest education level: Not on file   Occupational History     Not on file   Tobacco Use     Smoking status: Never     Smokeless tobacco: Never   Vaping Use     Vaping status: Never Used   Substance and Sexual Activity     Alcohol use: No     Drug use: No     Sexual activity: Not on file   Other Topics Concern     Not on file   Social History Narrative    Lives with her family.     Social Drivers of Health     Financial Resource Strain: Low Risk  (11/29/2023)    Financial Resource Strain      Within the past 12 months, have you or your family members you live with been unable to get utilities (heat, electricity) when it was really needed?: No   Food Insecurity: Low Risk  (11/29/2023)    Food Insecurity      Within the past 12 months, did you worry that your food would run out before you got money to buy more?: No      Within the past 12 months, did the food you bought just not last and you didn t have money to get more?: No   Transportation Needs: Low Risk  (11/29/2023)    Transportation Needs      Within the past 12 months, has lack of transportation kept you from medical appointments, getting  your medicines, non-medical meetings or appointments, work, or from getting things that you need?: No   Physical Activity: Not on file   Stress: Not on file   Social Connections: Not on file   Interpersonal Safety: Low Risk  (2/24/2025)    Interpersonal Safety      Do you feel physically and emotionally safe where you currently live?: Yes      Within the past 12 months, have you been hit, slapped, kicked or otherwise physically hurt by someone?: No      Within the past 12 months, have you been humiliated or emotionally abused in other ways by your partner or ex-partner?: No   Housing Stability: Low Risk  (11/29/2023)    Housing Stability      Do you have housing? : Yes      Are you worried about losing your housing?: No            Allergies   Allergen Reactions     Cymbalta [Duloxetine] Unknown     Nausea and vomiting         MEDICATIONS:   Current Outpatient Medications   Medication Sig Dispense Refill     aspirin 81 MG EC tablet Take 81 mg by mouth every evening       blood glucose (NO BRAND SPECIFIED) lancets standard Use to test blood sugar 1  times daily or as directed. 100 lancet. 3     blood glucose (NO BRAND SPECIFIED) test strip Use to test blood sugar 1 time daily or as directed. To accompany: Blood Glucose Monitor Brands: ACCU-CHEK LOIDA plus meter 100 strip 11     blood glucose monitoring (NO BRAND SPECIFIED) meter device kit Use to test blood sugar 2 times daily or as directed. Preferred blood glucose meter OR supplies to accompany: Blood Glucose Monitor Brands: per insurance. 1 kit 0     blood glucose monitoring (SOFTCLIX) lancets USE 1 EACH TO TEST BLOOD GLUCOSE TWICE DAILY       Calcium Carb-Cholecalciferol 500-10 MG-MCG CHEW CHEW 1 TABLET TWICE DAILY 180 tablet 3     cetirizine (ZYRTEC) 10 MG tablet Take 1 tablet (10 mg) by mouth daily. 90 tablet 3     Cholecalciferol (D3-1000) 25 MCG (1000 UT) CAPS Take 1 capsule by mouth daily 90 capsule 2     empagliflozin (JARDIANCE) 10 MG TABS tablet TAKE 1  TABLET(10 MG) BY MOUTH DAILY 90 tablet 0     losartan (COZAAR) 25 MG tablet TAKE 1 TABLET(25 MG) BY MOUTH DAILY 90 tablet 2     metFORMIN (GLUCOPHAGE) 1000 MG tablet TAKE 1 TABLET(1000 MG) BY MOUTH TWICE DAILY WITH MEALS 180 tablet 0     simvastatin (ZOCOR) 40 MG tablet TAKE 1 TABLET(40 MG) BY MOUTH AT BEDTIME 90 tablet 2     triamcinolone (KENALOG) 0.1 % external cream Apply topically 2 times daily. To affected areas.  Do not use longer than 10 days. 30 g 1     Current Facility-Administered Medications   Medication Dose Route Frequency Provider Last Rate Last Admin     cyanocobalamin injection 1,000 mcg  1,000 mcg Intramuscular Q30 Days    1,000 mcg at 05/05/25 0801     cyanocobalamin injection 1,000 mcg  1,000 mcg Intramuscular Q30 Days Erasmo Sanders, PharmD   1,000 mcg at 04/01/25 0907        No family history on file.       Review of Systems - 10 point Review of Systems is negative except for soft tissue mass which is noted in HPI.    OBJECTIVE:  Appearance: alert, well appearing, and in no distress.    VITAL SIGNS: There were no vitals taken for this visit.      General appearance: Patient is alert and fully cooperative with history & exam.  No sign of distress is noted during the visit.     Psychiatric: Affect is pleasant & appropriate.  Patient appears motivated to improve health.     Respiratory: Breathing is regular & unlabored while sitting.     HEENT: Hearing is intact to spoken word.  Speech is clear.  No gross evidence of visual impairment that would impact ambulation.      Vascular: Dorsalis pedis and posterior tibial pulses are palpable. There is pedal hair growth left.  CFT < 3 sec from anterior tibial surface to distal digits left. There is no appreciable edema noted.  Dermatologic: Turgor and texture are within normal limits. No coloration or temperature changes. No primary or secondary lesions noted.  Neurologic: Absent to light touch left lower extremity.   Musculoskeletal: Flaccid left foot,  no active dorsiflexion/plantar flexion. No pain along left lower extremity. Semi mobile soft tissue mass along lateral base of 5th metatarsal.         Again, thank you for allowing me to participate in the care of your patient.        Sincerely,        Luis Enrique Sauceda DPM    Electronically signed

## 2025-05-06 NOTE — PROGRESS NOTES
FOOT AND ANKLE SURGERY/PODIATRY CONSULT NOTE         ASSESSMENT:   Ganglion cyst left foot  Ankle fracture left   Foot drop bilaterally        TREATMENT:  - I discussed with the patient and her son today that she does have a semimobile soft tissue mass along the lateral base of the fifth metatarsal on the left foot consistent with a ganglion cyst.  We discussed treatment options including needle aspiration and surgical excision.    -I was able to review previous left tib/fib x-rays from December which indicate a proximal fibular fracture consistent with a Maisonneuve fracture.  I have referred her for updated tibia/fib x-rays.    -No pain to palpation along lateral malleolus on exam today.  Patient does complain of discomfort with ambulation at this location.  Referred for left ankle x-rays and foot x-rays.    -I have also referred the patient for MRI of the left foot for further evaluation of possible ganglion cyst lateral fifth metatarsal base.    -I will contact the patient with the MRI and x-ray reports when available and we will be guided by the results.     -Patient's questions invited and answered. She was encouraged to call my office with any further questions or concerns.     -I recommend patient schedule with urgent care immediately following her visit to address UTI symptoms.    30 minutes spent on the day of encounter doing chart review, history and exam, documentation, and further activities as noted.     Luis Enrique Sauceda DPM  [facility] Podiatry/Foot & Ankle Surgery      HPI: I was asked to see Hai Meade today for left lower extremity concerns.  Her son is present today and acting as an .  Patient developed foot drop over the past 2 to 3 years and has been using an AFO.  She has noticed what appears to be a soft tissue mass along the lateral left foot.  She also developed an ankle fracture several weeks ago and was seen at [facility] and no surgical intervention  recommended.  She does have mild pain with ambulation along the distal left fibula.  Past medical history significant for type 2 diabetes.  Patient also complaining of UTI symptoms.    Past Medical History:   Diagnosis Date    Anemia     Cervical cancer (H)     Cholelithiasis     Diabetes mellitus, type II (H)     Hyperlipidemia     Postoperative ileus (H)     Small bowel obstruction (H)     Vision problems          Social History     Socioeconomic History    Marital status:      Spouse name: Not on file    Number of children: Not on file    Years of education: Not on file    Highest education level: Not on file   Occupational History    Not on file   Tobacco Use    Smoking status: Never    Smokeless tobacco: Never   Vaping Use    Vaping status: Never Used   Substance and Sexual Activity    Alcohol use: No    Drug use: No    Sexual activity: Not on file   Other Topics Concern    Not on file   Social History Narrative    Lives with her family.     Social Drivers of Health     Financial Resource Strain: Low Risk  (11/29/2023)    Financial Resource Strain     Within the past 12 months, have you or your family members you live with been unable to get utilities (heat, electricity) when it was really needed?: No   Food Insecurity: Low Risk  (11/29/2023)    Food Insecurity     Within the past 12 months, did you worry that your food would run out before you got money to buy more?: No     Within the past 12 months, did the food you bought just not last and you didn t have money to get more?: No   Transportation Needs: Low Risk  (11/29/2023)    Transportation Needs     Within the past 12 months, has lack of transportation kept you from medical appointments, getting your medicines, non-medical meetings or appointments, work, or from getting things that you need?: No   Physical Activity: Not on file   Stress: Not on file   Social Connections: Not on file   Interpersonal Safety: Low Risk  (2/24/2025)    Interpersonal  Safety     Do you feel physically and emotionally safe where you currently live?: Yes     Within the past 12 months, have you been hit, slapped, kicked or otherwise physically hurt by someone?: No     Within the past 12 months, have you been humiliated or emotionally abused in other ways by your partner or ex-partner?: No   Housing Stability: Low Risk  (11/29/2023)    Housing Stability     Do you have housing? : Yes     Are you worried about losing your housing?: No            Allergies   Allergen Reactions    Cymbalta [Duloxetine] Unknown     Nausea and vomiting         MEDICATIONS:   Current Outpatient Medications   Medication Sig Dispense Refill    aspirin 81 MG EC tablet Take 81 mg by mouth every evening      blood glucose (NO BRAND SPECIFIED) lancets standard Use to test blood sugar 1  times daily or as directed. 100 lancet. 3    blood glucose (NO BRAND SPECIFIED) test strip Use to test blood sugar 1 time daily or as directed. To accompany: Blood Glucose Monitor Brands: ACCU-CHEK LOIDA plus meter 100 strip 11    blood glucose monitoring (NO BRAND SPECIFIED) meter device kit Use to test blood sugar 2 times daily or as directed. Preferred blood glucose meter OR supplies to accompany: Blood Glucose Monitor Brands: per insurance. 1 kit 0    blood glucose monitoring (SOFTCLIX) lancets USE 1 EACH TO TEST BLOOD GLUCOSE TWICE DAILY      Calcium Carb-Cholecalciferol 500-10 MG-MCG CHEW CHEW 1 TABLET TWICE DAILY 180 tablet 3    cetirizine (ZYRTEC) 10 MG tablet Take 1 tablet (10 mg) by mouth daily. 90 tablet 3    Cholecalciferol (D3-1000) 25 MCG (1000 UT) CAPS Take 1 capsule by mouth daily 90 capsule 2    empagliflozin (JARDIANCE) 10 MG TABS tablet TAKE 1 TABLET(10 MG) BY MOUTH DAILY 90 tablet 0    losartan (COZAAR) 25 MG tablet TAKE 1 TABLET(25 MG) BY MOUTH DAILY 90 tablet 2    metFORMIN (GLUCOPHAGE) 1000 MG tablet TAKE 1 TABLET(1000 MG) BY MOUTH TWICE DAILY WITH MEALS 180 tablet 0    simvastatin (ZOCOR) 40 MG tablet TAKE  1 TABLET(40 MG) BY MOUTH AT BEDTIME 90 tablet 2    triamcinolone (KENALOG) 0.1 % external cream Apply topically 2 times daily. To affected areas.  Do not use longer than 10 days. 30 g 1     Current Facility-Administered Medications   Medication Dose Route Frequency Provider Last Rate Last Admin    cyanocobalamin injection 1,000 mcg  1,000 mcg Intramuscular Q30 Days    1,000 mcg at 05/05/25 0801    cyanocobalamin injection 1,000 mcg  1,000 mcg Intramuscular Q30 Days Erasmo Sanders, PharmD   1,000 mcg at 04/01/25 0907        No family history on file.       Review of Systems - 10 point Review of Systems is negative except for soft tissue mass which is noted in HPI.    OBJECTIVE:  Appearance: alert, well appearing, and in no distress.    VITAL SIGNS: There were no vitals taken for this visit.      General appearance: Patient is alert and fully cooperative with history & exam.  No sign of distress is noted during the visit.     Psychiatric: Affect is pleasant & appropriate.  Patient appears motivated to improve health.     Respiratory: Breathing is regular & unlabored while sitting.     HEENT: Hearing is intact to spoken word.  Speech is clear.  No gross evidence of visual impairment that would impact ambulation.      Vascular: Dorsalis pedis and posterior tibial pulses are palpable. There is pedal hair growth left.  CFT < 3 sec from anterior tibial surface to distal digits left. There is no appreciable edema noted.  Dermatologic: Turgor and texture are within normal limits. No coloration or temperature changes. No primary or secondary lesions noted.  Neurologic: Absent to light touch left lower extremity.   Musculoskeletal: Flaccid left foot, no active dorsiflexion/plantar flexion. No pain along left lower extremity. Semi mobile soft tissue mass along lateral base of 5th metatarsal.

## 2025-05-06 NOTE — PROGRESS NOTES
Assessment & Plan:      Problem List Items Addressed This Visit    None  Visit Diagnoses       Acute cystitis with hematuria    -  Primary    Relevant Medications    cephALEXin (KEFLEX) 500 MG capsule    Other Relevant Orders    UA Macroscopic with reflex to Microscopic and Culture - Clinic Collect (Completed)    Urine Microscopic Exam (Completed)    Urine Culture          Medical Decision Making  Patient presents with 1 week of dysuria.  Urinalysis is consistent with acute cystitis.  Low concern for pyelonephritis.  Will treat patient with oral antibiotics.  Discussed treatment and symptomatic care.  Allergies and medication interactions reviewed.  Discussed signs of worsening symptoms and when to follow-up with PCP if no symptom improvement.     Subjective:      History provided by the patient.  She is here with her son who is helping translate.  Hai Meade is a 74 year old female here for evaluation of dysuria.  Onset of symptoms was 1 week ago.  No fevers or emesis.     The following portions of the patient's history were reviewed and updated as appropriate: allergies, current medications, and problem list.     Review of Systems  Pertinent items are noted in HPI.    Allergies  Allergies   Allergen Reactions    Cymbalta [Duloxetine] Unknown     Nausea and vomiting       No family history on file.    Social History     Tobacco Use    Smoking status: Never    Smokeless tobacco: Never   Substance Use Topics    Alcohol use: No        Objective:      /87   Pulse 86   Temp 98.9  F (37.2  C) (Oral)   Resp 16   Ht 1.524 m (5')   Wt 53.5 kg (118 lb)   SpO2 98%   BMI 23.05 kg/m    General appearance - alert, well appearing, and in no distress and non-toxic     Lab & Imaging Results    Results for orders placed or performed in visit on 05/06/25   UA Macroscopic with reflex to Microscopic and Culture - Clinic Collect     Status: Abnormal    Specimen: Urine, Clean Catch   Result Value Ref Range    Color Urine  Yellow Colorless, Straw, Light Yellow, Yellow    Appearance Urine Slightly Cloudy (A) Clear    Glucose Urine 500 (A) Negative mg/dL    Bilirubin Urine Negative Negative    Ketones Urine Negative Negative mg/dL    Specific Gravity Urine 1.010 1.005 - 1.030    Blood Urine Small (A) Negative    pH Urine 7.0 5.0 - 8.0    Protein Albumin Urine Negative Negative mg/dL    Urobilinogen Urine 0.2 0.2, 1.0 E.U./dL    Nitrite Urine Negative Negative    Leukocyte Esterase Urine Large (A) Negative   Urine Microscopic Exam     Status: Abnormal   Result Value Ref Range    Bacteria Urine Many (A) None Seen /HPF    RBC Urine 2-5 (A) 0-2 /HPF /HPF    WBC Urine  (A) 0-5 /HPF /HPF    Squamous Epithelials Urine Few (A) None Seen /LPF       I personally reviewed these results and discussed findings with the patient.    The use of Dragon/Webspy dictation services was used to construct the content of this note; any grammatical errors are non-intentional. Please contact the author directly if you are in need of any clarification.        Stable

## 2025-05-07 ENCOUNTER — RESULTS FOLLOW-UP (OUTPATIENT)
Dept: URGENT CARE | Facility: URGENT CARE | Age: 75
End: 2025-05-07

## 2025-05-07 LAB — BACTERIA UR CULT: NORMAL

## 2025-05-16 ENCOUNTER — HOSPITAL ENCOUNTER (OUTPATIENT)
Dept: MRI IMAGING | Facility: CLINIC | Age: 75
Discharge: HOME | End: 2025-05-16
Attending: PODIATRIST | Admitting: PODIATRIST
Payer: COMMERCIAL

## 2025-05-16 DIAGNOSIS — S82.892A ANKLE FRACTURE, LEFT, CLOSED, INITIAL ENCOUNTER: ICD-10-CM

## 2025-05-16 DIAGNOSIS — M67.472 GANGLION CYST OF LEFT FOOT: ICD-10-CM

## 2025-05-16 PROCEDURE — 73721 MRI JNT OF LWR EXTRE W/O DYE: CPT | Mod: LT

## 2025-05-16 PROCEDURE — 73718 MRI LOWER EXTREMITY W/O DYE: CPT | Mod: LT

## 2025-05-19 ENCOUNTER — TELEPHONE (OUTPATIENT)
Dept: VASCULAR SURGERY | Facility: CLINIC | Age: 75
End: 2025-05-19
Payer: COMMERCIAL

## 2025-05-19 ENCOUNTER — RESULTS FOLLOW-UP (OUTPATIENT)
Dept: PODIATRY | Facility: CLINIC | Age: 75
End: 2025-05-19

## 2025-05-19 NOTE — TELEPHONE ENCOUNTER
LMTCB with  to scheduled VV to go over MRI report. [phone]  --------------------------------------------------------------------------------------  Shelby, Mao S, [facility] Sched[facility] (Selected Message)        5/19/25  8:01 AM  Please schedule this in podiatry clinic tomorrow.      Luis Enrique Sauceda DPM to Podiatry Support Pool      5/19/25  7:35 AM  Result Note  Please schedule video visit to review MRI report.  Thank you  MR Ankle Left w/o Contrast

## 2025-05-23 DIAGNOSIS — E11.42 TYPE 2 DIABETES MELLITUS WITH DIABETIC POLYNEUROPATHY, WITHOUT LONG-TERM CURRENT USE OF INSULIN (CMD): ICD-10-CM

## 2025-05-23 RX ORDER — METFORMIN HYDROCHLORIDE 500 MG/1
2000 TABLET, EXTENDED RELEASE ORAL
Qty: 360 TABLET | Refills: 1 | Status: SHIPPED | OUTPATIENT
Start: 2025-05-23

## 2025-05-27 ENCOUNTER — VIRTUAL VISIT (OUTPATIENT)
Dept: PODIATRY | Facility: CLINIC | Age: 75
End: 2025-05-27
Payer: COMMERCIAL

## 2025-05-27 DIAGNOSIS — S82.892A ANKLE FRACTURE, LEFT, CLOSED, INITIAL ENCOUNTER: Primary | ICD-10-CM

## 2025-05-27 PROCEDURE — 98014 SYNCH AUDIO-ONLY EST MOD 30: CPT | Performed by: PODIATRIST

## 2025-05-27 PROCEDURE — 1125F AMNT PAIN NOTED PAIN PRSNT: CPT | Mod: 93 | Performed by: PODIATRIST

## 2025-05-27 ASSESSMENT — PAIN SCALES - GENERAL: PAINLEVEL_OUTOF10: SEVERE PAIN (7)

## 2025-05-27 NOTE — PROGRESS NOTES
Virtual Visit Details    Type of service:  Telephone Visit   Phone call duration: 24 minutes   Originating Location (pt. Location): Home    Distant Location (provider location):  On-site  Telephone visit completed due to the patient did not have access to video, while the distant provider did.    MRI left foot/ankle: 1.  Lateral high ankle injury with small nondisplaced avulsion fracture of the anterior lateral tibial plafond at the AITFL attachment site with associated sprain. In addition, there is partial tear of the PITFL.     2.  Nondisplaced posterior malleolus fracture at the attachment site of the PTFL which shows low grade sprain.     3.  Nondisplaced fracture lateral process of the talus.     4.  High grade tear superficial and deep fibers of the deltoid.     5.  Degeneration versus partial tear of the ATFL and CFL.     6.  Redundancy versus laxity of the posterior tibialis tendon without a full-thickness tear. This is nonspecific and may be related to stretch injury or positional changes.     7.  Mild peroneal brevis and longus tendinopathy without tear.     8.  Considerable soft tissue fluid and swelling about the ankle. There are tibiotalar and subtalar effusions.     9.  Complex thick-walled fluid collection measuring up to 2.2 cm in the soft tissues adjacent to the base of the 5th metatarsal is nonspecific, but favored to represent an adventitial bursa or complex cyst. While no contrast was given on this study, a   solid lesion is felt to be unlikely given overall appearance.     10.  Fatty atrophy intrinsic musculature.    - I reviewed the above MRI report with the patient via  today which indicates nondisplaced fracture of the posterior malleolus and lateral process of the talus.  Also indicates the presence of a cystic lesion along the base of the fifth metatarsal.  Based on current presentation I recommend a CT scan for evaluation of nonunion at both locations of the ankle and talus.  If  nonunion is present which is likely the case as the original fracture was in December of last year, I would recommend use of a bone stimulator with limited walking in a cam boot which we discussed today.  Patient reports that she does not want to remove the cystic lesion along the base of the fifth metatarsal.  All questions invited and answered today.  I will plan to contact the patient with the CT scan report when available and we will be guided by the results.

## 2025-05-27 NOTE — LETTER
5/27/2025      Hai Meade  [address]  [facility] [address]      Dear Colleague,    Thank you for referring your patient, Hai Meade, to the [facility]. Please see a copy of my visit note below.    Virtual Visit Details    Type of service:  Telephone Visit   Phone call duration: 24 minutes   Originating Location (pt. Location): Home    Distant Location (provider location):  On-site  Telephone visit completed due to the patient did not have access to video, while the distant provider did.    MRI left foot/ankle: 1.  Lateral high ankle injury with small nondisplaced avulsion fracture of the anterior lateral tibial plafond at the AITFL attachment site with associated sprain. In addition, there is partial tear of the PITFL.     2.  Nondisplaced posterior malleolus fracture at the attachment site of the PTFL which shows low grade sprain.     3.  Nondisplaced fracture lateral process of the talus.     4.  High grade tear superficial and deep fibers of the deltoid.     5.  Degeneration versus partial tear of the ATFL and CFL.     6.  Redundancy versus laxity of the posterior tibialis tendon without a full-thickness tear. This is nonspecific and may be related to stretch injury or positional changes.     7.  Mild peroneal brevis and longus tendinopathy without tear.     8.  Considerable soft tissue fluid and swelling about the ankle. There are tibiotalar and subtalar effusions.     9.  Complex thick-walled fluid collection measuring up to 2.2 cm in the soft tissues adjacent to the base of the 5th metatarsal is nonspecific, but favored to represent an adventitial bursa or complex cyst. While no contrast was given on this study, a   solid lesion is felt to be unlikely given overall appearance.     10.  Fatty atrophy intrinsic musculature.    - I reviewed the above MRI report with the patient via  today which indicates nondisplaced fracture of the posterior malleolus and  lateral process of the talus.  Also indicates the presence of a cystic lesion along the base of the fifth metatarsal.  Based on current presentation I recommend a CT scan for evaluation of nonunion at both locations of the ankle and talus.  If nonunion is present which is likely the case as the original fracture was in December of last year, I would recommend use of a bone stimulator with limited walking in a cam boot which we discussed today.  Patient reports that she does not want to remove the cystic lesion along the base of the fifth metatarsal.  All questions invited and answered today.  I will plan to contact the patient with the CT scan report when available and we will be guided by the results.    Again, thank you for allowing me to participate in the care of your patient.        Sincerely,        Luis Enrique Sauceda DPM    Electronically signed

## 2025-05-27 NOTE — NURSING NOTE
Current patient location: [facility] [address] [place] [place] [address]    Is the patient currently in the state of [place]? YES    Visit mode: TELEPHONE    If the visit is dropped, the patient can be reconnected by:TELEPHONE VISIT: Phone number:   Telephone Information:   Mobile [phone]       Will anyone else be joining the visit? YES: How would they like to receive their invitation? Text to cell phone:  and spouse of patient   (If patient encounters technical issues they should call [phone]    Are changes needed to the allergy or medication list? No    Are refills needed on medications prescribed by this physician? NO    Rooming Documentation:  Questionnaire(s) completed    Reason for visit: RECHECK    Amanda NEUMANNF

## 2025-05-28 DIAGNOSIS — E11.43 TYPE 2 DIABETES MELLITUS WITH DIABETIC AUTONOMIC NEUROPATHY, WITHOUT LONG-TERM CURRENT USE OF INSULIN (H): ICD-10-CM

## 2025-05-28 RX ORDER — GLIPIZIDE 2.5 MG/1
2.5 TABLET, EXTENDED RELEASE ORAL
Qty: 90 TABLET | Refills: 4 | OUTPATIENT
Start: 2025-05-28

## 2025-06-03 ENCOUNTER — OFFICE VISIT (OUTPATIENT)
Dept: FAMILY MEDICINE | Facility: CLINIC | Age: 75
End: 2025-06-03
Payer: COMMERCIAL

## 2025-06-03 VITALS
DIASTOLIC BLOOD PRESSURE: 84 MMHG | WEIGHT: 118.4 LBS | OXYGEN SATURATION: 97 % | SYSTOLIC BLOOD PRESSURE: 126 MMHG | BODY MASS INDEX: 23.25 KG/M2 | HEIGHT: 60 IN | TEMPERATURE: 97.8 F | RESPIRATION RATE: 16 BRPM | HEART RATE: 82 BPM

## 2025-06-03 DIAGNOSIS — E87.1 HYPONATREMIA: ICD-10-CM

## 2025-06-03 DIAGNOSIS — S82.892K CLOSED FRACTURE OF LEFT ANKLE WITH NONUNION, SUBSEQUENT ENCOUNTER: ICD-10-CM

## 2025-06-03 DIAGNOSIS — E11.43 TYPE 2 DIABETES MELLITUS WITH DIABETIC AUTONOMIC NEUROPATHY, WITHOUT LONG-TERM CURRENT USE OF INSULIN (H): Primary | ICD-10-CM

## 2025-06-03 DIAGNOSIS — M67.472 GANGLION CYST OF LEFT FOOT: ICD-10-CM

## 2025-06-03 LAB
ANION GAP SERPL CALCULATED.3IONS-SCNC: 13 MMOL/L (ref 7–15)
BUN SERPL-MCNC: 8.2 MG/DL (ref 8–23)
CALCIUM SERPL-MCNC: 9.7 MG/DL (ref 8.8–10.4)
CHLORIDE SERPL-SCNC: 94 MMOL/L (ref 98–107)
CREAT SERPL-MCNC: 0.57 MG/DL (ref 0.51–0.95)
EGFRCR SERPLBLD CKD-EPI 2021: >90 ML/MIN/1.73M2
EST. AVERAGE GLUCOSE BLD GHB EST-MCNC: 177 MG/DL
GLUCOSE SERPL-MCNC: 121 MG/DL (ref 70–99)
HBA1C MFR BLD: 7.8 % (ref 0–5.6)
HCO3 SERPL-SCNC: 23 MMOL/L (ref 22–29)
HOLD SPECIMEN: NORMAL
POTASSIUM SERPL-SCNC: 4.1 MMOL/L (ref 3.4–5.3)
SODIUM SERPL-SCNC: 130 MMOL/L (ref 135–145)

## 2025-06-03 PROCEDURE — 96372 THER/PROPH/DIAG INJ SC/IM: CPT | Performed by: PHARMACIST

## 2025-06-03 PROCEDURE — 36415 COLL VENOUS BLD VENIPUNCTURE: CPT | Performed by: FAMILY MEDICINE

## 2025-06-03 PROCEDURE — 80048 BASIC METABOLIC PNL TOTAL CA: CPT | Performed by: FAMILY MEDICINE

## 2025-06-03 PROCEDURE — 83036 HEMOGLOBIN GLYCOSYLATED A1C: CPT | Performed by: FAMILY MEDICINE

## 2025-06-03 RX ORDER — GLIPIZIDE 2.5 MG/1
2.5 TABLET, EXTENDED RELEASE ORAL DAILY
Qty: 90 TABLET | Refills: 4 | Status: SHIPPED | OUTPATIENT
Start: 2025-06-03

## 2025-06-03 RX ADMIN — CYANOCOBALAMIN 1000 MCG: 1000 INJECTION, SOLUTION INTRAMUSCULAR; SUBCUTANEOUS at 08:28

## 2025-06-03 ASSESSMENT — PATIENT HEALTH QUESTIONNAIRE - PHQ9
SUM OF ALL RESPONSES TO PHQ QUESTIONS 1-9: 2
SUM OF ALL RESPONSES TO PHQ QUESTIONS 1-9: 2
10. IF YOU CHECKED OFF ANY PROBLEMS, HOW DIFFICULT HAVE THESE PROBLEMS MADE IT FOR YOU TO DO YOUR WORK, TAKE CARE OF THINGS AT HOME, OR GET ALONG WITH OTHER PEOPLE: NOT DIFFICULT AT ALL

## 2025-06-03 ASSESSMENT — PAIN SCALES - GENERAL: PAINLEVEL_OUTOF10: NO PAIN (0)

## 2025-06-03 NOTE — PATIENT INSTRUCTIONS
Remain off Jardiance.  I have sent a refill of glipizide XL 2.5 mg to take once daily.  Continue metformin 1000 mg twice daily.  Follow-up with me in 3 months to reassess your diabetes.    Please schedule a diabetes eye exam.  Dr. Freddy Claudio across the pat[place] is great, and I have also placed a referral to [facility].  Check to make sure that the provider you see is under your insurance.    I placed a formal referral to [facility] orth[facility] for and additional opinion regarding your left ankle fracture.

## 2025-06-04 ENCOUNTER — PATIENT OUTREACH (OUTPATIENT)
Dept: CARE COORDINATION | Facility: CLINIC | Age: 75
End: 2025-06-04
Payer: COMMERCIAL

## 2025-06-06 ENCOUNTER — RESULTS FOLLOW-UP (OUTPATIENT)
Dept: FAMILY MEDICINE | Facility: CLINIC | Age: 75
End: 2025-06-06

## 2025-06-10 ENCOUNTER — HOSPITAL ENCOUNTER (OUTPATIENT)
Dept: CT IMAGING | Facility: HOSPITAL | Age: 75
Discharge: HOME | End: 2025-06-10
Attending: PODIATRIST
Payer: COMMERCIAL

## 2025-06-10 ENCOUNTER — RESULTS FOLLOW-UP (OUTPATIENT)
Dept: OTHER | Facility: CLINIC | Age: 75
End: 2025-06-10

## 2025-06-10 DIAGNOSIS — S82.892A ANKLE FRACTURE, LEFT, CLOSED, INITIAL ENCOUNTER: ICD-10-CM

## 2025-06-10 PROCEDURE — 73700 CT LOWER EXTREMITY W/O DYE: CPT | Mod: LT

## 2025-06-11 ENCOUNTER — VIRTUAL VISIT (OUTPATIENT)
Dept: VASCULAR SURGERY | Facility: CLINIC | Age: 75
End: 2025-06-11
Attending: PODIATRIST
Payer: COMMERCIAL

## 2025-06-11 VITALS — HEIGHT: 60 IN | BODY MASS INDEX: 23.16 KG/M2 | WEIGHT: 118 LBS

## 2025-06-11 DIAGNOSIS — S82.892A ANKLE FRACTURE, LEFT, CLOSED, INITIAL ENCOUNTER: Primary | ICD-10-CM

## 2025-06-11 PROCEDURE — 99207 PR NO BILLABLE SERVICE THIS VISIT: CPT | Performed by: PODIATRIST

## 2025-06-11 NOTE — NURSING NOTE
Current patient location: [facility] [address] [facility] [address]    Is the patient currently in the state of [place]? YES    Visit mode: TELEPHONE    If the visit is dropped, the patient can be reconnected by:TELEPHONE VISIT: Phone number: [phone]     Will anyone else be joining the visit? NO  (If patient encounters technical issues they should call [phone])    Are changes needed to the allergy or medication list? No    Patients daughter denies any changes and states that all information remains accurate since last reviewed/verified.   No changes that aware of per pts daughter    Are refills needed on medications prescribed by this physician? NO, does not believe so per pts daughter     Rooming Documentation:  Not applicable    Reason for visit: VERONICA Shaw MA VVF

## 2025-06-11 NOTE — PATIENT INSTRUCTIONS
Please continue to wear your CAM boot and remain limited walking.     We will order a BoneStim for you.

## 2025-06-11 NOTE — PROGRESS NOTES
Virtual Visit Details    Type of service:  Telephone Visit   Phone call duration: 11 minutes   Originating Location (pt. Location): Home    Distant Location (provider location):  On-site  Telephone visit completed due to the patient did not have access to video, while the distant provider did.    CT left ankle:1.  Healed fracture of the posterior malleolus.  2.  Partially healed avulsion fracture of the anterolateral margin of the tibial plafond.  3.  No new or acute fracture.  4.  Previous fibular shaft fracture is not included on this exam.  5.  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 Ankle joint effusion.    -I reviewed the CT scan report of the left ankle with the patient today via  which indicates a partially healed avulsion fracture of the tibial plafond.  Posterior malleolus appears to have healed.    - Based on the above, I recommend use of a bone stimulator at this time to attempt bony healing.  Recommend limited ambulation with a cam boot on the left ankle.    -My office to coordinate bone stimulator dispensing.     - All questions invited and answered.  Patient to follow-up in clinic in approximately 6 to 7 weeks for evaluation.

## 2025-06-18 ENCOUNTER — TRANSFERRED RECORDS (OUTPATIENT)
Dept: HEALTH INFORMATION MANAGEMENT | Facility: CLINIC | Age: 75
End: 2025-06-18
Payer: COMMERCIAL

## 2025-07-03 ENCOUNTER — ALLIED HEALTH/NURSE VISIT (OUTPATIENT)
Dept: FAMILY MEDICINE | Facility: CLINIC | Age: 75
End: 2025-07-03
Payer: COMMERCIAL

## 2025-07-03 DIAGNOSIS — E53.8 VITAMIN B 12 DEFICIENCY: Primary | ICD-10-CM

## 2025-07-03 RX ADMIN — CYANOCOBALAMIN 1000 MCG: 1000 INJECTION, SOLUTION INTRAMUSCULAR; SUBCUTANEOUS at 07:54

## 2025-07-03 NOTE — PROGRESS NOTES
Prior to immunization administration, verified patients identity using patient s name and date of birth. Please see Immunization Activity for additional information.     Screening Questionnaire for Adult Immunization    Are you sick today?   No   Do you have allergies to medications, food, a vaccine component or latex?   No   Have you ever had a serious reaction after receiving a vaccination?   No   Do you have a long-term health problem with heart, lung, kidney, or metabolic disease (e.g., diabetes), asthma, a blood disorder, no spleen, complement component deficiency, a cochlear implant, or a spinal fluid leak?  Are you on long-term aspirin therapy?   No   Do you have cancer, leukemia, HIV/AIDS, or any other immune system problem?   No   Do you have a parent, brother, or sister with an immune system problem?   No   In the past 3 months, have you taken medications that affect  your immune system, such as prednisone, other steroids, or anticancer drugs; drugs for the treatment of rheumatoid arthritis, Crohn s disease, or psoriasis; or have you had radiation treatments?   No   Have you had a seizure, or a brain or other nervous system problem?   No   During the past year, have you received a transfusion of blood or blood    products, or been given immune (gamma) globulin or antiviral drug?   No   For women: Are you pregnant or is there a chance you could become       pregnant during the next month?   No   Have you received any vaccinations in the past 4 weeks?   No         Immunization questionnaire answers were all negative.    I have reviewed the following standing orders: Not Applicable; Order were already placed prior to ancillary visit    Patient instructed to remain in clinic for 15 minutes afterwards, and to report any adverse reactions.     Screening performed by Talha Sosa MA on 7/3/2025 at 7:54 AM.

## 2025-07-17 ENCOUNTER — CASE MANAGEMENT (OUTPATIENT)
Dept: CARE COORDINATION | Age: 75
End: 2025-07-17

## 2025-07-18 ENCOUNTER — CASE MANAGEMENT (OUTPATIENT)
Dept: CARE COORDINATION | Age: 75
End: 2025-07-18

## 2025-07-18 SDOH — ECONOMIC STABILITY: HOUSING INSECURITY

## 2025-07-18 SDOH — ECONOMIC STABILITY: HOUSING INSECURITY: WHAT IS YOUR LIVING SITUATION TODAY?: I HAVE A STEADY PLACE TO LIVE

## 2025-07-18 SDOH — ECONOMIC STABILITY: GENERAL

## 2025-07-18 SDOH — ECONOMIC STABILITY: FOOD INSECURITY: FOOD INSECURITY: NO

## 2025-07-18 SDOH — ECONOMIC STABILITY: HOUSING INSECURITY: WHAT IS YOUR LIVING SITUATION TODAY?: PRIVATE RESIDENCE

## 2025-07-18 SDOH — ECONOMIC STABILITY: HOUSING INSECURITY: DO YOU HAVE STABLE HOUSING?: STABLE

## 2025-07-18 ASSESSMENT — ACTIVITIES OF DAILY LIVING (ADL)
DME_IN_USE: WALKER;CONTINUOUS GLUCOSE MONITOR
TOILETING: WITH DEVICE
AMBULATION: WITH DEVICE
EATING: INDEPENDENT
BATHING: INDEPENDENT
MOBILITY: GAIT IMPAIRMENT
GROOMING: INDEPENDENT
DRESSING: INDEPENDENT
FUNCTIONAL_EVALUATION: PERFORMS ADLS WITH ASSISTANCE

## 2025-08-11 ENCOUNTER — CASE MANAGEMENT (OUTPATIENT)
Dept: CARE COORDINATION | Age: 75
End: 2025-08-11

## 2025-08-14 ENCOUNTER — CASE MANAGEMENT (OUTPATIENT)
Dept: CARE COORDINATION | Age: 75
End: 2025-08-14

## 2025-08-19 ENCOUNTER — CASE MANAGEMENT (OUTPATIENT)
Dept: CARE COORDINATION | Age: 75
End: 2025-08-19

## 2025-08-20 ENCOUNTER — APPOINTMENT (OUTPATIENT)
Dept: FAMILY MEDICINE | Age: 75
End: 2025-08-20

## 2025-08-28 DIAGNOSIS — E11.43 TYPE 2 DIABETES MELLITUS WITH DIABETIC AUTONOMIC NEUROPATHY, WITHOUT LONG-TERM CURRENT USE OF INSULIN (H): ICD-10-CM

## 2025-08-28 DIAGNOSIS — R21 RASH AND NONSPECIFIC SKIN ERUPTION: ICD-10-CM

## 2025-08-28 RX ORDER — CETIRIZINE HYDROCHLORIDE 10 MG/1
10 TABLET ORAL DAILY
Qty: 90 TABLET | Refills: 3 | Status: SHIPPED | OUTPATIENT
Start: 2025-08-28

## 2025-09-03 ENCOUNTER — OFFICE VISIT (OUTPATIENT)
Dept: FAMILY MEDICINE | Facility: CLINIC | Age: 75
End: 2025-09-03
Payer: COMMERCIAL

## 2025-09-03 VITALS
HEIGHT: 60 IN | WEIGHT: 119 LBS | SYSTOLIC BLOOD PRESSURE: 120 MMHG | RESPIRATION RATE: 16 BRPM | HEART RATE: 85 BPM | OXYGEN SATURATION: 98 % | BODY MASS INDEX: 23.36 KG/M2 | TEMPERATURE: 98 F | DIASTOLIC BLOOD PRESSURE: 72 MMHG

## 2025-09-03 DIAGNOSIS — E87.1 HYPONATREMIA: ICD-10-CM

## 2025-09-03 DIAGNOSIS — E11.43 TYPE 2 DIABETES MELLITUS WITH DIABETIC AUTONOMIC NEUROPATHY, WITHOUT LONG-TERM CURRENT USE OF INSULIN (H): Primary | ICD-10-CM

## 2025-09-03 LAB
ANION GAP SERPL CALCULATED.3IONS-SCNC: 11 MMOL/L (ref 7–15)
BUN SERPL-MCNC: 7.3 MG/DL (ref 8–23)
CALCIUM SERPL-MCNC: 9.9 MG/DL (ref 8.8–10.4)
CHLORIDE SERPL-SCNC: 95 MMOL/L (ref 98–107)
CREAT SERPL-MCNC: 0.59 MG/DL (ref 0.51–0.95)
EGFRCR SERPLBLD CKD-EPI 2021: >90 ML/MIN/1.73M2
EST. AVERAGE GLUCOSE BLD GHB EST-MCNC: 174 MG/DL
GLUCOSE SERPL-MCNC: 141 MG/DL (ref 70–99)
HBA1C MFR BLD: 7.7 % (ref 0–5.6)
HCO3 SERPL-SCNC: 25 MMOL/L (ref 22–29)
POTASSIUM SERPL-SCNC: 4.7 MMOL/L (ref 3.4–5.3)
SODIUM SERPL-SCNC: 131 MMOL/L (ref 135–145)

## 2025-09-03 PROCEDURE — 36415 COLL VENOUS BLD VENIPUNCTURE: CPT | Performed by: FAMILY MEDICINE

## 2025-09-03 PROCEDURE — 80048 BASIC METABOLIC PNL TOTAL CA: CPT | Performed by: FAMILY MEDICINE

## 2025-09-03 PROCEDURE — 83036 HEMOGLOBIN GLYCOSYLATED A1C: CPT | Performed by: FAMILY MEDICINE

## 2025-09-03 RX ORDER — CETIRIZINE HYDROCHLORIDE 10 MG/1
10 TABLET ORAL DAILY PRN
Status: SHIPPED
Start: 2025-09-03

## 2025-09-03 RX ORDER — GLIPIZIDE 2.5 MG/1
2.5 TABLET, EXTENDED RELEASE ORAL DAILY
Qty: 90 TABLET | Refills: 4 | Status: SHIPPED | OUTPATIENT
Start: 2025-09-03

## 2025-09-03 RX ADMIN — CYANOCOBALAMIN 1000 MCG: 1000 INJECTION, SOLUTION INTRAMUSCULAR; SUBCUTANEOUS at 09:05

## 2025-09-03 ASSESSMENT — PAIN SCALES - GENERAL: PAINLEVEL_OUTOF10: NO PAIN (0)

## 2025-09-03 ASSESSMENT — PATIENT HEALTH QUESTIONNAIRE - PHQ9
SUM OF ALL RESPONSES TO PHQ QUESTIONS 1-9: 3
SUM OF ALL RESPONSES TO PHQ QUESTIONS 1-9: 3
10. IF YOU CHECKED OFF ANY PROBLEMS, HOW DIFFICULT HAVE THESE PROBLEMS MADE IT FOR YOU TO DO YOUR WORK, TAKE CARE OF THINGS AT HOME, OR GET ALONG WITH OTHER PEOPLE: SOMEWHAT DIFFICULT

## 2025-10-09 ENCOUNTER — APPOINTMENT (OUTPATIENT)
Dept: FAMILY MEDICINE | Age: 75
End: 2025-10-09

## 2025-10-15 ENCOUNTER — APPOINTMENT (OUTPATIENT)
Dept: CARDIOLOGY | Age: 75
End: 2025-10-15

## (undated) DEVICE — SET ATSBWL 225ML ATLGS HCT LOW VOLUME DISP CELL SAVER 5+

## (undated) DEVICE — PROBE DPLR 20MHZ CRV LF STRL VTI DISP

## (undated) DEVICE — GLOVE SURG 6 PROTEXIS PI MIC LF CRM PF SMTH BEAD CUFF STRL

## (undated) DEVICE — HEMOSTAT ABS BIONERT ARISTA AH MPH PLANT STRH 3GM STRL

## (undated) DEVICE — SUTURE PROLENE 4-0 SH 36IN 2 ARM MONO NABSB BLUE 8521H

## (undated) DEVICE — SURESTEP POST INSERTION FOLEY CARE WIPES

## (undated) DEVICE — DEVICE BLWR 16.5CM AXIUS CO2 MIST MALLEABLE SHAFT

## (undated) DEVICE — HOLDER INST 23X19CM 8 PCKT SURGI-KIT STRL LF DISP

## (undated) DEVICE — DRAPE 2 INCS FILM ANTIMICROBIAL 23X17IN SURG IOBAN STRL

## (undated) DEVICE — SUTURE ETHIBOND EXCEL 0 CTX 18IN POLY CNTRL BRAID 8 STRN CX31D

## (undated) DEVICE — DRAPE ADH STRIP SM TWL MATTE FNSH 17X11IN SURG STRDRP STRL

## (undated) DEVICE — ADAPTER CRDPLG Y MALE FEMALE LL CLR CD CLAMP 7.5IN DLP 3IN

## (undated) DEVICE — GLOVE SURG 6.5 PROTEXIS PI MIC LF CRM PF SMTH BEAD CUFF STRL

## (undated) DEVICE — BANDAGE CMPR HOOK LOCK 5YDX6IN 2 HOOK CLSR KNIT ELASTIC LOOP

## (undated) DEVICE — TUBING SMKEVC 10FT 78IN INTGR WAND ADPR SPNG GRD 8IN STRL

## (undated) DEVICE — SYSTEM WND IRR IRRISEPT DBRD CLNSG 0.05% CHG STRL LF

## (undated) DEVICE — CONNECTOR 2:1 SIL CARDIAC DRN 4 CHNL RADOPQ SOLID CORE CNTR

## (undated) DEVICE — DRESSING ADH 14X4IN ABS NADH FILM ISLAND NONWOVEN KENDALL

## (undated) DEVICE — SET ANTGRD RTRGD CRDPLG Y DISP

## (undated) DEVICE — ELECTRODE ESURG BLADE 2.75IN .2IN 332IN INSULATE STD SHAFT

## (undated) DEVICE — PROBE SURG 8CM STD 1-1.5MM VASC STREAMLINE BULB FLXB SHAFT

## (undated) DEVICE — KIT INTRO 6FR 21GA 10CM 45CM .021IN 35MM FLEX STRGT SHTH DIL

## (undated) DEVICE — CANNULA PRFSN 29-37FR 14.5IN STD OPEN LTHSE 2 STG WIRE

## (undated) DEVICE — SUTURE VCL+ 0 CTX 36IN BRAID ANBCTRL COAT ABS UNDYED VCP978H

## (undated) DEVICE — SYRINGE 50ML GRAD N-PYRG DEHP-FR PVC FREE STRL MED LF DISP

## (undated) DEVICE — CANNULA PRFSN 1MM 1.8IN BULB TIP FEMALE LL DLP IMA SS

## (undated) DEVICE — SUTURE PROLENE 2-0 MH 36IN 2 ARM MONO NABSB BLUE 8843H

## (undated) DEVICE — CONNECTOR TBG .50IN .38IN Y STRL INTERSEPT

## (undated) DEVICE — COVER RGD STRL LF LIGHT HNDL PLASTIC GRN

## (undated) DEVICE — SUTURE MONOCRYL + MTPS 4-0 PS2 27IN MONO ABS

## (undated) DEVICE — INSERT CLAMP STLTH 5 90MM LATIS STRL LF DISP

## (undated) DEVICE — CLIP SURG 6MM 2 SOFTJAW SPRG DISP FGRTY SOFTJAW

## (undated) DEVICE — TRANSDUCER PRSS MNTR TRUWAVE 1 WAY SHTOF BOND 3W VENT STPCK

## (undated) DEVICE — CATHETER BARDEX 18FR 16IN INTMT ROBINSON MODEL URTH LTX

## (undated) DEVICE — DECANTER FLUID 9IN SET BAG

## (undated) DEVICE — CABLE PT WHT 1.8MR MYOLD DISP

## (undated) DEVICE — DRAPE SLUSH WRMR RND BASIN 66X44IN EQUIPMENT STRL

## (undated) DEVICE — KIT PRSS MNTR 72IN 3MLHR 3 BRCH 2 PRSS TRNDCR 3W STPCK ARM

## (undated) DEVICE — SUTURE 6 CCS 18IN SS MONO 4 STRN NABSB SLVR M654G

## (undated) DEVICE — SYSTEM VSL HRVT VSVW HEMOPRO STRL DISP

## (undated) DEVICE — CANNULA PRFSN 2IN 3MM FEMALE LL RADOPQ BLUNT TIP DLP VSL SS

## (undated) DEVICE — POUCH INST 11X7IN 2 ADH STRIP 2 CMPRT STRL STRDRP PLASTIC

## (undated) DEVICE — SENSOR PRFSN SARNS LVL DTCTR DISP

## (undated) DEVICE — BANDAGE CMPR HOOK-LOCK 5YDX4IN KNIT ELASTIC UNQ LOOP LOCK

## (undated) DEVICE — DRESSING WND TELFA 5X4IN ABS NADH FILM ISLAND NONWOVEN POLY

## (undated) DEVICE — SOLUTION IV 1000ML 0.9% NACL PVC PH 5 PLASTIC CNTNR PRSV FR

## (undated) DEVICE — SOLUTION IV 1000ML PLSMLT A PH 7.4 TYPE 1 VIAFLEX LF

## (undated) DEVICE — SUTURE PROLENE 6-0 C-1 30IN 2 ARM MONO BLUE EVP8706H

## (undated) DEVICE — CATHETER 6FR JL4 CRV 100CM RADOPQ BRAID SLCT SUP TRQ ANGIO

## (undated) DEVICE — ELECTRODE ESURG BLADE PNCL 10FT BTN SWH CORD HLSTR EDGE PVC

## (undated) DEVICE — DEVICE ATRICLIP GILLINOV-CSGRV STD EXCLUSION SYTEM SELECTION

## (undated) DEVICE — GLOVE SURG 7 PROTEXIS PI LF CRM PF BEAD CUFF STRL PLISPRN

## (undated) DEVICE — Device

## (undated) DEVICE — DRAPE REINF FAN FOLD 77X56IN 42X25IN SURG CNVRT ASTOUND STRL

## (undated) DEVICE — SET CELL SAVER 5+ ELITE ANCOAG ASP ATF TBG LF DISP

## (undated) DEVICE — CONNECTOR 1:1 SIL CARDIAC DRN 4 CHNL RADOPQ BLAKE 38.5MM

## (undated) DEVICE — CONTAINER SPEC 4OZ INDIV PK LEAK RST LID STKBL TMPR EVD SEAL

## (undated) DEVICE — KIT 20 X COAT DELPHIN PUMP

## (undated) DEVICE — DRAIN INCS 24FR RND 516IN SIL RADOPQ 4 FREE FLOW CHNL FULL

## (undated) DEVICE — SOLUTION ANTIFOG .212OZ FOAM PAD RADOPQ ADH BACK NTOX

## (undated) DEVICE — KIT SURG CARDIAC CPC DISP LF

## (undated) DEVICE — SUTURE PRMHND 2 60IN SILK BRAID TIES NABSB BLK SA8H

## (undated) DEVICE — BANDAGE ADH CURITY 3.75X2IN XL STRCH FABRIC 1 78INX1.25IN

## (undated) DEVICE — TRAY CATH CMP CR LUBRI-SIL IC STLK SURESTEP 16FR FOLEY URMTR

## (undated) DEVICE — PACK CVSA X-COATED WOUT OXY

## (undated) DEVICE — TIP SCT ARG YNKR 22FR 25CM VINYL SMTH EYE RGD OPEN STRL LF

## (undated) DEVICE — CATHETER 6FR JR4 CRV 100CM RADOPQ BRAID SUP TRQ ANGIO PU COR

## (undated) DEVICE — DRAIN INCS ATR OASIS PLASTIC CHEST THOR TUBE DRY SCT

## (undated) DEVICE — DRESSING TRANS 4.75X4IN ADH HPOAL WTPRF TEGADERM PU STD STRL

## (undated) DEVICE — SOLIDIFIER FLUID 1.3OZ CL FREE NS LF PLASTIC SOLIDIFY UPTO

## (undated) DEVICE — COVER RGD STRL LF CNVRT LIGHT HNDL PLASTIC DISP GRN

## (undated) DEVICE — INSERT RETRACTOR DDSH

## (undated) DEVICE — STRAP PSTN 60X3IN DEVON KN VELCRO 2023 LF DISP

## (undated) DEVICE — CATHETER DOVER ROBINSON 18FR 16IN 2 OPPS DRN EYE SMTH RND

## (undated) DEVICE — GUIDEWIRE STRT ROSEN 4CM 260CM 1.5MM RDS J CRV TPR .035IN

## (undated) DEVICE — GLOVE SURG 7.5 PROTEXIS ESTM LF CRM PF SMTH BEAD CUFF INTLK

## (undated) DEVICE — SET BLD COL 21GA 12IN VCTNR BD SFLOK TUBE WING PSHBTN BTRFLY

## (undated) DEVICE — SET 305CM MPS 3ND DELIVERY ARS AGENT CRTDG XTN LN HEAT

## (undated) DEVICE — CATHETER 6FR 145D PGTL CRV 110CM 6 SH RADOPQ BRAID SUP TRQ

## (undated) DEVICE — SPONGE LAPAROTOMY 18X18IN COTTON X-RAY DETECTABLE HIGHLY ABSORBENT PREWASH TUB SOFT PACK STERILE LATEX FREE DISPOSABLE

## (undated) DEVICE — CANNULA ART 21FR 14.5IN 38IN XTD BODY NONVENT CNCT SUT RING

## (undated) DEVICE — DECANTER FLUID 9IN BAG WHT LF

## (undated) DEVICE — 2% CHLORHEXIDINE SKIN PREP ORANGE 26ML

## (undated) DEVICE — KIT INTRO 9FR 18GA 10CM 6.35CM .035IN SHTH CATH GW NDL AFLX

## (undated) DEVICE — GLOVE SURG 6.5 PROTEXIS LF LIGHT BRN CHMO PF SMTH BEAD CUFF

## (undated) DEVICE — DRESSING TRANS 2.75INX2 38IN ADH HPOAL WTPRF TEGADERM PU

## (undated) DEVICE — SUTURE VCL+ 2-0 CT 36IN 90% GLYC 10% L-LACTIDE

## (undated) DEVICE — SUTURE PROLENE 4-0 RB1 .5 CIRLE 36IN PP 2 ARM MONO NON ABS 8557H

## (undated) DEVICE — SET TBG HFL SMOKE EVAC PNEUMOCLEAR

## (undated) DEVICE — SUTURE PROLENE 2-0 SH 36IN 2 ARM MONO NABSB BLUE 8523H

## (undated) DEVICE — SUTURE VCL+ 3-0 SH 27IN BRAID ANBCTRL COAT ABS UNDYED VCP416H

## (undated) DEVICE — RESERVOIR 150UM CELL SAVER 5+ ELITE SMARTSUCTION

## (undated) DEVICE — SUTURE PROLENE 5-0 RB1 36IN 2 ARM MONO NABSB BLUE 8556H

## (undated) DEVICE — LEAD PCNG STREAMLINE 4MM TEMP .7MM ATR ADULT STRL

## (undated) DEVICE — PUNCH 4.4MM LONG AOR

## (undated) DEVICE — CABLE PT BLUE 1.8MR MYOLD DISP

## (undated) DEVICE — SHEET TRNSF PREVALON AIRTAP GLIDE

## (undated) DEVICE — SUTURE PROLENE EVERPOINT 7-0 BV175-8 24IN 2 ARM MONO NABSB EP8747H